# Patient Record
Sex: MALE | Race: WHITE | NOT HISPANIC OR LATINO | Employment: FULL TIME | URBAN - METROPOLITAN AREA
[De-identification: names, ages, dates, MRNs, and addresses within clinical notes are randomized per-mention and may not be internally consistent; named-entity substitution may affect disease eponyms.]

---

## 2017-06-29 ENCOUNTER — OFFICE VISIT (OUTPATIENT)
Dept: FAMILY MEDICINE CLINIC | Facility: CLINIC | Age: 32
End: 2017-06-29

## 2017-06-29 VITALS
RESPIRATION RATE: 16 BRPM | OXYGEN SATURATION: 98 % | HEART RATE: 66 BPM | DIASTOLIC BLOOD PRESSURE: 94 MMHG | WEIGHT: 157.8 LBS | SYSTOLIC BLOOD PRESSURE: 146 MMHG | HEIGHT: 74 IN | BODY MASS INDEX: 20.25 KG/M2 | TEMPERATURE: 98.6 F

## 2017-06-29 DIAGNOSIS — R03.0 PRE-HYPERTENSION: Primary | ICD-10-CM

## 2017-06-29 DIAGNOSIS — F98.8 ADD (ATTENTION DEFICIT DISORDER): ICD-10-CM

## 2017-06-29 LAB
ALBUMIN SERPL-MCNC: 5 G/DL (ref 3.5–5.2)
ALBUMIN/GLOB SERPL: 2 G/DL
ALP SERPL-CCNC: 72 U/L (ref 39–117)
ALT SERPL-CCNC: 20 U/L (ref 1–41)
AST SERPL-CCNC: 22 U/L (ref 1–40)
BASOPHILS # BLD AUTO: 0.04 10*3/MM3 (ref 0–0.2)
BASOPHILS NFR BLD AUTO: 0.5 % (ref 0–1.5)
BILIRUB SERPL-MCNC: 0.4 MG/DL (ref 0.1–1.2)
BUN SERPL-MCNC: 12 MG/DL (ref 6–20)
BUN/CREAT SERPL: 13.3 (ref 7–25)
CALCIUM SERPL-MCNC: 9.7 MG/DL (ref 8.6–10.5)
CHLORIDE SERPL-SCNC: 99 MMOL/L (ref 98–107)
CO2 SERPL-SCNC: 28.6 MMOL/L (ref 22–29)
CREAT SERPL-MCNC: 0.9 MG/DL (ref 0.76–1.27)
EOSINOPHIL # BLD AUTO: 0.13 10*3/MM3 (ref 0–0.7)
EOSINOPHIL NFR BLD AUTO: 1.5 % (ref 0.3–6.2)
ERYTHROCYTE [DISTWIDTH] IN BLOOD BY AUTOMATED COUNT: 13.2 % (ref 11.5–14.5)
GLOBULIN SER CALC-MCNC: 2.5 GM/DL
GLUCOSE SERPL-MCNC: 104 MG/DL (ref 65–99)
HCT VFR BLD AUTO: 47.4 % (ref 40.4–52.2)
HGB BLD-MCNC: 15.8 G/DL (ref 13.7–17.6)
IMM GRANULOCYTES # BLD: 0 10*3/MM3 (ref 0–0.03)
IMM GRANULOCYTES NFR BLD: 0 % (ref 0–0.5)
LYMPHOCYTES # BLD AUTO: 1.87 10*3/MM3 (ref 0.9–4.8)
LYMPHOCYTES NFR BLD AUTO: 21.1 % (ref 19.6–45.3)
MCH RBC QN AUTO: 32.2 PG (ref 27–32.7)
MCHC RBC AUTO-ENTMCNC: 33.3 G/DL (ref 32.6–36.4)
MCV RBC AUTO: 96.7 FL (ref 79.8–96.2)
MONOCYTES # BLD AUTO: 0.55 10*3/MM3 (ref 0.2–1.2)
MONOCYTES NFR BLD AUTO: 6.2 % (ref 5–12)
NEUTROPHILS # BLD AUTO: 6.29 10*3/MM3 (ref 1.9–8.1)
NEUTROPHILS NFR BLD AUTO: 70.7 % (ref 42.7–76)
PLATELET # BLD AUTO: 255 10*3/MM3 (ref 140–500)
POTASSIUM SERPL-SCNC: 4.9 MMOL/L (ref 3.5–5.2)
PROT SERPL-MCNC: 7.5 G/DL (ref 6–8.5)
RBC # BLD AUTO: 4.9 10*6/MM3 (ref 4.6–6)
SODIUM SERPL-SCNC: 140 MMOL/L (ref 136–145)
TSH SERPL DL<=0.005 MIU/L-ACNC: 0.81 MIU/ML (ref 0.27–4.2)
WBC # BLD AUTO: 8.88 10*3/MM3 (ref 4.5–10.7)

## 2017-06-29 PROCEDURE — 99203 OFFICE O/P NEW LOW 30 MIN: CPT | Performed by: INTERNAL MEDICINE

## 2017-06-29 NOTE — PROGRESS NOTES
Subjective   Alvaro Morris is a 31 y.o. male. Patient is here today for establishing care as a new patient and wanting to get on Vyvanse for his ADD.  He had been on that for about 10 years ago.  He's been having increasing problems with concentration, staying on task.  He is generally been healthy.  His only operation was for hemorrhoids about a year ago and he denies any other problems.  He's not on any medications.  He is a smoker of tobacco and works as an .  He is feeling well and is had no chest pain, shortness of breath, edema or significant myalgias.  He says he just took an energy drink    Chief Complaint   Patient presents with   • Establish Care     NP   • ADD     PT WOULD LIKE TO DISCUSS POSSIBILITY OF ADD.           Vitals:    06/29/17 0959   BP: 146/94   Pulse: 66   Resp: 16   Temp: 98.6 °F (37 °C)   SpO2: 98%     The following portions of the patient's history were reviewed and updated as appropriate: allergies, current medications, past family history, past medical history, past social history, past surgical history and problem list.    Past Medical History:   Diagnosis Date   • Bursitis of elbow     right elbow   • Hemorrhoids       No Known Allergies   Social History     Social History   • Marital status:      Spouse name: N/A   • Number of children: N/A   • Years of education: N/A     Occupational History   • Not on file.     Social History Main Topics   • Smoking status: Current Every Day Smoker     Packs/day: 1.00     Years: 10.00   • Smokeless tobacco: Current User   • Alcohol use Yes      Comment: ON OCC   • Drug use: No   • Sexual activity: Not on file     Other Topics Concern   • Not on file     Social History Narrative      No current outpatient prescriptions on file.     Objective     History of Present Illness     Review of Systems   Constitutional: Negative.    HENT: Negative.    Eyes: Negative.    Respiratory: Negative.    Cardiovascular: Negative.     Gastrointestinal: Negative.    Genitourinary: Negative.    Musculoskeletal: Negative.    Skin: Negative.    Neurological: Negative.    Psychiatric/Behavioral: Positive for decreased concentration.       Physical Exam   Constitutional: He appears well-developed and well-nourished.   Pleasant, cooperative and in no distress but with a blood pressure 140/90 currently.  The patient says he just had a Monster energy drink   HENT:   Head: Normocephalic and atraumatic.   Eyes: Conjunctivae are normal.   Neck: Normal range of motion. Neck supple. No thyromegaly present.   Cardiovascular: Normal rate, regular rhythm and normal heart sounds.    Pulmonary/Chest: Effort normal and breath sounds normal. No respiratory distress. He has no wheezes. He has no rales.   Musculoskeletal: Normal range of motion. He exhibits no edema.   Neurological: He is alert.   Skin: Skin is warm and dry.   Psychiatric: He has a normal mood and affect. His behavior is normal.   Nursing note and vitals reviewed.      ASSESSMENT  patient appears stable and does give a good history of ADD with poor concentration and focus.  He has a prior history of being on the Vyvanse 50 mg.  Old records are not available.  His blood pressure is currently borderline high but he just had an energy drink, probably loaded with caffeine.     Problem List Items Addressed This Visit        Cardiovascular and Mediastinum    Pre-hypertension - Primary    Relevant Orders    CBC & Differential    Comprehensive Metabolic Panel    TSH       Other    ADD (attention deficit disorder)    Relevant Orders    TSH          PLAN  I'm going to have the patient try and watch energy drinks and monitor his blood pressure at home.  I will draw a CBC, CMP and TSH today and a plan on rechecking him in one week.  If labs and his blood pressure are okay, we can start trying the Vyvanse    There are no Patient Instructions on file for this visit.  Return in about 1 week (around 7/6/2017).

## 2017-07-06 ENCOUNTER — OFFICE VISIT (OUTPATIENT)
Dept: FAMILY MEDICINE CLINIC | Facility: CLINIC | Age: 32
End: 2017-07-06

## 2017-07-06 VITALS
HEIGHT: 74 IN | BODY MASS INDEX: 20.74 KG/M2 | OXYGEN SATURATION: 99 % | HEART RATE: 67 BPM | DIASTOLIC BLOOD PRESSURE: 76 MMHG | WEIGHT: 161.6 LBS | SYSTOLIC BLOOD PRESSURE: 152 MMHG | TEMPERATURE: 98.1 F

## 2017-07-06 DIAGNOSIS — I10 ESSENTIAL HYPERTENSION: ICD-10-CM

## 2017-07-06 DIAGNOSIS — F98.8 ADD (ATTENTION DEFICIT DISORDER): Primary | ICD-10-CM

## 2017-07-06 PROCEDURE — 99213 OFFICE O/P EST LOW 20 MIN: CPT | Performed by: INTERNAL MEDICINE

## 2017-07-06 RX ORDER — LISINOPRIL 5 MG/1
5 TABLET ORAL DAILY
Qty: 30 TABLET | Refills: 5 | Status: SHIPPED | OUTPATIENT
Start: 2017-07-06 | End: 2017-08-03 | Stop reason: SDUPTHER

## 2017-07-06 NOTE — PROGRESS NOTES
Cristhian Morris is a 31 y.o. male. Patient is here today for follow-up on laboratory studies and a recheck on his blood pressure.  He's been feeling fine.  He does have symptoms and a history of ADD and would like to get back on Vyvanse.  He formerly was on 50 mg.    Chief Complaint   Patient presents with   • ADD     PRE-HYPERTENSION FOLLOW UP          Vitals:    07/06/17 1401   BP: 152/76   Pulse: 67   Temp: 98.1 °F (36.7 °C)   SpO2: 99%     The following portions of the patient's history were reviewed and updated as appropriate: allergies, current medications, past family history, past medical history, past social history, past surgical history and problem list.    Past Medical History:   Diagnosis Date   • Bursitis of elbow     right elbow   • Hemorrhoids       No Known Allergies   Social History     Social History   • Marital status:      Spouse name: N/A   • Number of children: N/A   • Years of education: N/A     Occupational History   • Not on file.     Social History Main Topics   • Smoking status: Current Every Day Smoker     Packs/day: 1.00     Years: 10.00   • Smokeless tobacco: Current User   • Alcohol use Yes      Comment: ON OCC   • Drug use: No   • Sexual activity: Not on file     Other Topics Concern   • Not on file     Social History Narrative        Current Outpatient Prescriptions:   •  lisdexamfetamine (VYVANSE) 30 MG capsule, Take 1 capsule by mouth Every Morning., Disp: 30 capsule, Rfl: 0  •  lisinopril (PRINIVIL,ZESTRIL) 5 MG tablet, Take 1 tablet by mouth Daily., Disp: 30 tablet, Rfl: 5     Objective     History of Present Illness     Review of Systems   Constitutional: Negative.    HENT: Negative.    Eyes: Negative.    Respiratory: Negative.    Cardiovascular: Negative.    Gastrointestinal: Negative.    Genitourinary: Negative.    Musculoskeletal: Negative.    Skin: Negative.    Neurological: Negative.    Psychiatric/Behavioral: Negative.        Physical Exam    Constitutional: He appears well-developed and well-nourished.   Pleasant, cooperative and in no distress with a blood pressure of 140/70   HENT:   Head: Normocephalic and atraumatic.   Eyes: Conjunctivae are normal.   Neck: Normal range of motion. Neck supple.   Cardiovascular: Normal rate, regular rhythm and normal heart sounds.    Pulmonary/Chest: Effort normal and breath sounds normal. No respiratory distress. He has no wheezes. He has no rales.   Musculoskeletal: Normal range of motion.   Neurological: He is alert.   Skin: Skin is warm and dry.   Psychiatric: He has a normal mood and affect. His behavior is normal.   Nursing note and vitals reviewed.      ASSESSMENT  the patient CBC, CMP and TSH were normal.  His blood pressure continues high at 140/80.     Problem List Items Addressed This Visit        Cardiovascular and Mediastinum    Essential hypertension    Relevant Medications    lisinopril (PRINIVIL,ZESTRIL) 5 MG tablet       Other    ADD (attention deficit disorder) - Primary    Relevant Medications    lisdexamfetamine (VYVANSE) 30 MG capsule          PLAN  I'm going to start the patient on lisinopril 5 mg daily.  I'm also starting him on Vyvanse 30 mg daily.  I would like to recheck him in 3 weeks to see how his blood pressures doing to see how the dose of Vyvanse is doing.    There are no Patient Instructions on file for this visit.  Return in about 3 weeks (around 7/27/2017).

## 2017-07-18 ENCOUNTER — TELEPHONE (OUTPATIENT)
Dept: FAMILY MEDICINE CLINIC | Facility: CLINIC | Age: 32
End: 2017-07-18

## 2017-07-18 NOTE — TELEPHONE ENCOUNTER
FAXED BACK -[phone] AND CALLED AND ADVISED PT THAT I FAXED OVER.     ----- Message from Solange Conklin sent at 7/17/2017 10:32 AM EDT -----  PT CALLED THIS MORNING WANTING TO SEND A PAPER OVER FOR DR KEENE TO LOOK AT AND SIGN. AS OF NOW NOTHING FROM HIM IN THE FAX.  IF AND WHEN WE GET IT HE WOULD LIKE US TO FAX IT BACK AFTER IT GETS LOOKED FOR US TO FAX IT BACK [phone]        THANK YOU

## 2017-08-03 ENCOUNTER — OFFICE VISIT (OUTPATIENT)
Dept: FAMILY MEDICINE CLINIC | Facility: CLINIC | Age: 32
End: 2017-08-03

## 2017-08-03 VITALS
OXYGEN SATURATION: 99 % | SYSTOLIC BLOOD PRESSURE: 140 MMHG | WEIGHT: 158.8 LBS | HEART RATE: 65 BPM | TEMPERATURE: 98.1 F | HEIGHT: 74 IN | BODY MASS INDEX: 20.38 KG/M2 | DIASTOLIC BLOOD PRESSURE: 90 MMHG

## 2017-08-03 DIAGNOSIS — F98.8 ADD (ATTENTION DEFICIT DISORDER): ICD-10-CM

## 2017-08-03 DIAGNOSIS — I10 ESSENTIAL HYPERTENSION: Primary | ICD-10-CM

## 2017-08-03 PROCEDURE — 99213 OFFICE O/P EST LOW 20 MIN: CPT | Performed by: INTERNAL MEDICINE

## 2017-08-03 RX ORDER — LISINOPRIL 10 MG/1
10 TABLET ORAL DAILY
Qty: 30 TABLET | Refills: 5 | Status: SHIPPED | OUTPATIENT
Start: 2017-08-03 | End: 2017-09-12 | Stop reason: SDUPTHER

## 2017-08-03 NOTE — PROGRESS NOTES
Subjective   Alvaro Morris is a 31 y.o. male. Patient is here today for follow-up on his hypertension and ADD.  At the last visit he was started on Vyvanse 30 mg daily and has been continuing lisinopril 5 mg.  He has not checked his blood pressure at home but has been feeling better on the lisinopril.  He is not felt a whole lot from the Vyvanse thus far.  He's had no chest pain, shortness of breath, edema or significant myalgias    Chief Complaint   Patient presents with   • ADD     VYVANSE FOLLOW UP    • Hypertension     FOLLOW UP           Vitals:    08/03/17 0841   BP: 140/90   Pulse: 65   Temp: 98.1 °F (36.7 °C)   SpO2: 99%     The following portions of the patient's history were reviewed and updated as appropriate: allergies, current medications, past family history, past medical history, past social history, past surgical history and problem list.    Past Medical History:   Diagnosis Date   • Bursitis of elbow     right elbow   • Hemorrhoids       No Known Allergies   Social History     Social History   • Marital status:      Spouse name: N/A   • Number of children: N/A   • Years of education: N/A     Occupational History   • Not on file.     Social History Main Topics   • Smoking status: Current Every Day Smoker     Packs/day: 1.00     Years: 10.00   • Smokeless tobacco: Current User   • Alcohol use Yes      Comment: ON OCC   • Drug use: No   • Sexual activity: Not on file     Other Topics Concern   • Not on file     Social History Narrative        Current Outpatient Prescriptions:   •  lisdexamfetamine (VYVANSE) 40 MG capsule, Take 1 capsule by mouth Every Morning., Disp: 30 capsule, Rfl: 0  •  lisinopril (PRINIVIL,ZESTRIL) 10 MG tablet, Take 1 tablet by mouth Daily., Disp: 30 tablet, Rfl: 5     Objective     History of Present Illness     Review of Systems   Constitutional: Negative.    HENT: Negative.    Eyes: Negative.    Respiratory: Negative.    Cardiovascular: Negative.    Gastrointestinal:  Negative.    Genitourinary: Negative.    Musculoskeletal: Negative.    Skin: Negative.    Neurological: Negative.    Psychiatric/Behavioral: Negative.        Physical Exam   Constitutional: He appears well-developed and well-nourished.   Pleasant, cooperative and in no distress with a blood pressure of 135/80 and 130/80   HENT:   Head: Normocephalic and atraumatic.   Eyes: Conjunctivae are normal.   Neck: Normal range of motion. Neck supple. No thyromegaly present.   Cardiovascular: Normal rate, regular rhythm and normal heart sounds.    Pulmonary/Chest: Effort normal and breath sounds normal. No respiratory distress. He has no wheezes. He has no rales.   Musculoskeletal: Normal range of motion. He exhibits no edema.   Neurological: He is alert.   Skin: Skin is warm and dry.   Psychiatric: He has a normal mood and affect. His behavior is normal.   Nursing note and vitals reviewed.      ASSESSMENT  overall the patient seems stable.  His blood pressures improved but not optimal.  His ADD has not worsened but not appreciably improved with the Vyvanse.  He is noted no side effects however.  CBC was essentially normal drawn at the last visit.  CMP had a minimally increased sugar 104 but was nonfasting specimen.  TSH was normal.  His Nilson was reviewed and is appropriate.     Problem List Items Addressed This Visit        Cardiovascular and Mediastinum    Essential hypertension - Primary    Relevant Medications    lisinopril (PRINIVIL,ZESTRIL) 10 MG tablet       Other    ADD (attention deficit disorder)    Relevant Medications    lisdexamfetamine (VYVANSE) 40 MG capsule          PLAN  I'm increasing the patient's lisinopril to 10 mg daily for better blood pressure control and am increasing his Vyvanse to 40 mg daily.  I like to recheck him in one month with no laboratory studies      There are no Patient Instructions on file for this visit.  Return in about 1 month (around 9/3/2017).

## 2017-09-12 ENCOUNTER — OFFICE VISIT (OUTPATIENT)
Dept: FAMILY MEDICINE CLINIC | Facility: CLINIC | Age: 32
End: 2017-09-12

## 2017-09-12 VITALS
HEART RATE: 65 BPM | TEMPERATURE: 98.6 F | OXYGEN SATURATION: 99 % | BODY MASS INDEX: 19.69 KG/M2 | DIASTOLIC BLOOD PRESSURE: 74 MMHG | HEIGHT: 74 IN | WEIGHT: 153.4 LBS | SYSTOLIC BLOOD PRESSURE: 120 MMHG

## 2017-09-12 DIAGNOSIS — M25.512 ACUTE PAIN OF BOTH SHOULDERS: ICD-10-CM

## 2017-09-12 DIAGNOSIS — I10 ESSENTIAL HYPERTENSION: Primary | ICD-10-CM

## 2017-09-12 DIAGNOSIS — M25.511 ACUTE PAIN OF BOTH SHOULDERS: ICD-10-CM

## 2017-09-12 DIAGNOSIS — F98.8 ADD (ATTENTION DEFICIT DISORDER): ICD-10-CM

## 2017-09-12 PROCEDURE — 99213 OFFICE O/P EST LOW 20 MIN: CPT | Performed by: INTERNAL MEDICINE

## 2017-09-12 RX ORDER — LISINOPRIL 10 MG/1
10 TABLET ORAL DAILY
Qty: 30 TABLET | Refills: 5 | Status: ON HOLD | OUTPATIENT
Start: 2017-09-12 | End: 2017-12-04

## 2017-09-12 NOTE — PROGRESS NOTES
Subjective   Alvaro Morris is a 31 y.o. male. Patient is here today for follow-up on his hypertension and ADD.  At the last visit I increased his lisinopril and Vyvanse.  He is tolerating those medications fine.  His new complaints today are bilateral shoulder pains that he thinks are related to his job which is very physical.  He says he can only abduct his arms to about 90° without discomfort.  He's had no acute injuries to them and has not seen an orthopedist in the past.     Chief Complaint   Patient presents with   • Hypertension   • ADD     MED CHECK FOR VYVANSE          Vitals:    09/12/17 1521   BP: 120/74   Pulse: 65   Temp: 98.6 °F (37 °C)   SpO2: 99%     The following portions of the patient's history were reviewed and updated as appropriate: allergies, current medications, past family history, past medical history, past social history, past surgical history and problem list.    Past Medical History:   Diagnosis Date   • Bursitis of elbow     right elbow   • Hemorrhoids       No Known Allergies   Social History     Social History   • Marital status:      Spouse name: N/A   • Number of children: N/A   • Years of education: N/A     Occupational History   • Not on file.     Social History Main Topics   • Smoking status: Current Every Day Smoker     Packs/day: 1.00     Years: 10.00   • Smokeless tobacco: Current User   • Alcohol use Yes      Comment: ON OCC   • Drug use: No   • Sexual activity: Not on file     Other Topics Concern   • Not on file     Social History Narrative        Current Outpatient Prescriptions:   •  lisdexamfetamine (VYVANSE) 40 MG capsule, Take 1 capsule by mouth Every Morning., Disp: 30 capsule, Rfl: 0  •  lisinopril (PRINIVIL,ZESTRIL) 10 MG tablet, Take 1 tablet by mouth Daily., Disp: 30 tablet, Rfl: 5     Objective     History of Present Illness     Review of Systems   Constitutional: Negative.    HENT: Negative.    Eyes: Negative.    Respiratory: Negative.     Cardiovascular: Negative.    Gastrointestinal: Negative.    Genitourinary: Negative.    Musculoskeletal: Positive for arthralgias.   Skin: Negative.    Neurological: Negative.    Psychiatric/Behavioral: Negative.        Physical Exam   Constitutional: He is oriented to person, place, and time. He appears well-developed and well-nourished.   Pleasant, cooperative and in no distress with a blood pressure of 120/70   HENT:   Head: Normocephalic and atraumatic.   Eyes: Conjunctivae are normal. Pupils are equal, round, and reactive to light.   Neck: Normal range of motion. Neck supple. No thyromegaly present.   Cardiovascular: Normal rate, regular rhythm and normal heart sounds.    Pulmonary/Chest: Effort normal and breath sounds normal. No respiratory distress. He has no wheezes. He has no rales.   Musculoskeletal: He exhibits tenderness.   Patient has tenderness in the deltoid area of both shoulders and can only abduct to about 90° without significant discomfort starting   Neurological: He is alert and oriented to person, place, and time.   Skin: Skin is warm and dry.   Psychiatric: He has a normal mood and affect. His behavior is normal.   Nursing note and vitals reviewed.      ASSESSMENT  the patient's having bilateral shoulder pain that probably is an overuse syndrome.  However it is impacting his job performance.  I will refer him to the orthopedists.  He feels that the increased dose on the Vyvanse to 40 mg has been helpful and he satisfied with how it's helping.  His blood pressure seems under excellent control on the lisinopril 10 mg.  His Nilson was reviewed and is appropriate.     Problem List Items Addressed This Visit        Cardiovascular and Mediastinum    Essential hypertension - Primary    Relevant Medications    lisinopril (PRINIVIL,ZESTRIL) 10 MG tablet       Other    ADD (attention deficit disorder)    Relevant Medications    lisdexamfetamine (VYVANSE) 40 MG capsule    Acute pain of both shoulders     Relevant Orders    Ambulatory Referral to Orthopedic Surgery          PLAN  I'm referring the patient to the orthopedists for his bilateral shoulder pain.  I refilled his Vyvanse.  I would like to recheck him in 3 months without any laboratory studies that    There are no Patient Instructions on file for this visit.  Return in about 3 months (around 12/12/2017).

## 2017-10-12 ENCOUNTER — OFFICE VISIT (OUTPATIENT)
Dept: ORTHOPEDIC SURGERY | Facility: CLINIC | Age: 32
End: 2017-10-12

## 2017-10-12 VITALS — WEIGHT: 154.4 LBS | BODY MASS INDEX: 19.81 KG/M2 | HEIGHT: 74 IN | TEMPERATURE: 97.8 F

## 2017-10-12 DIAGNOSIS — M25.512 BILATERAL SHOULDER PAIN, UNSPECIFIED CHRONICITY: Primary | ICD-10-CM

## 2017-10-12 DIAGNOSIS — IMO0002 BURSITIS/TENDONITIS, SHOULDER: ICD-10-CM

## 2017-10-12 DIAGNOSIS — M25.511 BILATERAL SHOULDER PAIN, UNSPECIFIED CHRONICITY: Primary | ICD-10-CM

## 2017-10-12 PROCEDURE — 73030 X-RAY EXAM OF SHOULDER: CPT | Performed by: ORTHOPAEDIC SURGERY

## 2017-10-12 PROCEDURE — 20610 DRAIN/INJ JOINT/BURSA W/O US: CPT | Performed by: ORTHOPAEDIC SURGERY

## 2017-10-12 PROCEDURE — 99244 OFF/OP CNSLTJ NEW/EST MOD 40: CPT | Performed by: ORTHOPAEDIC SURGERY

## 2017-10-12 RX ORDER — METHYLPREDNISOLONE ACETATE 80 MG/ML
80 INJECTION, SUSPENSION INTRA-ARTICULAR; INTRALESIONAL; INTRAMUSCULAR; SOFT TISSUE
Status: COMPLETED | OUTPATIENT
Start: 2017-10-12 | End: 2017-10-12

## 2017-10-12 RX ORDER — MELOXICAM 15 MG/1
TABLET ORAL
Qty: 30 TABLET | Refills: 3 | Status: SHIPPED | OUTPATIENT
Start: 2017-10-12 | End: 2017-11-27 | Stop reason: HOSPADM

## 2017-10-12 RX ORDER — BUPIVACAINE HYDROCHLORIDE 5 MG/ML
4 INJECTION, SOLUTION EPIDURAL; INTRACAUDAL
Status: COMPLETED | OUTPATIENT
Start: 2017-10-12 | End: 2017-10-12

## 2017-10-12 RX ADMIN — BUPIVACAINE HYDROCHLORIDE 4 ML: 5 INJECTION, SOLUTION EPIDURAL; INTRACAUDAL at 09:15

## 2017-10-12 RX ADMIN — METHYLPREDNISOLONE ACETATE 80 MG: 80 INJECTION, SUSPENSION INTRA-ARTICULAR; INTRALESIONAL; INTRAMUSCULAR; SOFT TISSUE at 09:15

## 2017-10-12 NOTE — PROGRESS NOTES
New Bilateral Shoulder      Patient: Alvaro Morris        YOB: 1985    Medical Record Number: [id number]        Chief Complaints: Bilateral Shoulder pain  Chief Complaint   Patient presents with   • Left Shoulder - Establish Care, Pain   • Right Shoulder - Establish Care, Pain           History of Present Illness: This is a  31 y.o. male who presents with Complaints of bilateral shoulder pain he is right-hand-dominant right is greater than left.  His been ongoing for 6-8 months.  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  Significant night pain states he can't sleep he is tried ibuprofen and Aleve all with no relief.  His symptoms are severe constant stabbing aching clicking popping snapping smokes a pack a day he was referred here by Dr. Banks is a  past medical history is unremarkable          Allergies: No Known Allergies    Medications:   Home Medications:  Current Outpatient Prescriptions on File Prior to Visit   Medication Sig   • lisdexamfetamine (VYVANSE) 40 MG capsule Take 1 capsule by mouth Every Morning.   • lisinopril (PRINIVIL,ZESTRIL) 10 MG tablet Take 1 tablet by mouth Daily.     No current facility-administered medications on file prior to visit.      Current Medications:  Scheduled Meds:  Continuous Infusions:  No current facility-administered medications for this visit.   PRN Meds:.    Past Medical History:   Diagnosis Date   • Bursitis of elbow     right elbow   • Hemorrhoids         Past Surgical History:   Procedure Laterality Date   • HEMORRHOIDECTOMY N/A 6/23/2016    Procedure: HEMORRHOIDECTOMY;  Surgeon: Atilio Smith MD;  Location: [facility] OR;  Service:         Social History     Occupational History   • Not on file.     Social History Main Topics   • Smoking status: Current Every Day Smoker     Packs/day: 1.00     Years: 10.00  "  • Smokeless tobacco: Current User   • Alcohol use Yes      Comment: ON OCC   • Drug use: No   • Sexual activity: Not on file    Social History     Social History Narrative      History reviewed. No pertinent family history.          Review of Systems: 14 point review of systems are remarkable for the pertinent positives listed in the chart by the patient the remainder are negative    Review of Systems      Physical Exam: 31 y.o. male  General Appearance:    Alert, cooperative, in no acute distress                 Vitals:    10/12/17 0843   Temp: 97.8 °F (36.6 °C)   TempSrc: Temporal Artery    Weight: 154 lb 6.4 oz (70 kg)   Height: 74\" (188 cm)      Patient is alert and read ×3 no acute distress appears her above-listed at height weight and age.  Affect is normal respiratory rate is normal unlabored. Heart rate regular rate rhythm, sclera, dentition and hearing are normal for the purpose of this exam.    Ortho Exam  Physical exam of the right shoulder reveals no overlying skin changes no lymphedema no lymphadenopathy.  Patient has active flexion 180 with mild symptoms abduction is similar external rotation is to 50 and internal rotation to the upper lumbar spine with mild symptoms.  Patient has good rotator cuff strength 4+ over 5 with isometric strength testing with pain.  Patient has a positive impingement and a positive Che sign.  Patient has good cervical range of motion which is full and asymptomatic no radicular symptoms.  Patient has a normal elbow exam.  Good distal pulses are present  Patient has pain with overhead activity and a positive Neer sign and a positive empty can sign  Physical exam of the left shoulder reveals no overlying skin changes no lymphedema no lymphadenopathy.  Patient has active flexion 180 with mild symptoms abduction is similar external rotation is to 50 and internal rotation to the upper lumbar spine with mild symptoms.  Patient has good rotator cuff strength 4+ over 5 with " isometric strength testing with pain.  Patient has a positive impingement and a positive Che sign.  Patient has good cervical range of motion which is full and asymptomatic no radicular symptoms.  Patient has a normal elbow exam.  Good distal pulses are presentPatient has pain with overhead activity and a positive Neer sign and a positive empty can sign      Large Joint Arthrocentesis  Date/Time: 10/12/2017 9:15 AM  Consent given by: patient  Site marked: site marked  Timeout: Immediately prior to procedure a time out was called to verify the correct patient, procedure, equipment, support staff and site/side marked as required   Supporting Documentation  Indications: pain   Procedure Details  Location: shoulder - L subacromial bursa  Preparation: Patient was prepped and draped in the usual sterile fashion  Needle size: 25 G  Approach: posterior  Medications administered: 80 mg methylPREDNISolone acetate 80 MG/ML; 4 mL bupivacaine (PF) 0.5 %  Patient tolerance: patient tolerated the procedure well with no immediate complications    Large Joint Arthrocentesis  Date/Time: 10/12/2017 9:15 AM  Consent given by: patient  Site marked: site marked  Timeout: Immediately prior to procedure a time out was called to verify the correct patient, procedure, equipment, support staff and site/side marked as required   Supporting Documentation  Indications: pain   Procedure Details  Location: shoulder - R subacromial bursa  Preparation: Patient was prepped and draped in the usual sterile fashion  Needle size: 25 G  Approach: posterior  Medications administered: 80 mg methylPREDNISolone acetate 80 MG/ML; 4 mL bupivacaine (PF) 0.5 %  Patient tolerance: patient tolerated the procedure well with no immediate complications                Radiology:   AP, Scapular Y and Axillary Lateral of the right and left shoulder were ordered/reviewed to evauate shoulder pain.  I've no comparative films.  These are normal I see no evidence of any  acute bony pathology    Assessment/Plan:      Bilateral shoulder pain I think this is impingement I think he is just in about pain cycle and continues to work and can get out of it.  I would like to approach this from several different ways.  I will start him on Mobic with strict precautions I will inject both subacromially today which she is agreeable to do I will then start him into physical therapy.  I will see him back in a month if he fails to improve we will pursue other means of testing I will send a copy of the office notes to Dr. Banks

## 2017-10-17 ENCOUNTER — TELEPHONE (OUTPATIENT)
Dept: FAMILY MEDICINE CLINIC | Facility: CLINIC | Age: 32
End: 2017-10-17

## 2017-10-17 NOTE — TELEPHONE ENCOUNTER
RX IS UP FRONT AND READY TO BE PICKED UP. PT IS AWARE.     ----- Message from Amie Paez sent at 10/16/2017 11:00 AM EDT -----  NEEDS A RX FOR VYVANSE # 30     PLEASE CALL PT WHEN READY TO      [phone]

## 2017-10-24 ENCOUNTER — OFFICE VISIT (OUTPATIENT)
Dept: ORTHOPEDIC SURGERY | Facility: CLINIC | Age: 32
End: 2017-10-24

## 2017-10-24 VITALS — HEIGHT: 74 IN | TEMPERATURE: 98.6 F

## 2017-10-24 DIAGNOSIS — M75.101 TEAR OF RIGHT ROTATOR CUFF, UNSPECIFIED TEAR EXTENT: Primary | ICD-10-CM

## 2017-10-24 DIAGNOSIS — S43.431D TEAR OF RIGHT GLENOID LABRUM, SUBSEQUENT ENCOUNTER: ICD-10-CM

## 2017-10-24 PROCEDURE — 99213 OFFICE O/P EST LOW 20 MIN: CPT | Performed by: ORTHOPAEDIC SURGERY

## 2017-10-24 NOTE — PROGRESS NOTES
"Shoulder Follow Up      Patient: Alvaro Morris        YOB: 1985            Chief Complaints: Bilateral Shoulder pain  Chief Complaint   Patient presents with   • Left Shoulder - Follow-up, Pain   • Right Shoulder - Follow-up, Pain           History of Present Illness:Patient is here follow-up bilateral shoulder pain.  The left one is doing well and is responded well to conservative care the right one is still really bothering him it is activity limiting pain he really would like her see to the next step      Physical Exam: 31 y.o. male  General Appearance:    Alert, cooperative, in no acute distress                   Vitals:    10/24/17 1328   Temp: 98.6 °F (37 °C)   TempSrc: Temporal Artery    Height: 74\" (188 cm)        Patient is alert and read ×3 no acute distress appears her above-listed at height weight and age.  Affect is normal respiratory rate is normal unlabored. Heart rate regular rate rhythm, sclera, dentition and hearing are normal for the purpose of this exam.      Ortho Exam    Physical exam of the left shoulder reveals no overlying skin changes no lymphedema no lymphadenopathy.  The patient can actively flex to 180, abduction is similar external rotation is to 50, internal rotation to the upper lumbar spine.  Rotator cuff strength is 4+ to 5 over 5 with isometric strength testing without symptoms.  The patient has good cervical range of motion full and asymptomatic no radicular symptoms and a normal elbow exam.  Patient has good distal pulses  Physical exam of the right shoulder reveals no overlying skin changes no lymphedema no lymphadenopathy.  Patient has active flexion 180 with mild symptoms abduction is similar external rotation is to 50 and internal rotation to the upper lumbar spine with mild symptoms.  Patient has good rotator cuff strength 4+ over 5 with isometric strength testing with pain.  Patient has a positive impingement and a positive Che sign.  Patient has good " cervical range of motion which is full and asymptomatic no radicular symptoms.  Patient has a normal elbow exam.  Good distal pulses are present  Patient has pain with overhead activity and a positive Neer sign and a positive empty can sign        Assessment/Plan:      Bilateral shoulder pain the left has resolved the right is actually a little worse she would like to proceed to the next step.  We will proceed with an MRI.  I would do this with an arthrogram as labral pathology is in my differential

## 2017-10-31 ENCOUNTER — HOSPITAL ENCOUNTER (OUTPATIENT)
Dept: MRI IMAGING | Facility: HOSPITAL | Age: 32
Discharge: HOME | End: 2017-10-31
Attending: ORTHOPAEDIC SURGERY

## 2017-10-31 ENCOUNTER — HOSPITAL ENCOUNTER (OUTPATIENT)
Dept: GENERAL RADIOLOGY | Facility: HOSPITAL | Age: 32
Discharge: HOME | End: 2017-10-31
Attending: ORTHOPAEDIC SURGERY | Admitting: ORTHOPAEDIC SURGERY

## 2017-10-31 DIAGNOSIS — M75.101 TEAR OF RIGHT ROTATOR CUFF, UNSPECIFIED TEAR EXTENT: ICD-10-CM

## 2017-10-31 PROCEDURE — 77002 NEEDLE LOCALIZATION BY XRAY: CPT

## 2017-10-31 PROCEDURE — 73222 MRI JOINT UPR EXTREM W/DYE: CPT

## 2017-10-31 PROCEDURE — A9577 INJ MULTIHANCE: HCPCS | Performed by: RADIOLOGY

## 2017-10-31 PROCEDURE — 0 GADOBENATE DIMEGLUMINE 529 MG/ML SOLUTION: Performed by: RADIOLOGY

## 2017-10-31 PROCEDURE — 0 IOPAMIDOL 61 % SOLUTION: Performed by: RADIOLOGY

## 2017-10-31 RX ORDER — LIDOCAINE HYDROCHLORIDE 10 MG/ML
10 INJECTION, SOLUTION INFILTRATION; PERINEURAL ONCE
Status: COMPLETED | OUTPATIENT
Start: 2017-10-31 | End: 2017-10-31

## 2017-10-31 RX ADMIN — IOPAMIDOL 5 ML: 612 INJECTION, SOLUTION INTRAVENOUS at 08:54

## 2017-10-31 RX ADMIN — LIDOCAINE HYDROCHLORIDE 2 ML: 10 INJECTION, SOLUTION INFILTRATION; PERINEURAL at 08:54

## 2017-10-31 RX ADMIN — GADOBENATE DIMEGLUMINE 0.05 ML: 529 INJECTION, SOLUTION INTRAVENOUS at 08:54

## 2017-11-07 ENCOUNTER — OFFICE VISIT (OUTPATIENT)
Dept: ORTHOPEDIC SURGERY | Facility: CLINIC | Age: 32
End: 2017-11-07

## 2017-11-07 VITALS — TEMPERATURE: 98.9 F | HEIGHT: 73 IN | WEIGHT: 160 LBS | BODY MASS INDEX: 21.2 KG/M2

## 2017-11-07 DIAGNOSIS — M75.110 INCOMPLETE TEAR OF ROTATOR CUFF, UNSPECIFIED LATERALITY: Primary | ICD-10-CM

## 2017-11-07 PROCEDURE — 99213 OFFICE O/P EST LOW 20 MIN: CPT | Performed by: ORTHOPAEDIC SURGERY

## 2017-11-07 RX ORDER — TRAMADOL HYDROCHLORIDE 50 MG/1
TABLET ORAL
Qty: 45 TABLET | Refills: 0 | Status: SHIPPED | OUTPATIENT
Start: 2017-11-07 | End: 2017-12-04 | Stop reason: HOSPADM

## 2017-11-07 NOTE — PROGRESS NOTES
"RTShoulder MRI Follow Up      Patient: Alvaro Morris        YOB: 1985            Chief Complaints:RT Shoulder pain      History of Present Illness: The patient is here follow-up of an MRI of the shoulder MRI shows a near full thickness of the rotator cuff approximately 1 cm patient states his \"shoulder hurts so F'in  bad.\"  He states we have to get this fixed      Physical Exam: 31 y.o. male  General Appearance:    Alert, cooperative, in no acute distress                   Vitals:    11/07/17 1325   Temp: 98.9 °F (37.2 °C)   Weight: 160 lb (72.6 kg)   Height: 73\" (185.4 cm)        Patient is alert and read ×3 no acute distress appears her above-listed at height weight and age.  Affect is normal respiratory rate is normal unlabored. Heart rate regular rate rhythm, sclera, dentition and hearing are normal for the purpose of this exam.      Ortho Exam    Physical exam the right shoulder he only actively flex to about 1:30 with severe pain passively has near normal range of motion rotator cuff strength 44+ over 5 with severe pain  MRI Results: MRI results are as above near full-thickness tear plan is to proceed with repair I have reviewed the findings and the films and agree with him  Procedures      Assessment/Plan:    Right shoulder pain with a near full-thickness rotator cuff tear.  He is quite young to have this but I think it is real plan is to proceed with shoulder arthroscopy rotator cuff repair.  I have discussed with him risks benefits and alternatives The patient voiced understanding of the risks, benefits, and alternative forms of treatment that were discussed and the patient consents to proceed with the above listed surgery.  All risks, benefits and alternatives were discussed.  Risks including to but not exclusive to anesthetic complications, including death, MI, CVA, infection, bleeding DVT, fracture, residual pain and need for future surgery.  He understands these and agrees to proceed. "  Understands that these do not do as well smokers and will attempt to quit

## 2017-11-08 ENCOUNTER — PREP FOR SURGERY (OUTPATIENT)
Dept: OTHER | Facility: HOSPITAL | Age: 32
End: 2017-11-08

## 2017-11-08 DIAGNOSIS — M75.110 INCOMPLETE TEAR OF ROTATOR CUFF, UNSPECIFIED LATERALITY: Primary | ICD-10-CM

## 2017-11-09 ENCOUNTER — TELEPHONE (OUTPATIENT)
Dept: ORTHOPEDIC SURGERY | Facility: CLINIC | Age: 32
End: 2017-11-09

## 2017-11-14 ENCOUNTER — TELEPHONE (OUTPATIENT)
Dept: FAMILY MEDICINE CLINIC | Facility: CLINIC | Age: 32
End: 2017-11-14

## 2017-11-14 NOTE — TELEPHONE ENCOUNTER
RX IS UP FRONT AND READY TO BE PICKED UP. PT IS AWARE.     ----- Message from Love Morales MA sent at 11/13/2017 11:38 AM EST -----  Contact: PT  PT NEEDS RX REFILL FOR VYVANSE 40 MG QTY 30      PT CAN BE REACHED [phone]

## 2017-11-27 ENCOUNTER — APPOINTMENT (OUTPATIENT)
Dept: PREADMISSION TESTING | Facility: HOSPITAL | Age: 32
End: 2017-11-27

## 2017-11-27 VITALS
TEMPERATURE: 97.5 F | OXYGEN SATURATION: 98 % | HEIGHT: 74 IN | RESPIRATION RATE: 18 BRPM | BODY MASS INDEX: 20.28 KG/M2 | HEART RATE: 77 BPM | DIASTOLIC BLOOD PRESSURE: 61 MMHG | SYSTOLIC BLOOD PRESSURE: 119 MMHG | WEIGHT: 158 LBS

## 2017-11-27 LAB
ANION GAP SERPL CALCULATED.3IONS-SCNC: 13.7 MMOL/L
BUN BLD-MCNC: 11 MG/DL (ref 6–20)
BUN/CREAT SERPL: 14.7 (ref 7–25)
CALCIUM SPEC-SCNC: 9.2 MG/DL (ref 8.6–10.5)
CHLORIDE SERPL-SCNC: 103 MMOL/L (ref 98–107)
CO2 SERPL-SCNC: 27.3 MMOL/L (ref 22–29)
CREAT BLD-MCNC: 0.75 MG/DL (ref 0.76–1.27)
DEPRECATED RDW RBC AUTO: 46.3 FL (ref 37–54)
ERYTHROCYTE [DISTWIDTH] IN BLOOD BY AUTOMATED COUNT: 13.4 % (ref 11.5–14.5)
GFR SERPL CREATININE-BSD FRML MDRD: 121 ML/MIN/1.73
GLUCOSE BLD-MCNC: 74 MG/DL (ref 65–99)
HCT VFR BLD AUTO: 42.1 % (ref 40.4–52.2)
HGB BLD-MCNC: 14.4 G/DL (ref 13.7–17.6)
MCH RBC QN AUTO: 32.4 PG (ref 27–32.7)
MCHC RBC AUTO-ENTMCNC: 34.2 G/DL (ref 32.6–36.4)
MCV RBC AUTO: 94.6 FL (ref 79.8–96.2)
PLATELET # BLD AUTO: 211 10*3/MM3 (ref 140–500)
PMV BLD AUTO: 9.5 FL (ref 6–12)
POTASSIUM BLD-SCNC: 3.7 MMOL/L (ref 3.5–5.2)
RBC # BLD AUTO: 4.45 10*6/MM3 (ref 4.6–6)
SODIUM BLD-SCNC: 144 MMOL/L (ref 136–145)
WBC NRBC COR # BLD: 5.01 10*3/MM3 (ref 4.5–10.7)

## 2017-11-27 PROCEDURE — 85027 COMPLETE CBC AUTOMATED: CPT | Performed by: ORTHOPAEDIC SURGERY

## 2017-11-27 PROCEDURE — 36415 COLL VENOUS BLD VENIPUNCTURE: CPT

## 2017-11-27 PROCEDURE — 80048 BASIC METABOLIC PNL TOTAL CA: CPT | Performed by: ORTHOPAEDIC SURGERY

## 2017-11-27 NOTE — DISCHARGE INSTRUCTIONS
Take the following medications the morning of surgery with a small sip of water:        General Instructions:  • Do not eat solid food after midnight the night before surgery.  • You may drink clear liquids day of surgery but must stop at least one hour before your hospital arrival time.  • It is beneficial for you to have a clear drink that contains carbohydrates the day of surgery.  We suggest a 12 to 20 ounce bottle of Gatorade or Powerade for non-diabetic patients or a 12 to 20 ounce bottle of G2 or Powerade Zero for diabetic patients. (Pediatric patients, are not advised to drink a 12 to 20 ounce carbohydrate drink)    Clear liquids are liquids you can see through.  Nothing red in color.     Plain water                               Sports drinks  Sodas                                   Gelatin (Jell-O)  Fruit juices without pulp such as white grape juice and apple juice  Popsicles that contain no fruit or yogurt  Tea or coffee (no cream or milk added)  Gatorade / Powerade  G2 / Powerade Zero    • Infants may have breast milk up to four hours before surgery.  • Infants drinking formula may drink formula up to six hours before surgery.   • Patients who avoid smoking, chewing tobacco and alcohol for 4 weeks prior to surgery have a reduced risk of post-operative complications.  Quit smoking as many days before surgery as you can.  • Do not smoke, use chewing tobacco or drink alcohol the day of surgery.   • If applicable bring your C-PAP/ BI-PAP machine.  • Bring any papers given to you in the doctor’s office.  • Wear clean comfortable clothes and socks.  • Do not wear contact lenses or make-up.  Bring a case for your glasses.   • Bring crutches or walker if applicable.  • Remove all piercings.  Leave jewelry and any other valuables at home.  • The Pre-Admission Testing nurse will instruct you to bring medications if unable to obtain an accurate list in Pre-Admission Testing.        If you were given a blood bank  ID arm band remember to bring it with you the day of surgery.    Preventing a Surgical Site Infection:  • For 2 to 3 days before surgery, avoid shaving with a razor because the razor can irritate skin and make it easier to develop an infection.  • The night prior to surgery sleep in a clean bed with clean clothing.  Do not allow pets to sleep with you.  • Shower on the morning of surgery using a fresh bar of anti-bacterial soap (such as Dial) and clean washcloth.  Dry with a clean towel and dress in clean clothing.  • Ask your surgeon if you will be receiving antibiotics prior to surgery.  • Make sure you, your family, and all healthcare providers clean their hands with soap and water or an alcohol based hand  before caring for you or your wound.    Day of surgery:  Upon arrival, a Pre-op nurse and Anesthesiologist will review your health history, obtain vital signs, and answer questions you may have.  The only belongings needed at this time will be your home medications and if applicable your C-PAP/BI-PAP machine.  If you are staying overnight your family can leave the rest of your belongings in the car and bring them to your room later.  A Pre-op nurse will start an IV and you may receive medication in preparation for surgery, including something to help you relax.  Your family will be able to see you in the Pre-op area.  While you are in surgery your family should notify the waiting room  if they leave the waiting room area and provide a contact phone number.    Please be aware that surgery does come with discomfort.  We want to make every effort to control your discomfort so please discuss any uncontrolled symptoms with your nurse.   Your doctor will most likely have prescribed pain medications.      If you are going home after surgery you will receive individualized written care instructions before being discharged.  A responsible adult must drive you to and from the hospital on the day of  your surgery and stay with you for 24 hours.    If you are staying overnight following surgery, you will be transported to your hospital room following the recovery period.  [facility] has all private rooms.    If you have any questions please call Pre-Admission Testing at [phone].  Deductibles and co-payments are collected on the day of service. Please be prepared to pay the required co-pay, deductible or deposit on the day of service as defined by your plan.

## 2017-11-29 ENCOUNTER — OFFICE VISIT (OUTPATIENT)
Dept: FAMILY MEDICINE CLINIC | Facility: CLINIC | Age: 32
End: 2017-11-29

## 2017-11-29 VITALS
BODY MASS INDEX: 19.87 KG/M2 | OXYGEN SATURATION: 99 % | HEIGHT: 74 IN | HEART RATE: 80 BPM | TEMPERATURE: 97.8 F | DIASTOLIC BLOOD PRESSURE: 80 MMHG | SYSTOLIC BLOOD PRESSURE: 120 MMHG | WEIGHT: 154.8 LBS

## 2017-11-29 DIAGNOSIS — F98.8 ATTENTION DEFICIT DISORDER, UNSPECIFIED HYPERACTIVITY PRESENCE: ICD-10-CM

## 2017-11-29 DIAGNOSIS — I10 ESSENTIAL HYPERTENSION: Primary | ICD-10-CM

## 2017-11-29 DIAGNOSIS — M75.111 INCOMPLETE TEAR OF RIGHT ROTATOR CUFF: ICD-10-CM

## 2017-11-29 PROCEDURE — 99213 OFFICE O/P EST LOW 20 MIN: CPT | Performed by: INTERNAL MEDICINE

## 2017-11-29 NOTE — PROGRESS NOTES
Subjective   Alvaro Morris is a 32 y.o. male. Patient is here today for follow-up on his hypertension and his ADD.  He has been on Vyvanse 40 mg.  He feels a little bit with that but seems to wear off fairly quickly and lungs not feeling it's doing as well as he like.  His blood pressures been doing fine when he checks it and he tells me his quit smoking for the past 3 weeks.  He also is seen the orthopedist and has rotator cuff tear and will be having surgery on his right shoulder by Dr. Cordero next week.    Chief Complaint   Patient presents with   • Hypertension     ADD- PT HERE FOR MED CHECK           Vitals:    11/29/17 0753   BP: 120/80   Pulse: 80   Temp: 97.8 °F (36.6 °C)   SpO2: 99%     The following portions of the patient's history were reviewed and updated as appropriate: allergies, current medications, past family history, past medical history, past social history, past surgical history and problem list.    Past Medical History:   Diagnosis Date   • ADHD    • Bursitis of elbow     right elbow   • Hemorrhoids    • Hypertension     B/P OK SINCE STOPPED SMOKING-NO MEDS CURRENTLY   • Rotator cuff tear       No Known Allergies   Social History     Social History   • Marital status:      Spouse name: N/A   • Number of children: N/A   • Years of education: N/A     Occupational History   • Not on file.     Social History Main Topics   • Smoking status: Former Smoker     Packs/day: 1.00     Years: 10.00     Types: Cigarettes     Quit date: 11/6/2017   • Smokeless tobacco: Current User   • Alcohol use Yes      Comment: ON OCC   • Drug use: No   • Sexual activity: Defer     Other Topics Concern   • Not on file     Social History Narrative        Current Outpatient Prescriptions:   •  traMADol (ULTRAM) 50 MG tablet, 1-2 Oral Q4H PRN severe pain (Patient taking differently: Take  by mouth Every 6 (Six) Hours As Needed. 1-2 Oral Q4H PRN severe pain), Disp: 45 tablet, Rfl: 0  •  lisdexamfetamine (VYVANSE) 50  MG capsule, Take 1 capsule by mouth Every Morning., Disp: 30 capsule, Rfl: 0  •  lisinopril (PRINIVIL,ZESTRIL) 10 MG tablet, Take 1 tablet by mouth Daily. (Patient taking differently: Take 10 mg by mouth Daily. PT NOT TAKING SINCE STOPPED SMOKING 11/6/17), Disp: 30 tablet, Rfl: 5     Objective     History of Present Illness     Review of Systems   Constitutional: Negative.    HENT: Negative.    Eyes: Negative.    Respiratory: Negative.    Cardiovascular: Negative.    Gastrointestinal: Negative.    Endocrine: Negative.    Genitourinary: Negative.    Musculoskeletal:        Right shoulder pain   Skin: Negative.    Neurological: Negative.    Psychiatric/Behavioral: Negative.        Physical Exam   Constitutional: He is oriented to person, place, and time. He appears well-developed and well-nourished.   Pleasant, cooperative no distress with a blood pressure of 110 over 80   HENT:   Head: Normocephalic and atraumatic.   Eyes: Conjunctivae are normal. Pupils are equal, round, and reactive to light.   Neck: Normal range of motion.   Cardiovascular: Normal rate, regular rhythm and normal heart sounds.    Pulmonary/Chest: Effort normal and breath sounds normal. No respiratory distress. He has no wheezes. He has no rales.   Musculoskeletal: Normal range of motion. He exhibits tenderness. He exhibits no edema.   Neurological: He is alert and oriented to person, place, and time.   Skin: Skin is warm and dry.   Psychiatric: He has a normal mood and affect. His behavior is normal.   Nursing note and vitals reviewed.      ASSESSMENT  #1-hypertension, well controlled  #2-ADD, not optimally controlled and we'll try a higher dose of the Vyvanse  #3-rotator cuff tear in the right shoulder with planned surgery by Dr. Cordero  The patient's Nilson was reviewed and is appropriate       Problem List Items Addressed This Visit        Cardiovascular and Mediastinum    Essential hypertension - Primary       Musculoskeletal and Integument     Incomplete tear of rotator cuff       Other    ADD (attention deficit disorder)          PLAN  I'm going to increase the Vyvanse to 50 mg daily and the patient will continue other medicines.  I would like to recheck him in about one month to see how the Vyvanse is doing.    There are no Patient Instructions on file for this visit.  Return in about 1 month (around 12/29/2017).

## 2017-12-04 ENCOUNTER — HOSPITAL ENCOUNTER (OUTPATIENT)
Facility: HOSPITAL | Age: 32
Setting detail: HOSPITAL OUTPATIENT SURGERY
Discharge: HOME | End: 2017-12-04
Attending: ORTHOPAEDIC SURGERY | Admitting: ORTHOPAEDIC SURGERY

## 2017-12-04 ENCOUNTER — ANESTHESIA EVENT (OUTPATIENT)
Dept: PERIOP | Facility: HOSPITAL | Age: 32
End: 2017-12-04

## 2017-12-04 ENCOUNTER — ANESTHESIA (OUTPATIENT)
Dept: PERIOP | Facility: HOSPITAL | Age: 32
End: 2017-12-04

## 2017-12-04 VITALS
DIASTOLIC BLOOD PRESSURE: 88 MMHG | HEART RATE: 84 BPM | SYSTOLIC BLOOD PRESSURE: 137 MMHG | TEMPERATURE: 98 F | RESPIRATION RATE: 16 BRPM | OXYGEN SATURATION: 95 %

## 2017-12-04 PROCEDURE — 25010000002 ONDANSETRON PER 1 MG: Performed by: NURSE ANESTHETIST, CERTIFIED REGISTERED

## 2017-12-04 PROCEDURE — 23412 REPAIR ROTATOR CUFF CHRONIC: CPT | Performed by: ORTHOPAEDIC SURGERY

## 2017-12-04 PROCEDURE — C1713 ANCHOR/SCREW BN/BN,TIS/BN: HCPCS | Performed by: ORTHOPAEDIC SURGERY

## 2017-12-04 PROCEDURE — 25010000002 ROPIVACAINE PER 1 MG: Performed by: ANESTHESIOLOGY

## 2017-12-04 PROCEDURE — 25010000002 PROPOFOL 10 MG/ML EMULSION: Performed by: NURSE ANESTHETIST, CERTIFIED REGISTERED

## 2017-12-04 PROCEDURE — 25010000002 MIDAZOLAM PER 1 MG: Performed by: ANESTHESIOLOGY

## 2017-12-04 PROCEDURE — 29826 SHO ARTHRS SRG DECOMPRESSION: CPT | Performed by: ORTHOPAEDIC SURGERY

## 2017-12-04 PROCEDURE — 25010000002 KETOROLAC TROMETHAMINE PER 15 MG: Performed by: NURSE ANESTHETIST, CERTIFIED REGISTERED

## 2017-12-04 PROCEDURE — 25010000002 DEXAMETHASONE PER 1 MG: Performed by: ANESTHESIOLOGY

## 2017-12-04 PROCEDURE — 25010000002 FENTANYL CITRATE (PF) 100 MCG/2ML SOLUTION: Performed by: ANESTHESIOLOGY

## 2017-12-04 PROCEDURE — 25010000003 CEFAZOLIN IN DEXTROSE 2-4 GM/100ML-% SOLUTION: Performed by: ORTHOPAEDIC SURGERY

## 2017-12-04 PROCEDURE — 29822 SHO ARTHRS SRG LMTD DBRDMT: CPT | Performed by: ORTHOPAEDIC SURGERY

## 2017-12-04 PROCEDURE — 25010000002 EPINEPHRINE PER 0.1 MG: Performed by: ORTHOPAEDIC SURGERY

## 2017-12-04 DEVICE — SUT/ANCH BIOCOMP SWIVELOCK/C BLU FIBR TP LP: Type: IMPLANTABLE DEVICE | Site: SHOULDER | Status: FUNCTIONAL

## 2017-12-04 DEVICE — SUT/ANCH BIOCOMP SWIVELOCK/C 4.75X19.1MM: Type: IMPLANTABLE DEVICE | Site: SHOULDER | Status: FUNCTIONAL

## 2017-12-04 RX ORDER — OXYCODONE HYDROCHLORIDE AND ACETAMINOPHEN 5; 325 MG/1; MG/1
1 TABLET ORAL EVERY 4 HOURS PRN
Status: DISCONTINUED | OUTPATIENT
Start: 2017-12-04 | End: 2017-12-04 | Stop reason: HOSPADM

## 2017-12-04 RX ORDER — DEXAMETHASONE SODIUM PHOSPHATE 4 MG/ML
INJECTION, SOLUTION INTRA-ARTICULAR; INTRALESIONAL; INTRAMUSCULAR; INTRAVENOUS; SOFT TISSUE AS NEEDED
Status: DISCONTINUED | OUTPATIENT
Start: 2017-12-04 | End: 2017-12-04 | Stop reason: SURG

## 2017-12-04 RX ORDER — OXYCODONE AND ACETAMINOPHEN 7.5; 325 MG/1; MG/1
TABLET ORAL
Qty: 60 TABLET | Refills: 0 | Status: SHIPPED | OUTPATIENT
Start: 2017-12-04 | End: 2017-12-08 | Stop reason: SDUPTHER

## 2017-12-04 RX ORDER — LIDOCAINE HYDROCHLORIDE 10 MG/ML
0.5 INJECTION, SOLUTION EPIDURAL; INFILTRATION; INTRACAUDAL; PERINEURAL ONCE AS NEEDED
Status: COMPLETED | OUTPATIENT
Start: 2017-12-04 | End: 2017-12-04

## 2017-12-04 RX ORDER — ONDANSETRON 2 MG/ML
4 INJECTION INTRAMUSCULAR; INTRAVENOUS ONCE AS NEEDED
Status: DISCONTINUED | OUTPATIENT
Start: 2017-12-04 | End: 2017-12-04 | Stop reason: HOSPADM

## 2017-12-04 RX ORDER — CEFAZOLIN SODIUM 2 G/100ML
2 INJECTION, SOLUTION INTRAVENOUS ONCE
Status: COMPLETED | OUTPATIENT
Start: 2017-12-04 | End: 2017-12-04

## 2017-12-04 RX ORDER — FLUMAZENIL 0.1 MG/ML
0.2 INJECTION INTRAVENOUS AS NEEDED
Status: DISCONTINUED | OUTPATIENT
Start: 2017-12-04 | End: 2017-12-04 | Stop reason: HOSPADM

## 2017-12-04 RX ORDER — OXYCODONE HYDROCHLORIDE AND ACETAMINOPHEN 5; 325 MG/1; MG/1
2 TABLET ORAL ONCE AS NEEDED
Status: DISCONTINUED | OUTPATIENT
Start: 2017-12-04 | End: 2017-12-04 | Stop reason: HOSPADM

## 2017-12-04 RX ORDER — HYDROCODONE BITARTRATE AND ACETAMINOPHEN 7.5; 325 MG/1; MG/1
1 TABLET ORAL ONCE AS NEEDED
Status: DISCONTINUED | OUTPATIENT
Start: 2017-12-04 | End: 2017-12-04 | Stop reason: HOSPADM

## 2017-12-04 RX ORDER — FENTANYL CITRATE 50 UG/ML
100 INJECTION, SOLUTION INTRAMUSCULAR; INTRAVENOUS
Status: DISCONTINUED | OUTPATIENT
Start: 2017-12-04 | End: 2017-12-04 | Stop reason: HOSPADM

## 2017-12-04 RX ORDER — LIDOCAINE HYDROCHLORIDE 20 MG/ML
INJECTION, SOLUTION INFILTRATION; PERINEURAL AS NEEDED
Status: DISCONTINUED | OUTPATIENT
Start: 2017-12-04 | End: 2017-12-04 | Stop reason: SURG

## 2017-12-04 RX ORDER — EPHEDRINE SULFATE 50 MG/ML
5 INJECTION, SOLUTION INTRAVENOUS ONCE AS NEEDED
Status: DISCONTINUED | OUTPATIENT
Start: 2017-12-04 | End: 2017-12-04 | Stop reason: HOSPADM

## 2017-12-04 RX ORDER — LABETALOL HYDROCHLORIDE 5 MG/ML
5 INJECTION, SOLUTION INTRAVENOUS
Status: DISCONTINUED | OUTPATIENT
Start: 2017-12-04 | End: 2017-12-04 | Stop reason: HOSPADM

## 2017-12-04 RX ORDER — PROPOFOL 10 MG/ML
VIAL (ML) INTRAVENOUS AS NEEDED
Status: DISCONTINUED | OUTPATIENT
Start: 2017-12-04 | End: 2017-12-04 | Stop reason: SURG

## 2017-12-04 RX ORDER — SODIUM CHLORIDE 0.9 % (FLUSH) 0.9 %
1-10 SYRINGE (ML) INJECTION AS NEEDED
Status: DISCONTINUED | OUTPATIENT
Start: 2017-12-04 | End: 2017-12-04 | Stop reason: HOSPADM

## 2017-12-04 RX ORDER — FENTANYL CITRATE 50 UG/ML
50 INJECTION, SOLUTION INTRAMUSCULAR; INTRAVENOUS
Status: DISCONTINUED | OUTPATIENT
Start: 2017-12-04 | End: 2017-12-04 | Stop reason: HOSPADM

## 2017-12-04 RX ORDER — ONDANSETRON 4 MG/1
4 TABLET, FILM COATED ORAL EVERY 8 HOURS PRN
Qty: 20 TABLET | Refills: 0 | Status: SHIPPED | OUTPATIENT
Start: 2017-12-04 | End: 2018-01-17

## 2017-12-04 RX ORDER — ROPIVACAINE HYDROCHLORIDE 5 MG/ML
INJECTION, SOLUTION EPIDURAL; INFILTRATION; PERINEURAL AS NEEDED
Status: DISCONTINUED | OUTPATIENT
Start: 2017-12-04 | End: 2017-12-04 | Stop reason: SURG

## 2017-12-04 RX ORDER — WOUND DRESSING ADHESIVE - LIQUID
LIQUID MISCELLANEOUS AS NEEDED
Status: DISCONTINUED | OUTPATIENT
Start: 2017-12-04 | End: 2017-12-04 | Stop reason: HOSPADM

## 2017-12-04 RX ORDER — DIPHENHYDRAMINE HYDROCHLORIDE 50 MG/ML
6.25 INJECTION INTRAMUSCULAR; INTRAVENOUS
Status: DISCONTINUED | OUTPATIENT
Start: 2017-12-04 | End: 2017-12-04 | Stop reason: HOSPADM

## 2017-12-04 RX ORDER — MIDAZOLAM HYDROCHLORIDE 1 MG/ML
2 INJECTION INTRAMUSCULAR; INTRAVENOUS
Status: DISCONTINUED | OUTPATIENT
Start: 2017-12-04 | End: 2017-12-04 | Stop reason: HOSPADM

## 2017-12-04 RX ORDER — KETOROLAC TROMETHAMINE 15 MG/ML
15 INJECTION, SOLUTION INTRAMUSCULAR; INTRAVENOUS EVERY 8 HOURS
Status: DISCONTINUED | OUTPATIENT
Start: 2017-12-04 | End: 2017-12-04 | Stop reason: HOSPADM

## 2017-12-04 RX ORDER — BUPIVACAINE HYDROCHLORIDE 5 MG/ML
INJECTION, SOLUTION PERINEURAL AS NEEDED
Status: DISCONTINUED | OUTPATIENT
Start: 2017-12-04 | End: 2017-12-04 | Stop reason: HOSPADM

## 2017-12-04 RX ORDER — ONDANSETRON 2 MG/ML
INJECTION INTRAMUSCULAR; INTRAVENOUS AS NEEDED
Status: DISCONTINUED | OUTPATIENT
Start: 2017-12-04 | End: 2017-12-04 | Stop reason: SURG

## 2017-12-04 RX ORDER — MIDAZOLAM HYDROCHLORIDE 1 MG/ML
1 INJECTION INTRAMUSCULAR; INTRAVENOUS
Status: DISCONTINUED | OUTPATIENT
Start: 2017-12-04 | End: 2017-12-04 | Stop reason: HOSPADM

## 2017-12-04 RX ORDER — SODIUM CHLORIDE, SODIUM LACTATE, POTASSIUM CHLORIDE, CALCIUM CHLORIDE 600; 310; 30; 20 MG/100ML; MG/100ML; MG/100ML; MG/100ML
9 INJECTION, SOLUTION INTRAVENOUS CONTINUOUS
Status: DISCONTINUED | OUTPATIENT
Start: 2017-12-04 | End: 2017-12-04 | Stop reason: HOSPADM

## 2017-12-04 RX ORDER — FAMOTIDINE 10 MG/ML
20 INJECTION, SOLUTION INTRAVENOUS ONCE
Status: DISCONTINUED | OUTPATIENT
Start: 2017-12-04 | End: 2017-12-04 | Stop reason: HOSPADM

## 2017-12-04 RX ORDER — KETOROLAC TROMETHAMINE 30 MG/ML
INJECTION, SOLUTION INTRAMUSCULAR; INTRAVENOUS AS NEEDED
Status: DISCONTINUED | OUTPATIENT
Start: 2017-12-04 | End: 2017-12-04 | Stop reason: SURG

## 2017-12-04 RX ORDER — HYDRALAZINE HYDROCHLORIDE 20 MG/ML
5 INJECTION INTRAMUSCULAR; INTRAVENOUS
Status: DISCONTINUED | OUTPATIENT
Start: 2017-12-04 | End: 2017-12-04 | Stop reason: HOSPADM

## 2017-12-04 RX ADMIN — ONDANSETRON 4 MG: 2 INJECTION INTRAMUSCULAR; INTRAVENOUS at 08:47

## 2017-12-04 RX ADMIN — CEFAZOLIN SODIUM 2 G: 2 INJECTION, SOLUTION INTRAVENOUS at 08:14

## 2017-12-04 RX ADMIN — FENTANYL CITRATE 100 MCG: 50 INJECTION INTRAMUSCULAR; INTRAVENOUS at 07:30

## 2017-12-04 RX ADMIN — SODIUM CHLORIDE, POTASSIUM CHLORIDE, SODIUM LACTATE AND CALCIUM CHLORIDE 9 ML/HR: 600; 310; 30; 20 INJECTION, SOLUTION INTRAVENOUS at 07:30

## 2017-12-04 RX ADMIN — DEXAMETHASONE SODIUM PHOSPHATE 4 MG: 4 INJECTION, SOLUTION INTRAMUSCULAR; INTRAVENOUS at 07:44

## 2017-12-04 RX ADMIN — DEXAMETHASONE SODIUM PHOSPHATE 8 MG: 4 INJECTION, SOLUTION INTRAMUSCULAR; INTRAVENOUS at 08:25

## 2017-12-04 RX ADMIN — Medication 2 MG: at 07:30

## 2017-12-04 RX ADMIN — Medication 2 MG: at 07:35

## 2017-12-04 RX ADMIN — SODIUM CHLORIDE, POTASSIUM CHLORIDE, SODIUM LACTATE AND CALCIUM CHLORIDE 9 ML/HR: 600; 310; 30; 20 INJECTION, SOLUTION INTRAVENOUS at 10:05

## 2017-12-04 RX ADMIN — LIDOCAINE HYDROCHLORIDE 100 MG: 20 INJECTION, SOLUTION INFILTRATION; PERINEURAL at 08:17

## 2017-12-04 RX ADMIN — PROPOFOL 200 MG: 10 INJECTION, EMULSION INTRAVENOUS at 08:17

## 2017-12-04 RX ADMIN — ROPIVACAINE HYDROCHLORIDE 20 ML: 5 INJECTION, SOLUTION EPIDURAL; INFILTRATION; PERINEURAL at 07:44

## 2017-12-04 RX ADMIN — KETOROLAC TROMETHAMINE 30 MG: 30 INJECTION, SOLUTION INTRAMUSCULAR; INTRAVENOUS at 09:15

## 2017-12-04 RX ADMIN — LIDOCAINE HYDROCHLORIDE 0.5 ML: 10 INJECTION, SOLUTION EPIDURAL; INFILTRATION; INTRACAUDAL; PERINEURAL at 07:30

## 2017-12-04 NOTE — ANESTHESIA PROCEDURE NOTES
Peripheral Block    Patient location during procedure: holding area  Start time: 12/4/2017 7:35 AM  Stop time: 12/4/2017 7:45 AM  Reason for block: at surgeon's request and post-op pain management  Performed by  Anesthesiologist: MOISÉS SANON  Preanesthetic Checklist  Completed: patient identified, site marked, surgical consent, pre-op evaluation, timeout performed, IV checked, risks and benefits discussed and monitors and equipment checked  Prep:  Pt Position: supine  Sterile barriers:cap, gloves, mask and sterile barriers  Prep: ChloraPrep  Patient monitoring: blood pressure monitoring, continuous pulse oximetry and EKG  Procedure  Sedation:yes  Performed under: local infiltration  Guidance:ultrasound guided  ULTRASOUND INTERPRETATION.  Using ultrasound guidance a 22 G gauge needle was placed in close proximity to the brachial plexus nerve, at which point, under ultrasound guidance anesthetic was injected in the area of the nerve and spread of the anesthesia was seen on ultrasound in close proximity thereto.  There were no abnormalities seen on ultrasound; a digital image was taken; and the patient tolerated the procedure with no complications. Images:still images obtained    Laterality:right  Block Type:interscalene  Injection Technique:single-shot  Needle Type:echogenic  Needle Gauge:22 G  Resistance on Injection: less than 15 psi  Medications  Local Injected:ropivacaine 0.5% without epinephrine Local Amount Injected:20mL  Post Assessment  Injection Assessment: negative aspiration for heme, no paresthesia on injection and incremental injection  Patient Tolerance:comfortable throughout block  Complications:no  Additional Notes  rop 20cc / dex 4mg

## 2017-12-04 NOTE — PLAN OF CARE
Problem: Patient Care Overview (Adult)  Goal: Plan of Care Review  Outcome: Ongoing (interventions implemented as appropriate)    12/04/17 0705   Coping/Psychosocial Response Interventions   Plan Of Care Reviewed With patient   Patient Care Overview   Progress improving       Goal: Adult Individualization and Mutuality  Outcome: Ongoing (interventions implemented as appropriate)  Goal: Discharge Needs Assessment  Outcome: Ongoing (interventions implemented as appropriate)    12/04/17 0705   Discharge Needs Assessment   Concerns To Be Addressed no discharge needs identified

## 2017-12-04 NOTE — PLAN OF CARE
Problem: Perioperative Period (Adult)  Goal: Signs and Symptoms of Listed Potential Problems Will be Absent or Manageable (Perioperative Period)  Outcome: Ongoing (interventions implemented as appropriate)    12/04/17 0743   Perioperative Period   Problems Assessed (Perioperative Period) all   Problems Present (Perioperative Period) none

## 2017-12-04 NOTE — ANESTHESIA POSTPROCEDURE EVALUATION
Patient: Alvaro Morris    Procedure Summary     Date Anesthesia Start Anesthesia Stop Room / Location    12/04/17 0812 0942  [facility]  /  [facility]       Procedure Diagnosis Surgeon Provider    SHOULDER ARTHROSCOPY WITH MINI OPEN ROTATOR CUFF REPAIR  DEBRIDEMENT OF LABRUM DEBRIDEMENT OF SUBSCAPULARIS DECOMPRESSION (Right Shoulder) Incomplete tear of rotator cuff, unspecified laterality  (Incomplete tear of rotator cuff, unspecified laterality [M75.110]) MD Kenneth Landers MD          Anesthesia Type: general  Last vitals  BP   137/88 (12/04/17 1054)   Temp   36.7 °C (98 °F) (12/04/17 0941)   Pulse   84 (12/04/17 1054)   Resp   16 (12/04/17 1030)     SpO2   95 % (12/04/17 1054)     Post Anesthesia Care and Evaluation    Patient location during evaluation: bedside  Patient participation: complete - patient participated  Level of consciousness: awake and alert  Pain management: adequate  Airway patency: patent  Anesthetic complications: No anesthetic complications    Cardiovascular status: acceptable  Respiratory status: acceptable  Hydration status: acceptable    Comments: /88 (BP Location: Left arm, Patient Position: Lying)  Pulse 84  Temp 36.7 °C (98 °F) (Temporal Artery )   Resp 16  SpO2 95%

## 2017-12-04 NOTE — H&P
History & Physical       Patient: Alvaro Morris    Date of Admission: No admission date for patient encounter.    YOB: 1985    Medical Record Number: [id number]    Attending Physician: Pily Cordero MD        Chief Complaints: Incomplete tear of rotator cuff, unspecified laterality [M75.110]  Right shoulder pain    History of Present Illness:  This is a 32-year-old gentleman with several month history of right shoulder pain he is a near full-thickness tear of the rotator cuff and presents for repair   Allergies: No Known Allergies    Medications:   Home Medications:  No current facility-administered medications on file prior to encounter.      Current Outpatient Prescriptions on File Prior to Encounter   Medication Sig   • lisinopril (PRINIVIL,ZESTRIL) 10 MG tablet Take 1 tablet by mouth Daily. (Patient taking differently: Take 10 mg by mouth Daily. PT NOT TAKING SINCE STOPPED SMOKING 11/6/17)   • traMADol (ULTRAM) 50 MG tablet 1-2 Oral Q4H PRN severe pain (Patient taking differently: Take  by mouth Every 6 (Six) Hours As Needed. 1-2 Oral Q4H PRN severe pain)     Current Medications:  Scheduled Meds:  Continuous Infusions:  No current facility-administered medications for this encounter.   PRN Meds:.    Past Medical History:   Diagnosis Date   • ADHD    • Bursitis of elbow     right elbow   • Hemorrhoids    • Hypertension     B/P OK SINCE STOPPED SMOKING-NO MEDS CURRENTLY   • Rotator cuff tear         Past Surgical History:   Procedure Laterality Date   • HEMORRHOIDECTOMY N/A 6/23/2016    Procedure: HEMORRHOIDECTOMY;  Surgeon: Atilio Smith MD;  Location: [facility] OR;  Service:         Social History     Occupational History   • Not on file.     Social History Main Topics   • Smoking status: Former Smoker     Packs/day: 1.00     Years: 10.00     Types: Cigarettes     Quit date: 11/6/2017   • Smokeless tobacco: Current User   • Alcohol use Yes      Comment: ON OCC   • Drug use: No   •  Sexual activity: Defer    Social History     Social History Narrative      No family history on file.    Review of Systems      Physical Exam: 32 y.o. male  General Appearance:    Alert, cooperative, in no acute distress                    There were no vitals filed for this visit.     Head:    Normocephalic, without obvious abnormality, atraumatic   Eyes:            conjunctivae and sclerae normal, no pallor, corneas clear,    Ears:    Ears appear intact with no abnormalities noted   Throat:   No oral lesions, no thrush, oral mucosa moist   Neck:   No adenopathy, supple, trachea midline, no thyromegaly,    Back:     No kyphosis present, no scoliosis present, no skin lesions,      erythema or scars, no tenderness to percussion or                   palpation,   range of motion normal   Lungs:     Clear to auscultation,respirations regular, even and                  unlabored    Heart:    Regular rhythm and normal rate               Chest Wall:    No abnormalities observed   Abdomen:     Normal bowel sounds, no masses, no organomegaly, soft        non-tender, non-distended, no guarding, no rebound                tenderness   Rectal:     Deferred   Extremities:    Moves all extremities well, no edema,   no cyanosis, no redness   Pulses:   Pulses palpable and equal bilaterally   Skin:   No bleeding, bruising or rash   Lymph nodes:   No palpable adenopathy   Neurologic:   Appears neurologic intact             Assessment:  Patient Active Problem List   Diagnosis   • ADD (attention deficit disorder)   • Essential hypertension   • Acute pain of both shoulders   • Incomplete tear of rotator cuff           Plan: All risks, benefits and alternatives were discussed.  Risks including to but not exclusive to anesthetic complications, including death, MI, CVA, infection, bleeding DVT, PE,  fracture, residual pain and need for future surgery. He also understands potential failure for repair and the importance of following strict  postoperative protocol Patient understood all and agrees to proceed.

## 2017-12-04 NOTE — ANESTHESIA PROCEDURE NOTES
Airway  Urgency: elective    Airway not difficult    General Information and Staff    Patient location during procedure: OR  Anesthesiologist: MOISÉS SANON  CRNA: AMBER CHIANG    Indications and Patient Condition  Indications for airway management: airway protection    Preoxygenated: yes  MILS not maintained throughout  Mask difficulty assessment: 1 - vent by mask    Final Airway Details  Final airway type: supraglottic airway      Successful airway: classic  Size 5    Number of attempts at approach: 1    Additional Comments  Inflated to seal.

## 2017-12-04 NOTE — ANESTHESIA PREPROCEDURE EVALUATION
Anesthesia Evaluation     Patient summary reviewed and Nursing notes reviewed   NPO Solid Status: > 8 hours       Airway   Mallampati: II  TM distance: >3 FB  Neck ROM: full  no difficulty expected  Dental - normal exam     Pulmonary - negative pulmonary ROS and normal exam   Cardiovascular - normal exam    (+) hypertension,       Neuro/Psych- negative ROS  GI/Hepatic/Renal/Endo - negative ROS     Musculoskeletal (-) negative ROS    Abdominal  - normal exam   Substance History - negative use     OB/GYN negative ob/gyn ROS         Other                                        Anesthesia Plan    ASA 2     general   (Isb popc  )  intravenous induction   Anesthetic plan and risks discussed with patient.    Plan discussed with CRNA.

## 2017-12-04 NOTE — ANESTHESIA PROCEDURE NOTES
Peripheral Block    Patient location during procedure: holding area  Start time: 12/4/2017 10:27 AM  Stop time: 12/4/2017 10:37 AM  Reason for block: at surgeon's request and post-op pain management  Performed by  Anesthesiologist: MOISÉS SANON  Preanesthetic Checklist  Completed: patient identified, site marked, surgical consent, pre-op evaluation, timeout performed, IV checked, risks and benefits discussed and monitors and equipment checked  Prep:  Pt Position: supine  Sterile barriers:cap, gloves, mask and sterile barriers  Prep: ChloraPrep  Patient monitoring: blood pressure monitoring, continuous pulse oximetry and EKG  Procedure  Sedation:no  Performed under: local infiltration  Guidance:ultrasound guided  ULTRASOUND INTERPRETATION.  Using ultrasound guidance a 22 G gauge needle was placed in close proximity to the brachial plexus nerve, at which point, under ultrasound guidance anesthetic was injected in the area of the nerve and spread of the anesthesia was seen on ultrasound in close proximity thereto.  There were no abnormalities seen on ultrasound; a digital image was taken; and the patient tolerated the procedure with no complications. Images:still images obtained    Laterality:right  Block Type:interscalene  Injection Technique:catheter  Needle Type:echogenic  Needle Gauge:22 G  Resistance on Injection: less than 15 psi  Post Assessment  Injection Assessment: negative aspiration for heme, no paresthesia on injection and incremental injection  Patient Tolerance:comfortable throughout block  Complications:no  Additional Notes  Cath placed for popc psr

## 2017-12-04 NOTE — OP NOTE
Rotator Cuff RepairOperative Note      Facility: [facility]  Patient Name: Alvaro Morris  YOB: 1985  Date: 12/4/2017  Medical Record Number: [id number]      Pre-op Diagnosis:   Incomplete tear of rotator cuff, unspecified laterality [M75.110] right    Post-Op Diagnosis Codes:     * Incomplete tear of rotator cuff, unspecified laterality [M75.110]  Small full-thickness tear of right rotator cuff, degenerative labral tear partial tear of subscapularis and impingement  Procedure(s):  SHOULDER ARTHROSCOPY WITH MINI OPEN ROTATOR CUFF REPAIR  DEBRIDEMENT OF LABRUM DEBRIDEMENT OF SUBSCAPULARIS DECOMPRESSION    Surgeon(s):  Pily Cordero MD    Anesthesia: General with Block  Anesthesiologist: Kenneth Zepeda MD  CRNA: Katy Barreto CRNA    Staff:   Circulator: Harika Villar RN  Scrub Person: Clarence Muse RN  Vendor Representative: Deon Ocampo; Sherman Oliva    Assistants :   None    Estimated Blood Loss: 50 cc    Specimens:   None    Drains: None    Findings: See Dictation    Complications: None    Indication for procedure:   This patient has had a several month history of right shoulder pain that is been unresponsive to conservative management.  They have an exam and an MRI which are consistent with rotator cuff pathology and they present for arthroscopy and possible repair.  They understand all risks benefits and alternatives.  Risk including but not exclusive to anesthetic complications including death,  MI.  CVA as well as infection bleeding DVT PE stiffness,  failure to relieve their symptoms and need for future surgery.  They understand this and agree to proceed.    Description of procedure:  Patient was taken to the operating room.  They were placed supine on the  operating room table.  After induction of adequate LMA anesthesia and scalene nerve block and IV antibiotics.  They underwent exam under anesthesia was symmetric full range of motion which was  symmetric side to side.  They were then placed in the modified beachchair position all prominent areas well padded and head well stabilized.  The arm was prepped and draped  in usual sterile fashion, bony landmarks were demarcated and the joint was infiltrated with 30 cc of fluid.  A standard posterior portal was made inferior and medial to the posterior lateral edge of the acromion with an 11 blade.  Blunt trocar penetrated into the joint scope following her evaluation began.  An anterior portal was established in the interval between the subscapularis and the biceps tendon with spinal needle localization and direct visualization. he was found have a degenerative tear of the superior aspect of the labrum that was debrided with the Apollo device and the motorized shaver.  He was also found to have a partial tear of the subscapularis was that was debrided with a motorized shaver and the Apollo device.  The rotator cuff appeared pathologic.    I then exited the space, entered subacromial space. I made accessory lateral portal off the anterior lateral edge of the acromion,placed the shaver and removed thickened bursa. I delineated the anterior lateral edge of the acromion with the Opus device and removed a large spur with a bone cutter. There was plenty of room under this joint after this for the rotator cuff. **The rotator cuff was evaluated and was found have a small full-thickness tear.. I did place a retention stitch with the scorpion device. I then exited the space, placed the Kobel retractors and readily identified the rotator cuff tear. The tuberosity was prepared with a rongeur.  2 Arthrex swivel lock anchors preloaded with fiber tape was placed in standard fashion with good bony pullout. A fiber tape was preloaded scorpion device was used to place each of the fiber wires one anterior one posterior. I then placed a cinch stitch anterior and posterior and incorporated this cinch stitch and 2 fiber wires in each  of 2 swivel lock anchors on the lateral row.  The decompression was adequate. Everything was thoroughly irrigated. The deltoid is reapproximated with 0 Vicryl, subcutaneous tissue with 2-0 Vicryl. The skin was closed with a running 4-0 subcuticular stitch. Sterile dressings Steri-Strips were applied. The portals were closed with 2-0 Vicryl. Sling was applied. The patient was taken to recovery room in good condition all sponge and needle count were correct.  I did inject the posterior portal with half percent Marcaine plain          Date: 12/4/2017  Time: 10:00 AM      brittany

## 2017-12-08 ENCOUNTER — TELEPHONE (OUTPATIENT)
Dept: ORTHOPEDIC SURGERY | Facility: CLINIC | Age: 32
End: 2017-12-08

## 2017-12-08 RX ORDER — OXYCODONE AND ACETAMINOPHEN 7.5; 325 MG/1; MG/1
TABLET ORAL
Qty: 60 TABLET | Refills: 0 | Status: SHIPPED | OUTPATIENT
Start: 2017-12-08 | End: 2017-12-14 | Stop reason: SDUPTHER

## 2017-12-08 NOTE — TELEPHONE ENCOUNTER
Spoke with patient re: prescription pick-up at [facility].pt verbalized understanding and states that he will pick-up script today (12/08/2017). AW

## 2017-12-13 ENCOUNTER — TELEPHONE (OUTPATIENT)
Dept: ORTHOPEDIC SURGERY | Facility: CLINIC | Age: 32
End: 2017-12-13

## 2017-12-13 NOTE — TELEPHONE ENCOUNTER
Lennon can not create a new encounter for this to print at EP tomorrow for NEHA for me please thanks

## 2017-12-14 RX ORDER — OXYCODONE AND ACETAMINOPHEN 7.5; 325 MG/1; MG/1
TABLET ORAL
Qty: 50 TABLET | Refills: 0 | Status: SHIPPED | OUTPATIENT
Start: 2017-12-14 | End: 2017-12-18 | Stop reason: ALTCHOICE

## 2017-12-18 ENCOUNTER — OFFICE VISIT (OUTPATIENT)
Dept: ORTHOPEDIC SURGERY | Facility: CLINIC | Age: 32
End: 2017-12-18

## 2017-12-18 VITALS — WEIGHT: 155 LBS | HEIGHT: 74 IN | BODY MASS INDEX: 19.89 KG/M2 | TEMPERATURE: 98.2 F

## 2017-12-18 DIAGNOSIS — M75.121 COMPLETE TEAR OF RIGHT ROTATOR CUFF: Primary | ICD-10-CM

## 2017-12-18 DIAGNOSIS — Z98.890 S/P ROTATOR CUFF REPAIR: ICD-10-CM

## 2017-12-18 PROCEDURE — 99024 POSTOP FOLLOW-UP VISIT: CPT | Performed by: ORTHOPAEDIC SURGERY

## 2017-12-18 RX ORDER — MELOXICAM 15 MG/1
TABLET ORAL
COMMUNITY
Start: 2017-12-08 | End: 2017-12-18 | Stop reason: ALTCHOICE

## 2017-12-18 RX ORDER — OXYCODONE HYDROCHLORIDE AND ACETAMINOPHEN 5; 325 MG/1; MG/1
TABLET ORAL
Qty: 50 TABLET | Refills: 0 | Status: SHIPPED | OUTPATIENT
Start: 2017-12-18 | End: 2017-12-21 | Stop reason: SDUPTHER

## 2017-12-18 RX ORDER — IBUPROFEN 800 MG/1
800 TABLET ORAL 3 TIMES DAILY
Qty: 90 TABLET | Refills: 3 | Status: SHIPPED | OUTPATIENT
Start: 2017-12-18 | End: 2018-02-08 | Stop reason: HOSPADM

## 2017-12-18 NOTE — PROGRESS NOTES
"Shoulder Scope RCR follow Up 1st Visit      Patient: Alvaro Morris        YOB: 1985      Chief Complaints: shoulder pain right      History of Present Illness: Pt is here f/u shoulder arthroscopy rotator cuff repair he states she's doing good he is still taking pain medicine 2 every 4-5 hours        Allergies: No Known Allergies    Medications:   Home Medications:  Current Outpatient Prescriptions on File Prior to Visit   Medication Sig   • lisdexamfetamine (VYVANSE) 50 MG capsule Take 1 capsule by mouth Every Morning.   • ondansetron (ZOFRAN) 4 MG tablet Take 1 tablet by mouth Every 8 (Eight) Hours As Needed for Nausea or Vomiting.   • [DISCONTINUED] oxyCODONE-acetaminophen (PERCOCET) 7.5-325 MG per tablet  1-2 Oral Q4H PRN severe pain     No current facility-administered medications on file prior to visit.      Current Medications:  Scheduled Meds:  Continuous Infusions:  No current facility-administered medications for this visit.   PRN Meds:.          Physical Exam: 32 y.o. male  General Appearance:    Alert, cooperative, in no acute distress                 Vitals:    12/18/17 1222   Temp: 98.2 °F (36.8 °C)   Weight: 70.3 kg (155 lb)   Height: 188 cm (74\")      Patient is alert and oriented ×3 no acute distress normal mood physical exam.  Physical exam of the shoulder, incisions looked good there is no erythema,no signs or sx of infection.        Assessment  S/P shoulder scope.  I did review intraoperative findings and arthroscopic pictures with the patient.He is doing fine we will remove his sutures today and Steri-Strips I will refill his pain medicine but I will drop him down to Percocet 5 over like to start spreading these out as well I will see him back in 1 month.  I will have him see physical therapy he wants to go up stairs      "

## 2017-12-21 ENCOUNTER — TELEPHONE (OUTPATIENT)
Dept: ORTHOPEDIC SURGERY | Facility: CLINIC | Age: 32
End: 2017-12-21

## 2017-12-21 RX ORDER — OXYCODONE HYDROCHLORIDE AND ACETAMINOPHEN 5; 325 MG/1; MG/1
TABLET ORAL
Qty: 50 TABLET | Refills: 0 | Status: SHIPPED | OUTPATIENT
Start: 2017-12-21 | End: 2017-12-26 | Stop reason: SDUPTHER

## 2017-12-22 ENCOUNTER — HOSPITAL ENCOUNTER (OUTPATIENT)
Dept: PHYSICAL THERAPY | Facility: HOSPITAL | Age: 32
Setting detail: THERAPIES SERIES
Discharge: HOME | End: 2017-12-22
Attending: ORTHOPAEDIC SURGERY

## 2017-12-22 DIAGNOSIS — M75.121 COMPLETE TEAR OF RIGHT ROTATOR CUFF: ICD-10-CM

## 2017-12-22 DIAGNOSIS — M25.511 ACUTE PAIN OF RIGHT SHOULDER: Primary | ICD-10-CM

## 2017-12-22 PROCEDURE — 97110 THERAPEUTIC EXERCISES: CPT | Performed by: PHYSICAL THERAPIST

## 2017-12-22 PROCEDURE — 97161 PT EVAL LOW COMPLEX 20 MIN: CPT | Performed by: PHYSICAL THERAPIST

## 2017-12-22 NOTE — THERAPY EVALUATION
Outpatient Physical Therapy Ortho Initial Evaluation  [facility]     Patient Name: Alvaro Morris  : 1985  MRN: [id number]  Today's Date: 2017      Visit Date: 2017    Patient Active Problem List   Diagnosis   • ADD (attention deficit disorder)   • Essential hypertension   • Acute pain of both shoulders   • Incomplete tear of rotator cuff        Past Medical History:   Diagnosis Date   • ADHD    • Bursitis of elbow     right elbow   • Hemorrhoids    • Hypertension     B/P OK SINCE STOPPED SMOKING-NO MEDS CURRENTLY   • Rotator cuff tear         Past Surgical History:   Procedure Laterality Date   • HEMORRHOIDECTOMY N/A 2016    Procedure: HEMORRHOIDECTOMY;  Surgeon: Atilio Smith MD;  Location: [facility];  Service:    • SHOULDER ARTHROSCOPY W/ ROTATOR CUFF REPAIR Right 2017    Procedure: SHOULDER ARTHROSCOPY WITH MINI OPEN ROTATOR CUFF REPAIR  DEBRIDEMENT OF LABRUM DEBRIDEMENT OF SUBSCAPULARIS DECOMPRESSION;  Surgeon: Pily Cordero MD;  Location: [facility];  Service:        Visit Dx:     ICD-10-CM ICD-9-CM   1. Acute pain of right shoulder M25.511 719.41   2. Complete tear of right rotator cuff M75.121 727.61             Patient History       17 1000          History    Chief Complaint Pain;Muscle weakness  -      Type of Pain Shoulder pain  -      Date Current Problem(s) Began 17  -      Brief Description of Current Complaint Patient is a 33 y/o male s/p R shoulder arthroscopy with mini open RTC repair, debridement of labrum, and  subscapularis decompression on 17. 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 Patient is now ~2.5 weeks post op and is still wearing the sling ~23 hours/day.   -KH      Previous treatment for THIS PROBLEM Surgery  -      Onset Date- PT 17  -      Surgery Date: 17  -      Patient/Caregiver Goals  Relieve pain;Improve mobility;Improve strength  -      Patient's Rating of General Health Very good  -KH      Hand Dominance right-handed  -      Occupation/sports/leisure activities works as a   -      How has patient tried to help current problem? tried injections first followed by surgery  -      What clinical tests have you had for this problem? MRI  -      Results of Clinical Tests full thickness RTC tear- now has been repaired through surgery  -      Pain     Pain Location Shoulder  -      Pain at Present 4  -KH      Pain at Best 3  -KH      Pain at Worst 8  -KH      Pain Frequency Constant/continuous  -      Pain Description Sharp  -KH      What Performance Factors Make the Current Problem(s) WORSE? night pain (if he rolls on it by accident)  -      What Performance Factors Make the Current Problem(s) BETTER? ice  -      Difficulties at work? yes- unable to work right now  -      Difficulties with ADL's? awkward to get dressed, shower. Patient doing most of his grooming with his left arm  -      Difficulties with recreational activities? has two young kids- enjoys playing with them and currently is limited in his ability to do that  -      Fall Risk Assessment    Any falls in the past year: No  -KH      Daily Activities    Primary Language English  -      Patient is concerned about/has problems with Bed Mobility;Difficulty with self care (i.e. bathing, dressing, toileting:;Performing home management (household chores, shopping, care of dependents);Performing job responsibilities/community activities (work, school,;Repetitive movements of the hand, arm, shoulder;Reaching over head;Writing/grasping items with hand(s)  -      Pt Participated in POC and Goals Yes  -      Safety    Are you being hurt, hit, or frightened by anyone at home or in your life? No  -KH      Are you being neglected by a caregiver No  -        User Key  (r) = Recorded By, (t) = Taken By, (c) =  Cosigned By    Initials Name Provider Type    GARTH Keyes PT Physical Therapist                PT Ortho       12/22/17 1000    Subjective Pain    Able to rate subjective pain? yes  -GARTH    Pre-Treatment Pain Level 4  -KH    Post-Treatment Pain Level 4  -KH    Shoulder Girdle Palpation    Deltoid Right:;Tender  -KH    Teres Minor Bilateral:;Tender  -KH    Greater Tubercule Right:;Tender  -KH    Shoulder Girdle Accessory Motions    Posterior glide of humerus Right:;Hypomobile;Right pain  -KH    Anterior glide of humerus Right:;Hypomobile;Right pain  -KH    Inferior glide of humerus Right:;Hypomobile;Right pain  -KH    Left Shoulder    Flexion ROM Details 180 degrees  -KH    ABduction ROM Details 180 degrees  -KH    External Rotation ROM Details 100 degrees (shoulder at 90 degrees abduction)  -KH    Internal Rotation ROM Details 100 degrees (shoulder at 90 degrees abduction)  -KH    Right Shoulder    Flexion ROM Details 50 degrees PROM   painful  -KH    Extension ROM Details 40 degrees  -KH    ABduction ROM Details NT  -KH    External Rotation ROM Details NT  -KH    Internal Rotation ROM Details at neutral: able to bring arm accross body to belly button  -KH    Left Shoulder    Flexion Gross Movement (5/5) normal  -KH    Extension Gross Movement (5/5) normal  -KH    ABduction Gross Movement (5/5) normal  -KH    Int Rotation Gross Movement (5/5) normal  -KH    Ext Rotation Gross Movement (5/5) normal  -KH    Right Shoulder    Flexion Gross Movement --   NT due to pain/protocol restrictions  -KH    Extension Gross Movement --   NT due to pain/protocol restrictions  -KH    ABduction Gross Movement --   NT due to pain/protocol restrictions  -KH    Int Rotation Gross Movement --   NT due to pain/protocol restrictions  -KH    Ext Rotation Gross Movement --   NT due to pain/protocol restrictions  -KH      User Key  (r) = Recorded By, (t) = Taken By, (c) = Cosigned By    Initials Name Provider Type    GARTH Keyes PT  Physical Therapist            Therapy Education  Given: HEP, Symptoms/condition management, Pain management, Posture/body mechanics  Program: New  How Provided: Verbal, Demonstration  Provided to: Patient  Level of Understanding: Teach back education performed           PT OP Goals       12/22/17 1000       PT Short Term Goals    STG 1 Patient will demonstrate compliance and independence with initial HEP  -     STG 2 Patient will report a 50% reduction in night pain to allow for better quality of sleep  -     STG 3 Patient will demonstrate R shoulder PROM of 90 degrees in order to increase ease with overhead tasks  -     Long Term Goals    LTG 1 Patient will report the ability to complete grooming tasks using his R UE to increase ease with morning ADLs  -     LTG 2 Patient will demonstrate R shoulder AROM of 100 degrees in order to increase ease with overhead tasks  -     LTG 3 Patient will demonstrate R shoulder flexion strength of 3+/5 with MMT to demonstrate improved shoulder stability for ADLs  -     LTG 4 Patient will decrease level of perceived disability as measured by the DASH from 37.5% disability to </=20% disability in order to improve quality of life  -       User Key  (r) = Recorded By, (t) = Taken By, (c) = Cosigned By    Initials Name Provider Type    GARTH Keyes, PT Physical Therapist                PT Assessment/Plan       12/22/17 1058       PT Assessment    Functional Limitations Limitations in community activities;Limitations in functional capacity and performance;Performance in work activities;Limitation in home management;Performance in sport activities;Performance in self-care ADL;Performance in leisure activities;Decreased safety during functional activities  -     Impairments Joint integrity;Joint mobility;Range of motion;Muscle strength;Pain  -     Assessment Comments Patient is a 33 y/o male s/p R shoulder arthroscopy with mini open RTC repair, debridement of labrum,  and  subscapularis decompression on 12/4/17. 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 Patient is now ~2.5 weeks post op and is still wearing the sling ~23 hours/day. His pain is the most severe at night if he accidentally rolls over onto his right side. He is able to complete ADLs but mostly compensates by using his left arm. He also reports limitations in his ability to play with his young children. Patient presents with slight tenderness in the musculature surrounding his right shoulder, hypomobility with GH joint mobs, and decreased right shoulder strength/ROM. Patient would benefit from skilled PT to address these deficits and increase ease with ADLs.   -     Please refer to paper survey for additional self-reported information Yes  -KH     Rehab Potential Good  -KH     Patient/caregiver participated in establishment of treatment plan and goals Yes  -KH     Patient would benefit from skilled therapy intervention Yes  -KH     PT Plan    PT Frequency 2x/week  -     Predicted Duration of Therapy Intervention (days/wks) 8 weeks  -     Planned CPT's? PT EVAL LOW COMPLEXITY: 91391;PT ULTRASOUND EA 15 MIN: 76291;PT HOT OR COLD PACK TREAT MCARE;PT MANUAL THERAPY EA 15 MIN: 22003;PT SELF CARE/HOME MGMT/TRAIN EA 15: 45674;PT RE-EVAL: 45014;PT THER PROC EA 15 MIN: 33141;PT ELECTRICAL STIM ATTD EA 15 MIN: 60493;PT IONTOPHORESIS EA 15 MIN: 18366;PT ELECTRICAL STIM UNATTEND: ;PT GAIT TRAINING EA 15 MIN: 92184;PT NEUROMUSC RE-EDUCATION EA 15 MIN: 10228;PT TRACTION CERVICAL: 70191  -     Physical Therapy Interventions (Optional Details) ROM (Range of Motion);strengthening;stretching;taping;dry needling;gross motor skills;home exercise program;joint mobilization;postural re-education;patient/family education;modalities  -     PT Plan Comments RTC rehabilitation per protocol  -       User Key  (r) = Recorded By,  (t) = Taken By, (c) = Cosigned By    Initials Name Provider Type    GARTH Keyes PT Physical Therapist                  Exercises       12/22/17 1000          Subjective Comments    Subjective Comments I feel like my arm is very stiff  -GARTH      Subjective Pain    Able to rate subjective pain? yes  -KH      Pre-Treatment Pain Level 4  -KH      Post-Treatment Pain Level 4  -KH      Exercise 1    Exercise Name 1 pendulums (circles, forward/backward, sideways)  -KH      Reps 1 2  -KH      Time (Minutes) 1 1  -KH      Exercise 2    Exercise Name 2 pulleys  -KH      Time (Minutes) 2 5  -KH        User Key  (r) = Recorded By, (t) = Taken By, (c) = Cosigned By    Initials Name Provider Type    GARTH Keyes PT Physical Therapist              Outcome Measure Options: Disabilities of the Arm, Shoulder, and Hand (DASH) (37.5% dsiability)         Time Calculation:   Start Time: 1015  Stop Time: 1059  Time Calculation (min): 44 min     Therapy Charges for Today     Code Description Service Date Service Provider Modifiers Qty    84840619428  PT THER PROC EA 15 MIN 12/22/2017 Morelia Keyes PT GP 1    [id number]  PT EVAL LOW COMPLEXITY 2 12/22/2017 Morelia Keyes, PT GP 1          PT G-Codes  Outcome Measure Options: Disabilities of the Arm, Shoulder, and Hand (DASH) (37.5% dsiability)         Morelia Keyes PT  12/22/2017

## 2017-12-26 DIAGNOSIS — Z98.890 S/P ARTHROSCOPY OF SHOULDER: Primary | ICD-10-CM

## 2017-12-26 RX ORDER — OXYCODONE HYDROCHLORIDE AND ACETAMINOPHEN 5; 325 MG/1; MG/1
TABLET ORAL
Qty: 50 TABLET | Refills: 0 | Status: SHIPPED | OUTPATIENT
Start: 2017-12-26 | End: 2017-12-29 | Stop reason: SDUPTHER

## 2017-12-27 ENCOUNTER — HOSPITAL ENCOUNTER (OUTPATIENT)
Dept: PHYSICAL THERAPY | Facility: HOSPITAL | Age: 32
Setting detail: THERAPIES SERIES
End: 2017-12-27
Attending: ORTHOPAEDIC SURGERY

## 2017-12-27 NOTE — TELEPHONE ENCOUNTER
Patient informed rx percocet 5/325mg ready for  at the [facility] office 1008 12/27/2017

## 2017-12-28 ENCOUNTER — HOSPITAL ENCOUNTER (OUTPATIENT)
Dept: PHYSICAL THERAPY | Facility: HOSPITAL | Age: 32
Setting detail: THERAPIES SERIES
Discharge: HOME | End: 2017-12-28
Attending: ORTHOPAEDIC SURGERY

## 2017-12-28 DIAGNOSIS — M25.511 ACUTE PAIN OF RIGHT SHOULDER: Primary | ICD-10-CM

## 2017-12-28 DIAGNOSIS — M75.121 COMPLETE TEAR OF RIGHT ROTATOR CUFF: ICD-10-CM

## 2017-12-28 PROCEDURE — 97110 THERAPEUTIC EXERCISES: CPT

## 2017-12-28 PROCEDURE — 97140 MANUAL THERAPY 1/> REGIONS: CPT

## 2017-12-28 NOTE — THERAPY TREATMENT NOTE
Outpatient Physical Therapy Ortho Treatment Note  [facility]     Patient Name: Alvaro Morris  : 1985  MRN: [id number]  Today's Date: 2017      Visit Date: 2017    Visit Dx:    ICD-10-CM ICD-9-CM   1. Acute pain of right shoulder M25.511 719.41   2. Complete tear of right rotator cuff M75.121 727.61       Patient Active Problem List   Diagnosis   • ADD (attention deficit disorder)   • Essential hypertension   • Acute pain of both shoulders   • Incomplete tear of rotator cuff        Past Medical History:   Diagnosis Date   • ADHD    • Bursitis of elbow     right elbow   • Hemorrhoids    • Hypertension     B/P OK SINCE STOPPED SMOKING-NO MEDS CURRENTLY   • Rotator cuff tear         Past Surgical History:   Procedure Laterality Date   • HEMORRHOIDECTOMY N/A 2016    Procedure: HEMORRHOIDECTOMY;  Surgeon: Atilio Smith MD;  Location: [facility];  Service:    • SHOULDER ARTHROSCOPY W/ ROTATOR CUFF REPAIR Right 2017    Procedure: SHOULDER ARTHROSCOPY WITH MINI OPEN ROTATOR CUFF REPAIR  DEBRIDEMENT OF LABRUM DEBRIDEMENT OF SUBSCAPULARIS DECOMPRESSION;  Surgeon: Pily Cordero MD;  Location: [facility] [place], operated by [facility];  Service:              PT Ortho       17 1300    Subjective Comments    Subjective Comments C/O pain and stiffness.  TAking pain meds 3-4 times a day  -SI    Subjective Pain    Able to rate subjective pain? yes  -SI    Right Shoulder    Flexion ROM Details P-80  -SI    External Rotation ROM Details P-30  -SI      User Key  (r) = Recorded By, (t) = Taken By, (c) = Cosigned By    Initials Name Provider Type    MARKIE Espinosa PTA Physical Therapy Assistant                            PT Assessment/Plan       17 1326       PT Assessment    Assessment Comments Pt appears to be well motivated and receptive to instruction for RCR protocol.  Pain and MM guarding with PROM.  -SI       User Key  (r) = Recorded By, (t) = Taken By, (c) = Cosigned By    Initials Name  Provider Type    MARKIE Espinosa PTA Physical Therapy Assistant                Modalities       12/28/17 1300          Ice    Ice Applied Yes  -SI      Location Right shoulder with arm on pillow sitting  -SI      Rx Minutes 10 mins  -SI      Ice S/P Rx Yes  -SI        User Key  (r) = Recorded By, (t) = Taken By, (c) = Cosigned By    Initials Name Provider Type    MARKIE Espinosa PTA Physical Therapy Assistant                Exercises       12/28/17 1300          Subjective Comments    Subjective Comments C/O pain and stiffness.  TAking pain meds 3-4 times a day  -SI      Subjective Pain    Able to rate subjective pain? yes  -SI      Exercise 1    Exercise Name 1 pendulums (circles, forward/backward, sideways)  -SI      Reps 1 2  -SI      Time (Minutes) 1 1  -SI      Exercise 2    Exercise Name 2 Sh rolls and retraction  -SI      Exercise 3    Exercise Name 3 Active biceps standing  -SI      Reps 3 20  -SI      Exercise 4    Exercise Name 4 Table slides for flexion  -SI      Sets 4 2  -SI      Reps 4 10  -SI      Time (Seconds) 4 20  -SI      Exercise 5    Exercise Name 5 supine ER with cane  -SI      Sets 5 1  -SI      Reps 5 10  -SI      Time (Seconds) 5 10  -SI        User Key  (r) = Recorded By, (t) = Taken By, (c) = Cosigned By    Initials Name Provider Type    MARKIE Espinosa PTA Physical Therapy Assistant                        Manual Rx (last 36 hours)      Manual Treatments       12/28/17 1300          Manual Rx 1    Manual Rx 1 Location PROM as per RCR protocal  -SI      Manual Rx 1 Duration 2 x 15 min  -SI        User Key  (r) = Recorded By, (t) = Taken By, (c) = Cosigned By    Initials Name Provider Type    MARKIE Espinosa PTA Physical Therapy Assistant              Therapy Education  Given: HEP, Symptoms/condition management, Pain management (Discussed do' and don'ts at 3 1/2 weeks post-op and goals of PROM next couple weeks.)  Program: Progressed  How Provided: Verbal, Demonstration,  Written  Provided to: Patient  Level of Understanding: Teach back education performed              Time Calculation:   Start Time: 1100  Stop Time: 1150  Time Calculation (min): 50 min    Therapy Charges for Today     Code Description Service Date Service Provider Modifiers Qty    30430248464 HC PT THER PROC EA 15 MIN 12/28/2017 Sia Espinosa PTA GP 1    02926745035 HC PT MANUAL THERAPY EA 15 MIN 12/28/2017 Sia Espinosa PTA GP 2                    Sia Espinosa PTA  12/28/2017

## 2017-12-29 DIAGNOSIS — Z98.890 S/P ARTHROSCOPY OF SHOULDER: ICD-10-CM

## 2017-12-29 RX ORDER — OXYCODONE HYDROCHLORIDE AND ACETAMINOPHEN 5; 325 MG/1; MG/1
TABLET ORAL
Qty: 50 TABLET | Refills: 0 | Status: SHIPPED | OUTPATIENT
Start: 2017-12-29 | End: 2018-01-02 | Stop reason: SDUPTHER

## 2018-01-02 DIAGNOSIS — Z98.890 S/P ARTHROSCOPY OF SHOULDER: ICD-10-CM

## 2018-01-02 RX ORDER — OXYCODONE HYDROCHLORIDE AND ACETAMINOPHEN 5; 325 MG/1; MG/1
TABLET ORAL
Qty: 50 TABLET | Refills: 0 | Status: SHIPPED | OUTPATIENT
Start: 2018-01-02 | End: 2018-01-08 | Stop reason: SDUPTHER

## 2018-01-05 ENCOUNTER — HOSPITAL ENCOUNTER (OUTPATIENT)
Dept: PHYSICAL THERAPY | Facility: HOSPITAL | Age: 33
Setting detail: THERAPIES SERIES
Discharge: HOME | End: 2018-01-05
Attending: ORTHOPAEDIC SURGERY

## 2018-01-05 DIAGNOSIS — M75.121 COMPLETE TEAR OF RIGHT ROTATOR CUFF: ICD-10-CM

## 2018-01-05 DIAGNOSIS — M25.511 ACUTE PAIN OF RIGHT SHOULDER: Primary | ICD-10-CM

## 2018-01-05 PROCEDURE — 97140 MANUAL THERAPY 1/> REGIONS: CPT

## 2018-01-05 PROCEDURE — 97110 THERAPEUTIC EXERCISES: CPT

## 2018-01-05 NOTE — THERAPY TREATMENT NOTE
Outpatient Physical Therapy Ortho Treatment Note  [facility]     Patient Name: Alvaro Morris  : 1985  MRN: [id number]  Today's Date: 2018      Visit Date: 2018    Visit Dx:    ICD-10-CM ICD-9-CM   1. Acute pain of right shoulder M25.511 719.41   2. Complete tear of right rotator cuff M75.121 727.61       Patient Active Problem List   Diagnosis   • ADD (attention deficit disorder)   • Essential hypertension   • Acute pain of both shoulders   • Incomplete tear of rotator cuff        Past Medical History:   Diagnosis Date   • ADHD    • Bursitis of elbow     right elbow   • Hemorrhoids    • Hypertension     B/P OK SINCE STOPPED SMOKING-NO MEDS CURRENTLY   • Rotator cuff tear         Past Surgical History:   Procedure Laterality Date   • HEMORRHOIDECTOMY N/A 2016    Procedure: HEMORRHOIDECTOMY;  Surgeon: Atilio Smith MD;  Location: [facility];  Service:    • SHOULDER ARTHROSCOPY W/ ROTATOR CUFF REPAIR Right 2017    Procedure: SHOULDER ARTHROSCOPY WITH MINI OPEN ROTATOR CUFF REPAIR  DEBRIDEMENT OF LABRUM DEBRIDEMENT OF SUBSCAPULARIS DECOMPRESSION;  Surgeon: Pily Cordero MD;  Location: [facility];  Service:              PT Ortho       18 0800    Subjective Comments    Subjective Comments Pt appologizes for missed appts and rescheduled future appts to days and times he feels will work better for him.  -SI    Subjective Pain    Able to rate subjective pain? yes  -SI    Pre-Treatment Pain Level 2  -SI    Post-Treatment Pain Level 2  -SI    Subjective Pain Comment Minimal report of pain at rest in sling.  Pain pain at night.  Pain end ranges with PROM.  -SI    Right Shoulder    Flexion ROM Details P-108  -SI    ABduction ROM Details abd P-80  -SI    External Rotation ROM Details P-42  -SI    Internal Rotation ROM Details P-70  -SI      User Key  (r) = Recorded By, (t) = Taken By, (c) = Cosigned By    Initials Name Provider Type    MARKIE Espinosa PTA Physical  "Therapy Assistant                            PT Assessment/Plan       01/05/18 0853       PT Assessment    Assessment Comments Pt has attended 3 sessions of PT since 12-22-17, and cx or no show 3 times.  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  Discussed with him need to get P-AAROM before start strength and needs to show up for PT and do HEP.  Pt  hopefully with be more \"onboard\" with PT and goals.  -SI       User Key  (r) = Recorded By, (t) = Taken By, (c) = Cosigned By    Initials Name Provider Type    MARKIE Espinosa PTA Physical Therapy Assistant                    Exercises       01/05/18 0800          Subjective Comments    Subjective Comments Pt appologizes for missed appts and rescheduled future appts to days and times he feels will work better for him.  -SI      Subjective Pain    Able to rate subjective pain? yes  -SI      Pre-Treatment Pain Level 2  -SI      Post-Treatment Pain Level 2  -SI      Subjective Pain Comment Minimal report of pain at rest in sling.  Pain pain at night.  Pain end ranges with PROM.  -SI      Exercise 1    Exercise Name 1 pendulums (circles, forward/backward, sideways)  -SI      Reps 1 2  -SI      Time (Minutes) 1 1  -SI      Exercise 2    Exercise Name 2 Sh rolls and retraction  -SI      Time (Minutes) 2 5  -SI      Exercise 3    Exercise Name 3 Active biceps standing  -SI      Reps 3 20  -SI      Exercise 4    Exercise Name 4 Table slides for flexion  -SI      Sets 4 2  -SI      Reps 4 10  -SI      Time (Seconds) 4 20  -SI      Exercise 5    Exercise Name 5 supine ER with cane  -SI      Sets 5 1  -SI      Reps 5 10  -SI      Time (Seconds) 5 10  -SI      Exercise 6    Exercise Name 6 supine, self assist sh flexion  -SI      Sets 6 1  -SI      Reps 6 10  -SI      Time (Seconds) 6 10  -SI      Exercise 7    Exercise Name 7 sub-maximal deltoid idometrics  -SI      Sets 7 1  -SI      Reps 7 10  -SI      Time (Seconds) 7 5  -SI        User Key  " (r) = Recorded By, (t) = Taken By, (c) = Cosigned By    Initials Name Provider Type    MARKIE Espinosa PTA Physical Therapy Assistant                        Manual Rx (last 36 hours)      Manual Treatments       01/05/18 0700          Manual Rx 1    Manual Rx 1 Location PROM as per RCR protocal  -SI      Manual Rx 1 Duration 2 x 15  -SI        User Key  (r) = Recorded By, (t) = Taken By, (c) = Cosigned By    Initials Name Provider Type    MARKIE Espinosa PTA Physical Therapy Assistant              Therapy Education  Given: HEP, Symptoms/condition management (jRe-explained rehab following RCR )  Program: Progressed  How Provided: Verbal, Demonstration, Written  Provided to: Patient  Level of Understanding: Teach back education performed              Time Calculation:   Start Time: 0715  Stop Time: 0800  Time Calculation (min): 45 min    Therapy Charges for Today     Code Description Service Date Service Provider Modifiers Qty    92246042331 HC PT THER PROC EA 15 MIN 1/5/2018 Sia Espinosa PTA GP 1    41233104619 HC PT MANUAL THERAPY EA 15 MIN 1/5/2018 Sia Espinosa PTA GP 2                    Sia Espinosa PTA  1/5/2018

## 2018-01-08 DIAGNOSIS — Z98.890 S/P ARTHROSCOPY OF SHOULDER: ICD-10-CM

## 2018-01-08 RX ORDER — OXYCODONE HYDROCHLORIDE AND ACETAMINOPHEN 5; 325 MG/1; MG/1
TABLET ORAL
Qty: 50 TABLET | Refills: 0 | Status: SHIPPED | OUTPATIENT
Start: 2018-01-08 | End: 2018-01-12 | Stop reason: SDUPTHER

## 2018-01-10 ENCOUNTER — HOSPITAL ENCOUNTER (OUTPATIENT)
Dept: PHYSICAL THERAPY | Facility: HOSPITAL | Age: 33
Setting detail: THERAPIES SERIES
Discharge: HOME | End: 2018-01-10
Attending: ORTHOPAEDIC SURGERY

## 2018-01-10 ENCOUNTER — TELEPHONE (OUTPATIENT)
Dept: FAMILY MEDICINE CLINIC | Facility: CLINIC | Age: 33
End: 2018-01-10

## 2018-01-10 DIAGNOSIS — M25.511 ACUTE PAIN OF RIGHT SHOULDER: Primary | ICD-10-CM

## 2018-01-10 DIAGNOSIS — M75.121 COMPLETE TEAR OF RIGHT ROTATOR CUFF: ICD-10-CM

## 2018-01-10 PROCEDURE — 97140 MANUAL THERAPY 1/> REGIONS: CPT

## 2018-01-10 PROCEDURE — 97110 THERAPEUTIC EXERCISES: CPT

## 2018-01-10 NOTE — THERAPY TREATMENT NOTE
Outpatient Physical Therapy Ortho Treatment Note  [facility]     Patient Name: Alvaro Morris  : 1985  MRN: [id number]  Today's Date: 1/10/2018      Visit Date: 01/10/2018    Visit Dx:    ICD-10-CM ICD-9-CM   1. Acute pain of right shoulder M25.511 719.41   2. Complete tear of right rotator cuff M75.121 727.61       Patient Active Problem List   Diagnosis   • ADD (attention deficit disorder)   • Essential hypertension   • Acute pain of both shoulders   • Incomplete tear of rotator cuff        Past Medical History:   Diagnosis Date   • ADHD    • Bursitis of elbow     right elbow   • Hemorrhoids    • Hypertension     B/P OK SINCE STOPPED SMOKING-NO MEDS CURRENTLY   • Rotator cuff tear         Past Surgical History:   Procedure Laterality Date   • HEMORRHOIDECTOMY N/A 2016    Procedure: HEMORRHOIDECTOMY;  Surgeon: Atilio Smith MD;  Location: [facility];  Service:    • SHOULDER ARTHROSCOPY W/ ROTATOR CUFF REPAIR Right 2017    Procedure: SHOULDER ARTHROSCOPY WITH MINI OPEN ROTATOR CUFF REPAIR  DEBRIDEMENT OF LABRUM DEBRIDEMENT OF SUBSCAPULARIS DECOMPRESSION;  Surgeon: Pily Cordero MD;  Location: [facility];  Service:              PT Ortho       01/10/18 1300    Subjective Comments    Subjective Comments Pt late 10 min..from appt rescheduled from yesterday.  Reports compliance with HEP  -SI    Subjective Pain    Able to rate subjective pain? yes  -SI    Pre-Treatment Pain Level 1  -SI    Post-Treatment Pain Level 1  -SI    Subjective Pain Comment C/O pain end ranges with PROM states takes 4 pain pills per day  -SI    Right Shoulder    Flexion ROM Details P-140  -SI    ABduction ROM Details P-90  -SI    External Rotation ROM Details P-52  -SI    Internal Rotation ROM Details P-75  -SI      User Key  (r) = Recorded By, (t) = Taken By, (c) = Cosigned By    Initials Name Provider Type    MARKIE Espinosa PTA Physical Therapy Assistant                            PT Assessment/Plan        01/10/18 1327       PT Assessment    Assessment Comments Pt late or reschedules most appointments...He does know his ROM ex and showing gains in PROM.  Taking pain meds regularly still.  Pt is 5 1/2 weeks post-op.  -SI       User Key  (r) = Recorded By, (t) = Taken By, (c) = Cosigned By    Initials Name Provider Type    MARKIE Espinosa PTA Physical Therapy Assistant                    Exercises       01/10/18 1300          Subjective Comments    Subjective Comments Pt late 10 min..from appt rescheduled from yesterday.  Reports compliance with HEP  -SI      Subjective Pain    Able to rate subjective pain? yes  -SI      Pre-Treatment Pain Level 1  -SI      Post-Treatment Pain Level 1  -SI      Subjective Pain Comment C/O pain end ranges with PROM states takes 4 pain pills per day  -SI      Exercise 1    Exercise Name 1 pendulums (circles, forward/backward, sideways)  -SI      Reps 1 2  -SI      Time (Minutes) 1 1  -SI      Exercise 2    Exercise Name 2 Sh rolls and retraction  -SI      Time (Minutes) 2 5  -SI      Exercise 3    Exercise Name 3 Active biceps standing  -SI      Reps 3 20  -SI      Exercise 4    Exercise Name 4 Table slides for flexion  -SI      Sets 4 2  -SI      Reps 4 10  -SI      Time (Seconds) 4 20  -SI      Exercise 5    Exercise Name 5 supine ER with cane  -SI      Sets 5 1  -SI      Reps 5 10  -SI      Time (Seconds) 5 10  -SI      Exercise 6    Exercise Name 6 supine, self assist sh flexion  -SI      Sets 6 1  -SI      Reps 6 10  -SI      Time (Seconds) 6 10  -SI      Exercise 7    Exercise Name 7 sub-maximal deltoid idometrics  -SI      Sets 7 1  -SI      Reps 7 10  -SI      Time (Seconds) 7 5  -SI      Exercise 8    Exercise Name 8 UBE  -SI      Time (Minutes) 8 4  -SI        User Key  (r) = Recorded By, (t) = Taken By, (c) = Cosigned By    Initials Name Provider Type    MARKIE Espinosa PTA Physical Therapy Assistant                        Manual Rx (last 36 hours)      Manual  Treatments       01/10/18 1300          Manual Rx 1    Manual Rx 1 Location PROM as per RCR protocal  -SI      Manual Rx 1 Duration 2 x 15  -SI        User Key  (r) = Recorded By, (t) = Taken By, (c) = Cosigned By    Initials Name Provider Type    MARKIE Espinosa PTA Physical Therapy Assistant                             Time Calculation:   Start Time: 0855  Stop Time: 0940  Time Calculation (min): 45 min    Therapy Charges for Today     Code Description Service Date Service Provider Modifiers Qty    05241741442 HC PT MANUAL THERAPY EA 15 MIN 1/10/2018 Sia Espinosa PTA GP 2    04073200831 HC PT THER PROC EA 15 MIN 1/10/2018 Sia Espinosa PTA GP 1                    Sia Espinosa PTA  1/10/2018

## 2018-01-12 ENCOUNTER — APPOINTMENT (OUTPATIENT)
Dept: PHYSICAL THERAPY | Facility: HOSPITAL | Age: 33
End: 2018-01-12
Attending: ORTHOPAEDIC SURGERY

## 2018-01-12 DIAGNOSIS — Z98.890 S/P ARTHROSCOPY OF SHOULDER: ICD-10-CM

## 2018-01-12 RX ORDER — OXYCODONE HYDROCHLORIDE AND ACETAMINOPHEN 5; 325 MG/1; MG/1
TABLET ORAL
Qty: 50 TABLET | Refills: 0 | Status: SHIPPED | OUTPATIENT
Start: 2018-01-12 | End: 2018-01-18 | Stop reason: ALTCHOICE

## 2018-01-16 ENCOUNTER — OFFICE VISIT (OUTPATIENT)
Dept: ORTHOPEDIC SURGERY | Facility: CLINIC | Age: 33
End: 2018-01-16

## 2018-01-16 VITALS — WEIGHT: 160 LBS | BODY MASS INDEX: 20.53 KG/M2 | TEMPERATURE: 98.9 F | HEIGHT: 74 IN

## 2018-01-16 DIAGNOSIS — Z98.890 STATUS POST ROTATOR CUFF REPAIR: Primary | ICD-10-CM

## 2018-01-16 DIAGNOSIS — Z98.890 S/P ROTATOR CUFF REPAIR: ICD-10-CM

## 2018-01-16 PROCEDURE — 99024 POSTOP FOLLOW-UP VISIT: CPT | Performed by: ORTHOPAEDIC SURGERY

## 2018-01-16 NOTE — PROGRESS NOTES
"Shoulder Scope RCR follow Up       Patient: Alvaro Morris        YOB: 1985      Chief Complaints:  right shoulder pain      History of Present Illness: Pt is here f/u shoulder arthroscopy, RCR he is doing okay still requesting a lot of pain medicine I told him we need to decrease not only the frequency with a strength of this which we are doing.  He understands the risk of becoming dependent on these.  In looking at his physical therapy he has missed several visits I do think his range of motion has suffered.  He states he has 2 young kids and it is hard and I do understand that however I think his shoulder results will stop her        Allergies: No Known Allergies    Medications:   Home Medications:  Current Outpatient Prescriptions on File Prior to Visit   Medication Sig   • ibuprofen (ADVIL,MOTRIN) 800 MG tablet Take 1 tablet by mouth 3 (Three) Times a Day.   • lisdexamfetamine (VYVANSE) 50 MG capsule Take 1 capsule by mouth Every Morning Earliest Fill Date: 1/10/18   • oxyCODONE-acetaminophen (PERCOCET) 5-325 MG per tablet 1-2 Oral Every 8 hr PRN severe pain   • ondansetron (ZOFRAN) 4 MG tablet Take 1 tablet by mouth Every 8 (Eight) Hours As Needed for Nausea or Vomiting.     No current facility-administered medications on file prior to visit.      Current Medications:  Scheduled Meds:  Continuous Infusions:  No current facility-administered medications for this visit.   PRN Meds:.          Physical Exam: 32 y.o. male  General Appearance:    Alert, cooperative, in no acute distress                 Vitals:    01/16/18 1332   Temp: 98.9 °F (37.2 °C)   TempSrc: Temporal Artery    Weight: 72.6 kg (160 lb)   Height: 188 cm (74\")      Patient is alert and oriented ×3 no acute distress normal mood physical exam.  Physical exam of the shoulder, incisions looked good there is no erythema,no signs or sx of infection.  I can only flex him up to about 90 states when therapy worked with him having get up " to 1 80 cc this reflected in the notes his rotator cuff strength is quite good    Assessment  S/P shoulder scope, rotator cuff repair, he is behind on his motion which I discussed with him in detail.  Also discussed the need to decrease the frequency and strength of this pain medicine I will see him back in 2-3 weeks to review his motion he may well have to have a manipulation

## 2018-01-17 ENCOUNTER — HOSPITAL ENCOUNTER (OUTPATIENT)
Dept: PHYSICAL THERAPY | Facility: HOSPITAL | Age: 33
Setting detail: THERAPIES SERIES
Discharge: HOME | End: 2018-01-17
Attending: ORTHOPAEDIC SURGERY

## 2018-01-17 ENCOUNTER — OFFICE VISIT (OUTPATIENT)
Dept: FAMILY MEDICINE CLINIC | Facility: CLINIC | Age: 33
End: 2018-01-17

## 2018-01-17 VITALS
TEMPERATURE: 98.3 F | OXYGEN SATURATION: 98 % | DIASTOLIC BLOOD PRESSURE: 82 MMHG | HEIGHT: 74 IN | SYSTOLIC BLOOD PRESSURE: 130 MMHG | WEIGHT: 157 LBS | BODY MASS INDEX: 20.15 KG/M2 | HEART RATE: 82 BPM

## 2018-01-17 DIAGNOSIS — Z98.890 HISTORY OF ROTATOR CUFF SURGERY: ICD-10-CM

## 2018-01-17 DIAGNOSIS — F98.8 ATTENTION DEFICIT DISORDER, UNSPECIFIED HYPERACTIVITY PRESENCE: ICD-10-CM

## 2018-01-17 DIAGNOSIS — I10 ESSENTIAL HYPERTENSION: Primary | ICD-10-CM

## 2018-01-17 DIAGNOSIS — M75.121 COMPLETE TEAR OF RIGHT ROTATOR CUFF: ICD-10-CM

## 2018-01-17 DIAGNOSIS — M25.511 ACUTE PAIN OF RIGHT SHOULDER: Primary | ICD-10-CM

## 2018-01-17 PROCEDURE — 99213 OFFICE O/P EST LOW 20 MIN: CPT | Performed by: INTERNAL MEDICINE

## 2018-01-17 PROCEDURE — 97110 THERAPEUTIC EXERCISES: CPT

## 2018-01-17 PROCEDURE — 97140 MANUAL THERAPY 1/> REGIONS: CPT

## 2018-01-17 NOTE — PROGRESS NOTES
Cristhian Morris is a 32 y.o. male. Patient is here today for follow-up on his borderline hypertension and ADD.  He is doing better on the increased dose of the Vyvanse and is happy with the results and just recently had it refilled.  He had right rotator cuff surgery done in December 2017 by Dr. Cordero and seems to be recovering okay from that.  He is getting physical therapy for it.  Otherwise he is felt well and is had no new acute problems.  Chief Complaint   Patient presents with   • ADD     MED CHECK FOR VYVANSE          Vitals:    01/17/18 1000   BP: 130/82   Pulse: 82   Temp: 98.3 °F (36.8 °C)   SpO2: 98%     The following portions of the patient's history were reviewed and updated as appropriate: allergies, current medications, past family history, past medical history, past social history, past surgical history and problem list.    Past Medical History:   Diagnosis Date   • ADHD    • Bursitis of elbow     right elbow   • Hemorrhoids    • Hypertension     B/P OK SINCE STOPPED SMOKING-NO MEDS CURRENTLY   • Rotator cuff tear       No Known Allergies   Social History     Social History   • Marital status:      Spouse name: N/A   • Number of children: N/A   • Years of education: N/A     Occupational History   • Not on file.     Social History Main Topics   • Smoking status: Former Smoker     Packs/day: 1.00     Years: 10.00     Types: Cigarettes     Quit date: 11/6/2017   • Smokeless tobacco: Current User   • Alcohol use Yes      Comment: ON OCC   • Drug use: No   • Sexual activity: Defer     Other Topics Concern   • Not on file     Social History Narrative        Current Outpatient Prescriptions:   •  ibuprofen (ADVIL,MOTRIN) 800 MG tablet, Take 1 tablet by mouth 3 (Three) Times a Day., Disp: 90 tablet, Rfl: 3  •  lisdexamfetamine (VYVANSE) 50 MG capsule, Take 1 capsule by mouth Every Morning Earliest Fill Date: 1/10/18, Disp: 30 capsule, Rfl: 0  •  oxyCODONE-acetaminophen (PERCOCET) 5-325  MG per tablet, 1-2 Oral Every 8 hr PRN severe pain, Disp: 50 tablet, Rfl: 0     Objective     History of Present Illness     Review of Systems   Constitutional: Negative.    HENT: Negative.    Eyes: Negative.    Respiratory: Negative.    Cardiovascular: Negative.    Gastrointestinal: Negative.    Endocrine: Negative.    Genitourinary: Negative.    Musculoskeletal:        Recent right rotator cuff surgery   Skin: Negative.    Neurological: Negative.    Psychiatric/Behavioral: Negative.        Physical Exam   Constitutional: He is oriented to person, place, and time. He appears well-developed and well-nourished.   Pleasant, cooperative no distress with a blood pressure 140/80   HENT:   Head: Normocephalic and atraumatic.   Eyes: Conjunctivae are normal. Pupils are equal, round, and reactive to light. No scleral icterus.   Neck: Normal range of motion. Neck supple.   Cardiovascular: Normal rate, regular rhythm and normal heart sounds.    Pulmonary/Chest: Effort normal and breath sounds normal. No respiratory distress. He has no wheezes. He has no rales.   Neurological: He is alert and oriented to person, place, and time.   Skin: Skin is warm and dry.   Psychiatric: He has a normal mood and affect. His behavior is normal.   Nursing note and vitals reviewed.      ASSESSMENT  overall stable and recovering fine from his right rotator cuff surgery.  The patient's ADD seems well controlled on the Vyvanse.  Blood pressure is reasonable.     Problem List Items Addressed This Visit        Cardiovascular and Mediastinum    Essential hypertension - Primary       Other    ADD (attention deficit disorder)    History of rotator cuff surgery          PLAN  The patient will continue Vyvanse 50 mg daily.  Recheck him in 3 months with a CBC, CMP, lipid panel    There are no Patient Instructions on file for this visit.  Return in about 3 months (around 4/17/2018) for with labs.

## 2018-01-17 NOTE — THERAPY TREATMENT NOTE
"    Outpatient Physical Therapy Ortho Treatment Note  [facility]     Patient Name: Alvaro Morris  : 1985  MRN: [id number]  Today's Date: 2018      Visit Date: 2018    Visit Dx:    ICD-10-CM ICD-9-CM   1. Acute pain of right shoulder M25.511 719.41   2. Complete tear of right rotator cuff M75.121 727.61       Patient Active Problem List   Diagnosis   • ADD (attention deficit disorder)   • Essential hypertension   • Acute pain of both shoulders   • Incomplete tear of rotator cuff        Past Medical History:   Diagnosis Date   • ADHD    • Bursitis of elbow     right elbow   • Hemorrhoids    • Hypertension     B/P OK SINCE STOPPED SMOKING-NO MEDS CURRENTLY   • Rotator cuff tear         Past Surgical History:   Procedure Laterality Date   • HEMORRHOIDECTOMY N/A 2016    Procedure: HEMORRHOIDECTOMY;  Surgeon: Atilio Smith MD;  Location: [facility];  Service:    • SHOULDER ARTHROSCOPY W/ ROTATOR CUFF REPAIR Right 2017    Procedure: SHOULDER ARTHROSCOPY WITH MINI OPEN ROTATOR CUFF REPAIR  DEBRIDEMENT OF LABRUM DEBRIDEMENT OF SUBSCAPULARIS DECOMPRESSION;  Surgeon: Pily Cordero MD;  Location: [facility];  Service:              PT Ortho       18 0800    Subjective Comments    Subjective Comments MD yesterday and said need to get moving and mentionined a manipulation.  \"Scared me and really worked on ex yesterday\"  -SI    Subjective Pain    Able to rate subjective pain? yes  -SI    Pre-Treatment Pain Level 1  -SI    Post-Treatment Pain Level 1  -SI    Subjective Pain Comment Pain end  ranges with PROM.  -SI    Right Shoulder    Flexion ROM Details P-148  -SI    ABduction ROM Details P-P-130  -SI    External Rotation ROM Details P-67  -SI    Internal Rotation ROM Details P-75  -SI    Right Shoulder    Flexion Gross Movement (3/5) fair  -SI    ABduction Gross Movement (3-/5) fair minus  -SI    Int Rotation Gross Movement (3/5) fair  -SI    Ext Rotation Gross Movement (3/5) " "fair  -SI      User Key  (r) = Recorded By, (t) = Taken By, (c) = Cosigned By    Initials Name Provider Type    MARKIE Espinosa PTA Physical Therapy Assistant                            PT Assessment/Plan       01/17/18 0844       PT Assessment    Assessment Comments Sounds like MD gave him a nice talk about compliance and verbally he states he is \"on board\".  6 1/2 weeks post-op  -SI       User Key  (r) = Recorded By, (t) = Taken By, (c) = Cosigned By    Initials Name Provider Type    MARKIE AVELAR FABIAN Espinosa Physical Therapy Assistant                    Exercises       01/17/18 0800          Subjective Comments    Subjective Comments MD yesterday and said need to get moving and mentionined a manipulation.  \"Scared me and really worked on ex yesterday\"  -SI      Subjective Pain    Able to rate subjective pain? yes  -SI      Pre-Treatment Pain Level 1  -SI      Post-Treatment Pain Level 1  -SI      Subjective Pain Comment Pain end  ranges with PROM.  -SI      Exercise 1    Exercise Name 1 pendulums (circles, forward/backward, sideways)  -SI      Reps 1 2  -SI      Time (Minutes) 1 1  -SI      Exercise 2    Exercise Name 2 Sh rolls and retraction  -SI      Time (Minutes) 2 5  -SI      Exercise 3    Exercise Name 3 Active biceps standing  -SI      Reps 3 20  -SI      Exercise 4    Exercise Name 4 Table slides for flexion  -SI      Sets 4 2  -SI      Reps 4 10  -SI      Time (Seconds) 4 20  -SI      Exercise 5    Exercise Name 5 supine ER with cane  -SI      Sets 5 1  -SI      Reps 5 10  -SI      Time (Seconds) 5 10  -SI      Exercise 6    Exercise Name 6 supine, self assist sh flexion  -SI      Sets 6 1  -SI      Reps 6 10  -SI      Time (Seconds) 6 10  -SI      Exercise 7    Exercise Name 7 sub-maximal deltoid idometrics  -SI      Sets 7 1  -SI      Reps 7 10  -SI      Time (Seconds) 7 5  -SI      Exercise 8    Exercise Name 8 UBE  -SI      Time (Minutes) 8 4  -SI      Exercise 9    Exercise Name 9 Sh press up " supine  -SI      Sets 9 2  -SI      Reps 9 10  -SI      Exercise 10    Exercise Name 10 supine, sh protraction, trace letters of alphabet  -SI      Sets 10 1  -SI      Exercise 11    Exercise Name 11 ER side  -SI      Sets 11 2  -SI      Reps 11 10  -SI        User Key  (r) = Recorded By, (t) = Taken By, (c) = Cosigned By    Initials Name Provider Type    MARKIE Espinosa PTA Physical Therapy Assistant                        Manual Rx (last 36 hours)      Manual Treatments       01/17/18 0700          Manual Rx 1    Manual Rx 1 Location PROM as per RCR protocal  -SI      Manual Rx 1 Duration 2 x 15  -SI        User Key  (r) = Recorded By, (t) = Taken By, (c) = Cosigned By    Initials Name Provider Type    MARKIE Espinosa PTA Physical Therapy Assistant                PT OP Goals       01/17/18 0800       PT Short Term Goals    STG 1 Patient will demonstrate compliance and independence with initial HEP  -SI     STG 1 Progress Ongoing  -SI     STG 2 Patient will report a 50% reduction in night pain to allow for better quality of sleep  -SI     STG 2 Progress Ongoing  -SI     STG 3 Patient will demonstrate R shoulder PROM of 90 degrees in order to increase ease with overhead tasks  -SI     STG 3 Progress Met  -SI       User Key  (r) = Recorded By, (t) = Taken By, (c) = Cosigned By    Initials Name Provider Type    MARKIE Espinosa PTA Physical Therapy Assistant          Therapy Education  Given: HEP, Symptoms/condition management, Pain management (discussed protocol at 6 to 8 weeks.  Avoid overhead, and no quick jerk of arm not that out of sling)              Time Calculation:   Start Time: 0805  Stop Time: 0850  Time Calculation (min): 45 min    Therapy Charges for Today     Code Description Service Date Service Provider Modifiers Qty    56939304662 HC PT THER PROC EA 15 MIN 1/17/2018 Sia Espinosa PTA GP 1    04250471784 HC PT MANUAL THERAPY EA 15 MIN 1/17/2018 Sai Espinosa PTA GP 2                     Sia Espinosa, PTA  1/17/2018

## 2018-01-18 ENCOUNTER — HOSPITAL ENCOUNTER (OUTPATIENT)
Dept: PHYSICAL THERAPY | Facility: HOSPITAL | Age: 33
Setting detail: THERAPIES SERIES
Discharge: HOME | End: 2018-01-18
Attending: ORTHOPAEDIC SURGERY

## 2018-01-18 DIAGNOSIS — M75.121 COMPLETE TEAR OF RIGHT ROTATOR CUFF: ICD-10-CM

## 2018-01-18 DIAGNOSIS — Z98.890 S/P ARTHROSCOPY OF SHOULDER: ICD-10-CM

## 2018-01-18 DIAGNOSIS — M25.511 ACUTE PAIN OF RIGHT SHOULDER: Primary | ICD-10-CM

## 2018-01-18 DIAGNOSIS — Z98.890 STATUS POST ROTATOR CUFF REPAIR: Primary | ICD-10-CM

## 2018-01-18 PROCEDURE — 97110 THERAPEUTIC EXERCISES: CPT

## 2018-01-18 PROCEDURE — 97140 MANUAL THERAPY 1/> REGIONS: CPT

## 2018-01-18 RX ORDER — HYDROCODONE BITARTRATE AND ACETAMINOPHEN 5; 325 MG/1; MG/1
1 TABLET ORAL EVERY 8 HOURS PRN
Qty: 45 TABLET | Refills: 0 | Status: SHIPPED | OUTPATIENT
Start: 2018-01-18 | End: 2018-01-23 | Stop reason: SDUPTHER

## 2018-01-18 NOTE — THERAPY TREATMENT NOTE
"    Outpatient Physical Therapy Ortho Treatment Note  [facility]     Patient Name: Alvaro Morris  : 1985  MRN: [id number]  Today's Date: 2018      Visit Date: 2018    Visit Dx:    ICD-10-CM ICD-9-CM   1. Acute pain of right shoulder M25.511 719.41   2. Complete tear of right rotator cuff M75.121 727.61       Patient Active Problem List   Diagnosis   • ADD (attention deficit disorder)   • Essential hypertension   • Acute pain of both shoulders   • Incomplete tear of rotator cuff   • History of rotator cuff surgery        Past Medical History:   Diagnosis Date   • ADHD    • Bursitis of elbow     right elbow   • Hemorrhoids    • Hypertension     B/P OK SINCE STOPPED SMOKING-NO MEDS CURRENTLY   • Rotator cuff tear         Past Surgical History:   Procedure Laterality Date   • HEMORRHOIDECTOMY N/A 2016    Procedure: HEMORRHOIDECTOMY;  Surgeon: Atilio Smith MD;  Location: [facility];  Service:    • SHOULDER ARTHROSCOPY W/ ROTATOR CUFF REPAIR Right 2017    Procedure: SHOULDER ARTHROSCOPY WITH MINI OPEN ROTATOR CUFF REPAIR  DEBRIDEMENT OF LABRUM DEBRIDEMENT OF SUBSCAPULARIS DECOMPRESSION;  Surgeon: Pily Cordero MD;  Location: [facility];  Service:              PT Ortho       18 0800    Subjective Comments    Subjective Comments Sore for awhile after PT.  Pain reported today while doing the UBE  -SI    Subjective Pain    Able to rate subjective pain? yes  -SI    Pre-Treatment Pain Level 0  -SI    Post-Treatment Pain Level 1  -SI    Subjective Pain Comment No pain if just sitting , pain at night, and some pain with ex.  Mostly \"stiff\".  -SI    Right Shoulder    Flexion ROM Details A-80  -SI    ABduction ROM Details A-50  -SI    External Rotation ROM Details Neck  -SI    Internal Rotation ROM Details A-T12  -SI      18 0800    Subjective Comments    Subjective Comments MD yesterday and said need to get moving and mentionined a manipulation.  \"Scared me and really " "worked on ex yesterday\"  -SI    Subjective Pain    Able to rate subjective pain? yes  -SI    Pre-Treatment Pain Level 1  -SI    Post-Treatment Pain Level 1  -SI    Subjective Pain Comment Pain end  ranges with PROM.  -SI    Right Shoulder    Flexion ROM Details P-148  -SI    ABduction ROM Details P-P-130  -SI    External Rotation ROM Details P-67  -SI    Internal Rotation ROM Details P-75  -SI    Right Shoulder    Flexion Gross Movement (3/5) fair  -SI    ABduction Gross Movement (3-/5) fair minus  -SI    Int Rotation Gross Movement (3/5) fair  -SI    Ext Rotation Gross Movement (3/5) fair  -SI      User Key  (r) = Recorded By, (t) = Taken By, (c) = Cosigned By    Initials Name Provider Type    MARKIE Espinosa PTA Physical Therapy Assistant                            PT Assessment/Plan       01/18/18 0908       PT Assessment    Assessment Comments More pain and MM guarding today.  Hopefully not doing too much now that out of sling.  Reminded pt of do's and don'ts at 6 1/2 weeks post-op.  -SI       User Key  (r) = Recorded By, (t) = Taken By, (c) = Cosigned By    Initials Name Provider Type    MARKIE Espinosa PTA Physical Therapy Assistant                    Exercises       01/18/18 0800          Subjective Comments    Subjective Comments Sore for awhile after PT.  Pain reported today while doing the UBE  -SI      Subjective Pain    Able to rate subjective pain? yes  -SI      Pre-Treatment Pain Level 0  -SI      Post-Treatment Pain Level 1  -SI      Subjective Pain Comment No pain if just sitting , pain at night, and some pain with ex.  Mostly \"stiff\".  -SI        User Key  (r) = Recorded By, (t) = Taken By, (c) = Cosigned By    Initials Name Provider Type    MARKIE Espinosa PTA Physical Therapy Assistant                        Manual Rx (last 36 hours)      Manual Treatments       01/17/18 0700          Manual Rx 1    Manual Rx 1 Location PROM as per RCR protocal  -SI      Manual Rx 1 Duration 2 x 15  -SI   "      User Key  (r) = Recorded By, (t) = Taken By, (c) = Cosigned By    Initials Name Provider Type    MARKIE Espinosa PTA Physical Therapy Assistant                PT OP Goals       01/18/18 0900       Long Term Goals    LTG 2 Patient will demonstrate R shoulder AROM of 100 degrees in order to increase ease with overhead tasks  -SI     LTG 2 Progress Progressing  -SI       User Key  (r) = Recorded By, (t) = Taken By, (c) = Cosigned By    Initials Name Provider Type    MARKIE Espinosa PTA Physical Therapy Assistant                         Time Calculation:   Start Time: 0847  Stop Time: 0935  Time Calculation (min): 48 min    Therapy Charges for Today     Code Description Service Date Service Provider Modifiers Qty    15160914502 HC PT MANUAL THERAPY EA 15 MIN 1/18/2018 Sia Espinosa PTA GP 2    91173233105 HC PT THER PROC EA 15 MIN 1/18/2018 Sia Espinosa PTA GP 1                    Sia Espinosa PTA  1/18/2018

## 2018-01-18 NOTE — TELEPHONE ENCOUNTER
Karan Darnell   [facility] Clinical Pool 2 hours ago (9:43 AM)                 PT NEEDS REFILL ON SCRIPT FOR HYDROCODONE FOR [facility] (Routing comment)

## 2018-01-19 RX ORDER — OXYCODONE HYDROCHLORIDE AND ACETAMINOPHEN 5; 325 MG/1; MG/1
TABLET ORAL
Qty: 50 TABLET | Refills: 0 | Status: CANCELLED | OUTPATIENT
Start: 2018-01-19

## 2018-01-23 RX ORDER — HYDROCODONE BITARTRATE AND ACETAMINOPHEN 5; 325 MG/1; MG/1
1 TABLET ORAL EVERY 6 HOURS PRN
Qty: 60 TABLET | Refills: 0 | Status: SHIPPED | OUTPATIENT
Start: 2018-01-23 | End: 2018-02-08

## 2018-01-23 NOTE — TELEPHONE ENCOUNTER
Have spoken with [place] and Dr. Banks's office regarding this patient and his request for pain medicine.  Patient called them this morning to get a refill on his hydrocodone however she did notice that he had also called our office and they denied his prescription.  I've discussed with her that Dr. Cordero is recommending that the patient see someone in pain management.  According to his chart he does have an appointment with Dr. Orosco with [facility] pain management on February 8.  I did discuss with both Dr. Banks's office as well as Dr. Cordero and she has agreed to fill one more prescription hydrocodone 5/325mg, #60, directions her to take 1 tablet every 6 hours as needed for pain.  Also advised the patient that this is the last prescription that he will be getting from our office and that this prescription must last until his appointment with Dr. Orosco on February 8.  I also advised him that Dr. Banks's office also will not be giving him any narcotics.  Prescription has been left at the  for the patient to pickup

## 2018-01-24 ENCOUNTER — HOSPITAL ENCOUNTER (OUTPATIENT)
Dept: PHYSICAL THERAPY | Facility: HOSPITAL | Age: 33
Setting detail: THERAPIES SERIES
Discharge: HOME | End: 2018-01-24
Attending: ORTHOPAEDIC SURGERY

## 2018-01-24 DIAGNOSIS — M75.121 COMPLETE TEAR OF RIGHT ROTATOR CUFF: ICD-10-CM

## 2018-01-24 DIAGNOSIS — M25.511 ACUTE PAIN OF RIGHT SHOULDER: Primary | ICD-10-CM

## 2018-01-24 PROCEDURE — 97110 THERAPEUTIC EXERCISES: CPT

## 2018-01-24 PROCEDURE — 97140 MANUAL THERAPY 1/> REGIONS: CPT

## 2018-01-24 NOTE — THERAPY TREATMENT NOTE
Outpatient Physical Therapy Ortho Treatment Note  [facility]     Patient Name: Alvaro Morris  : 1985  MRN: [id number]  Today's Date: 2018      Visit Date: 2018    Visit Dx:    ICD-10-CM ICD-9-CM   1. Acute pain of right shoulder M25.511 719.41   2. Complete tear of right rotator cuff M75.121 727.61       Patient Active Problem List   Diagnosis   • ADD (attention deficit disorder)   • Essential hypertension   • Acute pain of both shoulders   • Incomplete tear of rotator cuff   • History of rotator cuff surgery        Past Medical History:   Diagnosis Date   • ADHD    • Bursitis of elbow     right elbow   • Hemorrhoids    • Hypertension     B/P OK SINCE STOPPED SMOKING-NO MEDS CURRENTLY   • Rotator cuff tear         Past Surgical History:   Procedure Laterality Date   • HEMORRHOIDECTOMY N/A 2016    Procedure: HEMORRHOIDECTOMY;  Surgeon: Atilio Smith MD;  Location: [facility];  Service:    • SHOULDER ARTHROSCOPY W/ ROTATOR CUFF REPAIR Right 2017    Procedure: SHOULDER ARTHROSCOPY WITH MINI OPEN ROTATOR CUFF REPAIR  DEBRIDEMENT OF LABRUM DEBRIDEMENT OF SUBSCAPULARIS DECOMPRESSION;  Surgeon: Pily Cordero MD;  Location: [facility];  Service:              PT Ortho       18 1500    Subjective Comments    Subjective Comments Pt states some days lot of shoulder pain and others not....  -SI    Subjective Pain    Able to rate subjective pain? yes  -SI    Pre-Treatment Pain Level 0  -SI    Post-Treatment Pain Level 1  -SI    Subjective Pain Comment No pain at rest, pain intermittent with ADL's and ex  -SI    Right Shoulder    Flexion ROM Details A/P 130/160  -SI    ABduction ROM Details 90/130  -SI    External Rotation ROM Details neck/73  -SI    Internal Rotation ROM Details T12/73  -SI    Right Shoulder    Flexion Gross Movement (3/5) fair  -SI    ABduction Gross Movement (3-/5) fair minus  -SI    Int Rotation Gross Movement (3/5) fair  -SI    Ext Rotation Gross  Movement (3/5) fair  -SI      User Key  (r) = Recorded By, (t) = Taken By, (c) = Cosigned By    Initials Name Provider Type    MARKIE Espinosa PTA Physical Therapy Assistant                            PT Assessment/Plan       01/24/18 1536       PT Assessment    Assessment Comments Pt must be doing his HEP because his ROM and strength are improving.  He is often late for PT appts or no show as this AM, but after call he came in this PM........Pt is 7 1/2 weeks post-op.  -SI       User Key  (r) = Recorded By, (t) = Taken By, (c) = Cosigned By    Initials Name Provider Type    MARKIE Espinosa PTA Physical Therapy Assistant                    Exercises       01/24/18 1500          Subjective Comments    Subjective Comments Pt states some days lot of shoulder pain and others not....  -SI      Subjective Pain    Able to rate subjective pain? yes  -SI      Pre-Treatment Pain Level 0  -SI      Post-Treatment Pain Level 1  -SI      Subjective Pain Comment No pain at rest, pain intermittent with ADL's and ex  -SI      Exercise 1    Exercise Name 1 pendulums (circles, forward/backward, sideways)  -SI      Reps 1 2  -SI      Time (Minutes) 1 1  -SI      Exercise 2    Exercise Name 2 Sh rolls and retraction  -SI      Time (Minutes) 2 5  -SI      Exercise 3    Exercise Name 3 Active biceps standing  -SI      Reps 3 20  -SI      Exercise 4    Exercise Name 4 Table slides for flexion  -SI      Sets 4 2  -SI      Reps 4 10  -SI      Time (Seconds) 4 20  -SI      Exercise 5    Exercise Name 5 supine ER with cane  -SI      Sets 5 1  -SI      Reps 5 10  -SI      Time (Seconds) 5 10  -SI      Exercise 6    Exercise Name 6 supine, self assist sh flexion  -SI      Sets 6 1  -SI      Reps 6 10  -SI      Time (Seconds) 6 10  -SI      Exercise 7    Exercise Name 7 Wall slide with eccentric lower  -SI      Reps 7 5  -SI      Exercise 8    Exercise Name 8 UBE  -SI      Time (Minutes) 8 4  -SI      Exercise 9    Exercise Name 9 Sh press  up supine  -SI      Sets 9 2  -SI      Reps 9 10  -SI      Additional Comments 2lbs  -SI      Exercise 10    Exercise Name 10 supine, sh protraction, trace letters of alphabet  -SI      Sets 10 1  -SI      Additional Comments 2lbs  -SI      Exercise 11    Exercise Name 11 ER side  -SI      Sets 11 2  -SI      Reps 11 10  -SI      Additional Comments 2lbs  -SI        User Key  (r) = Recorded By, (t) = Taken By, (c) = Cosigned By    Initials Name Provider Type    MARKIE Espinosa PTA Physical Therapy Assistant                        Manual Rx (last 36 hours)      Manual Treatments       01/24/18 1500          Manual Rx 1    Manual Rx 1 Location PROM as per RCR protocal  -SI      Manual Rx 1 Duration 25  -SI        User Key  (r) = Recorded By, (t) = Taken By, (c) = Cosigned By    Initials Name Provider Type    MARKIE Espinosa PTA Physical Therapy Assistant                PT OP Goals       01/24/18 1500       PT Short Term Goals    STG 1 Patient will demonstrate compliance and independence with initial HEP  -SI     STG 1 Progress Met  -SI     STG 2 Patient will report a 50% reduction in night pain to allow for better quality of sleep  -SI     STG 2 Progress Progressing  -SI     Long Term Goals    LTG 1 Patient will report the ability to complete grooming tasks using his R UE to increase ease with morning ADLs  -SI     LTG 1 Progress Progressing  -SI     LTG 2 Patient will demonstrate R shoulder AROM of 100 degrees in order to increase ease with overhead tasks  -SI     LTG 2 Progress Met  -SI     LTG 3 Patient will demonstrate R shoulder flexion strength of 3+/5 with MMT to demonstrate improved shoulder stability for ADLs  -SI     LTG 3 Progress Progressing  -SI       User Key  (r) = Recorded By, (t) = Taken By, (c) = Cosigned By    Initials Name Provider Type    MARKIE Espinosa PTA Physical Therapy Assistant          Therapy Education  Given: HEP, Symptoms/condition management  Program: Progressed  How Provided:  Verbal, Demonstration, Written  Provided to: Patient  Level of Understanding: Teach back education performed              Time Calculation:   Start Time: 1330  Stop Time: 1415  Time Calculation (min): 45 min    Therapy Charges for Today     Code Description Service Date Service Provider Modifiers Qty    77707983502 HC PT THER PROC EA 15 MIN 1/24/2018 Sia Espinosa PTA GP 1    02588724769 HC PT MANUAL THERAPY EA 15 MIN 1/24/2018 Sia Espinosa PTA GP 2                    Sia Espinosa PTA  1/24/2018

## 2018-01-26 ENCOUNTER — HOSPITAL ENCOUNTER (OUTPATIENT)
Dept: PHYSICAL THERAPY | Facility: HOSPITAL | Age: 33
Setting detail: THERAPIES SERIES
Discharge: HOME | End: 2018-01-26
Attending: ORTHOPAEDIC SURGERY

## 2018-01-26 DIAGNOSIS — M75.121 COMPLETE TEAR OF RIGHT ROTATOR CUFF: ICD-10-CM

## 2018-01-26 DIAGNOSIS — M25.511 ACUTE PAIN OF RIGHT SHOULDER: Primary | ICD-10-CM

## 2018-01-26 PROCEDURE — 97140 MANUAL THERAPY 1/> REGIONS: CPT | Performed by: PHYSICAL THERAPIST

## 2018-01-26 PROCEDURE — 97110 THERAPEUTIC EXERCISES: CPT | Performed by: PHYSICAL THERAPIST

## 2018-01-26 NOTE — THERAPY TREATMENT NOTE
Outpatient Physical Therapy Ortho Treatment Note  [facility]     Patient Name: Alvaro Morris  : 1985  MRN: [id number]  Today's Date: 2018      Visit Date: 2018    Visit Dx:    ICD-10-CM ICD-9-CM   1. Acute pain of right shoulder M25.511 719.41   2. Complete tear of right rotator cuff M75.121 727.61       Patient Active Problem List   Diagnosis   • ADD (attention deficit disorder)   • Essential hypertension   • Acute pain of both shoulders   • Incomplete tear of rotator cuff   • History of rotator cuff surgery        Past Medical History:   Diagnosis Date   • ADHD    • Bursitis of elbow     right elbow   • Hemorrhoids    • Hypertension     B/P OK SINCE STOPPED SMOKING-NO MEDS CURRENTLY   • Rotator cuff tear         Past Surgical History:   Procedure Laterality Date   • HEMORRHOIDECTOMY N/A 2016    Procedure: HEMORRHOIDECTOMY;  Surgeon: Atilio Smith MD;  Location: [facility];  Service:    • SHOULDER ARTHROSCOPY W/ ROTATOR CUFF REPAIR Right 2017    Procedure: SHOULDER ARTHROSCOPY WITH MINI OPEN ROTATOR CUFF REPAIR  DEBRIDEMENT OF LABRUM DEBRIDEMENT OF SUBSCAPULARIS DECOMPRESSION;  Surgeon: Pily Cordero MD;  Location: [facility] [place];  Service:              PT Ortho       18 0900    Subjective Comments    Subjective Comments Pt reports improved movement of his shoulder over the past few days. He has some increased soreness just from increased activity.  -LC    Subjective Pain    Able to rate subjective pain? yes  -LC    Pre-Treatment Pain Level 2  -LC    Right Shoulder    Flexion ROM Details A/P 150/160  -LC    ABduction ROM Details A/P 120/145  -LC    External Rotation ROM Details P 90  -LC    Internal Rotation ROM Details P 80  -LC    Right Shoulder    Flexion Gross Movement (3/5) fair  -LC    ABduction Gross Movement (3-/5) fair minus  -LC    Int Rotation Gross Movement (3/5) fair  -LC    Ext Rotation Gross Movement (3/5) fair  -LC      18 1500     Subjective Comments    Subjective Comments Pt states some days lot of shoulder pain and others not....  -SI    Subjective Pain    Able to rate subjective pain? yes  -SI    Pre-Treatment Pain Level 0  -SI    Post-Treatment Pain Level 1  -SI    Subjective Pain Comment No pain at rest, pain intermittent with ADL's and ex  -SI    Right Shoulder    Flexion ROM Details A/P 130/160  -SI    ABduction ROM Details 90/130  -SI    External Rotation ROM Details neck/73  -SI    Internal Rotation ROM Details T12/73  -SI    Right Shoulder    Flexion Gross Movement (3/5) fair  -SI    ABduction Gross Movement (3-/5) fair minus  -SI    Int Rotation Gross Movement (3/5) fair  -SI    Ext Rotation Gross Movement (3/5) fair  -SI      User Key  (r) = Recorded By, (t) = Taken By, (c) = Cosigned By    Initials Name Provider Type    MARKIE Espinosa, PTA Physical Therapy Assistant    CHAD Rosado, PT DPT Physical Therapist                            PT Assessment/Plan       01/26/18 0933       PT Assessment    Functional Limitations Limitations in community activities;Limitations in functional capacity and performance;Performance in work activities;Limitation in home management;Performance in sport activities;Performance in self-care ADL;Performance in leisure activities;Decreased safety during functional activities  -     Impairments Joint integrity;Joint mobility;Range of motion;Muscle strength;Pain  -     Assessment Comments Pt demonstrates increased AROM of R shoulder. He reports soreness and pain, but can tell that functionally he is improving and is very pleased. He demonstrated AROM 150 flex, 145 ABD in supine. He tolerated PROM 90 ER and 80 IR. He tolerated active therex well and reports no sharp pain only muscle fatigue and anterior capsule soreness. Pt was early for appointment and reported complete compliance with HEP.  -LC     PT Plan    PT Plan Comments Pt requires continued skilled therapy to improve R shoulder  functional strenght and mobility to allow full return to work and leisure activities with no limitations.  -LC       User Key  (r) = Recorded By, (t) = Taken By, (c) = Cosigned By    Initials Name Provider Type    CHAD Rosado, PT DPT Physical Therapist                    Exercises       01/26/18 0900          Subjective Comments    Subjective Comments Pt reports improved movement of his shoulder over the past few days. He has some increased soreness just from increased activity.  -LC      Subjective Pain    Able to rate subjective pain? yes  -LC      Pre-Treatment Pain Level 2  -LC      Exercise 1    Exercise Name 1 pendulums (circles, forward/backward, sideways)  -LC      Reps 1 2  -LC      Time (Minutes) 1 1  -LC      Exercise 2    Exercise Name 2 Sh rolls and retraction  -LC      Time (Minutes) 2 5  -LC      Exercise 3    Exercise Name 3 Active biceps standing  -LC      Reps 3 20  -LC      Additional Comments 2#s  -LC      Exercise 4    Exercise Name 4 Table slides for flexion  -LC      Sets 4 2  -LC      Reps 4 10  -LC      Time (Seconds) 4 20  -LC      Exercise 5    Exercise Name 5 supine ER with cane  -LC      Sets 5 1  -LC      Reps 5 10  -LC      Time (Seconds) 5 10  -LC      Exercise 6    Exercise Name 6 supine, self assist sh flexion  -LC      Sets 6 1  -LC      Reps 6 10  -LC      Time (Seconds) 6 10  -LC      Exercise 7    Exercise Name 7 Wall slide with eccentric lower  -LC      Sets 7 1  -LC      Reps 7 5  -LC      Time (Seconds) 7 5  -LC      Exercise 8    Exercise Name 8 UBE  -LC      Time (Minutes) 8 4  -LC      Exercise 9    Exercise Name 9 Sh press up supine  -LC      Sets 9 2  -LC      Reps 9 10  -LC      Additional Comments 2#s  -LC      Exercise 10    Exercise Name 10 supine, sh protraction, trace letters of alphabet  -LC      Sets 10 1  -LC      Additional Comments 2#s  -LC      Exercise 11    Exercise Name 11 ER side  -LC      Sets 11 2  -LC      Reps 11 10  -LC      Additional Comments  2#s  -LC        User Key  (r) = Recorded By, (t) = Taken By, (c) = Cosigned By    Initials Name Provider Type    CHAD Rosado, PT DPT Physical Therapist                        Manual Rx (last 36 hours)      Manual Treatments       01/26/18 0900          Manual Rx 1    Manual Rx 1 Location PROM as per RCR protocal  -LC      Manual Rx 1 Duration 25  -LC        User Key  (r) = Recorded By, (t) = Taken By, (c) = Cosigned By    Initials Name Provider Type    CHDA Rosado, PT DPT Physical Therapist              Therapy Education  Given: HEP, Symptoms/condition management  Program: Reinforced  How Provided: Verbal, Demonstration  Provided to: Patient  Level of Understanding: Teach back education performed, Verbalized, Demonstrated              Time Calculation:   Start Time: 0900  Stop Time: 0941  Time Calculation (min): 41 min  Total Timed Code Minutes- PT: 41 minute(s)    Therapy Charges for Today     Code Description Service Date Service Provider Modifiers Qty    56931853046  PT THER PROC EA 15 MIN 1/26/2018 Caleb Rosado PT DPT GP 1    [id number]  PT MANUAL THERAPY EA 15 MIN 1/26/2018 Caleb Rosado PT DPT GP 2                    Caleb Rosado PT DPT  1/26/2018

## 2018-01-31 ENCOUNTER — HOSPITAL ENCOUNTER (OUTPATIENT)
Dept: PHYSICAL THERAPY | Facility: HOSPITAL | Age: 33
Setting detail: THERAPIES SERIES
Discharge: HOME | End: 2018-01-31
Attending: ORTHOPAEDIC SURGERY

## 2018-01-31 DIAGNOSIS — M75.121 COMPLETE TEAR OF RIGHT ROTATOR CUFF: ICD-10-CM

## 2018-01-31 DIAGNOSIS — M25.511 ACUTE PAIN OF RIGHT SHOULDER: Primary | ICD-10-CM

## 2018-01-31 PROCEDURE — 97110 THERAPEUTIC EXERCISES: CPT

## 2018-01-31 PROCEDURE — 97140 MANUAL THERAPY 1/> REGIONS: CPT

## 2018-02-01 NOTE — THERAPY PROGRESS REPORT/RE-CERT
"    Outpatient Physical Therapy Ortho Re-Assessment  [facility]     Patient Name: Alvaro Morris  : 1985  MRN: [id number]  Today's Date: 2018      Visit Date: 2018    Patient Active Problem List   Diagnosis   • ADD (attention deficit disorder)   • Essential hypertension   • Acute pain of both shoulders   • Incomplete tear of rotator cuff   • History of rotator cuff surgery        Past Medical History:   Diagnosis Date   • ADHD    • Bursitis of elbow     right elbow   • Hemorrhoids    • Hypertension     B/P OK SINCE STOPPED SMOKING-NO MEDS CURRENTLY   • Rotator cuff tear         Past Surgical History:   Procedure Laterality Date   • HEMORRHOIDECTOMY N/A 2016    Procedure: HEMORRHOIDECTOMY;  Surgeon: Atilio Smith MD;  Location: [facility];  Service:    • SHOULDER ARTHROSCOPY W/ ROTATOR CUFF REPAIR Right 2017    Procedure: SHOULDER ARTHROSCOPY WITH MINI OPEN ROTATOR CUFF REPAIR  DEBRIDEMENT OF LABRUM DEBRIDEMENT OF SUBSCAPULARIS DECOMPRESSION;  Surgeon: Pily Cordero MD;  Location: [facility];  Service:        Visit Dx:     ICD-10-CM ICD-9-CM   1. Acute pain of right shoulder M25.511 719.41   2. Complete tear of right rotator cuff M75.121 727.61                 PT Ortho       18 1000    Subjective Comments    Subjective Comments \"never sleep well but better than before surgery now\"  -SI    Subjective Pain    Able to rate subjective pain? yes  -SI    Pre-Treatment Pain Level 0  -SI    Post-Treatment Pain Level 1  -SI    Right Shoulder    Flexion ROM Details A-150/160  -SI    ABduction ROM Details 140/150  -SI    Right Shoulder    Flexion Gross Movement (3+/5) fair plus  -SI    ABduction Gross Movement (3/5) fair  -SI    Int Rotation Gross Movement (3+/5) fair plus  -SI    Ext Rotation Gross Movement (3+/5) fair plus  -SI      User Key  (r) = Recorded By, (t) = Taken By, (c) = Cosigned By    Initials Name Provider Type    MARKIE Espinosa PTA Physical Therapy " Assistant                      Therapy Education  Given: HEP, Symptoms/condition management  Program: Progressed  How Provided: Verbal, Demonstration, Written  Provided to: Patient  Level of Understanding: Teach back education performed               PT Assessment/Plan       02/01/18 1529       PT Assessment    Functional Limitations Limitations in community activities;Limitations in functional capacity and performance;Performance in work activities;Limitation in home management;Performance in sport activities;Performance in self-care ADL;Performance in leisure activities;Decreased safety during functional activities  -     Impairments Joint integrity;Joint mobility;Range of motion;Muscle strength;Pain  -     Assessment Comments Pt has AROM of R shoulder 150 flex, 140 ABD. He continues to increase with strength and is progressing well through therex. He will continue to be progressed to improve R shoulder functional strength and mobility. He demonstrates R shoulder MMT grossly 3/5 at this time.  -     PT Plan    PT Plan Comments Pt requires continued skilled therapy to include therex, manual, strengthening, stretching, and ROM.  -       User Key  (r) = Recorded By, (t) = Taken By, (c) = Cosigned By    Initials Name Provider Type    CHAD Rosado, PT DPT Physical Therapist                 Manual Rx (last 36 hours)      Manual Treatments       01/31/18 0900          Manual Rx 1    Manual Rx 1 Location PROM as per RCR protocal  -SI      Manual Rx 1 Duration 25  -SI        User Key  (r) = Recorded By, (t) = Taken By, (c) = Cosigned By    Initials Name Provider Type    MARKIE Espinosa, PTA Physical Therapy Assistant                      Outcome Measure Options: Disabilities of the Arm, Shoulder, and Hand (DASH) (58.3)         Time Calculation:   Start Time: 0930  Stop Time: 1013  Time Calculation (min): 43 min         PT G-Codes  Outcome Measure Options: Disabilities of the Arm, Shoulder, and Hand (DASH)  (58.3)         Caleb Rosado, PT DPT  2/1/2018

## 2018-02-02 ENCOUNTER — HOSPITAL ENCOUNTER (OUTPATIENT)
Dept: PHYSICAL THERAPY | Facility: HOSPITAL | Age: 33
Setting detail: THERAPIES SERIES
Discharge: HOME | End: 2018-02-02
Attending: ORTHOPAEDIC SURGERY

## 2018-02-02 DIAGNOSIS — M25.511 ACUTE PAIN OF RIGHT SHOULDER: Primary | ICD-10-CM

## 2018-02-02 DIAGNOSIS — M75.121 COMPLETE TEAR OF RIGHT ROTATOR CUFF: ICD-10-CM

## 2018-02-02 PROCEDURE — 97110 THERAPEUTIC EXERCISES: CPT

## 2018-02-02 PROCEDURE — 97140 MANUAL THERAPY 1/> REGIONS: CPT

## 2018-02-02 NOTE — THERAPY TREATMENT NOTE
"    Outpatient Physical Therapy Ortho Treatment Note  [facility]     Patient Name: Alvaro Morris  : 1985  MRN: [id number]  Today's Date: 2018      Visit Date: 2018    Visit Dx:    ICD-10-CM ICD-9-CM   1. Acute pain of right shoulder M25.511 719.41   2. Complete tear of right rotator cuff M75.121 727.61       Patient Active Problem List   Diagnosis   • ADD (attention deficit disorder)   • Essential hypertension   • Acute pain of both shoulders   • Incomplete tear of rotator cuff   • History of rotator cuff surgery        Past Medical History:   Diagnosis Date   • ADHD    • Bursitis of elbow     right elbow   • Hemorrhoids    • Hypertension     B/P OK SINCE STOPPED SMOKING-NO MEDS CURRENTLY   • Rotator cuff tear         Past Surgical History:   Procedure Laterality Date   • HEMORRHOIDECTOMY N/A 2016    Procedure: HEMORRHOIDECTOMY;  Surgeon: Atilio Smith MD;  Location: [facility];  Service:    • SHOULDER ARTHROSCOPY W/ ROTATOR CUFF REPAIR Right 2017    Procedure: SHOULDER ARTHROSCOPY WITH MINI OPEN ROTATOR CUFF REPAIR  DEBRIDEMENT OF LABRUM DEBRIDEMENT OF SUBSCAPULARIS DECOMPRESSION;  Surgeon: Pily Cordero MD;  Location: [facility];  Service:              PT Ortho       18 1100    Subjective Comments    Subjective Comments Elizabeth MAXWELL is pleased with my progress when go next week.  \"She scared me when said may have a manip if not improved.  -SI    Subjective Pain    Able to rate subjective pain? yes  -SI    Pre-Treatment Pain Level 0  -SI    Post-Treatment Pain Level 0  -SI    Subjective Pain Comment sub-acute pain with stretching  -SI    Right Shoulder    Flexion ROM Details A/P 150/161  -SI    ABduction ROM Details 140/148  -SI    External Rotation ROM Details T1/83  -SI    Internal Rotation ROM Details T12/  -SI    Right Shoulder    Flexion Gross Movement (3+/5) fair plus  -SI    ABduction Gross Movement (3/5) fair  -SI    Int Rotation Gross Movement (3+/5) fair " "plus  -SI    Ext Rotation Gross Movement (3+/5) fair plus  -SI      01/31/18 1000    Subjective Comments    Subjective Comments \"never sleep well but better than before surgery now\"  -SI    Subjective Pain    Able to rate subjective pain? yes  -SI    Pre-Treatment Pain Level 0  -SI    Post-Treatment Pain Level 1  -SI    Right Shoulder    Flexion ROM Details A-150/160  -SI    ABduction ROM Details 140/150  -SI    Right Shoulder    Flexion Gross Movement (3+/5) fair plus  -SI    ABduction Gross Movement (3/5) fair  -SI    Int Rotation Gross Movement (3+/5) fair plus  -SI    Ext Rotation Gross Movement (3+/5) fair plus  -SI      User Key  (r) = Recorded By, (t) = Taken By, (c) = Cosigned By    Initials Name Provider Type    MARKIE Espinosa PTA Physical Therapy Assistant                            PT Assessment/Plan       02/02/18 1111 02/01/18 1529    PT Assessment    Functional Limitations  Limitations in community activities;Limitations in functional capacity and performance;Performance in work activities;Limitation in home management;Performance in sport activities;Performance in self-care ADL;Performance in leisure activities;Decreased safety during functional activities  -    Impairments  Joint integrity;Joint mobility;Range of motion;Muscle strength;Pain  -    Assessment Comments Pt is now progressing as expected at 8 1/2 weeks post-op.  Strengthening and ROM continue to improve.  Pt works as an  and off work.  - Pt has AROM of R shoulder 150 flex, 140 ABD. He continues to increase with strength and is progressing well through therex. He will continue to be progressed to improve R shoulder functional strength and mobility. He demonstrates R shoulder MMT grossly 3/5 at this time.  -    PT Plan    PT Plan Comments  Pt requires continued skilled therapy to include therex, manual, strengthening, stretching, and ROM.  -      User Key  (r) = Recorded By, (t) = Taken By, (c) = Cosigned By    " "Initials Name Provider Type    SI Sia AVELAR Francisca, PTA Physical Therapy Assistant    CHAD Rosado, PT DPT Physical Therapist                    Exercises       02/02/18 1100          Subjective Comments    Subjective Comments Elizabeth MAXWELL is pleased with my progress when go next week.  \"She scared me when said may have a manip if not improved.  -SI      Subjective Pain    Able to rate subjective pain? yes  -SI      Pre-Treatment Pain Level 0  -SI      Post-Treatment Pain Level 0  -SI      Subjective Pain Comment sub-acute pain with stretching  -SI      Exercise 1    Exercise Name 1 pendulums (circles, forward/backward, sideways)  -SI      Reps 1 2  -SI      Time (Minutes) 1 1  -SI      Exercise 2    Exercise Name 2 Sh rolls and retraction  -SI      Time (Minutes) 2 5  -SI      Exercise 3    Exercise Name 3 Active biceps standing  -SI      Reps 3 20  -SI      Exercise 4    Exercise Name 4 Table slides for flexion  -SI      Sets 4 2  -SI      Reps 4 10  -SI      Time (Seconds) 4 20  -SI      Exercise 5    Exercise Name 5 supine ER with cane  -SI      Sets 5 1  -SI      Reps 5 10  -SI      Time (Seconds) 5 10  -SI      Exercise 6    Exercise Name 6 supine, self assist sh flexion  -SI      Sets 6 1  -SI      Reps 6 10  -SI      Time (Seconds) 6 10  -SI      Exercise 7    Exercise Name 7 Wall slide with eccentric lower  -SI      Sets 7 1  -SI      Reps 7 5  -SI      Time (Seconds) 7 5  -SI      Exercise 8    Exercise Name 8 UBE  -SI      Time (Minutes) 8 4  -SI      Exercise 9    Exercise Name 9 Sh press up supine  -SI      Sets 9 2  -SI      Reps 9 10  -SI      Additional Comments 2lbs  -SI      Exercise 10    Exercise Name 10 supine, sh protraction, trace letters of alphabet  -SI      Sets 10 1  -SI      Additional Comments 2lbs  -SI      Exercise 11    Exercise Name 11 ER side  -SI      Sets 11 2  -SI      Reps 11 10  -SI      Additional Comments 2lbs  -SI      Exercise 12    Exercise Name 12 Scap retraction and Sh ext "   -SI      Sets 12 2  -SI      Reps 12 10  -SI      Additional Comments green  -SI      Exercise 13    Exercise Name 13 IR and ER  -SI      Sets 13 2  -SI      Reps 13 10  -SI      Additional Comments red  -SI        User Key  (r) = Recorded By, (t) = Taken By, (c) = Cosigned By    Initials Name Provider Type    MARKIE Espinosa PTA Physical Therapy Assistant                        Manual Rx (last 36 hours)      Manual Treatments       02/02/18 1100          Manual Rx 1    Manual Rx 1 Location PROM as per RCR protocal  -SI      Manual Rx 1 Duration 20  -SI        User Key  (r) = Recorded By, (t) = Taken By, (c) = Cosigned By    Initials Name Provider Type    MARKIE Espinosa PTA Physical Therapy Assistant              Therapy Education  Given: HEP, Symptoms/condition management  Program: Reinforced  How Provided: Verbal, Demonstration, Written  Provided to: Patient  Level of Understanding: Teach back education performed              Time Calculation:   Start Time: 0930  Stop Time: 1015  Time Calculation (min): 45 min    Therapy Charges for Today     Code Description Service Date Service Provider Modifiers Qty    29280109555 HC PT THER PROC EA 15 MIN 2/2/2018 Sia Espinosa PTA GP 2    06117437306 HC PT MANUAL THERAPY EA 15 MIN 2/2/2018 Sia Espinosa PTA GP 1                    Sia Espinosa PTA  2/2/2018

## 2018-02-07 ENCOUNTER — HOSPITAL ENCOUNTER (OUTPATIENT)
Dept: PHYSICAL THERAPY | Facility: HOSPITAL | Age: 33
Setting detail: THERAPIES SERIES
Discharge: HOME | End: 2018-02-07
Attending: ORTHOPAEDIC SURGERY

## 2018-02-07 DIAGNOSIS — M25.511 ACUTE PAIN OF RIGHT SHOULDER: Primary | ICD-10-CM

## 2018-02-07 DIAGNOSIS — M75.121 COMPLETE TEAR OF RIGHT ROTATOR CUFF: ICD-10-CM

## 2018-02-07 PROCEDURE — 97140 MANUAL THERAPY 1/> REGIONS: CPT

## 2018-02-07 PROCEDURE — 97110 THERAPEUTIC EXERCISES: CPT

## 2018-02-07 NOTE — THERAPY TREATMENT NOTE
"    Outpatient Physical Therapy Ortho Treatment Note  [facility]     Patient Name: Alvaro Morris  : 1985  MRN: [id number]  Today's Date: 2018      Visit Date: 2018    Visit Dx:    ICD-10-CM ICD-9-CM   1. Acute pain of right shoulder M25.511 719.41   2. Complete tear of right rotator cuff M75.121 727.61       Patient Active Problem List   Diagnosis   • ADD (attention deficit disorder)   • Essential hypertension   • Acute pain of both shoulders   • Incomplete tear of rotator cuff   • History of rotator cuff surgery        Past Medical History:   Diagnosis Date   • ADHD    • Bursitis of elbow     right elbow   • Hemorrhoids    • Hypertension     B/P OK SINCE STOPPED SMOKING-NO MEDS CURRENTLY   • Rotator cuff tear         Past Surgical History:   Procedure Laterality Date   • HEMORRHOIDECTOMY N/A 2016    Procedure: HEMORRHOIDECTOMY;  Surgeon: Atilio Smith MD;  Location: [facility];  Service:    • SHOULDER ARTHROSCOPY W/ ROTATOR CUFF REPAIR Right 2017    Procedure: SHOULDER ARTHROSCOPY WITH MINI OPEN ROTATOR CUFF REPAIR  DEBRIDEMENT OF LABRUM DEBRIDEMENT OF SUBSCAPULARIS DECOMPRESSION;  Surgeon: Pily Cordero MD;  Location: [facility];  Service:              PT Ortho       18 1000    Subjective Comments    Subjective Comments Had to change appt with MD \"no  yesterday\"...Sleeping much better and less shoulder pain  -SI    Subjective Pain    Able to rate subjective pain? yes  -SI    Pre-Treatment Pain Level 0  -SI    Post-Treatment Pain Level 0  -SI      User Key  (r) = Recorded By, (t) = Taken By, (c) = Cosigned By    Initials Name Provider Type    MARKIE Espinosa PTA Physical Therapy Assistant                            PT Assessment/Plan       18 1018       PT Assessment    Assessment Comments pt \"on board\" with the PT and making excellent gains.  RTMD  now as changed appt.  -SI       User Key  (r) = Recorded By, (t) = Taken By, (c) = " "Cosigned By    Initials Name Provider Type    MARKIE Sia AVEALR FABIAN Espinosa Physical Therapy Assistant                    Exercises       02/07/18 1000          Subjective Comments    Subjective Comments Had to change appt with MD \"no  yesterday\"...Sleeping much better and less shoulder pain  -SI      Subjective Pain    Able to rate subjective pain? yes  -SI      Pre-Treatment Pain Level 0  -SI      Post-Treatment Pain Level 0  -SI      Exercise 1    Exercise Name 1 pendulums (circles, forward/backward, sideways)  -SI      Reps 1 2  -SI      Time (Minutes) 1 1  -SI      Exercise 2    Exercise Name 2 Sh rolls and retraction  -SI      Time (Minutes) 2 5  -SI      Exercise 3    Exercise Name 3 Active biceps standing  -SI      Reps 3 20  -SI      Additional Comments green  -SI      Exercise 4    Exercise Name 4 Table slides for flexion  -SI      Sets 4 2  -SI      Reps 4 10  -SI      Time (Seconds) 4 20  -SI      Exercise 5    Exercise Name 5 supine ER with cane  -SI      Sets 5 1  -SI      Reps 5 10  -SI      Time (Seconds) 5 10  -SI      Exercise 6    Exercise Name 6 supine, self assist sh flexion  -SI      Sets 6 1  -SI      Reps 6 10  -SI      Time (Seconds) 6 10  -SI      Exercise 7    Exercise Name 7 Wall slide with eccentric lower  -SI      Sets 7 1  -SI      Reps 7 5  -SI      Time (Seconds) 7 5  -SI      Exercise 8    Exercise Name 8 UBE  -SI      Time (Minutes) 8 4  -SI      Exercise 9    Exercise Name 9 D2 flexion and horiz abd supine  -SI      Sets 9 2  -SI      Reps 9 10  -SI      Additional Comments red  -SI      Exercise 10    Exercise Name 10 biceps and triceps  -SI      Sets 10 2  -SI      Reps 10 10  -SI      Additional Comments green  -SI      Exercise 11    Exercise Name 11 ER side  -SI      Sets 11 2  -SI      Reps 11 10  -SI      Additional Comments 2lbs  -SI      Exercise 12    Exercise Name 12 Scap retraction and Sh ext   -SI      Sets 12 2  -SI      Reps 12 10  -SI      Additional Comments " blue  -SI      Exercise 13    Exercise Name 13 IR and ER  -SI      Sets 13 2  -SI      Reps 13 10  -SI      Additional Comments green  -SI        User Key  (r) = Recorded By, (t) = Taken By, (c) = Cosigned By    Initials Name Provider Type    MARKIE Espinosa PTA Physical Therapy Assistant                        Manual Rx (last 36 hours)      Manual Treatments       02/07/18 0900          Manual Rx 1    Manual Rx 1 Location PROM as per RCR protocal  -SI      Manual Rx 1 Duration 20  -SI        User Key  (r) = Recorded By, (t) = Taken By, (c) = Cosigned By    Initials Name Provider Type    MARKIE Espinosa PTA Physical Therapy Assistant              Therapy Education  Given: HEP, Symptoms/condition management  Program: Progressed  How Provided: Verbal, Demonstration, Written  Provided to: Patient  Level of Understanding: Teach back education performed              Time Calculation:   Start Time: 0930  Stop Time: 1015  Time Calculation (min): 45 min    Therapy Charges for Today     Code Description Service Date Service Provider Modifiers Qty    75934954180 HC PT THER PROC EA 15 MIN 2/7/2018 Sia Espinosa PTA GP 2    11855513179 HC PT MANUAL THERAPY EA 15 MIN 2/7/2018 Sia Espinosa PTA GP 1                    Sia Espinosa PTA  2/7/2018

## 2018-02-08 ENCOUNTER — OFFICE VISIT (OUTPATIENT)
Dept: PAIN MEDICINE | Facility: CLINIC | Age: 33
End: 2018-02-08

## 2018-02-08 VITALS
BODY MASS INDEX: 20.02 KG/M2 | HEART RATE: 99 BPM | SYSTOLIC BLOOD PRESSURE: 150 MMHG | RESPIRATION RATE: 18 BRPM | HEIGHT: 74 IN | TEMPERATURE: 98.3 F | OXYGEN SATURATION: 99 % | DIASTOLIC BLOOD PRESSURE: 95 MMHG | WEIGHT: 156 LBS

## 2018-02-08 DIAGNOSIS — G89.29 CHRONIC RIGHT SHOULDER PAIN: Primary | ICD-10-CM

## 2018-02-08 DIAGNOSIS — M25.511 CHRONIC RIGHT SHOULDER PAIN: Primary | ICD-10-CM

## 2018-02-08 LAB
POC AMPHETAMINES: NEGATIVE
POC BARBITURATES: NEGATIVE
POC BENZODIAZEPHINES: NEGATIVE
POC COCAINE: NEGATIVE
POC METHADONE: NEGATIVE
POC METHAMPHETAMINE SCREEN URINE: NEGATIVE
POC OPIATES: NEGATIVE
POC OXYCODONE: POSITIVE
POC PHENCYCLIDINE: NEGATIVE
POC PROPOXYPHENE: NEGATIVE
POC THC: NEGATIVE
POC TRICYCLIC ANTIDEPRESSANTS: NEGATIVE

## 2018-02-08 PROCEDURE — 80305 DRUG TEST PRSMV DIR OPT OBS: CPT | Performed by: PAIN MEDICINE

## 2018-02-08 PROCEDURE — 99204 OFFICE O/P NEW MOD 45 MIN: CPT | Performed by: PAIN MEDICINE

## 2018-02-08 RX ORDER — IBUPROFEN 800 MG/1
800 TABLET ORAL EVERY 6 HOURS PRN
COMMUNITY
End: 2018-05-03

## 2018-02-08 RX ORDER — CYCLOBENZAPRINE HCL 10 MG
10 TABLET ORAL NIGHTLY PRN
Qty: 30 TABLET | Refills: 0 | Status: SHIPPED | OUTPATIENT
Start: 2018-02-08 | End: 2018-04-26

## 2018-02-08 RX ORDER — LIDOCAINE 50 MG/G
1 PATCH TOPICAL EVERY 24 HOURS
Qty: 30 PATCH | Refills: 0 | Status: SHIPPED | OUTPATIENT
Start: 2018-02-08 | End: 2018-06-08

## 2018-02-08 RX ORDER — HYDROCODONE BITARTRATE AND ACETAMINOPHEN 7.5; 325 MG/1; MG/1
1 TABLET ORAL 3 TIMES DAILY PRN
Qty: 90 TABLET | Refills: 0 | Status: SHIPPED | OUTPATIENT
Start: 2018-02-08 | End: 2018-03-08 | Stop reason: SDUPTHER

## 2018-02-09 ENCOUNTER — HOSPITAL ENCOUNTER (OUTPATIENT)
Dept: PHYSICAL THERAPY | Facility: HOSPITAL | Age: 33
Setting detail: THERAPIES SERIES
Discharge: HOME | End: 2018-02-09
Attending: ORTHOPAEDIC SURGERY

## 2018-02-09 DIAGNOSIS — M25.511 ACUTE PAIN OF RIGHT SHOULDER: Primary | ICD-10-CM

## 2018-02-09 DIAGNOSIS — M75.121 COMPLETE TEAR OF RIGHT ROTATOR CUFF: ICD-10-CM

## 2018-02-09 PROCEDURE — 97110 THERAPEUTIC EXERCISES: CPT

## 2018-02-09 PROCEDURE — 97140 MANUAL THERAPY 1/> REGIONS: CPT

## 2018-02-09 NOTE — THERAPY TREATMENT NOTE
"    Outpatient Physical Therapy Ortho Treatment Note  [facility]     Patient Name: Alvaro Morris  : 1985  MRN: [id number]  Today's Date: 2018      Visit Date: 2018    Visit Dx:    ICD-10-CM ICD-9-CM   1. Acute pain of right shoulder M25.511 719.41   2. Complete tear of right rotator cuff M75.121 727.61       Patient Active Problem List   Diagnosis   • ADD (attention deficit disorder)   • Essential hypertension   • Chronic right shoulder pain   • Incomplete tear of rotator cuff   • History of rotator cuff surgery        Past Medical History:   Diagnosis Date   • ADHD    • Bursitis of elbow     right elbow   • Hemorrhoids    • Hypertension     B/P OK SINCE STOPPED SMOKING-NO MEDS CURRENTLY   • Rotator cuff tear         Past Surgical History:   Procedure Laterality Date   • HEMORRHOIDECTOMY N/A 2016    Procedure: HEMORRHOIDECTOMY;  Surgeon: Atilio Smith MD;  Location: [facility];  Service:    • SHOULDER ARTHROSCOPY W/ ROTATOR CUFF REPAIR Right 2017    Procedure: SHOULDER ARTHROSCOPY WITH MINI OPEN ROTATOR CUFF REPAIR  DEBRIDEMENT OF LABRUM DEBRIDEMENT OF SUBSCAPULARIS DECOMPRESSION;  Surgeon: Pily Cordero MD;  Location: [facility];  Service:              PT Ortho       18 0900    Subjective Comments    Subjective Comments Pt states went to \"Pain Management\" because was told to.....Reports difficulty sleeping at night.  -SI    Subjective Pain    Able to rate subjective pain? yes  -SI    Pre-Treatment Pain Level 0  -SI    Post-Treatment Pain Level 0  -SI    Subjective Pain Comment No pain at rest during the day.  sub-acute pain with ADL's.  Intermitent sharp pain rates 6-7   -SI    Right Shoulder    Flexion ROM Details A/P 150/161  -SI    ABduction ROM Details 140/150  -SI    External Rotation ROM Details T1/83  -SI    Internal Rotation ROM Details T12/73  -SI    Right Shoulder    Flexion Gross Movement (4-/5) good minus  -SI    ABduction Gross Movement (3+/5) fair " "plus  -SI    Int Rotation Gross Movement (4-/5) good minus  -SI    Ext Rotation Gross Movement (4-/5) good minus  -SI      02/07/18 1000    Subjective Comments    Subjective Comments Had to change appt with MD \"no  yesterday\"...Sleeping much better and less shoulder pain  -SI    Subjective Pain    Able to rate subjective pain? yes  -SI    Pre-Treatment Pain Level 0  -SI    Post-Treatment Pain Level 0  -SI      User Key  (r) = Recorded By, (t) = Taken By, (c) = Cosigned By    Initials Name Provider Type    MARKIE Espinosa PTA Physical Therapy Assistant                            PT Assessment/Plan       02/09/18 0955       PT Assessment    Assessment Comments No unusual pain observed with pt while in PT, and tolerating given ex well.  Did caution him regarding doing to much with RUE at home.  Pt is 9 1/2 weeks post-op and progressing as expected with ROM and strength.  -SI       User Key  (r) = Recorded By, (t) = Taken By, (c) = Cosigned By    Initials Name Provider Type    MARKIE Espinosa PTA Physical Therapy Assistant                Modalities       02/09/18 0900          Ice    Patient reports will apply ice at home to involved area Yes  -SI        User Key  (r) = Recorded By, (t) = Taken By, (c) = Cosigned By    Initials Name Provider Type    MARKIE Espinosa PTA Physical Therapy Assistant                Exercises       02/09/18 0900          Subjective Comments    Subjective Comments Pt states went to \"Pain Management\" because was told to.....Reports difficulty sleeping at night.  -SI      Subjective Pain    Able to rate subjective pain? yes  -SI      Pre-Treatment Pain Level 0  -SI      Post-Treatment Pain Level 0  -SI      Subjective Pain Comment No pain at rest during the day.  sub-acute pain with ADL's.  Intermitent sharp pain rates 6-7   -SI      Exercise 1    Exercise Name 1 pendulums (circles, forward/backward, sideways)  -SI      Reps 1 2  -SI      Time (Minutes) 1 1  -SI      Exercise " 2    Exercise Name 2 Sh rolls and retraction  -SI      Time (Minutes) 2 5  -SI      Exercise 3    Exercise Name 3 Active biceps standing  -SI      Reps 3 20  -SI      Additional Comments green  -SI      Exercise 4    Exercise Name 4 Table slides for flexion  -SI      Sets 4 2  -SI      Reps 4 10  -SI      Time (Seconds) 4 20  -SI      Exercise 5    Exercise Name 5 supine ER with cane  -SI      Sets 5 1  -SI      Reps 5 10  -SI      Time (Seconds) 5 10  -SI      Exercise 6    Exercise Name 6 supine, self assist sh flexion  -SI      Sets 6 1  -SI      Reps 6 10  -SI      Time (Seconds) 6 10  -SI      Exercise 7    Exercise Name 7 Wall slide with eccentric lower  -SI      Sets 7 1  -SI      Reps 7 5  -SI      Time (Seconds) 7 5  -SI      Exercise 8    Exercise Name 8 UBE  -SI      Time (Minutes) 8 4  -SI      Exercise 9    Exercise Name 9 D2 flexion and horiz abd supine  -SI      Sets 9 2  -SI      Reps 9 10  -SI      Additional Comments red  -SI      Exercise 11    Exercise Name 11 ER side  -SI      Sets 11 2  -SI      Reps 11 10  -SI      Additional Comments 2lbs  -SI      Exercise 12    Exercise Name 12 Scap retraction and Sh ext   -SI      Sets 12 2  -SI      Reps 12 10  -SI      Additional Comments blue  -SI      Exercise 13    Exercise Name 13 IR and ER  -SI      Sets 13 2  -SI      Reps 13 10  -SI      Additional Comments green  -SI        User Key  (r) = Recorded By, (t) = Taken By, (c) = Cosigned By    Initials Name Provider Type    MARKIE Espinosa PTA Physical Therapy Assistant                        Manual Rx (last 36 hours)      Manual Treatments       02/09/18 0900          Manual Rx 1    Manual Rx 1 Location PROM as per RCR protocal  -SI      Manual Rx 1 Duration 20  -SI        User Key  (r) = Recorded By, (t) = Taken By, (c) = Cosigned By    Initials Name Provider Type    MARKIE Espinosa PTA Physical Therapy Assistant              Therapy Education  Given: HEP, Symptoms/condition management (pt  cautioned no overhead lifting or extreme reaching.)  Program: Reinforced  How Provided: Verbal, Demonstration, Written  Provided to: Patient  Level of Understanding: Teach back education performed              Time Calculation:   Start Time: 0930  Stop Time: 1015  Time Calculation (min): 45 min    Therapy Charges for Today     Code Description Service Date Service Provider Modifiers Qty    67322205069 HC PT THER PROC EA 15 MIN 2/9/2018 Sia Espinosa, FABIAN GP 2    64975197730 HC PT MANUAL THERAPY EA 15 MIN 2/9/2018 Sia Espinosa PTA GP 1                    Sia Espinosa PTA  2/9/2018

## 2018-02-12 ENCOUNTER — OFFICE VISIT (OUTPATIENT)
Dept: ORTHOPEDIC SURGERY | Facility: CLINIC | Age: 33
End: 2018-02-12

## 2018-02-12 VITALS — BODY MASS INDEX: 20.02 KG/M2 | TEMPERATURE: 97.8 F | WEIGHT: 156 LBS | HEIGHT: 74 IN

## 2018-02-12 DIAGNOSIS — Z98.890 S/P ROTATOR CUFF REPAIR: Primary | ICD-10-CM

## 2018-02-12 PROCEDURE — 99024 POSTOP FOLLOW-UP VISIT: CPT | Performed by: ORTHOPAEDIC SURGERY

## 2018-02-12 NOTE — PROGRESS NOTES
"Shoulder Scope RCR follow Up       Patient: Alvaro Morris        YOB: 1985      Chief Complaints: shoulder pain      History of Present Illness: Pt is here f/u shoulder arthroscopy, RCR he is better motion is better decreasing his pain medicine.  I did have him see pain management to manage overall doing much better        Allergies: No Known Allergies    Medications:   Home Medications:  Current Outpatient Prescriptions on File Prior to Visit   Medication Sig   • cyclobenzaprine (FLEXERIL) 10 MG tablet Take 1 tablet by mouth At Night As Needed for Muscle Spasms.   • HYDROcodone-acetaminophen (NORCO) 7.5-325 MG per tablet Take 1 tablet by mouth 3 (Three) Times a Day As Needed for Moderate Pain .   • ibuprofen (ADVIL,MOTRIN) 800 MG tablet Take 800 mg by mouth Every 6 (Six) Hours As Needed for Mild Pain .   • lidocaine (LIDODERM) 5 % Place 1 patch on the skin Daily. Remove & Discard patch within 12 hours or as directed by MD   • lisdexamfetamine (VYVANSE) 50 MG capsule Take 1 capsule by mouth Every Morning Earliest Fill Date: 1/10/18     No current facility-administered medications on file prior to visit.      Current Medications:  Scheduled Meds:  Continuous Infusions:  No current facility-administered medications for this visit.   PRN Meds:.          Physical Exam: 32 y.o. male  General Appearance:    Alert, cooperative, in no acute distress                 Vitals:    02/12/18 1607   Temp: 97.8 °F (36.6 °C)   TempSrc: Temporal Artery    Weight: 70.8 kg (156 lb)   Height: 188 cm (74\")      Patient is alert and oriented ×3 no acute distress normal mood physical exam.  Physical exam of the shoulder, incisions looked good there is no erythema,no signs or sx of infection.  Range of motion is much improved passively get up to 180 actively still struggling with that a little bit but I think that's perfectly normal.  Rotator cuff strength is 4+ over 5    Assessment  S/P shoulder scope, rotator cuff " repair, much better he continued progress with physical therapy we'll have him continue with pain management see him back in 4-6 weeks

## 2018-02-13 ENCOUNTER — RESULTS ENCOUNTER (OUTPATIENT)
Dept: PAIN MEDICINE | Facility: CLINIC | Age: 33
End: 2018-02-13

## 2018-02-13 ENCOUNTER — TELEPHONE (OUTPATIENT)
Dept: FAMILY MEDICINE CLINIC | Facility: CLINIC | Age: 33
End: 2018-02-13

## 2018-02-13 DIAGNOSIS — M25.511 CHRONIC RIGHT SHOULDER PAIN: ICD-10-CM

## 2018-02-13 DIAGNOSIS — G89.29 CHRONIC RIGHT SHOULDER PAIN: ICD-10-CM

## 2018-02-13 NOTE — TELEPHONE ENCOUNTER
RX IS UP FRONT AND READY TO BE PICKED UP. PT IS AWARE.     ----- Message from Solange Conklin sent at 2/13/2018 12:15 PM EST -----  Refill on vyvance 50mg qty:30 1qd    Please call pt when ready [phone]

## 2018-02-16 ENCOUNTER — HOSPITAL ENCOUNTER (OUTPATIENT)
Dept: PHYSICAL THERAPY | Facility: HOSPITAL | Age: 33
Setting detail: THERAPIES SERIES
Discharge: HOME | End: 2018-02-16
Attending: ORTHOPAEDIC SURGERY

## 2018-02-16 DIAGNOSIS — M25.511 ACUTE PAIN OF RIGHT SHOULDER: Primary | ICD-10-CM

## 2018-02-16 DIAGNOSIS — M75.121 COMPLETE TEAR OF RIGHT ROTATOR CUFF: ICD-10-CM

## 2018-02-16 PROCEDURE — 97140 MANUAL THERAPY 1/> REGIONS: CPT

## 2018-02-16 PROCEDURE — 97110 THERAPEUTIC EXERCISES: CPT

## 2018-02-16 NOTE — THERAPY TREATMENT NOTE
Outpatient Physical Therapy Ortho Treatment Note  [facility]     Patient Name: Alvaro Morris  : 1985  MRN: [id number]  Today's Date: 2018      Visit Date: 2018    Visit Dx:    ICD-10-CM ICD-9-CM   1. Acute pain of right shoulder M25.511 719.41   2. Complete tear of right rotator cuff M75.121 727.61       Patient Active Problem List   Diagnosis   • ADD (attention deficit disorder)   • Essential hypertension   • Chronic right shoulder pain   • Incomplete tear of rotator cuff   • History of rotator cuff surgery        Past Medical History:   Diagnosis Date   • ADHD    • Bursitis of elbow     right elbow   • Hemorrhoids    • Hypertension     B/P OK SINCE STOPPED SMOKING-NO MEDS CURRENTLY   • Rotator cuff tear         Past Surgical History:   Procedure Laterality Date   • HEMORRHOIDECTOMY N/A 2016    Procedure: HEMORRHOIDECTOMY;  Surgeon: Atilio Smith MD;  Location: [facility];  Service:    • SHOULDER ARTHROSCOPY W/ ROTATOR CUFF REPAIR Right 2017    Procedure: SHOULDER ARTHROSCOPY WITH MINI OPEN ROTATOR CUFF REPAIR  DEBRIDEMENT OF LABRUM DEBRIDEMENT OF SUBSCAPULARIS DECOMPRESSION;  Surgeon: Pily Cordero MD;  Location: [facility];  Service:              PT Ortho       18 1000    Subjective Comments    Subjective Comments Pt N/S last ts and 1/2 hour late today... issues... Went to MD and said strengthening  x 4 weeks before RTW on regular duty.  -SI    Subjective Pain    Able to rate subjective pain? yes  -SI    Pre-Treatment Pain Level 0  -SI    Post-Treatment Pain Level 0  -SI    Subjective Pain Comment Intermittent 6-7 with ADL's.  -SI      User Key  (r) = Recorded By, (t) = Taken By, (c) = Cosigned By    Initials Name Provider Type    MARKIE Espinosa PTA Physical Therapy Assistant                            PT Assessment/Plan       18 1033       PT Assessment    Assessment Comments Pt reports compliance with HEP.  Has trouble with attendance  in PT and getting here on time.  Half hour late today but able to see him for full session.  PROM good now, strengthening in progress.  Pain not observed to de high in PT.  Sub acute with PROM end ranges.  -SI       User Key  (r) = Recorded By, (t) = Taken By, (c) = Cosigned By    Initials Name Provider Type    MARKIE Siacristal Espinosa PTA Physical Therapy Assistant                    Exercises       02/16/18 1000          Subjective Comments    Subjective Comments Pt N/S last ts and 1/2 hour late today... issues... Went to MD and said strengthening  x 4 weeks before RTW on regular duty.  -SI      Subjective Pain    Able to rate subjective pain? yes  -SI      Pre-Treatment Pain Level 0  -SI      Post-Treatment Pain Level 0  -SI      Subjective Pain Comment Intermittent 6-7 with ADL's.  -SI      Exercise 1    Exercise Name 1 pendulums (circles, forward/backward, sideways)  -SI      Reps 1 2  -SI      Time (Minutes) 1 1  -SI      Exercise 2    Exercise Name 2 Sh rolls and retraction  -SI      Time (Minutes) 2 5  -SI      Exercise 3    Exercise Name 3 Active biceps standing  -SI      Reps 3 20  -SI      Additional Comments green  -SI      Exercise 5    Exercise Name 5 supine ER with cane  -SI      Sets 5 1  -SI      Reps 5 10  -SI      Time (Seconds) 5 10  -SI      Exercise 7    Exercise Name 7 Wall slide with eccentric lower  -SI      Sets 7 1  -SI      Reps 7 5  -SI      Time (Seconds) 7 5  -SI      Exercise 8    Exercise Name 8 UBE  -SI      Time (Minutes) 8 4  -SI      Exercise 9    Exercise Name 9 D2 flexion and horiz abd supine  -SI      Sets 9 2  -SI      Reps 9 10  -SI      Additional Comments green  -SI      Exercise 10    Exercise Name 10 biceps and triceps  -SI      Sets 10 2  -SI      Reps 10 10  -SI      Additional Comments green  -SI      Exercise 11    Exercise Name 11 ER side  -SI      Sets 11 2  -SI      Reps 11 10  -SI      Additional Comments 2lbs  -SI      Exercise 12    Exercise Name 12 Scap  retraction and Sh ext   -SI      Sets 12 2  -SI      Reps 12 10  -SI      Additional Comments blue  -SI      Exercise 13    Exercise Name 13 IR and ER  -SI      Sets 13 2  -SI      Reps 13 10  -SI      Additional Comments blue  -SI      Exercise 14    Exercise Name 14 Lat pull down with black TB  -SI      Sets 14 2  -SI      Reps 14 10  -SI      Exercise 15    Exercise Name 15 ER (80 degrees abd position)   -SI      Sets 15 2  -SI      Reps 15 10  -SI      Additional Comments In PT only  -SI        User Key  (r) = Recorded By, (t) = Taken By, (c) = Cosigned By    Initials Name Provider Type    MARKIE Espinosa PTA Physical Therapy Assistant                        Manual Rx (last 36 hours)      Manual Treatments       02/16/18 0900          Manual Rx 1    Manual Rx 1 Location PROM as per RCR protocal  -SI      Manual Rx 1 Duration 20  -SI        User Key  (r) = Recorded By, (t) = Taken By, (c) = Cosigned By    Initials Name Provider Type    MARKIE Espinosa PTA Physical Therapy Assistant              Therapy Education  Given: HEP, Symptoms/condition management  Program: Progressed  How Provided: Verbal, Demonstration, Written  Provided to: Patient  Level of Understanding: Teach back education performed              Time Calculation:   Start Time: 1000  Stop Time: 1045  Time Calculation (min): 45 min    Therapy Charges for Today     Code Description Service Date Service Provider Modifiers Qty    12552112112 HC PT THER PROC EA 15 MIN 2/16/2018 Sia Espinosa PTA GP 2    21669915292 HC PT MANUAL THERAPY EA 15 MIN 2/16/2018 Sia Espinosa PTA GP 1                    Sia Espinosa PTA  2/16/2018

## 2018-02-23 ENCOUNTER — HOSPITAL ENCOUNTER (OUTPATIENT)
Dept: PHYSICAL THERAPY | Facility: HOSPITAL | Age: 33
Setting detail: THERAPIES SERIES
Discharge: HOME | End: 2018-02-23
Attending: ORTHOPAEDIC SURGERY

## 2018-02-23 DIAGNOSIS — M75.121 COMPLETE TEAR OF RIGHT ROTATOR CUFF: ICD-10-CM

## 2018-02-23 DIAGNOSIS — M25.511 ACUTE PAIN OF RIGHT SHOULDER: Primary | ICD-10-CM

## 2018-02-23 PROCEDURE — 97110 THERAPEUTIC EXERCISES: CPT

## 2018-02-23 PROCEDURE — 97010 HOT OR COLD PACKS THERAPY: CPT

## 2018-02-23 PROCEDURE — 97140 MANUAL THERAPY 1/> REGIONS: CPT

## 2018-02-23 NOTE — THERAPY TREATMENT NOTE
"    Outpatient Physical Therapy Ortho Treatment Note  [facility]     Patient Name: Alvaro Morris  : 1985  MRN: [id number]  Today's Date: 2018      Visit Date: 2018    Visit Dx:    ICD-10-CM ICD-9-CM   1. Acute pain of right shoulder M25.511 719.41   2. Complete tear of right rotator cuff M75.121 727.61       Patient Active Problem List   Diagnosis   • ADD (attention deficit disorder)   • Essential hypertension   • Chronic right shoulder pain   • Incomplete tear of rotator cuff   • History of rotator cuff surgery        Past Medical History:   Diagnosis Date   • ADHD    • Bursitis of elbow     right elbow   • Hemorrhoids    • Hypertension     B/P OK SINCE STOPPED SMOKING-NO MEDS CURRENTLY   • Rotator cuff tear         Past Surgical History:   Procedure Laterality Date   • HEMORRHOIDECTOMY N/A 2016    Procedure: HEMORRHOIDECTOMY;  Surgeon: Atilio Smith MD;  Location: [facility];  Service:    • SHOULDER ARTHROSCOPY W/ ROTATOR CUFF REPAIR Right 2017    Procedure: SHOULDER ARTHROSCOPY WITH MINI OPEN ROTATOR CUFF REPAIR  DEBRIDEMENT OF LABRUM DEBRIDEMENT OF SUBSCAPULARIS DECOMPRESSION;  Surgeon: Pily Cordero MD;  Location: [facility];  Service:              PT Ortho       18 0900    Subjective Comments    Subjective Comments Arm hurt all day yesterday , \"didn't do anything but take care of kids.\"  -SI    Subjective Pain    Able to rate subjective pain? yes  -SI    Pre-Treatment Pain Level 1  -SI    Post-Treatment Pain Level 1  -SI    Subjective Pain Comment pain with abd  -SI    Right Shoulder    ABduction ROM Details A-80 today  -SI      User Key  (r) = Recorded By, (t) = Taken By, (c) = Cosigned By    Initials Name Provider Type    MARKIE Espinosa PTA Physical Therapy Assistant                            PT Assessment/Plan       18 0953       PT Assessment    Assessment Comments Strengthening in progress.  Pt admittingly can't sit still and cautioned him " "against doing too much with RUE with regards to overhead or extreme reaching.  Pain in PT with abd active or passive tiday...  -SI       User Key  (r) = Recorded By, (t) = Taken By, (c) = Cosigned By    Initials Name Provider Type    MARKIE AVELAR FABIAN Espinosa Physical Therapy Assistant                Modalities       02/23/18 0900          Ice    Ice Applied Yes  -SI      Location Right shoulder with arm on pillow sitting  -SI      Rx Minutes 10 mins  -SI      Ice S/P Rx Yes  -SI        User Key  (r) = Recorded By, (t) = Taken By, (c) = Cosigned By    Initials Name Provider Type    MARKIE AVELAR FABIAN Espinosa Physical Therapy Assistant                Exercises       02/23/18 0900          Subjective Comments    Subjective Comments Arm hurt all day yesterday , \"didn't do anything but take care of kids.\"  -SI      Subjective Pain    Able to rate subjective pain? yes  -SI      Pre-Treatment Pain Level 1  -SI      Post-Treatment Pain Level 1  -SI      Subjective Pain Comment pain with abd  -SI      Exercise 1    Exercise Name 1 pendulums (circles, forward/backward, sideways)  -SI      Reps 1 2  -SI      Time (Minutes) 1 1  -SI      Exercise 2    Exercise Name 2 Sh rolls and retraction  -SI      Time (Minutes) 2 5  -SI      Exercise 5    Exercise Name 5 supine ER with cane  -SI      Sets 5 1  -SI      Reps 5 10  -SI      Time (Seconds) 5 10  -SI      Exercise 7    Exercise Name 7 Wall slide with eccentric lower  -SI      Sets 7 1  -SI      Reps 7 5  -SI      Time (Seconds) 7 10  -SI      Exercise 8    Exercise Name 8 UBE  -SI      Time (Minutes) 8 4  -SI      Exercise 9    Exercise Name 9 D2 flexion and horiz abd supine  -SI      Sets 9 2  -SI      Reps 9 10  -SI      Additional Comments green  -SI      Exercise 10    Exercise Name 10 biceps and triceps  -SI      Sets 10 2  -SI      Reps 10 10  -SI      Additional Comments green  -SI      Exercise 11    Exercise Name 11 ER side  -SI      Sets 11 2  -SI      Reps 11 10  -SI      " Additional Comments 2lbs  -SI      Exercise 12    Exercise Name 12 Scap retraction and Sh ext   -SI      Sets 12 2  -SI      Reps 12 10  -SI      Additional Comments blue  -SI      Exercise 13    Exercise Name 13 IR and ER  -SI      Sets 13 2  -SI      Reps 13 10  -SI      Additional Comments blue  -SI      Exercise 14    Exercise Name 14 Lat pull down with black TB  -SI      Sets 14 2  -SI      Reps 14 10  -SI      Exercise 15    Exercise Name 15 ER (80 degrees abd position)   -SI      Sets 15 2  -SI      Reps 15 10  -SI      Additional Comments active only, In PT only  -SI        User Key  (r) = Recorded By, (t) = Taken By, (c) = Cosigned By    Initials Name Provider Type    MARKIE Espinosa PTA Physical Therapy Assistant                        Manual Rx (last 36 hours)      Manual Treatments       02/23/18 0900          Manual Rx 1    Manual Rx 1 Location PROM as per RCR protocal  -SI      Manual Rx 1 Duration 20  -SI        User Key  (r) = Recorded By, (t) = Taken By, (c) = Cosigned By    Initials Name Provider Type    MARKIE Espinosa PTA Physical Therapy Assistant              Therapy Education  Given: HEP, Symptoms/condition management  Program: Reinforced  How Provided: Verbal, Demonstration, Written  Provided to: Patient  Level of Understanding: Teach back education performed              Time Calculation:   Start Time: 0935  Stop Time: 1025  Time Calculation (min): 50 min    Therapy Charges for Today     Code Description Service Date Service Provider Modifiers Qty    71606031971 HC PT THER PROC EA 15 MIN 2/23/2018 Sia Espinosa PTA GP 2    67642126974 HC PT MANUAL THERAPY EA 15 MIN 2/23/2018 Sia Espinosa PTA GP 1    12511527556 HC PT HOT/COLD PACK WC NONMCARE 2/23/2018 Sia Espinosa PTA GP 1                    Sia Espinosa PTA  2/23/2018

## 2018-02-28 ENCOUNTER — HOSPITAL ENCOUNTER (OUTPATIENT)
Dept: PHYSICAL THERAPY | Facility: HOSPITAL | Age: 33
Setting detail: THERAPIES SERIES
Discharge: HOME | End: 2018-02-28
Attending: ORTHOPAEDIC SURGERY

## 2018-02-28 DIAGNOSIS — M25.511 ACUTE PAIN OF RIGHT SHOULDER: Primary | ICD-10-CM

## 2018-02-28 DIAGNOSIS — M75.121 COMPLETE TEAR OF RIGHT ROTATOR CUFF: ICD-10-CM

## 2018-02-28 PROCEDURE — 97140 MANUAL THERAPY 1/> REGIONS: CPT

## 2018-02-28 PROCEDURE — 97110 THERAPEUTIC EXERCISES: CPT

## 2018-03-02 ENCOUNTER — HOSPITAL ENCOUNTER (OUTPATIENT)
Dept: PHYSICAL THERAPY | Facility: HOSPITAL | Age: 33
Setting detail: THERAPIES SERIES
Discharge: HOME | End: 2018-03-02
Attending: ORTHOPAEDIC SURGERY

## 2018-03-02 DIAGNOSIS — M75.121 COMPLETE TEAR OF RIGHT ROTATOR CUFF: ICD-10-CM

## 2018-03-02 DIAGNOSIS — M25.511 ACUTE PAIN OF RIGHT SHOULDER: Primary | ICD-10-CM

## 2018-03-02 PROCEDURE — 97010 HOT OR COLD PACKS THERAPY: CPT

## 2018-03-02 PROCEDURE — G0283 ELEC STIM OTHER THAN WOUND: HCPCS

## 2018-03-02 PROCEDURE — 97140 MANUAL THERAPY 1/> REGIONS: CPT

## 2018-03-02 NOTE — THERAPY TREATMENT NOTE
"    Outpatient Physical Therapy Ortho Treatment Note  [facility]     Patient Name: Alvaro Morris  : 1985  MRN: [id number]  Today's Date: 3/2/2018      Visit Date: 2018    Visit Dx:    ICD-10-CM ICD-9-CM   1. Acute pain of right shoulder M25.511 719.41   2. Complete tear of right rotator cuff M75.121 727.61       Patient Active Problem List   Diagnosis   • ADD (attention deficit disorder)   • Essential hypertension   • Chronic right shoulder pain   • Incomplete tear of rotator cuff   • History of rotator cuff surgery        Past Medical History:   Diagnosis Date   • ADHD    • Bursitis of elbow     right elbow   • Hemorrhoids    • Hypertension     B/P OK SINCE STOPPED SMOKING-NO MEDS CURRENTLY   • Rotator cuff tear         Past Surgical History:   Procedure Laterality Date   • HEMORRHOIDECTOMY N/A 2016    Procedure: HEMORRHOIDECTOMY;  Surgeon: Atilio Smith MD;  Location: [facility];  Service:    • SHOULDER ARTHROSCOPY W/ ROTATOR CUFF REPAIR Right 2017    Procedure: SHOULDER ARTHROSCOPY WITH MINI OPEN ROTATOR CUFF REPAIR  DEBRIDEMENT OF LABRUM DEBRIDEMENT OF SUBSCAPULARIS DECOMPRESSION;  Surgeon: Pily Cordero MD;  Location: [facility];  Service:              PT Ortho       18 1000    Subjective Comments    Subjective Comments pt enters PT today with report of increase \"soreness\" right shoulder past two days.  -SI    Subjective Pain    Able to rate subjective pain? yes  -SI    Pre-Treatment Pain Level 0  -SI    Post-Treatment Pain Level 0  -SI    Subjective Pain Comment No pain at rest 6-7 with ADL's.  Did say felt better after tx today  -SI    Right Shoulder    Flexion ROM Details A-90  -SI    Extension ROM Details A-90  -SI      18 0900    Subjective Comments    Subjective Comments Shoulder not hurting as much.  Trying to be careful with what I do..  -SI    Subjective Pain    Able to rate subjective pain? yes  -SI    Pre-Treatment Pain Level 1  -SI    " "Post-Treatment Pain Level 1  -SI    Subjective Pain Comment Pain 4-5 at worst.    -SI    Right Shoulder    Flexion ROM Details 160/162  -SI    ABduction ROM Details 160/160   -SI    External Rotation ROM Details T3/80  -SI    Internal Rotation ROM Details T10/75  -SI    Right Shoulder    Flexion Gross Movement (4/5) good  -SI    ABduction Gross Movement (3+/5) fair plus   Mild sh hike at 90 degrees  -SI    Int Rotation Gross Movement (4/5) good  -SI    Ext Rotation Gross Movement (4-/5) good minus  -SI      User Key  (r) = Recorded By, (t) = Taken By, (c) = Cosigned By    Initials Name Provider Type    MARKIE Espinosa PTA Physical Therapy Assistant                            PT Assessment/Plan       03/02/18 1041       PT Assessment    Assessment Comments pt with increase pain and decrease AROM right shoulder today.  Felt better after tx with manual therapy , E-stim and ice today.  to rest it until nex session and only do isometrics and gentle ROM  -SI       User Key  (r) = Recorded By, (t) = Taken By, (c) = Cosigned By    Initials Name Provider Type    MARKIE Espinosa PTA Physical Therapy Assistant                Modalities       03/02/18 1000          Ice    Ice Applied Yes  -SI      Location Right shoulder with arm on pillow sitting  -SI      Rx Minutes Other:   20 minutes with E-stim  -SI      Ice S/P Rx Yes  -SI      ELECTRICAL STIMULATION    Attended/Unattended Unattended  -SI      Stimulation Type IFC  -SI      Location/Electrode Placement/Other ant, post, sup, and inferior shoulder joint right  -SI      Rx Minutes 20 mins  -SI        User Key  (r) = Recorded By, (t) = Taken By, (c) = Cosigned By    Initials Name Provider Type    MARKIE Espinosa PTA Physical Therapy Assistant                Exercises       03/02/18 1000          Subjective Comments    Subjective Comments pt enters PT today with report of increase \"soreness\" right shoulder past two days.  -SI      Subjective Pain    Able to rate " subjective pain? yes  -SI      Pre-Treatment Pain Level 0  -SI      Post-Treatment Pain Level 0  -SI      Subjective Pain Comment No pain at rest 6-7 with ADL's.  Did say felt better after tx today  -SI        User Key  (r) = Recorded By, (t) = Taken By, (c) = Cosigned By    Initials Name Provider Type    MARKIE Espinosa PTA Physical Therapy Assistant                        Manual Rx (last 36 hours)      Manual Treatments       03/02/18 0900          Manual Rx 1    Manual Rx 1 Location PROM as tolerated  -SI      Manual Rx 1 Type multiangle isometrics and scapular isometrics  -SI      Manual Rx 1 Duration 20  -SI        User Key  (r) = Recorded By, (t) = Taken By, (c) = Cosigned By    Initials Name Provider Type    MARKIE Espinosa PTA Physical Therapy Assistant              Therapy Education  Given: HEP, Symptoms/condition management, Pain management (pt to do ROM and isometrics only until next session)  Program: Modified  How Provided: Verbal, Demonstration, Written  Provided to: Patient  Level of Understanding: Teach back education performed              Time Calculation:   Start Time: 0930  Stop Time: 1015  Time Calculation (min): 45 min    Therapy Charges for Today     Code Description Service Date Service Provider Modifiers Qty    70820990920 HC PT MANUAL THERAPY EA 15 MIN 3/2/2018 Sia Espinosa PTA GP 1    39366811077 HC PT ELECTRICAL STIM UNATTENDED 3/2/2018 Sia Espinosa PTA  1    93701015381 HC PT HOT/COLD PACK WC NONMCARE 3/2/2018 Sia Espinosa PTA GP 1                    Sia Espinosa PTA  3/2/2018

## 2018-03-07 ENCOUNTER — APPOINTMENT (OUTPATIENT)
Dept: PHYSICAL THERAPY | Facility: HOSPITAL | Age: 33
End: 2018-03-07
Attending: ORTHOPAEDIC SURGERY

## 2018-03-08 ENCOUNTER — OFFICE VISIT (OUTPATIENT)
Dept: PAIN MEDICINE | Facility: CLINIC | Age: 33
End: 2018-03-08

## 2018-03-08 VITALS
TEMPERATURE: 98.4 F | RESPIRATION RATE: 18 BRPM | SYSTOLIC BLOOD PRESSURE: 168 MMHG | HEART RATE: 107 BPM | BODY MASS INDEX: 20.43 KG/M2 | HEIGHT: 74 IN | OXYGEN SATURATION: 99 % | DIASTOLIC BLOOD PRESSURE: 73 MMHG | WEIGHT: 159.2 LBS

## 2018-03-08 DIAGNOSIS — G89.29 CHRONIC RIGHT SHOULDER PAIN: ICD-10-CM

## 2018-03-08 DIAGNOSIS — M25.511 CHRONIC RIGHT SHOULDER PAIN: ICD-10-CM

## 2018-03-08 LAB
POC AMPHETAMINES: NEGATIVE
POC BARBITURATES: NEGATIVE
POC BENZODIAZEPHINES: NEGATIVE
POC COCAINE: NEGATIVE
POC METHADONE: NEGATIVE
POC METHAMPHETAMINE SCREEN URINE: NEGATIVE
POC OPIATES: POSITIVE
POC OXYCODONE: NEGATIVE
POC PHENCYCLIDINE: NEGATIVE
POC PROPOXYPHENE: NEGATIVE
POC THC: NEGATIVE
POC TRICYCLIC ANTIDEPRESSANTS: POSITIVE

## 2018-03-08 PROCEDURE — 80305 DRUG TEST PRSMV DIR OPT OBS: CPT | Performed by: PAIN MEDICINE

## 2018-03-08 PROCEDURE — 99213 OFFICE O/P EST LOW 20 MIN: CPT | Performed by: PAIN MEDICINE

## 2018-03-08 RX ORDER — HYDROCODONE BITARTRATE AND ACETAMINOPHEN 7.5; 325 MG/1; MG/1
1 TABLET ORAL 3 TIMES DAILY PRN
Qty: 80 TABLET | Refills: 0 | Status: SHIPPED | OUTPATIENT
Start: 2018-03-08 | End: 2018-04-05 | Stop reason: SDUPTHER

## 2018-03-08 NOTE — PROGRESS NOTES
CHIEF COMPLAINT: Shoulder Pain    HPI  Alvaro Morris is a 32 y.o. male.  He is here to follow up for Shoulder Pain    Alvaro Morris is a 32 y.o. male  who presents to the office for follow-up. Since last visit their pain has improved. Mr. Morris states that he started back to work as a  this week. Slowly healing, slowly improving. Medication helping. Only taking norco. Muscle relaxer helping his sleep at night. Insurance wouldn't cover patches. Needs to call compound cream. Taking a break from PT, going to restart this week. Had f/up with ortho, healing well, improved ROM.     The patient states their pain is a 4 on a scale of 1-10.  The patient describes this pain as episodic dull and ache.  The pain is located in right shoulder and does not radiate. This painful problem is aggravated by physical activity, lifting and work and is alleviated by pain medication, relaxation and physical therapy.    Past pain medications: Hydrocodone  - moderate relief                                                                         Ibuprofen - minimal relief                                                                         Oxycodone - works better than Hydrocodone                                                                         Meloxicam - no relief                                           Lidocaine patches - some help     Current pain medications: Hydrocodone 7.5/325 tid - some help                                                                               Ibuprofen                                                                               Vyvance     Past therapies:  Physical Therapy: yes - currently going - helping  Chiropractor: no  Massage Therapy: no  TENS: no  Neck or back surgery: no  Past pain management: no     Previous Injections: Right shoulder injections performed prior to surgery with minimal help    PEG Assessment   What number best describes your pain on average in the past  week? 6  What number best describes how, during the past week, pain has interfered with your enjoyment of life? 5  What number best describes how, during the past week, pain has interfered with your general activity? 5      Current Outpatient Prescriptions:   •  cyclobenzaprine (FLEXERIL) 10 MG tablet, Take 1 tablet by mouth At Night As Needed for Muscle Spasms., Disp: 30 tablet, Rfl: 0  •  HYDROcodone-acetaminophen (NORCO) 7.5-325 MG per tablet, Take 1 tablet by mouth 3 (Three) Times a Day As Needed for Moderate Pain ., Disp: 80 tablet, Rfl: 0  •  ibuprofen (ADVIL,MOTRIN) 800 MG tablet, Take 800 mg by mouth Every 6 (Six) Hours As Needed for Mild Pain ., Disp: , Rfl:   •  lisdexamfetamine (VYVANSE) 50 MG capsule, Take 1 capsule by mouth Every Morning Earliest Fill Date: 2/13/18, Disp: 30 capsule, Rfl: 0  •  lidocaine (LIDODERM) 5 %, Place 1 patch on the skin Daily. Remove & Discard patch within 12 hours or as directed by MD, Disp: 30 patch, Rfl: 0    IMAGING  RIGHT SHOULDER ARTHROGRAM MRI      HISTORY: Right shoulder pain with decreased range of motion for about 6  months.      TECHNIQUE: Arthrogram MRI of the right shoulder was performed using  standard sequences. Details of the injection procedure are reported  separately.      FINDINGS: There is contrast in the glenohumeral joint. A small effusion  is observed in the subacromial subdeltoid bursa. This is more voluminous  than would be expected simply due to fluid from the analgesic injection  during the procedure and is believed to represent some bursal fluid.      The sagittal and coronal T2 images demonstrate a focus of fluid signal  extending from the bursal surface deep into the substance of the distal  supraspinatus tendon, about 9 mm AP width. There is a 2 or 3 mm bundle  of fibers remaining intact along the articular surface. The adjacent  tendon is mildly edematous. There is also some bone marrow edema in the  greater tuberosity deep to the small  "supraspinatus tendon defect. The  infraspinatus, teres minor, and subscapularis are intact. Musculature  throughout the shoulder appears normal.      There is mild acromioclavicular arthropathy. The coracoclavicular  ligament is intact.      The long head biceps tendon is intact. There is a small sublabral  foramen anterosuperiorly. The labrum appears intact. No glenohumeral  chondral defect or intra-articular body is present. There is no lesion  in the spinoglenoid notch, quadrilateral space, nor in the visualized  axilla.      IMPRESSION:  Right subacromial subdeltoid bursitis with supraspinatus  tendinitis and a less than 1 cm wide, near full-thickness supraspinatus  tendon tear.    Imaging last reviewed: 03/09/18     PFSH:  The following portions of the patient's history were reviewed and updated as appropriate: problem list, past medical history, past surgery history, social history, family history, medications, and allergies    Review of Systems   Constitutional: Negative for fatigue.   HENT: Negative for congestion.    Eyes: Negative for visual disturbance.   Respiratory: Negative for cough, shortness of breath and wheezing.    Cardiovascular: Negative.    Gastrointestinal: Negative for constipation and diarrhea.   Genitourinary: Negative for difficulty urinating.   Musculoskeletal: Positive for arthralgias (right shoulder).   Neurological: Positive for weakness (right shoulder and arm). Negative for numbness.   Psychiatric/Behavioral: Positive for sleep disturbance. Negative for suicidal ideas. The patient is not nervous/anxious.        Vitals:    03/08/18 1355   BP: 168/73   Pulse: 107   Resp: 18   Temp: 98.4 °F (36.9 °C)   SpO2: 99%   Weight: 72.2 kg (159 lb 3.2 oz)   Height: 188 cm (74\")   PainSc:   4   PainLoc: Shoulder       Physical Exam   Constitutional: He appears well-developed and well-nourished. No distress.   HENT:   Head: Normocephalic and atraumatic.   Nose: Nose normal.   Mouth/Throat: " Oropharynx is clear and moist.   Eyes: Conjunctivae and EOM are normal.   Neck: Normal range of motion. Neck supple.   Pulmonary/Chest: Effort normal. No stridor. No respiratory distress.   Musculoskeletal:        Right shoulder: He exhibits decreased range of motion, tenderness and pain. He exhibits no deformity.        Left shoulder: He exhibits normal range of motion, no tenderness, no deformity and no pain.        Right elbow: He exhibits normal range of motion and no swelling. No tenderness found.        Cervical back: He exhibits normal range of motion, no tenderness and no pain.   Neurological: He is alert. He has normal strength. No cranial nerve deficit or sensory deficit.   Skin: Skin is warm and dry. No rash noted. He is not diaphoretic.   Psychiatric: He has a normal mood and affect. His speech is normal and behavior is normal.   Nursing note and vitals reviewed.    Ortho Exam  Neurologic Exam     Mental Status   Speech: speech is normal     Cranial Nerves     CN III, IV, VI   Extraocular motions are normal.     Motor Exam     Strength   Strength 5/5 throughout.       Lab Results   Component Value Date    POCMETH Negative 03/08/2018    POCAMPHET Negative 03/08/2018    POCBARBITUR Negative 03/08/2018    POCBENZO Negative 03/08/2018    POCCOCAINE Negative 03/08/2018    POCMETHADO Negative 03/08/2018    POCOPIATES Positive 03/08/2018    POCOXYCODO Negative 03/08/2018    POCPHENCYC Negative 03/08/2018    POCPROPOXY Negative 03/08/2018    POCTHC Negative 03/08/2018    POCTRICYC Positive 03/08/2018     Last UDS results reviewed: 03/09/18   Last UDS: 2/2018  Comments: inconsistent      Date of last LUCAS reviewed : 03/09/18   Comments: Consistent     Assessment/Plan   Alvaro was seen today for shoulder pain.    Diagnoses and all orders for this visit:    Chronic right shoulder pain  -     HYDROcodone-acetaminophen (NORCO) 7.5-325 MG per tablet; Take 1 tablet by mouth 3 (Three) Times a Day As Needed for  Moderate Pain .  -     Urine Drug Screen Confirmation - Urine, Urine, Clean Catch; Future  -     POC Urine Drug Screen, Triage      Requested Prescriptions     Signed Prescriptions Disp Refills   • HYDROcodone-acetaminophen (NORCO) 7.5-325 MG per tablet 80 tablet 0     Sig: Take 1 tablet by mouth 3 (Three) Times a Day As Needed for Moderate Pain .        - Patient did have large surgery performed 3 months ago and has reason for pain.  Saying that, he is 3 months post op and his pain should continue to improve as he heals.  I agree with his surgeon that these medications are very dangerous, can lead to dependence, and he does not need to be on them long-term.  Long discussion about these medications and they're dangers with the patient.  I will prescribe the patient short term narcotic therapy to help him get over the postoperative period, especially while in physical therapy.  Once out of the postoperative window he will be weaned and stopped on his narcotics as I will not provide them long-term.  Also discussion about being compliant with physical therapy in order to continue a narcotic therapy.  - Compound cream ordered today with Ketamine 5%. Ordered today.  Alternatives ok if his insurance does not cover.  re ordered again today.   - Continue Flexeril 10 mg daily at bedtime as needed for pain.    - Importance of multimodal pain regimen discussed with the patient so he can cut back on his narcotic requirements.  - Nilson reviewed today and patient obtained 480 tablets of pain medication the first 30 days after surgery.  This averages to 16 pills per day.  Discussion with patient how this is a very high dose and is not sustainable.  Will give hydrocodone 7.5/325 mg. #90 given last month, cut back to #80 this month. We need to continue to wean him off medication.   - Last UDS performed was reviewed and consistent.  Random urine drug screen per office policy today, to be checked at next visit.         Follow-up in 1  month.    Carey Orosco MD  Pain Management    LUCAS REPORT  As part of the patient's treatment plan, I am prescribing controlled substances. The patient has been made aware of appropriate use of such medications, including potential risk of somnolence, limited ability to drive and/or work safely, and the potential for dependence or overdose. It has also bee made clear that these medications are for use by this patient only, without concomitant use of alcohol or other substances unless prescribed.   Patient has completed prescribing agreement detailing terms of continued prescribing of controlled substances, including monitoring LUCAS reports, urine drug screening, and pill counts if necessary. The patient is aware that inappropriate use will results in cessation of prescribing such medications.  LUCAS report has been reviewed and scanned into the patient's chart.  History and physical exam exhibit continued safe and appropriate use of controlled substances.

## 2018-03-13 ENCOUNTER — RESULTS ENCOUNTER (OUTPATIENT)
Dept: PAIN MEDICINE | Facility: CLINIC | Age: 33
End: 2018-03-13

## 2018-03-13 DIAGNOSIS — M25.511 CHRONIC RIGHT SHOULDER PAIN: ICD-10-CM

## 2018-03-13 DIAGNOSIS — G89.29 CHRONIC RIGHT SHOULDER PAIN: ICD-10-CM

## 2018-03-15 ENCOUNTER — TELEPHONE (OUTPATIENT)
Dept: FAMILY MEDICINE CLINIC | Facility: CLINIC | Age: 33
End: 2018-03-15

## 2018-03-15 NOTE — TELEPHONE ENCOUNTER
"RX IS UP FRONT AND READY TO BE PICKED UP. CALLED AND LEFT MESSAGE FOR PT THAT HE CAN COME .     ----- Message from Solange Conklin sent at 3/15/2018 10:49 AM EDT -----  REFILL ON VYVANCE 50MG QTY\" 30    PLEASE CALL WHEN READY  [phone]    "

## 2018-04-04 ENCOUNTER — DOCUMENTATION (OUTPATIENT)
Dept: PHYSICAL THERAPY | Facility: HOSPITAL | Age: 33
End: 2018-04-04

## 2018-04-04 DIAGNOSIS — M75.121 COMPLETE TEAR OF RIGHT ROTATOR CUFF: ICD-10-CM

## 2018-04-04 DIAGNOSIS — M25.511 ACUTE PAIN OF RIGHT SHOULDER: Primary | ICD-10-CM

## 2018-04-04 NOTE — THERAPY DISCHARGE NOTE
Outpatient Physical Therapy Discharge Summary         Patient Name: Alvaro Morris  : 1985  MRN: [id number]    Today's Date: 2018    Visit Dx:    ICD-10-CM ICD-9-CM   1. Acute pain of right shoulder M25.511 719.41   2. Complete tear of right rotator cuff M75.121 727.61             PT OP Goals     Row Name 18 1600          PT Short Term Goals    STG 1 Patient will demonstrate compliance and independence with initial HEP  -SI     STG 1 Progress Met  -SI     STG 2 Patient will report a 50% reduction in night pain to allow for better quality of sleep  -SI     STG 2 Progress Met  -SI     STG 3 Patient will demonstrate R shoulder PROM of 90 degrees in order to increase ease with overhead tasks  -SI     STG 3 Progress Met  -SI        Long Term Goals    LTG 1 Patient will report the ability to complete grooming tasks using his R UE to increase ease with morning ADLs  -SI     LTG 1 Progress Met  -SI     LTG 2 Patient will demonstrate R shoulder AROM of 100 degrees in order to increase ease with overhead tasks  -SI     LTG 2 Progress Met  -SI     LTG 3 Patient will demonstrate R shoulder flexion strength of 3+/5 with MMT to demonstrate improved shoulder stability for ADLs  -SI     LTG 3 Progress Met  -SI     LTG 4 Patient will decrease level of perceived disability as measured by the DASH from 37.5% disability to </=20% disability in order to improve quality of life  -SI     LTG 4 Progress Partially Met   pt no show for final appts so not repeated  -SI       User Key  (r) = Recorded By, (t) = Taken By, (c) = Cosigned By    Initials Name Provider Type    MARKIE Espinosa PTA Physical Therapy Assistant          OP PT Discharge Summary  Date of Discharge: 18  Reason for Discharge: Independent  Outcomes Achieved: Patient able to partially acheive established goals  Discharge Destination: Home with home program  Discharge Instructions/Additional Comments: Pt had numerous No Shows for PT and  "eventually removed from schedule.  It is noted in Epic documentation that he also did not show for follow up with dr. Cordero 3-19-18, but he does seem to make regular appts at the \"pain clinic\" for meds....      Time Calculation:                    Sia Espinosa, PTA  4/4/2018       "

## 2018-04-05 ENCOUNTER — OFFICE VISIT (OUTPATIENT)
Dept: PAIN MEDICINE | Facility: CLINIC | Age: 33
End: 2018-04-05

## 2018-04-05 VITALS
RESPIRATION RATE: 15 BRPM | OXYGEN SATURATION: 98 % | SYSTOLIC BLOOD PRESSURE: 133 MMHG | TEMPERATURE: 97.8 F | HEIGHT: 74 IN | WEIGHT: 156.4 LBS | HEART RATE: 93 BPM | BODY MASS INDEX: 20.07 KG/M2 | DIASTOLIC BLOOD PRESSURE: 84 MMHG

## 2018-04-05 DIAGNOSIS — M25.511 CHRONIC RIGHT SHOULDER PAIN: ICD-10-CM

## 2018-04-05 DIAGNOSIS — G89.29 CHRONIC RIGHT SHOULDER PAIN: ICD-10-CM

## 2018-04-05 PROCEDURE — 99213 OFFICE O/P EST LOW 20 MIN: CPT | Performed by: PAIN MEDICINE

## 2018-04-05 RX ORDER — HYDROCODONE BITARTRATE AND ACETAMINOPHEN 7.5; 325 MG/1; MG/1
TABLET ORAL
Qty: 70 TABLET | Refills: 0 | Status: SHIPPED | OUTPATIENT
Start: 2018-04-05 | End: 2018-05-03 | Stop reason: SDUPTHER

## 2018-04-05 NOTE — PROGRESS NOTES
CHIEF COMPLAINT: Shoulder Pain    HPI  Alvaro Morris is a 32 y.o. male.  He is here to follow up for Shoulder Pain  .   Since last visit their pain has worsened . Pt states he has returned to work after four months off so his pain has been increased due to work the last few days. Pain is worse in the morning and at night. Better after he gets going. Still has to  arm when lifting. 4 months out from surgery. Missed follow up with surgeon last month- did not reschedule.   The patient states their pain is a 5 on a scale of 1-10.  The patient describes this pain as constant in am, intermittent during the day dull and ache.  The pain is located in right shoulder and does not radiate. This painful problem is aggravated by physical activity, work and lifting RUE and is alleviated by pain medication, relaxation and heat/heating pad.    Past pain medications:   Hydrocodone  - moderate relief  Ibuprofen - minimal relief  Oxycodone - works better than Hydrocodone   Meloxicam - no relief   Lidocaine patches - some help     Current pain medications:   Hydrocodone 7.5/325 tid - some help  Ibuprofen   Vyvance     Past therapies:  Physical Therapy: yes - currently going - helping  Chiropractor: no  Massage Therapy: no  TENS: no  Neck or back surgery: no  Past pain management: no     Previous Injections: Right shoulder injections performed prior to surgery with minimal help    PEG Assessment   What number best describes your pain on average in the past week? 5-6  What number best describes how, during the past week, pain has interfered with your enjoyment of life? 7  What number best describes how, during the past week, pain has interfered with your general activity? 6      Current Outpatient Prescriptions:   •  cyclobenzaprine (FLEXERIL) 10 MG tablet, Take 1 tablet by mouth At Night As Needed for Muscle Spasms., Disp: 30 tablet, Rfl: 0  •  HYDROcodone-acetaminophen (NORCO) 7.5-325 MG per tablet, Take 1 tablet po 2-3  "tablets a day prn pain, Disp: 70 tablet, Rfl: 0  •  ibuprofen (ADVIL,MOTRIN) 800 MG tablet, Take 800 mg by mouth Every 6 (Six) Hours As Needed for Mild Pain ., Disp: , Rfl:   •  lidocaine (LIDODERM) 5 %, Place 1 patch on the skin Daily. Remove & Discard patch within 12 hours or as directed by MD, Disp: 30 patch, Rfl: 0  •  lisdexamfetamine (VYVANSE) 50 MG capsule, Take 1 capsule by mouth Every Morning Earliest Fill Date: 3/15/18, Disp: 30 capsule, Rfl: 0    IMAGING  RIGHT SHOULDER ARTHROGRAM MRI - 10/2017  IMPRESSION:  Right subacromial subdeltoid bursitis with supraspinatus  tendinitis and a less than 1 cm wide, near full-thickness supraspinatus  tendon tear.     Imaging last reviewed: 04/05/18     PFSH:  The following portions of the patient's history were reviewed and updated as appropriate: problem list, past medical history, past surgery history, social history, family history, medications, and allergies    Review of Systems   Constitutional: Positive for activity change (increased). Negative for fatigue.   HENT: Negative for congestion.    Eyes: Negative for visual disturbance.   Respiratory: Negative for cough, shortness of breath and wheezing.    Cardiovascular: Negative.    Gastrointestinal: Negative for constipation and diarrhea.   Genitourinary: Negative for difficulty urinating.   Musculoskeletal: Positive for arthralgias (right shoulder).   Neurological: Positive for weakness (right shoulder and arm). Negative for dizziness, light-headedness, numbness and headaches.   Psychiatric/Behavioral: Positive for sleep disturbance. Negative for agitation, confusion and suicidal ideas. The patient is not nervous/anxious.    All other systems reviewed and are negative.      Vitals:    04/05/18 0756   BP: 133/84   Pulse: 93   Resp: 15   Temp: 97.8 °F (36.6 °C)   SpO2: 98%   Weight: 70.9 kg (156 lb 6.4 oz)   Height: 188 cm (74.02\")   PainSc:   5   PainLoc: Shoulder       Physical Exam   Constitutional: He appears " well-developed and well-nourished. No distress.   HENT:   Head: Normocephalic and atraumatic.   Nose: Nose normal.   Mouth/Throat: Oropharynx is clear and moist.   Eyes: Conjunctivae and EOM are normal.   Neck: Normal range of motion. Neck supple.   Pulmonary/Chest: Effort normal. No stridor. No respiratory distress.   Musculoskeletal:        Right shoulder: He exhibits decreased range of motion, tenderness and pain. He exhibits no deformity.        Left shoulder: He exhibits normal range of motion, no tenderness, no deformity and no pain.        Right elbow: He exhibits normal range of motion and no swelling. No tenderness found.        Cervical back: He exhibits normal range of motion, no tenderness and no pain.   Neurological: He is alert. He has normal strength. No cranial nerve deficit or sensory deficit.   Skin: Skin is warm and dry. No rash noted. He is not diaphoretic.   Psychiatric: He has a normal mood and affect. His speech is normal and behavior is normal.   Nursing note and vitals reviewed.    Ortho Exam  Neurologic Exam     Mental Status   Speech: speech is normal     Cranial Nerves     CN III, IV, VI   Extraocular motions are normal.     Motor Exam     Strength   Strength 5/5 throughout.       Lab Results   Component Value Date    POCMETH Negative 03/08/2018    POCAMPHET Negative 03/08/2018    POCBARBITUR Negative 03/08/2018    POCBENZO Negative 03/08/2018    POCCOCAINE Negative 03/08/2018    POCMETHADO Negative 03/08/2018    POCOPIATES Positive 03/08/2018    POCOXYCODO Negative 03/08/2018    POCPHENCYC Negative 03/08/2018    POCPROPOXY Negative 03/08/2018    POCTHC Negative 03/08/2018    POCTRICYC Positive 03/08/2018     Last UDS results reviewed: 04/05/18   Last UDS: 3/2018  Comments: Consistent     Date of last LUCAS reviewed : 04/05/18   Comments: Jasmyne John was seen today for shoulder pain.    Diagnoses and all orders for this visit:    Chronic right shoulder pain  -      HYDROcodone-acetaminophen (NORCO) 7.5-325 MG per tablet; Take 1 tablet po 2-3 tablets a day prn pain      Requested Prescriptions     Signed Prescriptions Disp Refills   • HYDROcodone-acetaminophen (NORCO) 7.5-325 MG per tablet 70 tablet 0     Sig: Take 1 tablet po 2-3 tablets a day prn pain     - 4 months out from surgery. His pain should continue to improve as he heals.  I agree with his surgeon that these medications are very dangerous, can lead to dependence, and he does not need to be on them long-term.  Long discussion about these medications and they're dangers with the patient.  I will prescribe the patient short term narcotic therapy to help him get over the postoperative period, especially while in physical therapy.    - Will continue to wean his medication every month.  I will not provide them long-term.  Also discussion about being compliant with physical therapy in order to continue a narcotic therapy. Cancelled follow up with surgeon without rescheduling but he is still having pain 4 months out which is unusual.   - He must follow up with surgeon before next visit. He must be compliant with surgical request to continue with pain management.   - Last UDS performed was reviewed and consistent.  Random urine drug screen per office policy AT NEXT VISIT.          Follow-up in 1 month.    Carey Orosco MD  Pain Management     [facility] REPORT  As part of the patient's treatment plan, I am prescribing controlled substances. The patient has been made aware of appropriate use of such medications, including potential risk of somnolence, limited ability to drive and/or work safely, and the potential for dependence or overdose. It has also bee made clear that these medications are for use by this patient only, without concomitant use of alcohol or other substances unless prescribed.   Patient has completed prescribing agreement detailing terms of continued prescribing of controlled substances, including  monitoring LUCAS reports, urine drug screening, and pill counts if necessary. The patient is aware that inappropriate use will results in cessation of prescribing such medications.  LUCAS report has been reviewed and scanned into the patient's chart.  History and physical exam exhibit continued safe and appropriate use of controlled substances.

## 2018-04-26 ENCOUNTER — OFFICE VISIT (OUTPATIENT)
Dept: ORTHOPEDIC SURGERY | Facility: CLINIC | Age: 33
End: 2018-04-26

## 2018-04-26 VITALS — BODY MASS INDEX: 19.76 KG/M2 | HEIGHT: 74 IN | WEIGHT: 154 LBS | TEMPERATURE: 98.2 F

## 2018-04-26 DIAGNOSIS — Z98.890 S/P ROTATOR CUFF REPAIR: Primary | ICD-10-CM

## 2018-04-26 PROCEDURE — 99212 OFFICE O/P EST SF 10 MIN: CPT | Performed by: ORTHOPAEDIC SURGERY

## 2018-04-26 NOTE — PROGRESS NOTES
"Right Shoulder Follow Up      Patient: Alvaro Morris        YOB: 1985            Chief Complaints: right Shoulder pain  Chief Complaint   Patient presents with   • Right Shoulder - Pain, Follow-up         History of Present Illness:Patient is here follow-up of right rotator cuff repair he's about 6 months out his doing his therapy on his own states he still limiting himself and his activities at work      Physical Exam: 32 y.o. male  General Appearance:    Alert, cooperative, in no acute distress                   Vitals:    04/26/18 0806   Temp: 98.2 °F (36.8 °C)   Weight: 69.9 kg (154 lb)   Height: 188 cm (74\")        Patient is alert and read ×3 no acute distress appears her above-listed at height weight and age.  Affect is normal respiratory rate is normal unlabored. Heart rate regular rate rhythm, sclera, dentition and hearing are normal for the purpose of this exam.      Ortho Exam      Physical exam of the right shoulder reveals no overlying skin changes no lymphedema no lymphadenopathy.  Patient has active flexion 180 with mild symptoms abduction is similar he does still limps on has poor scapular stabilization external rotation is to 50 and internal rotation to the upper lumbar spine with mild symptoms.  Patient has good rotator cuff strength 4+ over 5 with isometric strength testing with pain.  Patient has a positive impingement and a positive Che sign.  Patient has good cervical range of motion which is full and asymptomatic no radicular symptoms.  Patient has a normal elbow exam.  Good distal pulses are present  Patient has pain with overhead activity and a positive Neer sign and a positive empty can sign , a positive drop arm and a definitive painful arc        Assessment/Plan:    Status post right rotator cuff repair do think is a little behind where would normally like to see patients however I think we started slow he states about 10 or 11:00 the morning he feels much better I " want him to get back over and see [facility] physical therapy have her update his therapy regimen and I will see him back in 1 month

## 2018-05-03 ENCOUNTER — OFFICE VISIT (OUTPATIENT)
Dept: PAIN MEDICINE | Facility: CLINIC | Age: 33
End: 2018-05-03

## 2018-05-03 VITALS
HEART RATE: 83 BPM | DIASTOLIC BLOOD PRESSURE: 84 MMHG | BODY MASS INDEX: 19.69 KG/M2 | RESPIRATION RATE: 16 BRPM | OXYGEN SATURATION: 99 % | WEIGHT: 153.4 LBS | SYSTOLIC BLOOD PRESSURE: 142 MMHG | TEMPERATURE: 98.1 F | HEIGHT: 74 IN

## 2018-05-03 DIAGNOSIS — Z98.890 HISTORY OF ROTATOR CUFF SURGERY: ICD-10-CM

## 2018-05-03 DIAGNOSIS — G89.29 CHRONIC RIGHT SHOULDER PAIN: ICD-10-CM

## 2018-05-03 DIAGNOSIS — Z51.81 ENCOUNTER FOR MONITORING OPIOID MAINTENANCE THERAPY: ICD-10-CM

## 2018-05-03 DIAGNOSIS — Z79.891 ENCOUNTER FOR MONITORING OPIOID MAINTENANCE THERAPY: ICD-10-CM

## 2018-05-03 DIAGNOSIS — M25.511 CHRONIC RIGHT SHOULDER PAIN: ICD-10-CM

## 2018-05-03 DIAGNOSIS — G89.29 OTHER CHRONIC PAIN: Primary | ICD-10-CM

## 2018-05-03 LAB
POC AMPHETAMINES: NEGATIVE
POC BARBITURATES: NEGATIVE
POC BENZODIAZEPHINES: NEGATIVE
POC COCAINE: NEGATIVE
POC METHADONE: NEGATIVE
POC METHAMPHETAMINE SCREEN URINE: NEGATIVE
POC OPIATES: POSITIVE
POC OXYCODONE: NEGATIVE
POC PHENCYCLIDINE: NEGATIVE
POC PROPOXYPHENE: NEGATIVE
POC THC: NEGATIVE
POC TRICYCLIC ANTIDEPRESSANTS: NEGATIVE

## 2018-05-03 PROCEDURE — 99214 OFFICE O/P EST MOD 30 MIN: CPT | Performed by: NURSE PRACTITIONER

## 2018-05-03 PROCEDURE — 80305 DRUG TEST PRSMV DIR OPT OBS: CPT | Performed by: NURSE PRACTITIONER

## 2018-05-03 RX ORDER — HYDROCODONE BITARTRATE AND ACETAMINOPHEN 7.5; 325 MG/1; MG/1
1 TABLET ORAL 2 TIMES DAILY
Qty: 60 TABLET | Refills: 0 | Status: SHIPPED | OUTPATIENT
Start: 2018-05-03 | End: 2018-05-31 | Stop reason: DRUGHIGH

## 2018-05-03 NOTE — PROGRESS NOTES
CHIEF COMPLAINT  F/U right shoulder pain. Pt states it has worsened since last visit and he has seen Dr. Pily Cordero who re-referred him to PT.     Cristhian Morris is a 32 y.o. male  who presents to the office for follow-up.He has a history of right shoulder pain.  He is 5 months out from right shoulder rotator cuff repair with Dr. Cordero.  He has been sent back to PT by the surgeon. He reports that his pain is worse today.  He has not actually re-started PT.  He reports that he been back at work for a month now, he is a , pain has been worse since returning to work.      He continues with Hydrocodone 7.5/325 2-3/day.  He also takes Ibuprofen 800 mg Q6 hours. He reports mild-moderate reduction in pain. Denies adverse reactions.      Has never received compounded pain cream.      Shoulder Injury    The right shoulder is affected. Incident onset: surgery 12/2017. The quality of the pain is described as aching. The pain does not radiate. The pain is at a severity of 6/10. Pertinent negatives include no numbness. The symptoms are aggravated by movement and overhead lifting (worse first thing in the morning). He has tried ice, NSAIDs, acetaminophen and rest (PT, hydrocodone) for the symptoms. The treatment provided mild relief.      PEG Assessment   What number best describes your pain on average in the past week?7  What number best describes how, during the past week, pain has interfered with your enjoyment of life?7  What number best describes how, during the past week, pain has interfered with your general activity?  7    The following portions of the patient's history were reviewed and updated as appropriate: allergies, current medications, past family history, past medical history, past social history, past surgical history and problem list.    Review of Systems   Constitutional: Positive for activity change (increased). Negative for fatigue.   HENT: Negative for congestion.    Eyes: Negative  "for visual disturbance.   Respiratory: Negative for cough, shortness of breath and wheezing.    Cardiovascular: Negative.    Gastrointestinal: Negative for constipation and diarrhea.   Genitourinary: Negative for difficulty urinating.   Musculoskeletal: Positive for arthralgias (right shoulder).   Neurological: Positive for weakness (right shoulder and arm). Negative for dizziness, light-headedness, numbness and headaches.   Psychiatric/Behavioral: Positive for sleep disturbance. Negative for agitation, confusion and suicidal ideas. The patient is not nervous/anxious.        Vitals:    05/03/18 0758   BP: 142/84   Pulse: 83   Resp: 16   Temp: 98.1 °F (36.7 °C)   SpO2: 99%   Weight: 69.6 kg (153 lb 6.4 oz)   Height: 188 cm (74.02\")   PainSc:   6   PainLoc: Shoulder  Comment: right     Objective   Physical Exam   Constitutional: He appears well-developed and well-nourished. No distress.   HENT:   Head: Normocephalic and atraumatic.   Nose: Nose normal.   Mouth/Throat: Oropharynx is clear and moist.   Eyes: Conjunctivae and EOM are normal.   Neck: Normal range of motion. Neck supple.   Cardiovascular: Normal rate.    Pulmonary/Chest: Effort normal. No stridor. No respiratory distress.   Musculoskeletal:        Right shoulder: He exhibits decreased range of motion, tenderness, crepitus and pain. He exhibits no deformity.   Neurological: He is alert. He has normal strength. No cranial nerve deficit or sensory deficit.   Skin: Skin is warm and dry. No rash noted. He is not diaphoretic.   Psychiatric: He has a normal mood and affect. His speech is normal and behavior is normal.   Nursing note and vitals reviewed.    Assessment/Plan   Alvaro was seen today for shoulder pain.    Diagnoses and all orders for this visit:    Other chronic pain    Chronic right shoulder pain  -     HYDROcodone-acetaminophen (NORCO) 7.5-325 MG per tablet; Take 1 tablet by mouth 2 (Two) Times a Day.    History of rotator cuff surgery    Encounter " for monitoring opioid maintenance therapy    Other orders  -     diclofenac (VOLTAREN) 50 MG EC tablet; Take 1 tablet by mouth 3 (Three) Times a Day As Needed (pain).      --- Refill Hydrocodone, will reduce dose to #60, max 2/day as per previous plan of care. We will continue to wean as previously discussed with Dr. Oorsco. There is no plan for chronic opioids.    --- The urine drug screen confirmation from 3/8/18 has been reviewed and the result is appropriate based on patient history and LUCAS report  --- Routine UDS in office today as part of monitoring requirements for controlled substances.  The specimen was viewed and the immunoassay result reviewed and is +OPI.  This specimen will be sent to [facility] laboratory for confirmation.     --- Trial a different NSAID. Discussed medication with the patient.  Included in this discussion was the potential for side effects and adverse events.  Patient verbalized understanding and wished to proceed.  Prescription will be sent to pharmacy (Diclofenac)  --- Check on status of compounded pain cream  --- Follow-up 1 month          LUCAS REPORT  As part of the patient's treatment plan, I am prescribing controlled substances. The patient has been made aware of appropriate use of such medications, including potential risk of somnolence, limited ability to drive and/or work safely, and the potential for dependence or overdose. It has also bee made clear that these medications are for use by this patient only, without concomitant use of alcohol or other substances unless prescribed.     Patient has completed prescribing agreement detailing terms of continued prescribing of controlled substances, including monitoring LUCAS reports, urine drug screening, and pill counts if necessary. The patient is aware that inappropriate use will results in cessation of prescribing such medications.    LUCAS report has been reviewed and scanned into the patient's chart.    Date of last  LUCAS : 5/2/2018    History and physical exam exhibit continued safe and appropriate use of controlled substances.    EMR Dragon/Transcription disclaimer:   Much of this encounter note is an electronic transcription/translation of spoken language to printed text. The electronic translation of spoken language may permit erroneous, or at times, nonsensical words or phrases to be inadvertently transcribed; Although I have reviewed the note for such errors, some may still exist.      Initial visit with TODD Timmons

## 2018-05-08 ENCOUNTER — RESULTS ENCOUNTER (OUTPATIENT)
Dept: PAIN MEDICINE | Facility: CLINIC | Age: 33
End: 2018-05-08

## 2018-05-08 DIAGNOSIS — G89.29 CHRONIC RIGHT SHOULDER PAIN: ICD-10-CM

## 2018-05-08 DIAGNOSIS — Z98.890 HISTORY OF ROTATOR CUFF SURGERY: ICD-10-CM

## 2018-05-08 DIAGNOSIS — Z79.891 ENCOUNTER FOR MONITORING OPIOID MAINTENANCE THERAPY: ICD-10-CM

## 2018-05-08 DIAGNOSIS — M25.511 CHRONIC RIGHT SHOULDER PAIN: ICD-10-CM

## 2018-05-08 DIAGNOSIS — Z51.81 ENCOUNTER FOR MONITORING OPIOID MAINTENANCE THERAPY: ICD-10-CM

## 2018-05-08 DIAGNOSIS — G89.29 OTHER CHRONIC PAIN: ICD-10-CM

## 2018-05-25 DIAGNOSIS — M25.511 CHRONIC RIGHT SHOULDER PAIN: ICD-10-CM

## 2018-05-25 DIAGNOSIS — G89.29 CHRONIC RIGHT SHOULDER PAIN: ICD-10-CM

## 2018-05-25 RX ORDER — HYDROCODONE BITARTRATE AND ACETAMINOPHEN 7.5; 325 MG/1; MG/1
1 TABLET ORAL 2 TIMES DAILY
Qty: 60 TABLET | Refills: 0 | OUTPATIENT
Start: 2018-05-25

## 2018-05-25 NOTE — TELEPHONE ENCOUNTER
Medication Refill Request    Date of phone call: 5-24-18    Medication being requested: Hydrocodone-apap 7.5-325 mg sig: Take 1 tablet by mouth 2 times a day  Qty: 60 tablets    Date of last visit: 5-03-18    Date of last refill: 5-03-18    LUCAS up to date?: 5-02-18    Next Follow up?: 5-31-18    Any new pertinent information? (i.e, new medication allergies, new use of medications, change in patient's health or condition, non-compliance or inconsistency with prescribing agreement?): n/a

## 2018-05-31 ENCOUNTER — OFFICE VISIT (OUTPATIENT)
Dept: PAIN MEDICINE | Facility: CLINIC | Age: 33
End: 2018-05-31

## 2018-05-31 VITALS
HEART RATE: 67 BPM | BODY MASS INDEX: 19.38 KG/M2 | DIASTOLIC BLOOD PRESSURE: 83 MMHG | WEIGHT: 151 LBS | OXYGEN SATURATION: 100 % | RESPIRATION RATE: 16 BRPM | HEIGHT: 74 IN | SYSTOLIC BLOOD PRESSURE: 133 MMHG | TEMPERATURE: 97.8 F

## 2018-05-31 DIAGNOSIS — Z98.890 HISTORY OF ROTATOR CUFF SURGERY: ICD-10-CM

## 2018-05-31 DIAGNOSIS — Z51.81 ENCOUNTER FOR MONITORING OPIOID MAINTENANCE THERAPY: ICD-10-CM

## 2018-05-31 DIAGNOSIS — Z79.891 ENCOUNTER FOR MONITORING OPIOID MAINTENANCE THERAPY: ICD-10-CM

## 2018-05-31 DIAGNOSIS — G89.29 CHRONIC RIGHT SHOULDER PAIN: ICD-10-CM

## 2018-05-31 DIAGNOSIS — G89.29 OTHER CHRONIC PAIN: Primary | ICD-10-CM

## 2018-05-31 DIAGNOSIS — M25.511 CHRONIC RIGHT SHOULDER PAIN: ICD-10-CM

## 2018-05-31 PROCEDURE — 99214 OFFICE O/P EST MOD 30 MIN: CPT | Performed by: NURSE PRACTITIONER

## 2018-05-31 RX ORDER — HYDROCODONE BITARTRATE AND ACETAMINOPHEN 5; 325 MG/1; MG/1
TABLET ORAL
Qty: 21 TABLET | Refills: 0 | Status: SHIPPED | OUTPATIENT
Start: 2018-05-31 | End: 2018-06-08

## 2018-05-31 NOTE — PROGRESS NOTES
CHIEF COMPLAINT  Pt states R shoulder pain is tolerable with current medicine regimen,allowing Pt to work 6 days weekly as a .    Cristhian Morris is a 32 y.o. male  who presents to the office for follow-up.He has a history of right shoulder pain.    12/4/2018 - right rotator cuff repair.  He is nearly 6 months post operative at this time.  We have discussed at previous office visits the need to wean from opioids by this time.      He is currently taking hydrocodone 7.5/325 BID.  Reports minimal relief.  Also taking Ibuprofen 800 mg PRN.  Stiffness in the morning, gets better during the day, and worse again at night.      Doing rubber band strengthening exercises recommended by PT.      Shoulder Injury    The right shoulder is affected. Incident onset: surgery 12/2017. The quality of the pain is described as aching. The pain does not radiate. The pain is at a severity of 6/10. Pertinent negatives include no numbness. The symptoms are aggravated by movement and overhead lifting (worse first thing in the morning). He has tried ice, NSAIDs, acetaminophen and rest (PT, hydrocodone) for the symptoms. The treatment provided mild relief.      PEG Assessment   What number best describes your pain on average in the past week?7  What number best describes how, during the past week, pain has interfered with your enjoyment of life?7  What number best describes how, during the past week, pain has interfered with your general activity?  7    The following portions of the patient's history were reviewed and updated as appropriate: allergies, current medications, past family history, past medical history, past social history, past surgical history and problem list.    Review of Systems   Constitutional: Positive for activity change (increased). Negative for fatigue.   HENT: Negative for congestion.    Eyes: Negative for visual disturbance.   Respiratory: Negative for cough, shortness of breath and wheezing.   "  Cardiovascular: Negative.    Gastrointestinal: Negative for constipation, diarrhea and nausea.   Genitourinary: Negative for difficulty urinating.   Musculoskeletal: Positive for arthralgias (right shoulder).   Neurological: Positive for weakness (right shoulder and arm). Negative for dizziness, light-headedness, numbness and headaches.   Psychiatric/Behavioral: Positive for sleep disturbance. Negative for agitation, confusion and suicidal ideas. The patient is not nervous/anxious.        Vitals:    05/31/18 0837   BP: 133/83   Pulse: 67   Resp: 16   Temp: 97.8 °F (36.6 °C)   SpO2: 100%   Weight: 68.5 kg (151 lb)   Height: 188 cm (74.02\")   PainSc: 6  Comment: R shoulder pain ranges from 4-8/10   PainLoc: Shoulder     Objective   Physical Exam   Constitutional: He appears well-developed and well-nourished. No distress.   HENT:   Head: Normocephalic and atraumatic.   Nose: Nose normal.   Eyes: Conjunctivae and EOM are normal.   Cardiovascular: Normal rate.    Pulmonary/Chest: Effort normal. No respiratory distress.   Musculoskeletal:        Right shoulder: He exhibits decreased range of motion, tenderness, crepitus and pain. He exhibits no deformity.   Neurological: He is alert. He has normal strength. Gait normal.   Skin: Skin is warm and dry. He is not diaphoretic.   Psychiatric: He has a normal mood and affect. His speech is normal and behavior is normal.   Nursing note and vitals reviewed.      Assessment/Plan   Alvaro was seen today for shoulder pain.    Diagnoses and all orders for this visit:    Other chronic pain    Chronic right shoulder pain    History of rotator cuff surgery    Encounter for monitoring opioid maintenance therapy    Other orders  -     HYDROcodone-acetaminophen (NORCO) 5-325 MG per tablet; Take one PO BID X 1 week, then take one PO daily X 1 week, then discontinue      --- Weaning Dose of hydrocodone provided today  --- No more opioids from this practice.  He is 6 months out from surgery " and now has multiple abnormal urine drug screens.  Plan from the beginning has been to wean after acute post operative period.    --- The urine drug screen confirmation from 5/3/18 and 3/8/18 show very high levels of alcohol, also initial UDS on 2/8/18 was abnormal - positive for non-prescribed Percocet         LUCAS REPORT  LUCAS report has been reviewed and scanned into the patient's chart.    As the clinician, I personally reviewed the LUCAS from 5/30/2018 while the patient was in the office today.    EMR Dragon/Transcription disclaimer:   Much of this encounter note is an electronic transcription/translation of spoken language to printed text. The electronic translation of spoken language may permit erroneous, or at times, nonsensical words or phrases to be inadvertently transcribed; Although I have reviewed the note for such errors, some may still exist.

## 2018-06-05 ENCOUNTER — TELEPHONE (OUTPATIENT)
Dept: FAMILY MEDICINE CLINIC | Facility: CLINIC | Age: 33
End: 2018-06-05

## 2018-06-05 NOTE — TELEPHONE ENCOUNTER
CALLED AND LEFT MESSAGE FOR PT THAT HE HAS TO HAVE AN APPOINTMENT TO HAVE THIS FILLED. HE WAS LAST SEEN IN January AND NO SHOWED FOR HIS APPOINTMENT HE WAS SUPPOSED TO HAVE IN April. DR. DIAZ DOES HAVE OPENINGS THIS WEEK, SO I CALLED PATIENT AND LEFT MESSAGE FOR PT TO MAKE AN APPOINTMENT.     ----- Message from Solange Villalobos sent at 6/4/2018  2:19 PM EDT -----  REFILL ON:    VYVANCE 40MG 1QD QTY: 30    PLEASE CALL PT WHEN READY: [phone]

## 2018-06-08 ENCOUNTER — OFFICE VISIT (OUTPATIENT)
Dept: FAMILY MEDICINE CLINIC | Facility: CLINIC | Age: 33
End: 2018-06-08

## 2018-06-08 VITALS
TEMPERATURE: 97.8 F | WEIGHT: 154.6 LBS | OXYGEN SATURATION: 99 % | BODY MASS INDEX: 19.84 KG/M2 | HEART RATE: 77 BPM | SYSTOLIC BLOOD PRESSURE: 146 MMHG | HEIGHT: 74 IN | DIASTOLIC BLOOD PRESSURE: 80 MMHG

## 2018-06-08 DIAGNOSIS — Z98.890 HISTORY OF ROTATOR CUFF SURGERY: ICD-10-CM

## 2018-06-08 DIAGNOSIS — I10 ESSENTIAL HYPERTENSION: Primary | ICD-10-CM

## 2018-06-08 DIAGNOSIS — F98.8 ATTENTION DEFICIT DISORDER, UNSPECIFIED HYPERACTIVITY PRESENCE: ICD-10-CM

## 2018-06-08 PROCEDURE — 99213 OFFICE O/P EST LOW 20 MIN: CPT | Performed by: INTERNAL MEDICINE

## 2018-06-08 NOTE — PROGRESS NOTES
Cristhian Morris is a 32 y.o. male. Patient is here today for follow-up on borderline high blood pressure and ADD.  He has had his narcotics discontinued and may be having some slight withdrawal reaction, it is feeling a little bad and some diarrhea.  He is back at work and needs his Vyvanse renewed which seems to be helpful for his ADD.  Chief Complaint   Patient presents with   • ADD     MED CHECK FOR VYVANSE          Vitals:    06/08/18 1412   BP: 146/80   Pulse: 77   Temp: 97.8 °F (36.6 °C)   SpO2: 99%     The following portions of the patient's history were reviewed and updated as appropriate: allergies, current medications, past family history, past medical history, past social history, past surgical history and problem list.    Past Medical History:   Diagnosis Date   • ADHD    • Bursitis of elbow     right elbow   • Hemorrhoids    • Hypertension     B/P OK SINCE STOPPED SMOKING-NO MEDS CURRENTLY   • Rotator cuff tear       No Known Allergies   Social History     Social History   • Marital status:      Spouse name: N/A   • Number of children: N/A   • Years of education: N/A     Occupational History   • Not on file.     Social History Main Topics   • Smoking status: Former Smoker     Packs/day: 1.00     Years: 10.00     Types: Cigarettes     Quit date: 11/6/2017   • Smokeless tobacco: Current User   • Alcohol use Yes      Comment: ON OCC   • Drug use: No   • Sexual activity: Defer     Other Topics Concern   • Not on file     Social History Narrative   • No narrative on file        Current Outpatient Prescriptions:   •  lisdexamfetamine (VYVANSE) 50 MG capsule, Take 1 capsule by mouth Every Morning, Disp: 30 capsule, Rfl: 0     Objective     History of Present Illness     Review of Systems   Constitutional: Positive for fatigue.   HENT: Negative.    Eyes: Negative.    Respiratory: Negative.    Cardiovascular: Negative.    Gastrointestinal: Positive for diarrhea.   Endocrine: Negative.     Genitourinary: Negative.    Musculoskeletal: Positive for arthralgias.   Skin: Negative.    Neurological: Negative.    Psychiatric/Behavioral: Positive for decreased concentration.       Physical Exam   Constitutional: He is oriented to person, place, and time. He appears well-developed and well-nourished.   Pleasant, cooperative and in no severe distress with a blood pressure 140/80   HENT:   Head: Normocephalic and atraumatic.   Eyes: Conjunctivae are normal. Pupils are equal, round, and reactive to light.   Neck: Normal range of motion. Neck supple.   Cardiovascular: Normal rate, regular rhythm and normal heart sounds.    Pulmonary/Chest: Effort normal and breath sounds normal. No respiratory distress. He has no wheezes. He has no rales.   Musculoskeletal: Normal range of motion. He exhibits no edema.   Neurological: He is alert and oriented to person, place, and time.   Skin: Skin is warm and dry.   Psychiatric: He has a normal mood and affect. His behavior is normal.   Nursing note and vitals reviewed.      ASSESSMENT  #1-hypertension, little bit high today  #2-ADD, controlled on medication  #3-possible mild withdrawal symptoms from opiates  Nilson was reviewed and is appropriate.     Problem List Items Addressed This Visit        Cardiovascular and Mediastinum    Essential hypertension - Primary       Other    ADD (attention deficit disorder)          PLAN  I refilled the patient's Vyvanse for his ADD.  I want to recheck the patient in 3 months with a CBC, CMP, lipid panel and TSH and in office urine drug screen    There are no Patient Instructions on file for this visit.  No Follow-up on file.

## 2018-07-06 ENCOUNTER — TELEPHONE (OUTPATIENT)
Dept: FAMILY MEDICINE CLINIC | Facility: CLINIC | Age: 33
End: 2018-07-06

## 2018-07-06 NOTE — TELEPHONE ENCOUNTER
RX IS UP FRONT AND READY TO BE PICKED UP. CALLED PATIENT AND LEFT MESSAGE THAT THEY CAN COME .       ----- Message from Danita Wisdom sent at 7/5/2018 10:55 AM EDT -----  PT NEEDS SCRIPT REFILL FOR lisdexamfetamine (VYVANSE) 50 MG Take 1 capsule by mouth Every Morning #30    PLEASE CONTACT PT WHEN READY FOR  [phone]

## 2018-08-08 ENCOUNTER — TELEPHONE (OUTPATIENT)
Dept: FAMILY MEDICINE CLINIC | Facility: CLINIC | Age: 33
End: 2018-08-08

## 2018-08-08 NOTE — TELEPHONE ENCOUNTER
RX IS UP FRONT AND READY TO BE PICKED UP. CALLED PATIENT AND LEFT MESSAGE THAT THEY CAN COME .       ----- Message from Solange Villaloobs sent at 8/7/2018 12:38 PM EDT -----  REFILL ON VYVANCE 50MG QTY: 30    PLEASE CALL WHEN READY  [phone]

## 2018-09-11 ENCOUNTER — TELEPHONE (OUTPATIENT)
Dept: FAMILY MEDICINE CLINIC | Facility: CLINIC | Age: 33
End: 2018-09-11

## 2018-09-11 NOTE — TELEPHONE ENCOUNTER
PRINTED AND PT CALLED        ----- Message from Solange Villalobos sent at 9/11/2018 10:33 AM EDT -----  REFILL ON:    VYVANSE) 50 MG capsule QTY: 30 1AM    PLEASE CALL PT WHEN READY: [phone]

## 2018-09-11 NOTE — TELEPHONE ENCOUNTER
----- Message from Danita Wisdom sent at 9/10/2018  1:11 PM EDT -----  PT NEEDS SCRIPT REFILL FOR lisdexamfetamine (VYVANSE) 50 MG Take 1 capsule by mouth Every Morning #30    PLEASE CONTACT PT WHEN READY FOR  [phone]

## 2018-11-09 ENCOUNTER — TELEPHONE (OUTPATIENT)
Dept: FAMILY MEDICINE CLINIC | Facility: CLINIC | Age: 33
End: 2018-11-09

## 2018-11-09 NOTE — TELEPHONE ENCOUNTER
----MESSAGE LEFT ON VOICEMAIL NEEDS AN APPT FIRST    - Message from Jose Banks MD sent at 11/9/2018 11:54 AM EST -----  I'll need to see him first      ----- Message -----  From: Alta Reese MA  Sent: 11/9/2018  10:10 AM  To: Jose Banks MD    Please review and advise  ----- Message -----  From: Yoli Wiley  Sent: 11/8/2018   4:03 PM  To: Alta Reese MA    PT TAKES VYVANSE 50MG #30 AND COST HIM OUT OF POCKET AROUND $300. TRIED TO GET AN APPT FOR A MED CHECK AND NOTHING AVAILABLE UNTIL THE END OF NOV. CAN HE BE PRESCRIBED ADDERALL XR?     [phone] PLEASE CALL AND ADVISE WHAT HE SHOULD DO.

## 2018-11-14 ENCOUNTER — OFFICE VISIT (OUTPATIENT)
Dept: FAMILY MEDICINE CLINIC | Facility: CLINIC | Age: 33
End: 2018-11-14

## 2018-11-14 VITALS
SYSTOLIC BLOOD PRESSURE: 130 MMHG | HEIGHT: 74 IN | HEART RATE: 84 BPM | TEMPERATURE: 97.2 F | WEIGHT: 155 LBS | DIASTOLIC BLOOD PRESSURE: 80 MMHG | RESPIRATION RATE: 16 BRPM | BODY MASS INDEX: 19.89 KG/M2 | OXYGEN SATURATION: 98 %

## 2018-11-14 DIAGNOSIS — I10 ESSENTIAL HYPERTENSION: ICD-10-CM

## 2018-11-14 DIAGNOSIS — F98.8 ATTENTION DEFICIT DISORDER, UNSPECIFIED HYPERACTIVITY PRESENCE: Primary | ICD-10-CM

## 2018-11-14 PROBLEM — Z98.890 H/O SHOULDER SURGERY: Status: ACTIVE | Noted: 2018-08-25

## 2018-11-14 PROCEDURE — 99213 OFFICE O/P EST LOW 20 MIN: CPT | Performed by: INTERNAL MEDICINE

## 2018-11-14 RX ORDER — DEXTROAMPHETAMINE SACCHARATE, AMPHETAMINE ASPARTATE MONOHYDRATE, DEXTROAMPHETAMINE SULFATE AND AMPHETAMINE SULFATE 5; 5; 5; 5 MG/1; MG/1; MG/1; MG/1
20 CAPSULE, EXTENDED RELEASE ORAL EVERY MORNING
Qty: 30 CAPSULE | Refills: 0 | Status: SHIPPED | OUTPATIENT
Start: 2018-11-14 | End: 2018-12-05 | Stop reason: SDUPTHER

## 2018-11-14 NOTE — PROGRESS NOTES
Cristhian Morris is a 32 y.o. male. Patient is here today for follow-up on his ADD.  He has been taking Vyvanse but it's no longer covered by his insurance and will cost him $300 a month.  He would like to try Adderall.  He had been doing well on the Vyvanse.  Chief Complaint   Patient presents with   • ADD          Vitals:    18 0805   BP: 130/80   Pulse: 84   Resp: 16   Temp: 97.2 °F (36.2 °C)   SpO2: 98%     The following portions of the patient's history were reviewed and updated as appropriate: allergies, current medications, past family history, past medical history, past social history, past surgical history and problem list.    Past Medical History:   Diagnosis Date   • ADHD    • Bursitis of elbow     right elbow   • Hemorrhoids    • Hypertension     B/P OK SINCE STOPPED SMOKING-NO MEDS CURRENTLY   • Rotator cuff tear       No Known Allergies   Social History     Socioeconomic History   • Marital status:      Spouse name: Not on file   • Number of children: Not on file   • Years of education: Not on file   • Highest education level: Not on file   Social Needs   • Financial resource strain: Not on file   • Food insecurity - worry: Not on file   • Food insecurity - inability: Not on file   • Transportation needs - medical: Not on file   • Transportation needs - non-medical: Not on file   Occupational History   • Not on file   Tobacco Use   • Smoking status: Former Smoker     Packs/day: 1.00     Years: 10.00     Pack years: 10.00     Types: Cigarettes     Last attempt to quit: 2017     Years since quittin.0   • Smokeless tobacco: Current User   Substance and Sexual Activity   • Alcohol use: Yes     Comment: ON OCC   • Drug use: No   • Sexual activity: Defer   Other Topics Concern   • Not on file   Social History Narrative   • Not on file        Current Outpatient Medications:   •  amphetamine-dextroamphetamine XR (ADDERALL XR) 20 MG 24 hr capsule, Take 1 capsule by mouth Every  Morning, Disp: 30 capsule, Rfl: 0     Objective     History of Present Illness     Review of Systems   Constitutional: Negative.    HENT: Negative.    Eyes: Negative.    Respiratory: Negative.    Cardiovascular: Negative.    Gastrointestinal: Negative.    Genitourinary: Negative.    Musculoskeletal: Negative.    Skin: Negative.    Neurological: Negative.    Psychiatric/Behavioral: Negative.        Physical Exam   Constitutional: He is oriented to person, place, and time. He appears well-developed and well-nourished.   Pleasant, cooperative no distress, blood pressure 120/70   HENT:   Head: Normocephalic and atraumatic.   Eyes: Conjunctivae are normal. Pupils are equal, round, and reactive to light. No scleral icterus.   Neck: Normal range of motion. Neck supple.   Cardiovascular: Normal rate, regular rhythm and normal heart sounds.   Pulmonary/Chest: Effort normal and breath sounds normal. No respiratory distress. He has no wheezes. He has no rales.   Musculoskeletal: Normal range of motion.   Neurological: He is alert and oriented to person, place, and time.   Skin: Skin is warm and dry.   Psychiatric: He has a normal mood and affect. His behavior is normal.   Nursing note and vitals reviewed.      ASSESSMENT  overall the patient seems stable and having no side effects with medications.  His ADD was reasonably controlled on the Vyvanse but because of cost we'll have to switch.     Problem List Items Addressed This Visit        Cardiovascular and Mediastinum    Essential hypertension       Other    ADD (attention deficit disorder) - Primary    Relevant Medications    amphetamine-dextroamphetamine XR (ADDERALL XR) 20 MG 24 hr capsule          PLAN  I'm starting him on Adderall XR generic, 20 mg daily and want to recheck him in one month with a CBC, CMP, lipid panel, TSH and in office urine drug screen    There are no Patient Instructions on file for this visit.  Return in about 1 month (around 12/14/2018) for with  labs.

## 2018-12-05 ENCOUNTER — OFFICE VISIT (OUTPATIENT)
Dept: FAMILY MEDICINE CLINIC | Facility: CLINIC | Age: 33
End: 2018-12-05

## 2018-12-05 VITALS
SYSTOLIC BLOOD PRESSURE: 140 MMHG | WEIGHT: 156 LBS | HEIGHT: 74 IN | DIASTOLIC BLOOD PRESSURE: 80 MMHG | BODY MASS INDEX: 20.02 KG/M2 | TEMPERATURE: 98.2 F | RESPIRATION RATE: 18 BRPM | HEART RATE: 68 BPM

## 2018-12-05 DIAGNOSIS — I10 ESSENTIAL HYPERTENSION: Primary | ICD-10-CM

## 2018-12-05 DIAGNOSIS — F98.8 ATTENTION DEFICIT DISORDER, UNSPECIFIED HYPERACTIVITY PRESENCE: ICD-10-CM

## 2018-12-05 PROCEDURE — 99213 OFFICE O/P EST LOW 20 MIN: CPT | Performed by: INTERNAL MEDICINE

## 2018-12-05 RX ORDER — DEXTROAMPHETAMINE SACCHARATE, AMPHETAMINE ASPARTATE MONOHYDRATE, DEXTROAMPHETAMINE SULFATE AND AMPHETAMINE SULFATE 7.5; 7.5; 7.5; 7.5 MG/1; MG/1; MG/1; MG/1
30 CAPSULE, EXTENDED RELEASE ORAL EVERY MORNING
Qty: 30 CAPSULE | Refills: 0 | Status: SHIPPED | OUTPATIENT
Start: 2018-12-05 | End: 2019-01-08 | Stop reason: SDUPTHER

## 2018-12-05 NOTE — PROGRESS NOTES
Cristhian Morris is a 33 y.o. male. Patient is here today for follow-up on his hypertension and ADD.  At the last visit we had to switch from Vyvanse to Adderall.  He's been doing okay on it and actually likes it a bit better but says it decreases and effectiveness earlier in the day.  He's had no other side effects or problems.  Chief Complaint   Patient presents with   • ADD          Vitals:    18 0802   BP: 140/80   Pulse: 68   Resp: 18   Temp: 98.2 °F (36.8 °C)     The following portions of the patient's history were reviewed and updated as appropriate: allergies, current medications, past family history, past medical history, past social history, past surgical history and problem list.    Past Medical History:   Diagnosis Date   • ADHD    • Bursitis of elbow     right elbow   • Hemorrhoids    • Hypertension     B/P OK SINCE STOPPED SMOKING-NO MEDS CURRENTLY   • Rotator cuff tear       No Known Allergies   Social History     Socioeconomic History   • Marital status:      Spouse name: Not on file   • Number of children: Not on file   • Years of education: Not on file   • Highest education level: Not on file   Social Needs   • Financial resource strain: Not on file   • Food insecurity - worry: Not on file   • Food insecurity - inability: Not on file   • Transportation needs - medical: Not on file   • Transportation needs - non-medical: Not on file   Occupational History   • Not on file   Tobacco Use   • Smoking status: Former Smoker     Packs/day: 1.00     Years: 10.00     Pack years: 10.00     Types: Cigarettes     Last attempt to quit: 2017     Years since quittin.0   • Smokeless tobacco: Current User   Substance and Sexual Activity   • Alcohol use: Yes     Comment: ON OCC   • Drug use: No   • Sexual activity: Defer   Other Topics Concern   • Not on file   Social History Narrative   • Not on file        Current Outpatient Medications:   •  amphetamine-dextroamphetamine XR  (ADDERALL XR) 30 MG 24 hr capsule, Take 1 capsule by mouth Every Morning, Disp: 30 capsule, Rfl: 0     Objective     History of Present Illness     Review of Systems   Constitutional: Negative.    HENT: Negative.    Eyes: Negative.    Respiratory: Negative.    Cardiovascular: Negative.    Gastrointestinal: Negative.    Genitourinary: Negative.    Musculoskeletal: Negative.    Skin: Negative.    Neurological: Negative.    Psychiatric/Behavioral: Negative.        Physical Exam   Constitutional: He is oriented to person, place, and time. He appears well-developed and well-nourished.   Pleasant, cooperative no distress blood pressure 120/80   HENT:   Head: Normocephalic and atraumatic.   Eyes: Conjunctivae are normal. Pupils are equal, round, and reactive to light. No scleral icterus.   Neck: Normal range of motion. Neck supple.   Cardiovascular: Normal rate, regular rhythm and normal heart sounds.   Pulmonary/Chest: Effort normal and breath sounds normal. No stridor. No respiratory distress. He has no wheezes. He has no rales. He exhibits no tenderness.   Musculoskeletal: Normal range of motion. He exhibits no edema.   Neurological: He is alert and oriented to person, place, and time.   Skin: Skin is warm and dry.   Psychiatric: He has a normal mood and affect. His behavior is normal.   Nursing note and vitals reviewed.      ASSESSMENT  patient's blood pressures well controlled.  He is tolerating the Adderall XR better but it wears off a bit too quickly.  He is noted no side effects.     Problem List Items Addressed This Visit        Cardiovascular and Mediastinum    Essential hypertension - Primary       Other    ADD (attention deficit disorder)    Relevant Medications    amphetamine-dextroamphetamine XR (ADDERALL XR) 30 MG 24 hr capsule          PLAN  I'm going to increase his Adderall XR to 30 mg daily.  He will be rechecked in 3 months with a CBC, CMP, TSH and office urine drug screen    There are no Patient  Instructions on file for this visit.  Return in about 3 months (around 3/5/2019) for with labs.

## 2019-01-09 RX ORDER — DEXTROAMPHETAMINE SACCHARATE, AMPHETAMINE ASPARTATE MONOHYDRATE, DEXTROAMPHETAMINE SULFATE AND AMPHETAMINE SULFATE 7.5; 7.5; 7.5; 7.5 MG/1; MG/1; MG/1; MG/1
30 CAPSULE, EXTENDED RELEASE ORAL EVERY MORNING
Qty: 30 CAPSULE | Refills: 0 | Status: SHIPPED | OUTPATIENT
Start: 2019-01-09 | End: 2019-02-05 | Stop reason: SDUPTHER

## 2019-02-05 RX ORDER — DEXTROAMPHETAMINE SACCHARATE, AMPHETAMINE ASPARTATE MONOHYDRATE, DEXTROAMPHETAMINE SULFATE AND AMPHETAMINE SULFATE 7.5; 7.5; 7.5; 7.5 MG/1; MG/1; MG/1; MG/1
30 CAPSULE, EXTENDED RELEASE ORAL EVERY MORNING
Qty: 30 CAPSULE | Refills: 0 | Status: SHIPPED | OUTPATIENT
Start: 2019-02-05 | End: 2019-02-28 | Stop reason: SDUPTHER

## 2019-02-15 ENCOUNTER — APPOINTMENT (OUTPATIENT)
Dept: GENERAL RADIOLOGY | Facility: HOSPITAL | Age: 34
End: 2019-02-15

## 2019-02-15 ENCOUNTER — HOSPITAL ENCOUNTER (EMERGENCY)
Facility: HOSPITAL | Age: 34
Discharge: HOME | End: 2019-02-15
Attending: EMERGENCY MEDICINE | Admitting: EMERGENCY MEDICINE

## 2019-02-15 VITALS
TEMPERATURE: 99 F | HEIGHT: 74 IN | DIASTOLIC BLOOD PRESSURE: 95 MMHG | BODY MASS INDEX: 23.1 KG/M2 | RESPIRATION RATE: 16 BRPM | HEART RATE: 83 BPM | SYSTOLIC BLOOD PRESSURE: 148 MMHG | OXYGEN SATURATION: 97 % | WEIGHT: 180 LBS

## 2019-02-15 DIAGNOSIS — S60.212A CONTUSION OF LEFT WRIST, INITIAL ENCOUNTER: Primary | ICD-10-CM

## 2019-02-15 PROCEDURE — 99283 EMERGENCY DEPT VISIT LOW MDM: CPT

## 2019-02-15 PROCEDURE — 73110 X-RAY EXAM OF WRIST: CPT

## 2019-02-15 RX ORDER — IBUPROFEN 200 MG
400 TABLET ORAL EVERY 6 HOURS PRN
COMMUNITY
End: 2020-03-17

## 2019-02-15 NOTE — ED PROVIDER NOTES
EMERGENCY DEPARTMENT ENCOUNTER    CHIEF COMPLAINT  Chief Complaint: wrist pain  History given by: patient  History limited by: nothing  Room Number: 01/01  PMD: Jose Banks MD      HPI:  Pt is a 33 y.o. male who presents complaining of left wrist pain that began after he struck his left wrist with a hammer several weeks ago. Pt states that his pain became acutely worse today but denies additional injury. Pt denies additional complaint.    Duration:  Several weeks  Onset: sudden  Timing: constant  Location: left wrist  Radiation: none  Quality: throbbing  Intensity/Severity: moderate  Progression: worsening  Associated Symptoms: none  Aggravating Factors: movement  Alleviating Factors: none  Previous Episodes: none  Treatment before arrival: none    PAST MEDICAL HISTORY  Active Ambulatory Problems     Diagnosis Date Noted   • ADD (attention deficit disorder) 06/29/2017   • Essential hypertension 06/29/2017   • Chronic right shoulder pain 09/12/2017   • Incomplete tear of rotator cuff 11/08/2017   • History of rotator cuff surgery 01/17/2018   • Encounter for monitoring opioid maintenance therapy 05/03/2018   • H/O shoulder surgery 08/25/2018     Resolved Ambulatory Problems     Diagnosis Date Noted   • No Resolved Ambulatory Problems     Past Medical History:   Diagnosis Date   • ADHD    • Bursitis of elbow    • Hemorrhoids    • Hypertension    • Rotator cuff tear        PAST SURGICAL HISTORY  Past Surgical History:   Procedure Laterality Date   • HEMORRHOIDECTOMY N/A 6/23/2016    Procedure: HEMORRHOIDECTOMY;  Surgeon: Atilio Smith MD;  Location: [facility];  Service:    • SHOULDER ARTHROSCOPY W/ ROTATOR CUFF REPAIR Right 12/4/2017    Procedure: SHOULDER ARTHROSCOPY WITH MINI OPEN ROTATOR CUFF REPAIR  DEBRIDEMENT OF LABRUM DEBRIDEMENT OF SUBSCAPULARIS DECOMPRESSION;  Surgeon: Pily Cordero MD;  Location: [facility];  Service:        FAMILY HISTORY  History reviewed. No pertinent family  history.    SOCIAL HISTORY  Social History     Socioeconomic History   • Marital status:      Spouse name: Not on file   • Number of children: Not on file   • Years of education: Not on file   • Highest education level: Not on file   Social Needs   • Financial resource strain: Not on file   • Food insecurity - worry: Not on file   • Food insecurity - inability: Not on file   • Transportation needs - medical: Not on file   • Transportation needs - non-medical: Not on file   Occupational History   • Not on file   Tobacco Use   • Smoking status: Former Smoker     Packs/day: 1.00     Years: 10.00     Pack years: 10.00     Types: Cigarettes     Last attempt to quit: 2017     Years since quittin.2   • Smokeless tobacco: Current User   Substance and Sexual Activity   • Alcohol use: Yes     Comment: ON OCC   • Drug use: No   • Sexual activity: Defer   Other Topics Concern   • Not on file   Social History Narrative   • Not on file       ALLERGIES  Patient has no known allergies.    REVIEW OF SYSTEMS  Review of Systems   Constitutional: Negative for activity change, appetite change and fever.   HENT: Negative for congestion and sore throat.    Respiratory: Negative for cough and shortness of breath.    Cardiovascular: Negative for chest pain and leg swelling.   Gastrointestinal: Negative for abdominal pain, diarrhea and vomiting.   Musculoskeletal: Positive for arthralgias (left wrist). Negative for neck pain.   Skin: Negative for rash and wound.   Neurological: Negative for weakness, numbness and headaches.   All other systems reviewed and are negative.      PHYSICAL EXAM  ED Triage Vitals [02/15/19 1429]   Temp Heart Rate Resp BP SpO2   99 °F (37.2 °C) 101 -- -- 96 %      Temp src Heart Rate Source Patient Position BP Location FiO2 (%)   Tympanic -- -- -- --       Physical Exam   Constitutional: He is oriented to person, place, and time and well-developed, well-nourished, and in no distress. No distress.    HENT:   Mouth/Throat: Mucous membranes are normal.   Eyes: EOM are normal.   Cardiovascular: Normal rate and regular rhythm. Exam reveals no gallop and no friction rub.   No murmur heard.  Pulses:       Radial pulses are 2+ on the left side.   Pulmonary/Chest: Effort normal. No respiratory distress. He has no wheezes. He has no rhonchi. He has no rales.   Breath sounds are symmetric.   Abdominal: Soft. He exhibits no distension. There is no tenderness. There is no guarding.   Musculoskeletal: Normal range of motion. He exhibits no deformity.        Left wrist: He exhibits tenderness.   Diffuse tenderness over the radial aspect of the left distal wrist and proximal left thumb. No worsening tenderness over the left scaphoid   Neurological: He is alert and oriented to person, place, and time.   moving all extremities, no focal deficits. Pt is NVI distally in left hand   Skin: Skin is warm and dry.   Psychiatric:   Calm, cooperative     RADIOLOGY  XR Wrist 3+ View Left   Final Result       No acute fracture is identified. Follow-up/further evaluation can be   obtained as indications persist.       This report was finalized on 2/15/2019 4:10 PM by Dr. Gil Flowers M.D.               I ordered the above noted radiological studies. Interpreted by radiologist. Reviewed by me in PACS.       PROCEDURES  Procedures      PROGRESS AND CONSULTS     1516- Ordered L wrist XR for further evaluation.    1517- Discussed the plan to order a L wrist XR for further evaluation of his symptoms. Pt understands and agrees with the plan, all questions answered.    1615- Rechecked pt. Pt is resting comfortably. Notified pt of his unremarkable L wrist XR. Notified pt that his symptoms are likely due to a contusion and will improve with time. Discussed the plan to discharge the pt home. I instructed the pt to use ice and to take anti-inflammatory medication as needed for pain. Pt understands and agrees with the plan, all questions  answered.      MEDICAL DECISION MAKING  Results were reviewed/discussed with the patient and they were also made aware of online access. Pt also made aware that follow up with PMD is necessary.     MDM  Number of Diagnoses or Management Options     Amount and/or Complexity of Data Reviewed  Tests in the radiology section of CPT®: ordered and reviewed (L wrist XR shows nothing acute)  Independent visualization of images, tracings, or specimens: yes    Patient Progress  Patient progress: stable         DIAGNOSIS  Final diagnoses:   Contusion of left wrist, initial encounter       DISPOSITION  DISCHARGE    Patient discharged in stable condition.    Reviewed implications of results, diagnosis, meds, responsibility to follow up, warning signs and symptoms of possible worsening, potential complications and reasons to return to ER.    Patient/Family voiced understanding of above instructions.    Discussed plan for discharge, as there is no emergent indication for admission. Patient referred to primary care provider for BP management due to today's BP. Pt/family is agreeable and understands need for follow up and repeat testing.  Pt is aware that discharge does not mean that nothing is wrong but it indicates no emergency is present that requires admission and they must continue care with follow-up as given below or physician of their choice.     FOLLOW-UP  Jose Banks MD  [address] [facility] [address] [phone]    Schedule an appointment as soon as possible for a visit   As needed    [facility] Emergency Department  [address] [facility] [address] [phone]    As needed, If symptoms worsen         Medication List      No changes were made to your prescriptions during this visit.           Latest Documented Vital Signs:  As of 4:16 PM  BP- 151/83 HR- 101 Temp- 99 °F (37.2 °C) (Tympanic) O2 sat- 96%    --  Documentation assistance provided by otilia Mohan for   Rory.  Information recorded by the scribe was done at my direction and has been verified and validated by me.     Lara Mohan  02/15/19 1642       Gibson Valadez MD  02/15/19 3167

## 2019-02-28 ENCOUNTER — OFFICE VISIT (OUTPATIENT)
Dept: FAMILY MEDICINE CLINIC | Facility: CLINIC | Age: 34
End: 2019-02-28

## 2019-02-28 VITALS
DIASTOLIC BLOOD PRESSURE: 80 MMHG | WEIGHT: 158 LBS | BODY MASS INDEX: 20.28 KG/M2 | SYSTOLIC BLOOD PRESSURE: 140 MMHG | OXYGEN SATURATION: 98 % | HEART RATE: 69 BPM | HEIGHT: 74 IN | RESPIRATION RATE: 16 BRPM | TEMPERATURE: 97.2 F

## 2019-02-28 DIAGNOSIS — F98.8 ATTENTION DEFICIT DISORDER, UNSPECIFIED HYPERACTIVITY PRESENCE: ICD-10-CM

## 2019-02-28 DIAGNOSIS — Z23 NEED FOR VACCINATION: ICD-10-CM

## 2019-02-28 DIAGNOSIS — M25.532 ACUTE WRIST PAIN, LEFT: ICD-10-CM

## 2019-02-28 DIAGNOSIS — I10 ESSENTIAL HYPERTENSION: Primary | ICD-10-CM

## 2019-02-28 PROCEDURE — 90715 TDAP VACCINE 7 YRS/> IM: CPT | Performed by: INTERNAL MEDICINE

## 2019-02-28 PROCEDURE — 99213 OFFICE O/P EST LOW 20 MIN: CPT | Performed by: INTERNAL MEDICINE

## 2019-02-28 PROCEDURE — 90471 IMMUNIZATION ADMIN: CPT | Performed by: INTERNAL MEDICINE

## 2019-02-28 RX ORDER — DEXTROAMPHETAMINE SACCHARATE, AMPHETAMINE ASPARTATE MONOHYDRATE, DEXTROAMPHETAMINE SULFATE AND AMPHETAMINE SULFATE 7.5; 7.5; 7.5; 7.5 MG/1; MG/1; MG/1; MG/1
30 CAPSULE, EXTENDED RELEASE ORAL EVERY MORNING
Qty: 30 CAPSULE | Refills: 0 | Status: SHIPPED | OUTPATIENT
Start: 2019-02-28 | End: 2019-04-09 | Stop reason: SDUPTHER

## 2019-02-28 NOTE — PROGRESS NOTES
Subjective   Alvaro Morris is a 33 y.o. male. Patient is here today for follow-up on his hypertension and his ADD.  He is generally been doing okay on his current dose of Adderall.  He hit his left wrist with a hammer about 4-5 weeks ago.  Went to an urgent care several weeks ago and had an x-ray with no sign of a fracture but he continues with some swelling and pain in the radial aspect of his left wrist.  He has been taking some Advil but that is not helping much.  Chief Complaint   Patient presents with   • ADD     medication refill   • Wrist Pain     left x 1 month had xray no fracture          Vitals:    19 0759   BP: 140/80   Pulse: 69   Resp: 16   Temp: 97.2 °F (36.2 °C)   SpO2: 98%     The following portions of the patient's history were reviewed and updated as appropriate: allergies, current medications, past family history, past medical history, past social history, past surgical history and problem list.    Past Medical History:   Diagnosis Date   • ADHD    • Bursitis of elbow     right elbow   • Hemorrhoids    • Hypertension     B/P OK SINCE STOPPED SMOKING-NO MEDS CURRENTLY   • Rotator cuff tear       No Known Allergies   Social History     Socioeconomic History   • Marital status:      Spouse name: Not on file   • Number of children: Not on file   • Years of education: Not on file   • Highest education level: Not on file   Social Needs   • Financial resource strain: Not on file   • Food insecurity - worry: Not on file   • Food insecurity - inability: Not on file   • Transportation needs - medical: Not on file   • Transportation needs - non-medical: Not on file   Occupational History   • Not on file   Tobacco Use   • Smoking status: Former Smoker     Packs/day: 1.00     Years: 10.00     Pack years: 10.00     Types: Cigarettes     Last attempt to quit: 2017     Years since quittin.3   • Smokeless tobacco: Current User   Substance and Sexual Activity   • Alcohol use: Yes      Comment: ON OCC   • Drug use: No   • Sexual activity: Defer   Other Topics Concern   • Not on file   Social History Narrative   • Not on file        Current Outpatient Medications:   •  amphetamine-dextroamphetamine XR (ADDERALL XR) 30 MG 24 hr capsule, Take 1 capsule by mouth Every Morning, Disp: 30 capsule, Rfl: 0  •  ibuprofen (ADVIL,MOTRIN) 200 MG tablet, Take 400 mg by mouth Every 6 (Six) Hours As Needed for Mild Pain ., Disp: , Rfl:      Objective     History of Present Illness     Review of Systems   Constitutional: Negative.    HENT: Negative.    Eyes: Negative.    Respiratory: Negative.    Cardiovascular: Negative.    Gastrointestinal: Negative.    Genitourinary: Negative.    Musculoskeletal:        Left wrist swelling and pain   Skin: Negative.    Neurological: Negative.    Psychiatric/Behavioral: Negative.        Physical Exam   Constitutional: He is oriented to person, place, and time. He appears well-developed and well-nourished.   Pleasant, cooperative no distress blood pressure 120/70   HENT:   Head: Normocephalic and atraumatic.   Eyes: Conjunctivae are normal. Pupils are equal, round, and reactive to light. No scleral icterus.   Neck: Normal range of motion. Neck supple.   Cardiovascular: Normal rate, regular rhythm and normal heart sounds.   Pulmonary/Chest: Effort normal and breath sounds normal. No respiratory distress. He has no wheezes. He has no rales.   Musculoskeletal: Normal range of motion. He exhibits tenderness.   There is some swelling in the area of the distal left radius with tenderness to palpation   Neurological: He is alert and oriented to person, place, and time.   Skin: Skin is warm and dry.   Psychiatric: He has a normal mood and affect. His behavior is normal.   Nursing note and vitals reviewed.      ASSESSMENT #1-hypertension, controlled  #2-ADD, reasonable control on his medications  #3-persistent left wrist pain following an injury  The patient's Nilson was reviewed and is  appropriate.     Problem List Items Addressed This Visit        Cardiovascular and Mediastinum    Essential hypertension - Primary       Other    ADD (attention deficit disorder)    Relevant Medications    amphetamine-dextroamphetamine XR (ADDERALL XR) 30 MG 24 hr capsule      Other Visit Diagnoses     Acute wrist pain, left        Relevant Orders    Ambulatory Referral to Hand Surgery          PLAN I refilled patient's Adderall.  He received a Tdap immunization today.  I am referring him to hand surgery, for their opinion on his continuing left wrist pain.  Plan on rechecking him in 3 months    There are no Patient Instructions on file for this visit.  Return in about 3 months (around 5/28/2019).

## 2019-04-10 RX ORDER — DEXTROAMPHETAMINE SACCHARATE, AMPHETAMINE ASPARTATE MONOHYDRATE, DEXTROAMPHETAMINE SULFATE AND AMPHETAMINE SULFATE 7.5; 7.5; 7.5; 7.5 MG/1; MG/1; MG/1; MG/1
30 CAPSULE, EXTENDED RELEASE ORAL EVERY MORNING
Qty: 30 CAPSULE | Refills: 0 | Status: SHIPPED | OUTPATIENT
Start: 2019-04-10 | End: 2019-05-07 | Stop reason: SDUPTHER

## 2019-05-07 RX ORDER — DEXTROAMPHETAMINE SACCHARATE, AMPHETAMINE ASPARTATE MONOHYDRATE, DEXTROAMPHETAMINE SULFATE AND AMPHETAMINE SULFATE 7.5; 7.5; 7.5; 7.5 MG/1; MG/1; MG/1; MG/1
30 CAPSULE, EXTENDED RELEASE ORAL EVERY MORNING
Qty: 30 CAPSULE | Refills: 0 | Status: SHIPPED | OUTPATIENT
Start: 2019-05-07 | End: 2019-06-11 | Stop reason: SDUPTHER

## 2019-06-11 ENCOUNTER — OFFICE VISIT (OUTPATIENT)
Dept: FAMILY MEDICINE CLINIC | Facility: CLINIC | Age: 34
End: 2019-06-11

## 2019-06-11 VITALS
HEIGHT: 74 IN | BODY MASS INDEX: 19.53 KG/M2 | DIASTOLIC BLOOD PRESSURE: 80 MMHG | TEMPERATURE: 98.2 F | SYSTOLIC BLOOD PRESSURE: 124 MMHG | HEART RATE: 112 BPM | RESPIRATION RATE: 18 BRPM | WEIGHT: 152.2 LBS | OXYGEN SATURATION: 96 %

## 2019-06-11 DIAGNOSIS — F98.8 ATTENTION DEFICIT DISORDER, UNSPECIFIED HYPERACTIVITY PRESENCE: ICD-10-CM

## 2019-06-11 DIAGNOSIS — I10 ESSENTIAL HYPERTENSION: Primary | ICD-10-CM

## 2019-06-11 PROCEDURE — 99213 OFFICE O/P EST LOW 20 MIN: CPT | Performed by: INTERNAL MEDICINE

## 2019-06-11 RX ORDER — DEXTROAMPHETAMINE SACCHARATE, AMPHETAMINE ASPARTATE MONOHYDRATE, DEXTROAMPHETAMINE SULFATE AND AMPHETAMINE SULFATE 7.5; 7.5; 7.5; 7.5 MG/1; MG/1; MG/1; MG/1
30 CAPSULE, EXTENDED RELEASE ORAL EVERY MORNING
Qty: 30 CAPSULE | Refills: 0 | Status: SHIPPED | OUTPATIENT
Start: 2019-06-11 | End: 2019-06-11 | Stop reason: SDUPTHER

## 2019-06-11 RX ORDER — DEXTROAMPHETAMINE SACCHARATE, AMPHETAMINE ASPARTATE MONOHYDRATE, DEXTROAMPHETAMINE SULFATE AND AMPHETAMINE SULFATE 7.5; 7.5; 7.5; 7.5 MG/1; MG/1; MG/1; MG/1
30 CAPSULE, EXTENDED RELEASE ORAL EVERY MORNING
Qty: 30 CAPSULE | Refills: 0 | Status: SHIPPED | OUTPATIENT
Start: 2019-06-11 | End: 2019-07-09 | Stop reason: SDUPTHER

## 2019-06-11 RX ORDER — DEXTROAMPHETAMINE SACCHARATE, AMPHETAMINE ASPARTATE, DEXTROAMPHETAMINE SULFATE AND AMPHETAMINE SULFATE 2.5; 2.5; 2.5; 2.5 MG/1; MG/1; MG/1; MG/1
TABLET ORAL
Qty: 30 TABLET | Refills: 0 | Status: SHIPPED | OUTPATIENT
Start: 2019-06-11 | End: 2019-07-09 | Stop reason: SDUPTHER

## 2019-06-11 NOTE — PROGRESS NOTES
Cristhian Morris is a 33 y.o. male. Patient is here today for follow-up on his ADD.  He is generally feeling well.  He does say that the Adderall effects wear off around 3 or 4:00 in the afternoon he like either an increase or to take a smaller supplementary dose at that point.  Otherwise he has had no acute complaints.  He has not had any laboratory studies done in well over a year.  Chief Complaint   Patient presents with   • ADHD     pt here to get refill on adderall           Vitals:    19 0925   BP: 124/80   Pulse: 112   Resp: 18   Temp: 98.2 °F (36.8 °C)   SpO2: 96%     The following portions of the patient's history were reviewed and updated as appropriate: allergies, current medications, past family history, past medical history, past social history, past surgical history and problem list.    Past Medical History:   Diagnosis Date   • ADHD    • Bursitis of elbow     right elbow   • Hemorrhoids    • Hypertension     B/P OK SINCE STOPPED SMOKING-NO MEDS CURRENTLY   • Rotator cuff tear       No Known Allergies   Social History     Socioeconomic History   • Marital status:      Spouse name: Not on file   • Number of children: Not on file   • Years of education: Not on file   • Highest education level: Not on file   Tobacco Use   • Smoking status: Former Smoker     Packs/day: 1.00     Years: 10.00     Pack years: 10.00     Types: Cigarettes     Last attempt to quit: 2017     Years since quittin.5   • Smokeless tobacco: Current User   Substance and Sexual Activity   • Alcohol use: Yes     Comment: ON OCC   • Drug use: No   • Sexual activity: Defer        Current Outpatient Medications:   •  amphetamine-dextroamphetamine XR (ADDERALL XR) 30 MG 24 hr capsule, Take 1 capsule by mouth Every Morning, Disp: 30 capsule, Rfl: 0  •  ibuprofen (ADVIL,MOTRIN) 200 MG tablet, Take 400 mg by mouth Every 6 (Six) Hours As Needed for Mild Pain ., Disp: , Rfl:   •  amphetamine-dextroamphetamine  (ADDERALL) 10 MG tablet, Take 1 po in afternoon, Disp: 30 tablet, Rfl: 0     Objective     History of Present Illness     Review of Systems   Constitutional: Negative.    HENT: Negative.    Respiratory: Negative.    Cardiovascular: Negative.    Gastrointestinal: Negative.    Genitourinary: Negative.    Musculoskeletal: Negative.    Skin: Negative.    Neurological: Negative.    Psychiatric/Behavioral: Negative.        Physical Exam   Constitutional: He appears well-developed and well-nourished.   Pleasant, cooperative no distress, blood pressure 125/80   HENT:   Head: Normocephalic and atraumatic.   Eyes: Conjunctivae are normal. Pupils are equal, round, and reactive to light. No scleral icterus.   Neck: Normal range of motion. Neck supple.   Cardiovascular: Normal rate, regular rhythm and normal heart sounds.   Pulmonary/Chest: Effort normal and breath sounds normal. No respiratory distress. He has no wheezes. He has no rales.   Musculoskeletal: Normal range of motion.   Neurological: He is alert.   Skin: Skin is warm and dry.   Psychiatric: He has a normal mood and affect. His behavior is normal.   Nursing note and vitals reviewed.      ASSESSMENT the patient ADD is reasonably controlled on Adderall but the effects tend to wear off in the late afternoon.     Problem List Items Addressed This Visit     None          PLAN I refilled the patient's Adderall and also got him a supplementary dose of 10 mg to take in the afternoon.  I plan on rechecking him in 3 months with a CBC, CMP, lipid panel and TSH and urine drug screen done here in the office    There are no Patient Instructions on file for this visit.  Return in about 3 months (around 9/11/2019).

## 2019-07-09 RX ORDER — DEXTROAMPHETAMINE SACCHARATE, AMPHETAMINE ASPARTATE MONOHYDRATE, DEXTROAMPHETAMINE SULFATE AND AMPHETAMINE SULFATE 7.5; 7.5; 7.5; 7.5 MG/1; MG/1; MG/1; MG/1
30 CAPSULE, EXTENDED RELEASE ORAL EVERY MORNING
Qty: 30 CAPSULE | Refills: 0 | Status: SHIPPED | OUTPATIENT
Start: 2019-07-09 | End: 2019-08-07 | Stop reason: SDUPTHER

## 2019-07-09 RX ORDER — DEXTROAMPHETAMINE SACCHARATE, AMPHETAMINE ASPARTATE, DEXTROAMPHETAMINE SULFATE AND AMPHETAMINE SULFATE 2.5; 2.5; 2.5; 2.5 MG/1; MG/1; MG/1; MG/1
TABLET ORAL
Qty: 30 TABLET | Refills: 0 | Status: SHIPPED | OUTPATIENT
Start: 2019-07-09 | End: 2019-07-30 | Stop reason: DRUGHIGH

## 2019-07-10 DIAGNOSIS — H93.19 TINNITUS, UNSPECIFIED LATERALITY: Primary | ICD-10-CM

## 2019-07-30 RX ORDER — DEXTROAMPHETAMINE SACCHARATE, AMPHETAMINE ASPARTATE, DEXTROAMPHETAMINE SULFATE AND AMPHETAMINE SULFATE 5; 5; 5; 5 MG/1; MG/1; MG/1; MG/1
10 TABLET ORAL DAILY
Qty: 30 TABLET | Refills: 0 | Status: SHIPPED | OUTPATIENT
Start: 2019-07-30 | End: 2019-10-01 | Stop reason: SDUPTHER

## 2019-07-31 ENCOUNTER — TELEPHONE (OUTPATIENT)
Dept: FAMILY MEDICINE CLINIC | Facility: CLINIC | Age: 34
End: 2019-07-31

## 2019-07-31 NOTE — TELEPHONE ENCOUNTER
RX IS UP FRONT AND READY TO BE PICKED UP. PT IS AWARE.       ----- Message from Jose Banks MD sent at 7/30/2019  4:10 PM EDT -----  So okay to do this        ----- Message -----  From: Kelly Romero MA  Sent: 7/30/2019  11:00 AM  To: MD DR. YECENIA Chapman,    PER THE PHARMACIST ISIDORO AT [facility]- ALL [facility]'S HAVE THE ADDERALL 10MG ON BACK ORDER AND ARE NOT SURE WHEN THEY WILL GET IT. CAN PATIENT HAVE AN RX FOR THE ADDERALL 20MG AND TAKE 1/2 PILL DAILY? PLEASE ADVISE IF THIS IS OKAY. THANK YOU.      ----- Message -----  From: Carlee Wong RegSched Rep  Sent: 7/30/2019  10:21 AM  To: DONYA Sewell WITH [facility] CALLED IN STATING THAT amphetamine-dextroamphetamine (ADDERALL) 10 MG IS ON BACK ORDER WITH AN UNKNOWN DATE OF WHEN IT WILL BE BACK IN STOCK.  PT WAS ABLE TO GET HIS 30 MG 24 HR BUT NOT HIS 10 MG.  ISIDORO SUGGESTED INCREASING THE DOSAGE TO 20 MG AND CUTTING IN HALF.  PLEASE CHECK WITH  AND ADVISE.  PLEASE CONTACT PT WHEN READY FOR  [phone]

## 2019-08-08 ENCOUNTER — TELEPHONE (OUTPATIENT)
Dept: FAMILY MEDICINE CLINIC | Facility: CLINIC | Age: 34
End: 2019-08-08

## 2019-08-08 RX ORDER — DEXTROAMPHETAMINE SACCHARATE, AMPHETAMINE ASPARTATE MONOHYDRATE, DEXTROAMPHETAMINE SULFATE AND AMPHETAMINE SULFATE 7.5; 7.5; 7.5; 7.5 MG/1; MG/1; MG/1; MG/1
30 CAPSULE, EXTENDED RELEASE ORAL EVERY MORNING
Qty: 30 CAPSULE | Refills: 0 | Status: SHIPPED | OUTPATIENT
Start: 2019-08-08 | End: 2019-10-01 | Stop reason: SDUPTHER

## 2019-08-08 NOTE — TELEPHONE ENCOUNTER
CALLED PT AND LEFT V/M THAT SCRIPT IS READY FOR .     ----- Message from Kelly oRmero MA sent at 8/8/2019  1:02 PM EDT -----      ----- Message -----  From: Yoli Wiley  Sent: 7/31/2019  11:37 AM  To: Kelly Romero MA    PT NEEDS A REFILL ON ADDERALL 30MG 1QD #30.   PT HAS PUT IN A REQUEST FOR ANOTHER MEDICATION ALSO    R [phone]

## 2019-09-11 DIAGNOSIS — Z51.81 ENCOUNTER FOR MONITORING OPIOID MAINTENANCE THERAPY: Primary | ICD-10-CM

## 2019-09-11 DIAGNOSIS — Z79.891 ENCOUNTER FOR MONITORING OPIOID MAINTENANCE THERAPY: Primary | ICD-10-CM

## 2019-09-11 DIAGNOSIS — I10 ESSENTIAL HYPERTENSION: ICD-10-CM

## 2019-10-01 RX ORDER — DEXTROAMPHETAMINE SACCHARATE, AMPHETAMINE ASPARTATE, DEXTROAMPHETAMINE SULFATE AND AMPHETAMINE SULFATE 5; 5; 5; 5 MG/1; MG/1; MG/1; MG/1
10 TABLET ORAL DAILY
Qty: 30 TABLET | Refills: 0 | Status: SHIPPED | OUTPATIENT
Start: 2019-10-01 | End: 2019-11-13 | Stop reason: SDUPTHER

## 2019-10-01 RX ORDER — DEXTROAMPHETAMINE SACCHARATE, AMPHETAMINE ASPARTATE MONOHYDRATE, DEXTROAMPHETAMINE SULFATE AND AMPHETAMINE SULFATE 7.5; 7.5; 7.5; 7.5 MG/1; MG/1; MG/1; MG/1
30 CAPSULE, EXTENDED RELEASE ORAL EVERY MORNING
Qty: 30 CAPSULE | Refills: 0 | Status: SHIPPED | OUTPATIENT
Start: 2019-10-01 | End: 2019-11-05 | Stop reason: SDUPTHER

## 2019-10-03 LAB
ALBUMIN SERPL-MCNC: 4.5 G/DL (ref 3.5–5.2)
ALBUMIN/GLOB SERPL: 1.9 G/DL
ALP SERPL-CCNC: 66 U/L (ref 39–117)
ALT SERPL-CCNC: 13 U/L (ref 1–41)
AST SERPL-CCNC: 21 U/L (ref 1–40)
BASOPHILS # BLD AUTO: 0.05 10*3/MM3 (ref 0–0.2)
BASOPHILS NFR BLD AUTO: 1 % (ref 0–1.5)
BILIRUB SERPL-MCNC: 0.4 MG/DL (ref 0.2–1.2)
BUN SERPL-MCNC: 9 MG/DL (ref 6–20)
BUN/CREAT SERPL: 11.7 (ref 7–25)
CALCIUM SERPL-MCNC: 9.7 MG/DL (ref 8.6–10.5)
CHLORIDE SERPL-SCNC: 101 MMOL/L (ref 98–107)
CHOLEST SERPL-MCNC: 180 MG/DL (ref 0–200)
CO2 SERPL-SCNC: 32 MMOL/L (ref 22–29)
CREAT SERPL-MCNC: 0.77 MG/DL (ref 0.76–1.27)
EOSINOPHIL # BLD AUTO: 0.33 10*3/MM3 (ref 0–0.4)
EOSINOPHIL NFR BLD AUTO: 6.4 % (ref 0.3–6.2)
ERYTHROCYTE [DISTWIDTH] IN BLOOD BY AUTOMATED COUNT: 12.8 % (ref 12.3–15.4)
GLOBULIN SER CALC-MCNC: 2.4 GM/DL
GLUCOSE SERPL-MCNC: 109 MG/DL (ref 65–99)
HCT VFR BLD AUTO: 42.9 % (ref 37.5–51)
HDLC SERPL-MCNC: 98 MG/DL (ref 40–60)
HGB BLD-MCNC: 14.4 G/DL (ref 13–17.7)
IMM GRANULOCYTES # BLD AUTO: 0.01 10*3/MM3 (ref 0–0.05)
IMM GRANULOCYTES NFR BLD AUTO: 0.2 % (ref 0–0.5)
LDLC SERPL CALC-MCNC: 64 MG/DL (ref 0–100)
LDLC/HDLC SERPL: 0.66 {RATIO}
LYMPHOCYTES # BLD AUTO: 1.94 10*3/MM3 (ref 0.7–3.1)
LYMPHOCYTES NFR BLD AUTO: 37.7 % (ref 19.6–45.3)
MCH RBC QN AUTO: 31.6 PG (ref 26.6–33)
MCHC RBC AUTO-ENTMCNC: 33.6 G/DL (ref 31.5–35.7)
MCV RBC AUTO: 94.1 FL (ref 79–97)
MONOCYTES # BLD AUTO: 0.56 10*3/MM3 (ref 0.1–0.9)
MONOCYTES NFR BLD AUTO: 10.9 % (ref 5–12)
NEUTROPHILS # BLD AUTO: 2.25 10*3/MM3 (ref 1.7–7)
NEUTROPHILS NFR BLD AUTO: 43.8 % (ref 42.7–76)
NRBC BLD AUTO-RTO: 0 /100 WBC (ref 0–0.2)
PLATELET # BLD AUTO: 200 10*3/MM3 (ref 140–450)
POTASSIUM SERPL-SCNC: 5.1 MMOL/L (ref 3.5–5.2)
PROT SERPL-MCNC: 6.9 G/DL (ref 6–8.5)
RBC # BLD AUTO: 4.56 10*6/MM3 (ref 4.14–5.8)
SODIUM SERPL-SCNC: 142 MMOL/L (ref 136–145)
TRIGL SERPL-MCNC: 89 MG/DL (ref 0–150)
TSH SERPL DL<=0.005 MIU/L-ACNC: 1.93 UIU/ML (ref 0.27–4.2)
VLDLC SERPL CALC-MCNC: 17.8 MG/DL
WBC # BLD AUTO: 5.14 10*3/MM3 (ref 3.4–10.8)

## 2019-11-04 NOTE — TELEPHONE ENCOUNTER
Pt is calling for a refill on his Adderall 30 mg Pharmacy [phone]   Pt can be reached at [phone]

## 2019-11-05 NOTE — TELEPHONE ENCOUNTER
DR. KEENE,    PATIENT IS REQUESTING REFILL ON HIS ADDERALL 30MG. HE HASN'T BEEN SEEN SINCE 06/11/2019, AND NO SHOWED FOR HIS 10/09/2019 VISIT. HE DOES HAVE AN APPT ON 11/13/2019, OKAY TO REFILL THE WHOLE RX OR JUST GIVE HIM ENOUGH TO LAST UNTIL HIS APPOINTMENT? PLEASE ADVISE. THANK YOU.

## 2019-11-06 RX ORDER — DEXTROAMPHETAMINE SACCHARATE, AMPHETAMINE ASPARTATE MONOHYDRATE, DEXTROAMPHETAMINE SULFATE AND AMPHETAMINE SULFATE 7.5; 7.5; 7.5; 7.5 MG/1; MG/1; MG/1; MG/1
30 CAPSULE, EXTENDED RELEASE ORAL EVERY MORNING
Qty: 10 CAPSULE | Refills: 0 | Status: SHIPPED | OUTPATIENT
Start: 2019-11-06 | End: 2019-11-13 | Stop reason: SDUPTHER

## 2019-11-13 ENCOUNTER — OFFICE VISIT (OUTPATIENT)
Dept: FAMILY MEDICINE CLINIC | Facility: CLINIC | Age: 34
End: 2019-11-13

## 2019-11-13 VITALS
OXYGEN SATURATION: 100 % | HEART RATE: 65 BPM | DIASTOLIC BLOOD PRESSURE: 64 MMHG | SYSTOLIC BLOOD PRESSURE: 140 MMHG | WEIGHT: 155 LBS | BODY MASS INDEX: 19.89 KG/M2

## 2019-11-13 DIAGNOSIS — F98.8 ATTENTION DEFICIT DISORDER, UNSPECIFIED HYPERACTIVITY PRESENCE: ICD-10-CM

## 2019-11-13 DIAGNOSIS — I10 ESSENTIAL HYPERTENSION: Primary | ICD-10-CM

## 2019-11-13 PROCEDURE — 99213 OFFICE O/P EST LOW 20 MIN: CPT | Performed by: INTERNAL MEDICINE

## 2019-11-13 RX ORDER — BUPRENORPHINE HYDROCHLORIDE AND NALOXONE HYDROCHLORIDE DIHYDRATE 8; 2 MG/1; MG/1
TABLET SUBLINGUAL
Refills: 0 | COMMUNITY
Start: 2019-10-15 | End: 2020-03-17

## 2019-11-13 RX ORDER — DEXTROAMPHETAMINE SACCHARATE, AMPHETAMINE ASPARTATE, DEXTROAMPHETAMINE SULFATE AND AMPHETAMINE SULFATE 5; 5; 5; 5 MG/1; MG/1; MG/1; MG/1
10 TABLET ORAL DAILY
Qty: 30 TABLET | Refills: 0 | Status: SHIPPED | OUTPATIENT
Start: 2019-11-13 | End: 2019-12-17

## 2019-11-13 RX ORDER — DEXTROAMPHETAMINE SACCHARATE, AMPHETAMINE ASPARTATE MONOHYDRATE, DEXTROAMPHETAMINE SULFATE AND AMPHETAMINE SULFATE 7.5; 7.5; 7.5; 7.5 MG/1; MG/1; MG/1; MG/1
30 CAPSULE, EXTENDED RELEASE ORAL EVERY MORNING
Qty: 30 CAPSULE | Refills: 0 | Status: SHIPPED | OUTPATIENT
Start: 2019-11-13 | End: 2019-12-17 | Stop reason: SDUPTHER

## 2019-11-13 NOTE — PROGRESS NOTES
Cristhian Morris is a 33 y.o. male. Patient is here today for follow-up on his hypertension and ADD.  He is stable on medications and just needs a refill.  He also has to take periodic testing and needs a bit more time than usual because of his concentration problems.  That seems appropriate to me.  He is tolerating current medicines as now and his ADHD is generally controlled.  Chief Complaint   Patient presents with   • Hypertension     Lab results          Vitals:    19 1258   BP: 140/64   Pulse: 65   SpO2: 100%     Body mass index is 19.89 kg/m².  The following portions of the patient's history were reviewed and updated as appropriate: allergies, current medications, past family history, past medical history, past social history, past surgical history and problem list.    Past Medical History:   Diagnosis Date   • ADHD    • Bursitis of elbow     right elbow   • Hemorrhoids    • Hypertension     B/P OK SINCE STOPPED SMOKING-NO MEDS CURRENTLY   • Rotator cuff tear       No Known Allergies   Social History     Socioeconomic History   • Marital status:      Spouse name: Not on file   • Number of children: Not on file   • Years of education: Not on file   • Highest education level: Not on file   Tobacco Use   • Smoking status: Current Every Day Smoker     Packs/day: 1.00     Years: 10.00     Pack years: 10.00     Types: Cigarettes     Last attempt to quit: 2017     Years since quittin.0   • Smokeless tobacco: Current User   Substance and Sexual Activity   • Alcohol use: Yes     Comment: ON OCC   • Drug use: No   • Sexual activity: Defer        Current Outpatient Medications:   •  amphetamine-dextroamphetamine (ADDERALL) 20 MG tablet, Take 0.5 tablets by mouth Daily., Disp: 30 tablet, Rfl: 0  •  amphetamine-dextroamphetamine XR (ADDERALL XR) 30 MG 24 hr capsule, Take 1 capsule by mouth Every Morning, Disp: 30 capsule, Rfl: 0  •  buprenorphine-naloxone (SUBOXONE) 8-2 MG per SL tablet,  PLACE 1/2 TABLET UNDER TOUNGE EVERY DAY, Disp: , Rfl: 0  •  ibuprofen (ADVIL,MOTRIN) 200 MG tablet, Take 400 mg by mouth Every 6 (Six) Hours As Needed for Mild Pain ., Disp: , Rfl:      Objective     History of Present Illness     Review of Systems   Constitutional: Negative.    HENT: Negative.    Respiratory: Negative.    Cardiovascular: Negative.    Gastrointestinal: Negative.    Genitourinary: Negative.    Musculoskeletal: Negative.    Skin: Negative.    Neurological: Negative.    Psychiatric/Behavioral: Negative.        Physical Exam   Constitutional: He is oriented to person, place, and time. He appears well-developed and well-nourished.   Pleasant, cooperative no acute distress, blood pressure 126/80   HENT:   Head: Normocephalic and atraumatic.   Eyes: Conjunctivae are normal. Pupils are equal, round, and reactive to light. No scleral icterus.   Neck: Normal range of motion. Neck supple.   Cardiovascular: Normal rate, regular rhythm and normal heart sounds.   Pulmonary/Chest: Effort normal and breath sounds normal. No respiratory distress. He has no wheezes. He has no rales.   Musculoskeletal: Normal range of motion. He exhibits no edema.   Neurological: He is alert and oriented to person, place, and time.   Skin: Skin is warm and dry.   Psychiatric: He has a normal mood and affect. His behavior is normal.   Nursing note and vitals reviewed.      ASSESSMENT blood pressure was fine.  CBC was completely normal.  CMP had a minimally elevated sugar of 109 and was otherwise normal and TSH was quite normal.  Lipid panel has total cholesterol of 180, and HDL of 98 and LDL 64  #1-hypertension with normal blood pressure today  #2-ADD, controlled on medication  Patient's Nilson was reviewed and is appropriate.     Problem List Items Addressed This Visit        Cardiovascular and Mediastinum    Essential hypertension - Primary       Other    ADD (attention deficit disorder)    Relevant Medications     amphetamine-dextroamphetamine XR (ADDERALL XR) 30 MG 24 hr capsule    amphetamine-dextroamphetamine (ADDERALL) 20 MG tablet          PLAN I refilled the patient's Adderall medications.  I will recheck him in 4 months but will not need any laboratory studies    There are no Patient Instructions on file for this visit.  Return in about 4 months (around 3/13/2020).

## 2019-12-12 NOTE — TELEPHONE ENCOUNTER
PT CALLED IN TO GET SCRIPT REFILL FOR     amphetamine-dextroamphetamine XR (ADDERALL XR) 30 MG 24 hr Take 1 capsule by mouth Every Morning #30    amphetamine-dextroamphetamine (ADDERALL) 20 MG Take 0.5 tablets by mouth Daily #30    PLEASE SEND TO [facility] ON [address].  IF YOU HAVE ANY QUESTIONS PLEASE CONTACT PT [phone]

## 2019-12-17 RX ORDER — DEXTROAMPHETAMINE SACCHARATE, AMPHETAMINE ASPARTATE, DEXTROAMPHETAMINE SULFATE AND AMPHETAMINE SULFATE 2.5; 2.5; 2.5; 2.5 MG/1; MG/1; MG/1; MG/1
10 TABLET ORAL
Qty: 30 TABLET | Refills: 0 | Status: SHIPPED | OUTPATIENT
Start: 2019-12-17 | End: 2020-01-16 | Stop reason: SDUPTHER

## 2019-12-17 RX ORDER — DEXTROAMPHETAMINE SACCHARATE, AMPHETAMINE ASPARTATE MONOHYDRATE, DEXTROAMPHETAMINE SULFATE AND AMPHETAMINE SULFATE 7.5; 7.5; 7.5; 7.5 MG/1; MG/1; MG/1; MG/1
30 CAPSULE, EXTENDED RELEASE ORAL EVERY MORNING
Qty: 30 CAPSULE | Refills: 0 | Status: SHIPPED | OUTPATIENT
Start: 2019-12-17 | End: 2020-01-16 | Stop reason: SDUPTHER

## 2020-01-15 NOTE — TELEPHONE ENCOUNTER
PT CALLED IN FOR SCRIPT REFILL ON     amphetamine-dextroamphetamine XR (ADDERALL XR) 30 MG 24 hr Take 1 capsule by mouth Every Morning #30    amphetamine-dextroamphetamine (ADDERALL) 10 MG Take 1 tablet by mouth Daily With Lunch #30    PLEASE SEND TO [facility] ON [address].  IF YOU HAVE ANY QUESTIONS PLEASE CONTACT PT -[phone]

## 2020-01-16 RX ORDER — DEXTROAMPHETAMINE SACCHARATE, AMPHETAMINE ASPARTATE MONOHYDRATE, DEXTROAMPHETAMINE SULFATE AND AMPHETAMINE SULFATE 7.5; 7.5; 7.5; 7.5 MG/1; MG/1; MG/1; MG/1
30 CAPSULE, EXTENDED RELEASE ORAL EVERY MORNING
Qty: 30 CAPSULE | Refills: 0 | Status: SHIPPED | OUTPATIENT
Start: 2020-01-16 | End: 2020-02-12 | Stop reason: SDUPTHER

## 2020-01-16 RX ORDER — DEXTROAMPHETAMINE SACCHARATE, AMPHETAMINE ASPARTATE, DEXTROAMPHETAMINE SULFATE AND AMPHETAMINE SULFATE 2.5; 2.5; 2.5; 2.5 MG/1; MG/1; MG/1; MG/1
10 TABLET ORAL
Qty: 30 TABLET | Refills: 0 | Status: SHIPPED | OUTPATIENT
Start: 2020-01-16 | End: 2020-02-12 | Stop reason: SDUPTHER

## 2020-02-11 NOTE — TELEPHONE ENCOUNTER
REFILLS    Amphetamine-dextroamphetamine 10 mg and    Amphetamine-dextroamphetamine xr 30 mg 24 hr    [facility] [address]    PT #[phone]

## 2020-02-12 RX ORDER — DEXTROAMPHETAMINE SACCHARATE, AMPHETAMINE ASPARTATE, DEXTROAMPHETAMINE SULFATE AND AMPHETAMINE SULFATE 2.5; 2.5; 2.5; 2.5 MG/1; MG/1; MG/1; MG/1
10 TABLET ORAL
Qty: 30 TABLET | Refills: 0 | Status: SHIPPED | OUTPATIENT
Start: 2020-02-12 | End: 2020-03-17 | Stop reason: SDUPTHER

## 2020-02-12 RX ORDER — DEXTROAMPHETAMINE SACCHARATE, AMPHETAMINE ASPARTATE MONOHYDRATE, DEXTROAMPHETAMINE SULFATE AND AMPHETAMINE SULFATE 7.5; 7.5; 7.5; 7.5 MG/1; MG/1; MG/1; MG/1
30 CAPSULE, EXTENDED RELEASE ORAL EVERY MORNING
Qty: 30 CAPSULE | Refills: 0 | Status: SHIPPED | OUTPATIENT
Start: 2020-02-12 | End: 2020-03-17 | Stop reason: SDUPTHER

## 2020-03-17 ENCOUNTER — OFFICE VISIT (OUTPATIENT)
Dept: FAMILY MEDICINE CLINIC | Facility: CLINIC | Age: 35
End: 2020-03-17

## 2020-03-17 VITALS
SYSTOLIC BLOOD PRESSURE: 140 MMHG | HEIGHT: 74 IN | RESPIRATION RATE: 16 BRPM | WEIGHT: 162 LBS | TEMPERATURE: 98.4 F | BODY MASS INDEX: 20.79 KG/M2 | DIASTOLIC BLOOD PRESSURE: 70 MMHG | OXYGEN SATURATION: 99 % | HEART RATE: 72 BPM

## 2020-03-17 DIAGNOSIS — F10.11 HISTORY OF ALCOHOL ABUSE: ICD-10-CM

## 2020-03-17 DIAGNOSIS — I10 ESSENTIAL HYPERTENSION: Primary | ICD-10-CM

## 2020-03-17 DIAGNOSIS — F98.8 ATTENTION DEFICIT DISORDER, UNSPECIFIED HYPERACTIVITY PRESENCE: ICD-10-CM

## 2020-03-17 PROCEDURE — 99214 OFFICE O/P EST MOD 30 MIN: CPT | Performed by: INTERNAL MEDICINE

## 2020-03-17 RX ORDER — DEXTROAMPHETAMINE SACCHARATE, AMPHETAMINE ASPARTATE, DEXTROAMPHETAMINE SULFATE AND AMPHETAMINE SULFATE 2.5; 2.5; 2.5; 2.5 MG/1; MG/1; MG/1; MG/1
10 TABLET ORAL
Qty: 30 TABLET | Refills: 0 | Status: SHIPPED | OUTPATIENT
Start: 2020-03-17 | End: 2020-04-29 | Stop reason: SDUPTHER

## 2020-03-17 RX ORDER — DEXTROAMPHETAMINE SACCHARATE, AMPHETAMINE ASPARTATE MONOHYDRATE, DEXTROAMPHETAMINE SULFATE AND AMPHETAMINE SULFATE 7.5; 7.5; 7.5; 7.5 MG/1; MG/1; MG/1; MG/1
30 CAPSULE, EXTENDED RELEASE ORAL EVERY MORNING
Qty: 30 CAPSULE | Refills: 0 | Status: SHIPPED | OUTPATIENT
Start: 2020-03-17 | End: 2020-04-13 | Stop reason: SDUPTHER

## 2020-03-17 NOTE — PROGRESS NOTES
Cristhian Morris is a 34 y.o. male. Patient is here today for follow-up from hospital admission  for alcohol withdrawal as well as refill of his ADD medication.  The patient has been doing well since his discharge and has not had any alcohol to drink according to the patient is going to AA meetings daily.  He is feeling much better, eating much better and has been doing quite well.  His ADD has been well controlled on current medications and he just needs a refill.  He has not had any home blood pressure readings.  Chief Complaint   Patient presents with   • Hypertension     ADD- PT HERE FOR MED CHECK       (Not on file)-  Risk for Readmission (LACE) No data recorded         Vitals:    20 1408   BP: 140/70   Pulse: 72   Resp: 16   Temp: 98.4 °F (36.9 °C)   SpO2: 99%     The following portions of the patient's history were reviewed and updated as appropriate: allergies, current medications, past family history, past medical history, past social history, past surgical history and problem list.    Past Medical History:   Diagnosis Date   • ADHD    • Bursitis of elbow     right elbow   • Hemorrhoids    • Hypertension     B/P OK SINCE STOPPED SMOKING-NO MEDS CURRENTLY   • Rotator cuff tear       No Known Allergies   Social History     Socioeconomic History   • Marital status:      Spouse name: Not on file   • Number of children: Not on file   • Years of education: Not on file   • Highest education level: Not on file   Tobacco Use   • Smoking status: Current Every Day Smoker     Packs/day: 1.00     Years: 10.00     Pack years: 10.00     Types: Cigarettes     Last attempt to quit: 2017     Years since quittin.3   • Smokeless tobacco: Current User   Substance and Sexual Activity   • Alcohol use: Yes     Comment: ON OCC   • Drug use: No   • Sexual activity: Defer        Current Outpatient Medications:   •  amphetamine-dextroamphetamine (Adderall) 10 MG tablet, Take 1 tablet by  mouth Daily With Lunch., Disp: 30 tablet, Rfl: 0  •  amphetamine-dextroamphetamine XR (ADDERALL XR) 30 MG 24 hr capsule, Take 1 capsule by mouth Every Morning, Disp: 30 capsule, Rfl: 0     Objective     History of Present Illness     Review of Systems   Constitutional: Negative.    HENT: Negative.    Respiratory: Negative.    Cardiovascular: Negative.    Gastrointestinal: Negative.    Genitourinary: Negative.    Musculoskeletal: Negative.    Skin: Negative.    Neurological: Negative.    Psychiatric/Behavioral: Negative.        Physical Exam   Constitutional: He is oriented to person, place, and time. He appears well-developed and well-nourished.   Pleasant, cooperative no acute distress, blood pressure 140/70   HENT:   Head: Normocephalic and atraumatic.   Eyes: Pupils are equal, round, and reactive to light. Conjunctivae are normal. No scleral icterus.   Neck: Normal range of motion. Neck supple.   Cardiovascular: Normal rate, regular rhythm and normal heart sounds.   Pulmonary/Chest: Effort normal and breath sounds normal. No respiratory distress. He has no wheezes. He has no rales.   Neurological: He is alert and oriented to person, place, and time.   Skin: Skin is warm and dry.   Psychiatric: He has a normal mood and affect. His behavior is normal.   Nursing note and vitals reviewed.      ASSESSMENT the patient currently seems to be doing quite well.  His blood pressure is a bit borderline at 140/70 and I want him to monitor that at home.  He has had no further alcohol use and is feeling much better and eating well.  His ADD seems adequately controlled on current medications.  His Nilson was reviewed and is appropriate.     Problem List Items Addressed This Visit        Cardiovascular and Mediastinum    Essential hypertension - Primary       Other    ADD (attention deficit disorder)    Relevant Medications    amphetamine-dextroamphetamine XR (ADDERALL XR) 30 MG 24 hr capsule    amphetamine-dextroamphetamine  (Adderall) 10 MG tablet    History of alcohol abuse          Current outpatient and discharge medications have been reconciled for the patient.  Reviewed by: Jose Banks MD      PLAN I refilled his Adderall as now.  The patient will monitor blood pressure at home and will call for return appointment if it remains at its current level or higher.  I plan on rechecking him in 6 months and would like to get a CBC, CMP, lipid panel    There are no Patient Instructions on file for this visit.  Return in about 6 months (around 9/17/2020) for with labs.

## 2020-04-13 DIAGNOSIS — F98.8 ATTENTION DEFICIT DISORDER, UNSPECIFIED HYPERACTIVITY PRESENCE: ICD-10-CM

## 2020-04-13 NOTE — TELEPHONE ENCOUNTER
Patient called in requesting re-fill for amphetamine-dextroamphetamine XR (ADDERALL XR) 30 MG 24 hr capsule    [facility] [address] [facility]    Patient call back [phone]

## 2020-04-14 RX ORDER — DEXTROAMPHETAMINE SACCHARATE, AMPHETAMINE ASPARTATE MONOHYDRATE, DEXTROAMPHETAMINE SULFATE AND AMPHETAMINE SULFATE 7.5; 7.5; 7.5; 7.5 MG/1; MG/1; MG/1; MG/1
30 CAPSULE, EXTENDED RELEASE ORAL EVERY MORNING
Qty: 30 CAPSULE | Refills: 0 | Status: SHIPPED | OUTPATIENT
Start: 2020-04-14 | End: 2020-05-12 | Stop reason: SDUPTHER

## 2020-04-29 DIAGNOSIS — F98.8 ATTENTION DEFICIT DISORDER, UNSPECIFIED HYPERACTIVITY PRESENCE: ICD-10-CM

## 2020-04-29 NOTE — TELEPHONE ENCOUNTER
Patient called to request a refill on his 10mg ADDERALL as only the 30mg was called in on 04/14/2020. Patient states that he takes both, just at different times of the day.    [facility] [address]    Please advise.  [phone]

## 2020-04-30 RX ORDER — DEXTROAMPHETAMINE SACCHARATE, AMPHETAMINE ASPARTATE, DEXTROAMPHETAMINE SULFATE AND AMPHETAMINE SULFATE 2.5; 2.5; 2.5; 2.5 MG/1; MG/1; MG/1; MG/1
10 TABLET ORAL
Qty: 30 TABLET | Refills: 0 | Status: SHIPPED | OUTPATIENT
Start: 2020-04-30 | End: 2020-05-27 | Stop reason: SDUPTHER

## 2020-05-12 DIAGNOSIS — F98.8 ATTENTION DEFICIT DISORDER, UNSPECIFIED HYPERACTIVITY PRESENCE: ICD-10-CM

## 2020-05-12 RX ORDER — DEXTROAMPHETAMINE SACCHARATE, AMPHETAMINE ASPARTATE MONOHYDRATE, DEXTROAMPHETAMINE SULFATE AND AMPHETAMINE SULFATE 7.5; 7.5; 7.5; 7.5 MG/1; MG/1; MG/1; MG/1
30 CAPSULE, EXTENDED RELEASE ORAL EVERY MORNING
Qty: 30 CAPSULE | Refills: 0 | Status: SHIPPED | OUTPATIENT
Start: 2020-05-12 | End: 2020-06-10 | Stop reason: SDUPTHER

## 2020-05-12 NOTE — TELEPHONE ENCOUNTER
Patient calling to refill:  - amphetamine-dextroamphetamine XR (ADDERALL XR) 30 MG 24 hr capsule    [facility].    Please call patient when sent to pharmacy; [phone].

## 2020-05-27 DIAGNOSIS — F98.8 ATTENTION DEFICIT DISORDER, UNSPECIFIED HYPERACTIVITY PRESENCE: ICD-10-CM

## 2020-05-27 RX ORDER — DEXTROAMPHETAMINE SACCHARATE, AMPHETAMINE ASPARTATE, DEXTROAMPHETAMINE SULFATE AND AMPHETAMINE SULFATE 2.5; 2.5; 2.5; 2.5 MG/1; MG/1; MG/1; MG/1
10 TABLET ORAL
Qty: 30 TABLET | Refills: 0 | Status: SHIPPED | OUTPATIENT
Start: 2020-05-27 | End: 2020-06-24 | Stop reason: SDUPTHER

## 2020-06-10 DIAGNOSIS — F98.8 ATTENTION DEFICIT DISORDER, UNSPECIFIED HYPERACTIVITY PRESENCE: ICD-10-CM

## 2020-06-10 NOTE — TELEPHONE ENCOUNTER
PATIENT CALLING TO REFILL THE FOLLOWING MEDICATION: (PATIENT IS OUT OF MEDICATION)    amphetamine-dextroamphetamine XR (ADDERALL XR) 30 MG 24 hr capsule    PHARMACY: [facility]/pharmacy #[id number] - [place], [place] - [address] AT [facility] - [phone]  - [phone]   [phone]    PLEASE CALL TO ADVISE [phone]

## 2020-06-11 RX ORDER — DEXTROAMPHETAMINE SACCHARATE, AMPHETAMINE ASPARTATE MONOHYDRATE, DEXTROAMPHETAMINE SULFATE AND AMPHETAMINE SULFATE 7.5; 7.5; 7.5; 7.5 MG/1; MG/1; MG/1; MG/1
30 CAPSULE, EXTENDED RELEASE ORAL EVERY MORNING
Qty: 30 CAPSULE | Refills: 0 | Status: SHIPPED | OUTPATIENT
Start: 2020-06-11 | End: 2020-07-09 | Stop reason: SDUPTHER

## 2020-06-23 ENCOUNTER — TELEPHONE (OUTPATIENT)
Dept: FAMILY MEDICINE CLINIC | Facility: CLINIC | Age: 35
End: 2020-06-23

## 2020-06-23 NOTE — TELEPHONE ENCOUNTER
amphetamine-dextroamphetamine (Adderall) 10 MG tablet [[id number]]     Order Details   Dose: 10 mg Route: Oral Frequency: Daily With Lunch   Dispense Quantity: 30 tablet Refills: 0 Fills remaining: --           Sig: Take 1 tablet by mouth Daily With Lunch.               [facility]/pharmacy #[id number] - [place], [place] - [address] AT [facility] [phone] [facility] [phone] FX    Call pt at [phone]    Thanks kiera

## 2020-06-24 DIAGNOSIS — F98.8 ATTENTION DEFICIT DISORDER, UNSPECIFIED HYPERACTIVITY PRESENCE: ICD-10-CM

## 2020-06-24 RX ORDER — DEXTROAMPHETAMINE SACCHARATE, AMPHETAMINE ASPARTATE, DEXTROAMPHETAMINE SULFATE AND AMPHETAMINE SULFATE 2.5; 2.5; 2.5; 2.5 MG/1; MG/1; MG/1; MG/1
10 TABLET ORAL
Qty: 30 TABLET | Refills: 0 | Status: SHIPPED | OUTPATIENT
Start: 2020-06-24 | End: 2020-07-30 | Stop reason: SDUPTHER

## 2020-07-09 DIAGNOSIS — F98.8 ATTENTION DEFICIT DISORDER, UNSPECIFIED HYPERACTIVITY PRESENCE: ICD-10-CM

## 2020-07-09 RX ORDER — DEXTROAMPHETAMINE SACCHARATE, AMPHETAMINE ASPARTATE MONOHYDRATE, DEXTROAMPHETAMINE SULFATE AND AMPHETAMINE SULFATE 7.5; 7.5; 7.5; 7.5 MG/1; MG/1; MG/1; MG/1
30 CAPSULE, EXTENDED RELEASE ORAL EVERY MORNING
Qty: 30 CAPSULE | Refills: 0 | Status: SHIPPED | OUTPATIENT
Start: 2020-07-09 | End: 2020-07-30 | Stop reason: SDUPTHER

## 2020-07-09 NOTE — TELEPHONE ENCOUNTER
Patient is calling for a refill of the following:     amphetamine-dextroamphetamine XR (ADDERALL XR) 30 MG 24 hr capsule     [facility]/pharmacy #[id number] - [place], [place] - [address] AT [facility] - [phone]  - [phone]   [phone]    Patient call back [phone]

## 2020-07-30 DIAGNOSIS — F98.8 ATTENTION DEFICIT DISORDER, UNSPECIFIED HYPERACTIVITY PRESENCE: ICD-10-CM

## 2020-07-30 RX ORDER — DEXTROAMPHETAMINE SACCHARATE, AMPHETAMINE ASPARTATE MONOHYDRATE, DEXTROAMPHETAMINE SULFATE AND AMPHETAMINE SULFATE 7.5; 7.5; 7.5; 7.5 MG/1; MG/1; MG/1; MG/1
30 CAPSULE, EXTENDED RELEASE ORAL EVERY MORNING
Qty: 30 CAPSULE | Refills: 0 | Status: SHIPPED | OUTPATIENT
Start: 2020-07-30 | End: 2020-09-11 | Stop reason: SDUPTHER

## 2020-08-05 DIAGNOSIS — F98.8 ATTENTION DEFICIT DISORDER, UNSPECIFIED HYPERACTIVITY PRESENCE: ICD-10-CM

## 2020-08-06 RX ORDER — DEXTROAMPHETAMINE SACCHARATE, AMPHETAMINE ASPARTATE, DEXTROAMPHETAMINE SULFATE AND AMPHETAMINE SULFATE 2.5; 2.5; 2.5; 2.5 MG/1; MG/1; MG/1; MG/1
10 TABLET ORAL
Qty: 30 TABLET | Refills: 0 | Status: SHIPPED | OUTPATIENT
Start: 2020-08-06 | End: 2020-09-11 | Stop reason: SDUPTHER

## 2020-09-02 DIAGNOSIS — F98.8 ATTENTION DEFICIT DISORDER, UNSPECIFIED HYPERACTIVITY PRESENCE: ICD-10-CM

## 2020-09-02 RX ORDER — DEXTROAMPHETAMINE SACCHARATE, AMPHETAMINE ASPARTATE, DEXTROAMPHETAMINE SULFATE AND AMPHETAMINE SULFATE 2.5; 2.5; 2.5; 2.5 MG/1; MG/1; MG/1; MG/1
10 TABLET ORAL
Qty: 30 TABLET | Refills: 0 | OUTPATIENT
Start: 2020-09-02

## 2020-09-02 RX ORDER — DEXTROAMPHETAMINE SACCHARATE, AMPHETAMINE ASPARTATE MONOHYDRATE, DEXTROAMPHETAMINE SULFATE AND AMPHETAMINE SULFATE 7.5; 7.5; 7.5; 7.5 MG/1; MG/1; MG/1; MG/1
30 CAPSULE, EXTENDED RELEASE ORAL EVERY MORNING
Qty: 30 CAPSULE | Refills: 0 | OUTPATIENT
Start: 2020-09-02

## 2020-09-02 NOTE — TELEPHONE ENCOUNTER
PT IS CALLING IN TO REQUEST A MED REFILL ON HIS     amphetamine-dextroamphetamine XR (ADDERALL XR) 30 MG 24 hr capsule    amphetamine-dextroamphetamine (Adderall) 10 MG tablet      PT CALL BACK   [phone]  PHARMACY  [place] [id number] [facility]  [phone]

## 2020-09-10 ENCOUNTER — TELEPHONE (OUTPATIENT)
Dept: FAMILY MEDICINE CLINIC | Facility: CLINIC | Age: 35
End: 2020-09-10

## 2020-09-10 NOTE — TELEPHONE ENCOUNTER
PATIENT CALLED TO CHECK ON STATUS OF ADDERALL REFILLS.   ADVISED NEEDS TO BE SEEN BY , HAS APPT ALREADY SET UP BACK IN MARCH ON 09/17. PT WANTED TO BE SEEN TODAY, BUT THERE IS NO APPTS. WOULD LIKE TO KNOW IF CAN BE WORKED IN, OR IF DR WOULD SEND A 1 WEEK REFILL TO THE PHARMACY UNTIL NEXT APPT.    PLEASE ADVISE    [phone]

## 2020-09-11 ENCOUNTER — OFFICE VISIT (OUTPATIENT)
Dept: FAMILY MEDICINE CLINIC | Facility: CLINIC | Age: 35
End: 2020-09-11

## 2020-09-11 VITALS
RESPIRATION RATE: 16 BRPM | HEART RATE: 71 BPM | BODY MASS INDEX: 20.66 KG/M2 | WEIGHT: 161 LBS | DIASTOLIC BLOOD PRESSURE: 90 MMHG | SYSTOLIC BLOOD PRESSURE: 150 MMHG | OXYGEN SATURATION: 98 % | TEMPERATURE: 96.6 F | HEIGHT: 74 IN

## 2020-09-11 DIAGNOSIS — F98.8 ATTENTION DEFICIT DISORDER, UNSPECIFIED HYPERACTIVITY PRESENCE: Primary | ICD-10-CM

## 2020-09-11 DIAGNOSIS — I10 ESSENTIAL HYPERTENSION: ICD-10-CM

## 2020-09-11 DIAGNOSIS — F17.200 TOBACCO DEPENDENCE: ICD-10-CM

## 2020-09-11 PROCEDURE — 99214 OFFICE O/P EST MOD 30 MIN: CPT | Performed by: INTERNAL MEDICINE

## 2020-09-11 RX ORDER — DEXTROAMPHETAMINE SACCHARATE, AMPHETAMINE ASPARTATE, DEXTROAMPHETAMINE SULFATE AND AMPHETAMINE SULFATE 2.5; 2.5; 2.5; 2.5 MG/1; MG/1; MG/1; MG/1
10 TABLET ORAL
Qty: 30 TABLET | Refills: 0 | Status: SHIPPED | OUTPATIENT
Start: 2020-09-11 | End: 2020-10-08 | Stop reason: SDUPTHER

## 2020-09-11 RX ORDER — LISINOPRIL 10 MG/1
10 TABLET ORAL DAILY
Qty: 90 TABLET | Refills: 3 | Status: SHIPPED | OUTPATIENT
Start: 2020-09-11 | End: 2020-11-18 | Stop reason: SDUPTHER

## 2020-09-11 RX ORDER — DEXTROAMPHETAMINE SACCHARATE, AMPHETAMINE ASPARTATE MONOHYDRATE, DEXTROAMPHETAMINE SULFATE AND AMPHETAMINE SULFATE 7.5; 7.5; 7.5; 7.5 MG/1; MG/1; MG/1; MG/1
30 CAPSULE, EXTENDED RELEASE ORAL EVERY MORNING
Qty: 30 CAPSULE | Refills: 0 | Status: SHIPPED | OUTPATIENT
Start: 2020-09-11 | End: 2020-10-08 | Stop reason: SDUPTHER

## 2020-09-11 NOTE — PATIENT INSTRUCTIONS
Blood pressure is high.  Start lisinopril 10 mg daily.  You need to purchase an upper arm blood pressure monitor and check your blood pressure at home while rested and relaxed as instructed.  Normal is less than 120/80.  If blood pressure does not come down you need to make a follow-up appointment in the next few weeks.  Discussed tobacco cessation.  We will continue current ADD medicines.

## 2020-09-11 NOTE — PROGRESS NOTES
Subjective   Alvaro Morris is a 34 y.o. male. Patient is here today for   Chief Complaint   Patient presents with   • ADD          Vitals:    20 1009   BP: 150/90   Pulse: 71   Resp: 16   Temp: 96.6 °F (35.9 °C)   SpO2: 98%     Body mass index is 20.67 kg/m².    The following portions of the patient's history were reviewed and updated as appropriate: allergies, current medications, past family history, past medical history, past social history, past surgical history and problem list.    Past Medical History:   Diagnosis Date   • ADHD    • Bursitis of elbow     right elbow   • Hemorrhoids    • Hypertension     B/P OK SINCE STOPPED SMOKING-NO MEDS CURRENTLY   • Rotator cuff tear       No Known Allergies   Social History     Socioeconomic History   • Marital status:      Spouse name: Not on file   • Number of children: Not on file   • Years of education: Not on file   • Highest education level: Not on file   Tobacco Use   • Smoking status: Current Every Day Smoker     Packs/day: 1.00     Years: 10.00     Pack years: 10.00     Types: Cigarettes     Last attempt to quit: 2017     Years since quittin.8   • Smokeless tobacco: Current User   Substance and Sexual Activity   • Alcohol use: Yes     Comment: ON OCC   • Drug use: No   • Sexual activity: Defer        Current Outpatient Medications:   •  amphetamine-dextroamphetamine (Adderall) 10 MG tablet, Take 1 tablet by mouth Daily With Lunch., Disp: 30 tablet, Rfl: 0  •  amphetamine-dextroamphetamine XR (ADDERALL XR) 30 MG 24 hr capsule, Take 1 capsule by mouth Every Morning, Disp: 30 capsule, Rfl: 0  •  lisinopril (PRINIVIL,ZESTRIL) 10 MG tablet, Take 1 tablet by mouth Daily., Disp: 90 tablet, Rfl: 3     Objective     History of Present Illness Alvaro is here for a blood pressure check and 6-month med check.  He has ADD and is on Adderall.  He has a prior history of hypertension and previously was on lisinopril 10 mg daily.  His blood pressure  normalized when he quit smoking.  Unfortunately he started smoking again 1 pack/day.  He knows that he needs to quit.  He does not monitor his blood pressure.  He is physically active at his job.  He had lab work last October which was stable.  Review of Systems   Constitutional: Negative for activity change and unexpected weight change.   Respiratory: Negative for cough and shortness of breath.    Cardiovascular: Negative.    Psychiatric/Behavioral: Negative.        Physical Exam   Constitutional: He appears well-developed and well-nourished.   Cardiovascular: Normal rate, regular rhythm and normal heart sounds.   150 /80, 155/80   Neurological: He is alert.   Psychiatric: He has a normal mood and affect. His behavior is normal. Judgment and thought content normal.       ASSESSMENT     Problem List Items Addressed This Visit        Cardiovascular and Mediastinum    Essential hypertension    Relevant Medications    lisinopril (PRINIVIL,ZESTRIL) 10 MG tablet       Other    ADD (attention deficit disorder) - Primary    Relevant Medications    amphetamine-dextroamphetamine (Adderall) 10 MG tablet    amphetamine-dextroamphetamine XR (ADDERALL XR) 30 MG 24 hr capsule    Tobacco dependence          PLAN  Patient Instructions   Blood pressure is high.  Start lisinopril 10 mg daily.  You need to purchase an upper arm blood pressure monitor and check your blood pressure at home while rested and relaxed as instructed.  Normal is less than 120/80.  If blood pressure does not come down you need to make a follow-up appointment in the next few weeks.  Discussed tobacco cessation.  We will continue current ADD medicines.    Return in about 6 months (around 3/11/2021) for Recheck.

## 2020-10-08 DIAGNOSIS — F98.8 ATTENTION DEFICIT DISORDER, UNSPECIFIED HYPERACTIVITY PRESENCE: ICD-10-CM

## 2020-10-08 RX ORDER — DEXTROAMPHETAMINE SACCHARATE, AMPHETAMINE ASPARTATE MONOHYDRATE, DEXTROAMPHETAMINE SULFATE AND AMPHETAMINE SULFATE 7.5; 7.5; 7.5; 7.5 MG/1; MG/1; MG/1; MG/1
30 CAPSULE, EXTENDED RELEASE ORAL EVERY MORNING
Qty: 30 CAPSULE | Refills: 0 | Status: SHIPPED | OUTPATIENT
Start: 2020-10-08 | End: 2020-11-18 | Stop reason: SDUPTHER

## 2020-10-08 RX ORDER — DEXTROAMPHETAMINE SACCHARATE, AMPHETAMINE ASPARTATE, DEXTROAMPHETAMINE SULFATE AND AMPHETAMINE SULFATE 2.5; 2.5; 2.5; 2.5 MG/1; MG/1; MG/1; MG/1
10 TABLET ORAL
Qty: 30 TABLET | Refills: 0 | Status: SHIPPED | OUTPATIENT
Start: 2020-10-08 | End: 2020-11-18 | Stop reason: SDUPTHER

## 2020-10-08 NOTE — TELEPHONE ENCOUNTER
Caller: Alvaro Morris    Relationship: Self    Best call back number: [phone]    What medication are you requesting: ADDERALL 10 MG AND THE ADDERALL XR 30 MG       If a prescription is needed, what is your preferred pharmacy and phone number: [id number] - [place], [place] - [address] [facility]. AT [facility] [phone] [facility] [phone] FX     Additional notes: PATIENT HAS A 3 DAY SUPPLY LEFT OF BOTH MEDICATIONS

## 2020-11-18 DIAGNOSIS — F98.8 ATTENTION DEFICIT DISORDER, UNSPECIFIED HYPERACTIVITY PRESENCE: ICD-10-CM

## 2020-11-18 RX ORDER — DEXTROAMPHETAMINE SACCHARATE, AMPHETAMINE ASPARTATE, DEXTROAMPHETAMINE SULFATE AND AMPHETAMINE SULFATE 2.5; 2.5; 2.5; 2.5 MG/1; MG/1; MG/1; MG/1
10 TABLET ORAL
Qty: 30 TABLET | Refills: 0 | Status: SHIPPED | OUTPATIENT
Start: 2020-11-18 | End: 2020-12-18 | Stop reason: SDUPTHER

## 2020-11-18 RX ORDER — DEXTROAMPHETAMINE SACCHARATE, AMPHETAMINE ASPARTATE MONOHYDRATE, DEXTROAMPHETAMINE SULFATE AND AMPHETAMINE SULFATE 7.5; 7.5; 7.5; 7.5 MG/1; MG/1; MG/1; MG/1
30 CAPSULE, EXTENDED RELEASE ORAL EVERY MORNING
Qty: 30 CAPSULE | Refills: 0 | Status: SHIPPED | OUTPATIENT
Start: 2020-11-18 | End: 2020-12-18 | Stop reason: SDUPTHER

## 2020-11-18 RX ORDER — LISINOPRIL 10 MG/1
10 TABLET ORAL DAILY
Qty: 90 TABLET | Refills: 3 | Status: SHIPPED | OUTPATIENT
Start: 2020-11-18 | End: 2021-01-18 | Stop reason: SDUPTHER

## 2020-11-18 NOTE — TELEPHONE ENCOUNTER
Caller: Paulino Morrisntin    Relationship: Self    Best call back number: [phone]    Medication needed:   Requested Prescriptions     Pending Prescriptions Disp Refills   • amphetamine-dextroamphetamine (Adderall) 10 MG tablet 30 tablet 0     Sig: Take 1 tablet by mouth Daily With Lunch.   • amphetamine-dextroamphetamine XR (ADDERALL XR) 30 MG 24 hr capsule 30 capsule 0     Sig: Take 1 capsule by mouth Every Morning   • lisinopril (PRINIVIL,ZESTRIL) 10 MG tablet 90 tablet 3     Sig: Take 1 tablet by mouth Daily.       When do you need the refill by: FEW DAYS     Does the patient have less than a 3 day supply:  [x] Yes  [] No    What is the patient's preferred pharmacy: [facility]/PHARMACY #[id number] [place], [place] - [address] AT [facility] [phone] [facility] [phone] FX

## 2020-12-18 DIAGNOSIS — F98.8 ATTENTION DEFICIT DISORDER, UNSPECIFIED HYPERACTIVITY PRESENCE: ICD-10-CM

## 2020-12-18 RX ORDER — DEXTROAMPHETAMINE SACCHARATE, AMPHETAMINE ASPARTATE MONOHYDRATE, DEXTROAMPHETAMINE SULFATE AND AMPHETAMINE SULFATE 7.5; 7.5; 7.5; 7.5 MG/1; MG/1; MG/1; MG/1
30 CAPSULE, EXTENDED RELEASE ORAL EVERY MORNING
Qty: 30 CAPSULE | Refills: 0 | Status: SHIPPED | OUTPATIENT
Start: 2020-12-18 | End: 2021-01-18 | Stop reason: SDUPTHER

## 2020-12-18 RX ORDER — DEXTROAMPHETAMINE SACCHARATE, AMPHETAMINE ASPARTATE, DEXTROAMPHETAMINE SULFATE AND AMPHETAMINE SULFATE 2.5; 2.5; 2.5; 2.5 MG/1; MG/1; MG/1; MG/1
10 TABLET ORAL
Qty: 30 TABLET | Refills: 0 | Status: SHIPPED | OUTPATIENT
Start: 2020-12-18 | End: 2021-01-18 | Stop reason: SDUPTHER

## 2020-12-18 NOTE — TELEPHONE ENCOUNTER
Pt is requesting a refill on amphetamine-dextroamphetamine (Adderall) 10 MG tablet and amphetamine-dextroamphetamine XR (ADDERALL XR) 30 MG 24 hr capsule.    [facility]/pharmacy #[id number] - [place], [place] - [address] AT [facility] [phone] [facility] [phone]

## 2021-01-18 DIAGNOSIS — F98.8 ATTENTION DEFICIT DISORDER, UNSPECIFIED HYPERACTIVITY PRESENCE: ICD-10-CM

## 2021-01-18 NOTE — TELEPHONE ENCOUNTER
Caller: Patel Morrisin    Relationship: Self    Best call back number: [phone]    Medication needed:   Requested Prescriptions     Pending Prescriptions Disp Refills   • amphetamine-dextroamphetamine (Adderall) 10 MG tablet 30 tablet 0     Sig: Take 1 tablet by mouth Daily With Lunch.   • amphetamine-dextroamphetamine XR (ADDERALL XR) 30 MG 24 hr capsule 30 capsule 0     Sig: Take 1 capsule by mouth Every Morning   • lisinopril (PRINIVIL,ZESTRIL) 10 MG tablet 90 tablet 3     Sig: Take 1 tablet by mouth Daily.       When do you need the refill by: TODAY    What details did the patient provide when requesting the medication: PATIENT IS COMPLETELY OUT OF MEDICATION    Does the patient have less than a 3 day supply:  [x] Yes  [] No    What is the patient's preferred pharmacy: [facility]/PHARMACY #[id number] [place], [place] - [address] AT [facility] [phone] [facility] [phone]

## 2021-01-19 RX ORDER — LISINOPRIL 10 MG/1
10 TABLET ORAL DAILY
Qty: 90 TABLET | Refills: 3 | Status: SHIPPED | OUTPATIENT
Start: 2021-01-19 | End: 2021-07-01 | Stop reason: SDUPTHER

## 2021-01-19 RX ORDER — DEXTROAMPHETAMINE SACCHARATE, AMPHETAMINE ASPARTATE MONOHYDRATE, DEXTROAMPHETAMINE SULFATE AND AMPHETAMINE SULFATE 7.5; 7.5; 7.5; 7.5 MG/1; MG/1; MG/1; MG/1
30 CAPSULE, EXTENDED RELEASE ORAL EVERY MORNING
Qty: 30 CAPSULE | Refills: 0 | Status: SHIPPED | OUTPATIENT
Start: 2021-01-19 | End: 2021-02-18 | Stop reason: SDUPTHER

## 2021-01-19 RX ORDER — DEXTROAMPHETAMINE SACCHARATE, AMPHETAMINE ASPARTATE, DEXTROAMPHETAMINE SULFATE AND AMPHETAMINE SULFATE 2.5; 2.5; 2.5; 2.5 MG/1; MG/1; MG/1; MG/1
10 TABLET ORAL
Qty: 30 TABLET | Refills: 0 | Status: SHIPPED | OUTPATIENT
Start: 2021-01-19 | End: 2021-02-18 | Stop reason: SDUPTHER

## 2021-02-18 DIAGNOSIS — F98.8 ATTENTION DEFICIT DISORDER, UNSPECIFIED HYPERACTIVITY PRESENCE: ICD-10-CM

## 2021-02-18 RX ORDER — DEXTROAMPHETAMINE SACCHARATE, AMPHETAMINE ASPARTATE MONOHYDRATE, DEXTROAMPHETAMINE SULFATE AND AMPHETAMINE SULFATE 7.5; 7.5; 7.5; 7.5 MG/1; MG/1; MG/1; MG/1
30 CAPSULE, EXTENDED RELEASE ORAL EVERY MORNING
Qty: 30 CAPSULE | Refills: 0 | Status: SHIPPED | OUTPATIENT
Start: 2021-02-18 | End: 2021-03-25 | Stop reason: SDUPTHER

## 2021-02-18 RX ORDER — DEXTROAMPHETAMINE SACCHARATE, AMPHETAMINE ASPARTATE, DEXTROAMPHETAMINE SULFATE AND AMPHETAMINE SULFATE 2.5; 2.5; 2.5; 2.5 MG/1; MG/1; MG/1; MG/1
10 TABLET ORAL
Qty: 30 TABLET | Refills: 0 | Status: SHIPPED | OUTPATIENT
Start: 2021-02-18 | End: 2021-03-25 | Stop reason: SDUPTHER

## 2021-02-18 NOTE — TELEPHONE ENCOUNTER
Caller: Patel Morrisin    Relationship: Self    Best call back number: [phone]    Medication needed:   Requested Prescriptions     Pending Prescriptions Disp Refills   • amphetamine-dextroamphetamine XR (ADDERALL XR) 30 MG 24 hr capsule 30 capsule 0     Sig: Take 1 capsule by mouth Every Morning   • amphetamine-dextroamphetamine (Adderall) 10 MG tablet 30 tablet 0     Sig: Take 1 tablet by mouth Daily With Lunch.       When do you need the refill by: ASAP    What details did the patient provide when requesting the medication: Has one day of medicine left.    Does the patient have less than a 3 day supply:  [x] Yes  [] No    What is the patient's preferred pharmacy: [facility]/PHARMACY #[id number] [place], [place] - [address] AT [facility] [phone] [facility] [phone]

## 2021-03-25 DIAGNOSIS — F98.8 ATTENTION DEFICIT DISORDER, UNSPECIFIED HYPERACTIVITY PRESENCE: ICD-10-CM

## 2021-03-25 RX ORDER — DEXTROAMPHETAMINE SACCHARATE, AMPHETAMINE ASPARTATE, DEXTROAMPHETAMINE SULFATE AND AMPHETAMINE SULFATE 2.5; 2.5; 2.5; 2.5 MG/1; MG/1; MG/1; MG/1
10 TABLET ORAL
Qty: 30 TABLET | Refills: 0 | Status: SHIPPED | OUTPATIENT
Start: 2021-03-25 | End: 2021-04-26 | Stop reason: SDUPTHER

## 2021-03-25 RX ORDER — DEXTROAMPHETAMINE SACCHARATE, AMPHETAMINE ASPARTATE MONOHYDRATE, DEXTROAMPHETAMINE SULFATE AND AMPHETAMINE SULFATE 7.5; 7.5; 7.5; 7.5 MG/1; MG/1; MG/1; MG/1
30 CAPSULE, EXTENDED RELEASE ORAL EVERY MORNING
Qty: 30 CAPSULE | Refills: 0 | Status: SHIPPED | OUTPATIENT
Start: 2021-03-25 | End: 2021-04-26 | Stop reason: SDUPTHER

## 2021-03-25 NOTE — TELEPHONE ENCOUNTER
Caller: Paulino Morrisntin    Relationship: Self    Best call back number: [phone]    Medication needed:   Requested Prescriptions     Pending Prescriptions Disp Refills   • amphetamine-dextroamphetamine (Adderall) 10 MG tablet 30 tablet 0     Sig: Take 1 tablet by mouth Daily With Lunch.   • amphetamine-dextroamphetamine XR (ADDERALL XR) 30 MG 24 hr capsule 30 capsule 0     Sig: Take 1 capsule by mouth Every Morning       When do you need the refill by: ASAP    What additional details did the patient provide when requesting the medication: PATIENT DID NOT REALIZE HE MISSED APPT, RESCHEDULED FOR 4/2    Does the patient have less than a 3 day supply:  [x] Yes  [] No    What is the patient's preferred pharmacy: [facility]/PHARMACY #[id number] [place], [place] - [address] AT [facility] [phone] [facility] [phone]

## 2021-04-02 ENCOUNTER — OFFICE VISIT (OUTPATIENT)
Dept: FAMILY MEDICINE CLINIC | Facility: CLINIC | Age: 36
End: 2021-04-02

## 2021-04-02 VITALS
HEART RATE: 82 BPM | RESPIRATION RATE: 16 BRPM | WEIGHT: 157 LBS | OXYGEN SATURATION: 99 % | TEMPERATURE: 96.8 F | SYSTOLIC BLOOD PRESSURE: 130 MMHG | BODY MASS INDEX: 20.15 KG/M2 | HEIGHT: 74 IN | DIASTOLIC BLOOD PRESSURE: 78 MMHG

## 2021-04-02 DIAGNOSIS — F17.200 TOBACCO DEPENDENCE: ICD-10-CM

## 2021-04-02 DIAGNOSIS — F98.8 ATTENTION DEFICIT DISORDER, UNSPECIFIED HYPERACTIVITY PRESENCE: ICD-10-CM

## 2021-04-02 DIAGNOSIS — I10 ESSENTIAL HYPERTENSION: Primary | ICD-10-CM

## 2021-04-02 PROCEDURE — 99213 OFFICE O/P EST LOW 20 MIN: CPT | Performed by: INTERNAL MEDICINE

## 2021-04-02 NOTE — PROGRESS NOTES
Cristhian Morris is a 35 y.o. male. Patient is here today for follow-up on his hypertension, ADD.  He is feeling well on medications and has no acute complaints.  His ADD is doing fine on current medications.  He monitors blood pressure at home and has been doing quite well.  He unfortunately is smoking again.  Chief Complaint   Patient presents with   • Hypertension     ADD- PT HERE FOR MED CHECK           Vitals:    04/02/21 0758   BP: 130/78   Pulse: 82   Resp: 16   Temp: 96.8 °F (36 °C)   SpO2: 99%     Body mass index is 20.15 kg/m².  The following portions of the patient's history were reviewed and updated as appropriate: allergies, current medications, past family history, past medical history, past social history, past surgical history and problem list.    Past Medical History:   Diagnosis Date   • ADHD    • ADHD    • Bursitis of elbow     right elbow   • Hemorrhoids    • Hypertension     B/P OK SINCE STOPPED SMOKING-NO MEDS CURRENTLY   • Rotator cuff tear       No Known Allergies   Social History     Socioeconomic History   • Marital status:      Spouse name: Not on file   • Number of children: Not on file   • Years of education: Not on file   • Highest education level: Not on file   Tobacco Use   • Smoking status: Current Every Day Smoker     Packs/day: 1.00     Years: 10.00     Pack years: 10.00     Types: Cigarettes     Last attempt to quit: 11/6/2017     Years since quitting: 3.4   • Smokeless tobacco: Current User   Vaping Use   • Vaping Use: Never used   Substance and Sexual Activity   • Alcohol use: Yes     Comment: ON OCC   • Drug use: No   • Sexual activity: Defer        Current Outpatient Medications:   •  amphetamine-dextroamphetamine (Adderall) 10 MG tablet, Take 1 tablet by mouth Daily With Lunch., Disp: 30 tablet, Rfl: 0  •  amphetamine-dextroamphetamine XR (ADDERALL XR) 30 MG 24 hr capsule, Take 1 capsule by mouth Every Morning, Disp: 30 capsule, Rfl: 0  •  lisinopril  (PRINIVIL,ZESTRIL) 10 MG tablet, Take 1 tablet by mouth Daily., Disp: 90 tablet, Rfl: 3     Objective     History of Present Illness     Review of Systems   Constitutional: Negative.    HENT: Negative.    Respiratory: Negative.    Cardiovascular: Negative.    Gastrointestinal: Negative.    Genitourinary: Negative.    Musculoskeletal: Negative.    Skin: Negative.    Neurological: Negative.    Hematological: Negative.    Psychiatric/Behavioral: Negative.        Physical Exam  Vitals (130/80) and nursing note reviewed.   Constitutional:       General: He is not in acute distress.     Appearance: Normal appearance. He is not ill-appearing.   HENT:      Head: Normocephalic and atraumatic.   Eyes:      General: No scleral icterus.     Conjunctiva/sclera: Conjunctivae normal.   Cardiovascular:      Rate and Rhythm: Normal rate and regular rhythm.      Heart sounds: Normal heart sounds.   Pulmonary:      Effort: Pulmonary effort is normal. No respiratory distress.      Breath sounds: Normal breath sounds. No wheezing or rales.   Musculoskeletal:         General: Normal range of motion.      Cervical back: Normal range of motion and neck supple.      Right lower leg: No edema.      Left lower leg: No edema.   Skin:     General: Skin is warm and dry.   Neurological:      General: No focal deficit present.      Mental Status: He is alert and oriented to person, place, and time.   Psychiatric:         Mood and Affect: Mood normal.         Behavior: Behavior normal.         ASSESSMENT #1-hypertension, controlled on medication  #2-ADD, stable on current medications  #3-tobacco use     Problems Addressed this Visit        Cardiac and Vasculature    Essential hypertension - Primary       Mental Health    ADD (attention deficit disorder)       Tobacco    Tobacco dependence      Diagnoses       Codes Comments    Essential hypertension    -  Primary ICD-10-CM: I10  ICD-9-CM: 401.9     Attention deficit disorder, unspecified  hyperactivity presence     ICD-10-CM: F98.8  ICD-9-CM: 314.00     Tobacco dependence     ICD-10-CM: F17.200  ICD-9-CM: 305.1           PLAN I encouraged patient to consider quitting smoking again.  He will continue current medicines as now.  I would like to recheck him in 6 months for complete physical exam including an EKG because of his hypertension, CBC, CMP, lipid panel, TSH and free T4, urinalysis and an office urine drug screen and hepatitis C screen per protocol    There are no Patient Instructions on file for this visit.  Return in about 6 months (around 10/2/2021) for Annual physical, with labs.

## 2021-04-16 ENCOUNTER — BULK ORDERING (OUTPATIENT)
Dept: CASE MANAGEMENT | Facility: OTHER | Age: 36
End: 2021-04-16

## 2021-04-16 DIAGNOSIS — Z23 IMMUNIZATION DUE: ICD-10-CM

## 2021-04-26 DIAGNOSIS — F98.8 ATTENTION DEFICIT DISORDER, UNSPECIFIED HYPERACTIVITY PRESENCE: ICD-10-CM

## 2021-04-26 NOTE — TELEPHONE ENCOUNTER
.    Caller: Paulino Morrisntin    Relationship: Self    Best call back number: [phone]     Medication needed:   Requested Prescriptions     Pending Prescriptions Disp Refills   • amphetamine-dextroamphetamine XR (ADDERALL XR) 30 MG 24 hr capsule 30 capsule 0     Sig: Take 1 capsule by mouth Every Morning   • amphetamine-dextroamphetamine (Adderall) 10 MG tablet 30 tablet 0     Sig: Take 1 tablet by mouth Daily With Lunch.       When do you need the refill by: ASAP    What additional details did the patient provide when requesting the medication: HAS ONE DAY LEFT  Does the patient have less than a 3 day supply:  [x] Yes  [] No    What is the patient's preferred pharmacy: [facility]/PHARMACY #[id number] [place], [place] - [address] AT [facility] [phone] [facility] [phone]

## 2021-04-28 RX ORDER — DEXTROAMPHETAMINE SACCHARATE, AMPHETAMINE ASPARTATE, DEXTROAMPHETAMINE SULFATE AND AMPHETAMINE SULFATE 2.5; 2.5; 2.5; 2.5 MG/1; MG/1; MG/1; MG/1
10 TABLET ORAL
Qty: 30 TABLET | Refills: 0 | Status: SHIPPED | OUTPATIENT
Start: 2021-04-28 | End: 2021-05-28 | Stop reason: SDUPTHER

## 2021-04-28 RX ORDER — DEXTROAMPHETAMINE SACCHARATE, AMPHETAMINE ASPARTATE MONOHYDRATE, DEXTROAMPHETAMINE SULFATE AND AMPHETAMINE SULFATE 7.5; 7.5; 7.5; 7.5 MG/1; MG/1; MG/1; MG/1
30 CAPSULE, EXTENDED RELEASE ORAL EVERY MORNING
Qty: 30 CAPSULE | Refills: 0 | Status: SHIPPED | OUTPATIENT
Start: 2021-04-28 | End: 2021-05-28 | Stop reason: SDUPTHER

## 2021-05-28 DIAGNOSIS — F98.8 ATTENTION DEFICIT DISORDER, UNSPECIFIED HYPERACTIVITY PRESENCE: ICD-10-CM

## 2021-05-28 RX ORDER — DEXTROAMPHETAMINE SACCHARATE, AMPHETAMINE ASPARTATE MONOHYDRATE, DEXTROAMPHETAMINE SULFATE AND AMPHETAMINE SULFATE 7.5; 7.5; 7.5; 7.5 MG/1; MG/1; MG/1; MG/1
30 CAPSULE, EXTENDED RELEASE ORAL EVERY MORNING
Qty: 30 CAPSULE | Refills: 0 | Status: SHIPPED | OUTPATIENT
Start: 2021-05-28 | End: 2021-07-01 | Stop reason: SDUPTHER

## 2021-05-28 RX ORDER — DEXTROAMPHETAMINE SACCHARATE, AMPHETAMINE ASPARTATE, DEXTROAMPHETAMINE SULFATE AND AMPHETAMINE SULFATE 2.5; 2.5; 2.5; 2.5 MG/1; MG/1; MG/1; MG/1
10 TABLET ORAL
Qty: 30 TABLET | Refills: 0 | Status: SHIPPED | OUTPATIENT
Start: 2021-05-28 | End: 2021-07-01 | Stop reason: SDUPTHER

## 2021-05-28 NOTE — TELEPHONE ENCOUNTER
Caller: Paulino Morrisntin    Relationship: Self    Best call back number: [phone]    Medication needed:   Requested Prescriptions     Pending Prescriptions Disp Refills   • amphetamine-dextroamphetamine (Adderall) 10 MG tablet 30 tablet 0     Sig: Take 1 tablet by mouth Daily With Lunch.   • amphetamine-dextroamphetamine XR (ADDERALL XR) 30 MG 24 hr capsule 30 capsule 0     Sig: Take 1 capsule by mouth Every Morning       When do you need the refill by: ASAP     What additional details did the patient provide when requesting the medication: PATIENT HAS 1 PILL LEFT     Does the patient have less than a 3 day supply:  [x] Yes  [] No    What is the patient's preferred pharmacy: [facility]/PHARMACY #[id number] [place], [place] - [address] [address] AT [facility] [phone] [facility] [phone]

## 2021-07-01 DIAGNOSIS — F98.8 ATTENTION DEFICIT DISORDER, UNSPECIFIED HYPERACTIVITY PRESENCE: ICD-10-CM

## 2021-07-01 NOTE — TELEPHONE ENCOUNTER
Caller: Paulino Morrisntin    Relationship: Self    Best call back number: [phone] (H)    Medication needed:   Requested Prescriptions     Pending Prescriptions Disp Refills   • amphetamine-dextroamphetamine (Adderall) 10 MG tablet 30 tablet 0     Sig: Take 1 tablet by mouth Daily With Lunch.   • amphetamine-dextroamphetamine XR (ADDERALL XR) 30 MG 24 hr capsule 30 capsule 0     Sig: Take 1 capsule by mouth Every Morning   • lisinopril (PRINIVIL,ZESTRIL) 10 MG tablet 90 tablet 3     Sig: Take 1 tablet by mouth Daily.       When do you need the refill by: ASAP    What additional details did the patient provide when requesting the medication: PATIENT HAS ABOUT 15 LISINOPRIL LEFT BUT IS ASKING TO GET THAT REFILLED AT THE SAME TIME AS THE OTHER TWO MEDICATIONS    Does the patient have less than a 3 day supply:  [x] Yes  [] No    What is the patient's preferred pharmacy: [facility]/PHARMACY #[id number] [place], [place] - [address] [address] AT [facility] [phone] [facility] [phone]

## 2021-07-02 RX ORDER — LISINOPRIL 10 MG/1
10 TABLET ORAL DAILY
Qty: 90 TABLET | Refills: 3 | Status: SHIPPED | OUTPATIENT
Start: 2021-07-02

## 2021-07-02 RX ORDER — DEXTROAMPHETAMINE SACCHARATE, AMPHETAMINE ASPARTATE MONOHYDRATE, DEXTROAMPHETAMINE SULFATE AND AMPHETAMINE SULFATE 7.5; 7.5; 7.5; 7.5 MG/1; MG/1; MG/1; MG/1
30 CAPSULE, EXTENDED RELEASE ORAL EVERY MORNING
Qty: 30 CAPSULE | Refills: 0 | Status: SHIPPED | OUTPATIENT
Start: 2021-07-02 | End: 2021-08-13 | Stop reason: SDUPTHER

## 2021-07-02 RX ORDER — DEXTROAMPHETAMINE SACCHARATE, AMPHETAMINE ASPARTATE, DEXTROAMPHETAMINE SULFATE AND AMPHETAMINE SULFATE 2.5; 2.5; 2.5; 2.5 MG/1; MG/1; MG/1; MG/1
10 TABLET ORAL
Qty: 30 TABLET | Refills: 0 | Status: SHIPPED | OUTPATIENT
Start: 2021-07-02 | End: 2021-08-13 | Stop reason: SDUPTHER

## 2021-08-13 DIAGNOSIS — F98.8 ATTENTION DEFICIT DISORDER, UNSPECIFIED HYPERACTIVITY PRESENCE: ICD-10-CM

## 2021-08-13 RX ORDER — DEXTROAMPHETAMINE SACCHARATE, AMPHETAMINE ASPARTATE MONOHYDRATE, DEXTROAMPHETAMINE SULFATE AND AMPHETAMINE SULFATE 7.5; 7.5; 7.5; 7.5 MG/1; MG/1; MG/1; MG/1
30 CAPSULE, EXTENDED RELEASE ORAL EVERY MORNING
Qty: 30 CAPSULE | Refills: 0 | Status: SHIPPED | OUTPATIENT
Start: 2021-08-13 | End: 2021-09-08 | Stop reason: SDUPTHER

## 2021-08-13 RX ORDER — DEXTROAMPHETAMINE SACCHARATE, AMPHETAMINE ASPARTATE, DEXTROAMPHETAMINE SULFATE AND AMPHETAMINE SULFATE 2.5; 2.5; 2.5; 2.5 MG/1; MG/1; MG/1; MG/1
10 TABLET ORAL
Qty: 30 TABLET | Refills: 0 | Status: SHIPPED | OUTPATIENT
Start: 2021-08-13 | End: 2021-09-08 | Stop reason: SDUPTHER

## 2021-08-13 NOTE — TELEPHONE ENCOUNTER
Caller: Paulino Morrisntin    Relationship: Self    Best call back number: [phone]*    Medication needed:   Requested Prescriptions     Pending Prescriptions Disp Refills   • amphetamine-dextroamphetamine XR (ADDERALL XR) 30 MG 24 hr capsule 30 capsule 0     Sig: Take 1 capsule by mouth Every Morning   • amphetamine-dextroamphetamine (Adderall) 10 MG tablet 30 tablet 0     Sig: Take 1 tablet by mouth Daily With Lunch.       When do you need the refill by: ASAP    What additional details did the patient provide when requesting the medication: PATIENT COMPLETELY OUT OF MEDICATION    Does the patient have less than a 3 day supply:  [x] Yes  [] No    What is the patient's preferred pharmacy: [facility]/PHARMACY #[id number] [place], [place] [facility] [address] [facility] [address] [facility] [phone] [facility] [phone]

## 2021-08-13 NOTE — TELEPHONE ENCOUNTER
Rx Refill Note  Requested Prescriptions     Pending Prescriptions Disp Refills   • amphetamine-dextroamphetamine XR (ADDERALL XR) 30 MG 24 hr capsule 30 capsule 0     Sig: Take 1 capsule by mouth Every Morning   • amphetamine-dextroamphetamine (Adderall) 10 MG tablet 30 tablet 0     Sig: Take 1 tablet by mouth Daily With Lunch.      Last office visit with prescribing clinician: 4/2/2021      Next office visit with prescribing clinician: 10/27/2021            Kelly Kaur MA  08/13/21, 13:20 EDT

## 2021-09-08 DIAGNOSIS — F98.8 ATTENTION DEFICIT DISORDER, UNSPECIFIED HYPERACTIVITY PRESENCE: ICD-10-CM

## 2021-09-08 NOTE — TELEPHONE ENCOUNTER
Caller: Paulino Morrisntin    Relationship: Self    Best call back number: [phone]*    Medication needed:   Requested Prescriptions     Pending Prescriptions Disp Refills   • amphetamine-dextroamphetamine XR (ADDERALL XR) 30 MG 24 hr capsule 30 capsule 0     Sig: Take 1 capsule by mouth Every Morning   • amphetamine-dextroamphetamine (Adderall) 10 MG tablet 30 tablet 0     Sig: Take 1 tablet by mouth Daily With Lunch.       When do you need the refill by: ASAP    What additional details did the patient provide when requesting the medication: PATIENT HAS 2 DAYS OF MEDICATION REMAINING.    Does the patient have less than a 3 day supply:  [x] Yes  [] No    What is the patient's preferred pharmacy: [facility]/PHARMACY #[id number] [place], [place] - [address] [facility] AT [facility] [phone] [facility] [phone]

## 2021-09-10 RX ORDER — DEXTROAMPHETAMINE SACCHARATE, AMPHETAMINE ASPARTATE MONOHYDRATE, DEXTROAMPHETAMINE SULFATE AND AMPHETAMINE SULFATE 7.5; 7.5; 7.5; 7.5 MG/1; MG/1; MG/1; MG/1
30 CAPSULE, EXTENDED RELEASE ORAL EVERY MORNING
Qty: 30 CAPSULE | Refills: 0 | Status: SHIPPED | OUTPATIENT
Start: 2021-09-10 | End: 2021-10-05 | Stop reason: SDUPTHER

## 2021-09-10 RX ORDER — DEXTROAMPHETAMINE SACCHARATE, AMPHETAMINE ASPARTATE, DEXTROAMPHETAMINE SULFATE AND AMPHETAMINE SULFATE 2.5; 2.5; 2.5; 2.5 MG/1; MG/1; MG/1; MG/1
10 TABLET ORAL
Qty: 30 TABLET | Refills: 0 | Status: SHIPPED | OUTPATIENT
Start: 2021-09-10 | End: 2021-10-05 | Stop reason: SDUPTHER

## 2021-10-05 DIAGNOSIS — F98.8 ATTENTION DEFICIT DISORDER, UNSPECIFIED HYPERACTIVITY PRESENCE: ICD-10-CM

## 2021-10-05 RX ORDER — DEXTROAMPHETAMINE SACCHARATE, AMPHETAMINE ASPARTATE, DEXTROAMPHETAMINE SULFATE AND AMPHETAMINE SULFATE 2.5; 2.5; 2.5; 2.5 MG/1; MG/1; MG/1; MG/1
10 TABLET ORAL
Qty: 30 TABLET | Refills: 0 | Status: SHIPPED | OUTPATIENT
Start: 2021-10-05 | End: 2021-12-15 | Stop reason: SDUPTHER

## 2021-10-05 RX ORDER — DEXTROAMPHETAMINE SACCHARATE, AMPHETAMINE ASPARTATE MONOHYDRATE, DEXTROAMPHETAMINE SULFATE AND AMPHETAMINE SULFATE 7.5; 7.5; 7.5; 7.5 MG/1; MG/1; MG/1; MG/1
30 CAPSULE, EXTENDED RELEASE ORAL EVERY MORNING
Qty: 30 CAPSULE | Refills: 0 | Status: SHIPPED | OUTPATIENT
Start: 2021-10-05 | End: 2021-12-15 | Stop reason: SDUPTHER

## 2021-10-05 NOTE — TELEPHONE ENCOUNTER
Caller: Alvaro Morris    Relationship: Self      Medication requested (name and dosage):   amphetamine-dextroamphetamine (Adderall) 10 MG tablet  10 mg, Daily With Lunch     amphetamine-dextroamphetamine XR (ADDERALL XR) 30 MG 24 hr capsule  30 mg, Every Morning           Pharmacy where request should be sent: [facility]/pharmacy #[id number] - [place], [place] - [address] [facility] AT [facility] [phone] [facility] [phone] FX     Additional details provided by patient: PATIENT STATES THE PHARMACY DID NOT LET HIM GET THESE TOGETHER LAST TIME.     Best call back number: [phone]    Does the patient have less than a 3 day supply:  [x] Yes  [] No    Danita Galdamez Rep   10/05/21 10:31 EDT

## 2021-10-05 NOTE — TELEPHONE ENCOUNTER
Rx Refill Note  Requested Prescriptions     Pending Prescriptions Disp Refills   • amphetamine-dextroamphetamine (Adderall) 10 MG tablet 30 tablet 0     Sig: Take 1 tablet by mouth Daily With Lunch.   • amphetamine-dextroamphetamine XR (ADDERALL XR) 30 MG 24 hr capsule 30 capsule 0     Sig: Take 1 capsule by mouth Every Morning      Last office visit with prescribing clinician: 4/2/2021      Next office visit with prescribing clinician: 10/27/2021            Kelly Kaur MA  10/05/21, 14:26 EDT

## 2021-10-08 DIAGNOSIS — Z00.00 ROUTINE HEALTH MAINTENANCE: ICD-10-CM

## 2021-10-08 DIAGNOSIS — Z02.83 ENCOUNTER FOR DRUG SCREENING: Primary | ICD-10-CM

## 2021-10-08 DIAGNOSIS — Z11.59 NEED FOR HEPATITIS C SCREENING TEST: ICD-10-CM

## 2021-10-14 LAB
ALBUMIN SERPL-MCNC: 4.6 G/DL (ref 4–5)
ALBUMIN/GLOB SERPL: 2.1 {RATIO} (ref 1.2–2.2)
ALP SERPL-CCNC: 66 IU/L (ref 44–121)
ALT SERPL-CCNC: 26 IU/L (ref 0–44)
AST SERPL-CCNC: 36 IU/L (ref 0–40)
BASOPHILS # BLD AUTO: 0.1 X10E3/UL (ref 0–0.2)
BASOPHILS NFR BLD AUTO: 1 %
BILIRUB SERPL-MCNC: 0.6 MG/DL (ref 0–1.2)
BUN SERPL-MCNC: 9 MG/DL (ref 6–20)
BUN/CREAT SERPL: 14 (ref 9–20)
CALCIUM SERPL-MCNC: 9.1 MG/DL (ref 8.7–10.2)
CHLORIDE SERPL-SCNC: 102 MMOL/L (ref 96–106)
CHOLEST SERPL-MCNC: 184 MG/DL (ref 100–199)
CO2 SERPL-SCNC: 26 MMOL/L (ref 20–29)
CREAT SERPL-MCNC: 0.66 MG/DL (ref 0.76–1.27)
EOSINOPHIL # BLD AUTO: 0.2 X10E3/UL (ref 0–0.4)
EOSINOPHIL NFR BLD AUTO: 3 %
ERYTHROCYTE [DISTWIDTH] IN BLOOD BY AUTOMATED COUNT: 14 % (ref 11.6–15.4)
GLOBULIN SER CALC-MCNC: 2.2 G/DL (ref 1.5–4.5)
GLUCOSE SERPL-MCNC: 86 MG/DL (ref 65–99)
HCT VFR BLD AUTO: 41.2 % (ref 37.5–51)
HCV AB S/CO SERPL IA: 0.2 S/CO RATIO (ref 0–0.9)
HCV AB SERPL QL IA: NORMAL
HDLC SERPL-MCNC: 109 MG/DL
HGB BLD-MCNC: 14.1 G/DL (ref 13–17.7)
IMM GRANULOCYTES # BLD AUTO: 0 X10E3/UL (ref 0–0.1)
IMM GRANULOCYTES NFR BLD AUTO: 0 %
LDLC SERPL CALC-MCNC: 63 MG/DL (ref 0–99)
LDLC/HDLC SERPL: 0.6 RATIO (ref 0–3.6)
LYMPHOCYTES # BLD AUTO: 1.9 X10E3/UL (ref 0.7–3.1)
LYMPHOCYTES NFR BLD AUTO: 21 %
MCH RBC QN AUTO: 32.6 PG (ref 26.6–33)
MCHC RBC AUTO-ENTMCNC: 34.2 G/DL (ref 31.5–35.7)
MCV RBC AUTO: 95 FL (ref 79–97)
MONOCYTES # BLD AUTO: 0.7 X10E3/UL (ref 0.1–0.9)
MONOCYTES NFR BLD AUTO: 8 %
NEUTROPHILS # BLD AUTO: 6 X10E3/UL (ref 1.4–7)
NEUTROPHILS NFR BLD AUTO: 67 %
PLATELET # BLD AUTO: 197 X10E3/UL (ref 150–450)
POTASSIUM SERPL-SCNC: 4.2 MMOL/L (ref 3.5–5.2)
PROT SERPL-MCNC: 6.8 G/DL (ref 6–8.5)
RBC # BLD AUTO: 4.32 X10E6/UL (ref 4.14–5.8)
SODIUM SERPL-SCNC: 142 MMOL/L (ref 134–144)
TRIGL SERPL-MCNC: 65 MG/DL (ref 0–149)
TSH SERPL DL<=0.005 MIU/L-ACNC: 0.63 UIU/ML (ref 0.45–4.5)
UNABLE TO VOID: NORMAL
VLDLC SERPL CALC-MCNC: 12 MG/DL (ref 5–40)
WBC # BLD AUTO: 8.9 X10E3/UL (ref 3.4–10.8)

## 2021-12-09 ENCOUNTER — TELEPHONE (OUTPATIENT)
Dept: FAMILY MEDICINE CLINIC | Facility: CLINIC | Age: 36
End: 2021-12-09

## 2021-12-09 DIAGNOSIS — F98.8 ATTENTION DEFICIT DISORDER, UNSPECIFIED HYPERACTIVITY PRESENCE: ICD-10-CM

## 2021-12-15 ENCOUNTER — OFFICE VISIT (OUTPATIENT)
Dept: FAMILY MEDICINE CLINIC | Facility: CLINIC | Age: 36
End: 2021-12-15

## 2021-12-15 VITALS
SYSTOLIC BLOOD PRESSURE: 150 MMHG | BODY MASS INDEX: 20.64 KG/M2 | TEMPERATURE: 97.6 F | HEART RATE: 101 BPM | OXYGEN SATURATION: 95 % | DIASTOLIC BLOOD PRESSURE: 80 MMHG | RESPIRATION RATE: 18 BRPM | HEIGHT: 74 IN | WEIGHT: 160.8 LBS

## 2021-12-15 DIAGNOSIS — I10 ESSENTIAL HYPERTENSION: Primary | ICD-10-CM

## 2021-12-15 DIAGNOSIS — F98.8 ATTENTION DEFICIT DISORDER, UNSPECIFIED HYPERACTIVITY PRESENCE: ICD-10-CM

## 2021-12-15 DIAGNOSIS — F10.11 HISTORY OF ALCOHOL ABUSE: ICD-10-CM

## 2021-12-15 PROCEDURE — 99214 OFFICE O/P EST MOD 30 MIN: CPT | Performed by: INTERNAL MEDICINE

## 2021-12-15 RX ORDER — DEXTROAMPHETAMINE SACCHARATE, AMPHETAMINE ASPARTATE, DEXTROAMPHETAMINE SULFATE AND AMPHETAMINE SULFATE 2.5; 2.5; 2.5; 2.5 MG/1; MG/1; MG/1; MG/1
10 TABLET ORAL
Qty: 30 TABLET | Refills: 0 | Status: SHIPPED | OUTPATIENT
Start: 2021-12-15 | End: 2022-02-03 | Stop reason: SDUPTHER

## 2021-12-15 RX ORDER — DEXTROAMPHETAMINE SACCHARATE, AMPHETAMINE ASPARTATE MONOHYDRATE, DEXTROAMPHETAMINE SULFATE AND AMPHETAMINE SULFATE 7.5; 7.5; 7.5; 7.5 MG/1; MG/1; MG/1; MG/1
30 CAPSULE, EXTENDED RELEASE ORAL EVERY MORNING
Qty: 30 CAPSULE | Refills: 0 | Status: SHIPPED | OUTPATIENT
Start: 2021-12-15 | End: 2022-02-03 | Stop reason: SDUPTHER
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
## 2022-02-03 DIAGNOSIS — F98.8 ATTENTION DEFICIT DISORDER, UNSPECIFIED HYPERACTIVITY PRESENCE: ICD-10-CM

## 2022-02-03 NOTE — TELEPHONE ENCOUNTER
Caller: Alvaor Morris    Relationship: Self    Best call back number: [phone]    Requested Prescriptions:   Requested Prescriptions     Pending Prescriptions Disp Refills   • amphetamine-dextroamphetamine (Adderall) 10 MG tablet 30 tablet 0     Sig: Take 1 tablet by mouth Daily With Lunch.   • amphetamine-dextroamphetamine XR (ADDERALL XR) 30 MG 24 hr capsule 30 capsule 0     Sig: Take 1 capsule by mouth Every Morning        Pharmacy where request should be sent: [facility]/PHARMACY #[id number] [place], [place] - [address] [address] AT [facility] [phone] [facility] [phone]      Additional details provided by patient: PATIENT IS OUT OF MEDICATION. HE SCHEDULED A FOLLOW UP FOR NEXT AVAILABLE WHICH WAS 03/03. PLEASE ADVISE.     Does the patient have less than a 3 day supply:  [x] Yes  [] No    Danita Dougherty Rep   02/03/22 10:59 EST

## 2022-02-08 RX ORDER — DEXTROAMPHETAMINE SACCHARATE, AMPHETAMINE ASPARTATE, DEXTROAMPHETAMINE SULFATE AND AMPHETAMINE SULFATE 2.5; 2.5; 2.5; 2.5 MG/1; MG/1; MG/1; MG/1
10 TABLET ORAL
Qty: 30 TABLET | Refills: 0 | Status: SHIPPED | OUTPATIENT
Start: 2022-02-08 | End: 2022-03-03

## 2022-02-08 RX ORDER — DEXTROAMPHETAMINE SACCHARATE, AMPHETAMINE ASPARTATE MONOHYDRATE, DEXTROAMPHETAMINE SULFATE AND AMPHETAMINE SULFATE 7.5; 7.5; 7.5; 7.5 MG/1; MG/1; MG/1; MG/1
30 CAPSULE, EXTENDED RELEASE ORAL EVERY MORNING
Qty: 30 CAPSULE | Refills: 0 | Status: SHIPPED | OUTPATIENT
Start: 2022-02-08 | End: 2022-03-03 | Stop reason: SDUPTHER

## 2022-02-08 NOTE — TELEPHONE ENCOUNTER
Rx Refill Note  Requested Prescriptions     Pending Prescriptions Disp Refills   • amphetamine-dextroamphetamine (Adderall) 10 MG tablet 30 tablet 0     Sig: Take 1 tablet by mouth Daily With Lunch.   • amphetamine-dextroamphetamine XR (ADDERALL XR) 30 MG 24 hr capsule 30 capsule 0     Sig: Take 1 capsule by mouth Every Morning      Last office visit with prescribing clinician: 12/15/2021      Next office visit with prescribing clinician: 3/3/2022            Kelly Kaur MA  02/08/22, 08:46 EST

## 2022-03-03 ENCOUNTER — OFFICE VISIT (OUTPATIENT)
Dept: FAMILY MEDICINE CLINIC | Facility: CLINIC | Age: 37
End: 2022-03-03

## 2022-03-03 VITALS
DIASTOLIC BLOOD PRESSURE: 80 MMHG | OXYGEN SATURATION: 99 % | HEIGHT: 74 IN | HEART RATE: 87 BPM | SYSTOLIC BLOOD PRESSURE: 120 MMHG | TEMPERATURE: 96.7 F | BODY MASS INDEX: 20.15 KG/M2 | WEIGHT: 157 LBS | RESPIRATION RATE: 18 BRPM

## 2022-03-03 DIAGNOSIS — F98.8 ATTENTION DEFICIT DISORDER, UNSPECIFIED HYPERACTIVITY PRESENCE: ICD-10-CM

## 2022-03-03 DIAGNOSIS — I10 ESSENTIAL HYPERTENSION: Primary | ICD-10-CM

## 2022-03-03 PROCEDURE — 99213 OFFICE O/P EST LOW 20 MIN: CPT | Performed by: INTERNAL MEDICINE

## 2022-03-03 RX ORDER — DEXTROAMPHETAMINE SACCHARATE, AMPHETAMINE ASPARTATE MONOHYDRATE, DEXTROAMPHETAMINE SULFATE AND AMPHETAMINE SULFATE 7.5; 7.5; 7.5; 7.5 MG/1; MG/1; MG/1; MG/1
30 CAPSULE, EXTENDED RELEASE ORAL EVERY MORNING
Qty: 30 CAPSULE | Refills: 0 | Status: SHIPPED | OUTPATIENT
Start: 2022-03-03 | End: 2022-04-04 | Stop reason: SDUPTHER

## 2022-03-03 RX ORDER — DEXTROAMPHETAMINE SACCHARATE, AMPHETAMINE ASPARTATE, DEXTROAMPHETAMINE SULFATE AND AMPHETAMINE SULFATE 2.5; 2.5; 2.5; 2.5 MG/1; MG/1; MG/1; MG/1
10 TABLET ORAL
Qty: 30 TABLET | Refills: 0 | Status: SHIPPED | OUTPATIENT
Start: 2022-03-03 | End: 2022-04-04 | Stop reason: SDUPTHER

## 2022-03-03 NOTE — PROGRESS NOTES
Cristhian Morris is a 36 y.o. male. Patient is here today for follow-up on his hypertension and ADD.  He restarted on Adderall at the last visit.  He continues to go to  and has not been using any alcohol.  He feels much better on the Adderall and has much more focus according to the patient.  Chief Complaint   Patient presents with   • Hypertension     ADD- PT HERE FOR MED CHECK           Vitals:    22 1427   BP: 120/80   Pulse: 87   Resp: 18   Temp: 96.7 °F (35.9 °C)   SpO2: 99%     Body mass index is 20.15 kg/m².  The following portions of the patient's history were reviewed and updated as appropriate: allergies, current medications, past family history, past medical history, past social history, past surgical history and problem list.    Past Medical History:   Diagnosis Date   • ADHD    • ADHD    • Bursitis of elbow     right elbow   • Hemorrhoids    • Hypertension     B/P OK SINCE STOPPED SMOKING-NO MEDS CURRENTLY   • Rotator cuff tear       No Known Allergies   Social History     Socioeconomic History   • Marital status:    Tobacco Use   • Smoking status: Current Every Day Smoker     Packs/day: 1.00     Years: 10.00     Pack years: 10.00     Types: Cigarettes     Last attempt to quit: 2017     Years since quittin.3   • Smokeless tobacco: Current User   Vaping Use   • Vaping Use: Never used   Substance and Sexual Activity   • Alcohol use: Yes     Comment: ON OCC   • Drug use: No   • Sexual activity: Defer        Current Outpatient Medications:   •  amphetamine-dextroamphetamine (Adderall) 10 MG tablet, Take 1 tablet by mouth Daily With Lunch., Disp: 30 tablet, Rfl: 0  •  amphetamine-dextroamphetamine XR (ADDERALL XR) 30 MG 24 hr capsule, Take 1 capsule by mouth Every Morning, Disp: 30 capsule, Rfl: 0  •  lisinopril (PRINIVIL,ZESTRIL) 10 MG tablet, Take 1 tablet by mouth Daily., Disp: 90 tablet, Rfl: 3     Objective     History of Present Illness     Review of Systems   All  other systems reviewed and are negative.      Physical Exam  Vitals and nursing note reviewed.   Constitutional:       Appearance: Normal appearance.   HENT:      Head: Normocephalic and atraumatic.   Cardiovascular:      Rate and Rhythm: Normal rate and regular rhythm.      Heart sounds: Normal heart sounds.   Pulmonary:      Effort: Pulmonary effort is normal. No respiratory distress.      Breath sounds: Normal breath sounds. No wheezing or rales.   Musculoskeletal:         General: Normal range of motion.   Skin:     General: Skin is warm and dry.   Neurological:      General: No focal deficit present.      Mental Status: He is alert and oriented to person, place, and time.   Psychiatric:         Mood and Affect: Mood normal.         Behavior: Behavior normal.         ASSESSMENT #1-hypertension controlled on medication  #2-ADD, stable on Adderall     Problems Addressed this Visit        Cardiac and Vasculature    Essential hypertension - Primary       Mental Health    ADD (attention deficit disorder)    Relevant Medications    amphetamine-dextroamphetamine XR (ADDERALL XR) 30 MG 24 hr capsule    amphetamine-dextroamphetamine (Adderall) 10 MG tablet    Other Relevant Orders    Urine Drug Screen - Urine, Clean Catch      Diagnoses       Codes Comments    Essential hypertension    -  Primary ICD-10-CM: I10  ICD-9-CM: 401.9     Attention deficit disorder, unspecified hyperactivity presence     ICD-10-CM: F98.8  ICD-9-CM: 314.00           PLAN I refilled the patient's Adderall.  I can do that monthly.  I want to recheck him in June with a urine drug screen.    There are no Patient Instructions on file for this visit.  Return in about 3 months (around 6/3/2022) for with labs.

## 2022-04-04 DIAGNOSIS — F98.8 ATTENTION DEFICIT DISORDER, UNSPECIFIED HYPERACTIVITY PRESENCE: ICD-10-CM

## 2022-04-04 NOTE — TELEPHONE ENCOUNTER
Caller: Alvaro Morris    Relationship: Self    Best call back number: [phone]    Requested Prescriptions:   Requested Prescriptions     Pending Prescriptions Disp Refills   • amphetamine-dextroamphetamine (Adderall) 10 MG tablet 30 tablet 0     Sig: Take 1 tablet by mouth Daily With Lunch.   • amphetamine-dextroamphetamine XR (ADDERALL XR) 30 MG 24 hr capsule 30 capsule 0     Sig: Take 1 capsule by mouth Every Morning        Pharmacy where request should be sent: [facility]/PHARMACY #[id number] [place], [place] - [address] AT [facility] [phone] [facility] [phone]      Additional details provided by patient: PATIENT HAS A FEW DAYS LEFT    Does the patient have less than a 3 day supply:  [x] Yes  [] No    Danita Villalta Rep   04/04/22 12:35 EDT

## 2022-04-05 RX ORDER — DEXTROAMPHETAMINE SACCHARATE, AMPHETAMINE ASPARTATE MONOHYDRATE, DEXTROAMPHETAMINE SULFATE AND AMPHETAMINE SULFATE 7.5; 7.5; 7.5; 7.5 MG/1; MG/1; MG/1; MG/1
30 CAPSULE, EXTENDED RELEASE ORAL EVERY MORNING
Qty: 30 CAPSULE | Refills: 0 | Status: SHIPPED | OUTPATIENT
Start: 2022-04-05 | End: 2022-05-02 | Stop reason: SDUPTHER

## 2022-04-05 RX ORDER — DEXTROAMPHETAMINE SACCHARATE, AMPHETAMINE ASPARTATE, DEXTROAMPHETAMINE SULFATE AND AMPHETAMINE SULFATE 2.5; 2.5; 2.5; 2.5 MG/1; MG/1; MG/1; MG/1
10 TABLET ORAL
Qty: 30 TABLET | Refills: 0 | Status: SHIPPED | OUTPATIENT
Start: 2022-04-05 | End: 2022-05-02 | Stop reason: SDUPTHER

## 2022-04-05 NOTE — TELEPHONE ENCOUNTER
Rx Refill Note  Requested Prescriptions     Pending Prescriptions Disp Refills   • amphetamine-dextroamphetamine (Adderall) 10 MG tablet 30 tablet 0     Sig: Take 1 tablet by mouth Daily With Lunch.   • amphetamine-dextroamphetamine XR (ADDERALL XR) 30 MG 24 hr capsule 30 capsule 0     Sig: Take 1 capsule by mouth Every Morning      Last office visit with prescribing clinician: 3/3/2022      Next office visit with prescribing clinician: 6/15/2022            Kelly Kaur MA  04/05/22, 07:08 EDT

## 2022-05-02 DIAGNOSIS — F98.8 ATTENTION DEFICIT DISORDER, UNSPECIFIED HYPERACTIVITY PRESENCE: ICD-10-CM

## 2022-05-02 NOTE — TELEPHONE ENCOUNTER
Caller: Alvaro Morris    Relationship: Self    Best call back number: [phone]    Requested Prescriptions:   Requested Prescriptions     Pending Prescriptions Disp Refills   • amphetamine-dextroamphetamine (Adderall) 10 MG tablet 30 tablet 0     Sig: Take 1 tablet by mouth Daily With Lunch.   • amphetamine-dextroamphetamine XR (ADDERALL XR) 30 MG 24 hr capsule 30 capsule 0     Sig: Take 1 capsule by mouth Every Morning        Pharmacy where request should be sent: [facility]/PHARMACY #[id number] [place], [place] - [address] AT [facility] [phone] [facility] [phone]      Additional details provided by patient: PATIENT HAS 2 DAYS LEFT    Does the patient have less than a 3 day supply:  [x] Yes  [] No    Danita UGALDE Rep   05/02/22 10:29 EDT

## 2022-05-02 NOTE — TELEPHONE ENCOUNTER
Rx Refill Note  Requested Prescriptions     Pending Prescriptions Disp Refills   • amphetamine-dextroamphetamine (Adderall) 10 MG tablet 30 tablet 0     Sig: Take 1 tablet by mouth Daily With Lunch.   • amphetamine-dextroamphetamine XR (ADDERALL XR) 30 MG 24 hr capsule 30 capsule 0     Sig: Take 1 capsule by mouth Every Morning      Last office visit with prescribing clinician: 3/3/2022      Next office visit with prescribing clinician: 6/15/2022            Kelly Kaur MA  05/02/22, 13:48 EDT

## 2022-05-03 RX ORDER — DEXTROAMPHETAMINE SACCHARATE, AMPHETAMINE ASPARTATE, DEXTROAMPHETAMINE SULFATE AND AMPHETAMINE SULFATE 2.5; 2.5; 2.5; 2.5 MG/1; MG/1; MG/1; MG/1
10 TABLET ORAL
Qty: 30 TABLET | Refills: 0 | Status: SHIPPED | OUTPATIENT
Start: 2022-05-03 | End: 2022-05-31 | Stop reason: SDUPTHER

## 2022-05-03 RX ORDER — DEXTROAMPHETAMINE SACCHARATE, AMPHETAMINE ASPARTATE MONOHYDRATE, DEXTROAMPHETAMINE SULFATE AND AMPHETAMINE SULFATE 7.5; 7.5; 7.5; 7.5 MG/1; MG/1; MG/1; MG/1
30 CAPSULE, EXTENDED RELEASE ORAL EVERY MORNING
Qty: 30 CAPSULE | Refills: 0 | Status: SHIPPED | OUTPATIENT
Start: 2022-05-03 | End: 2022-05-31 | Stop reason: SDUPTHER

## 2022-05-31 DIAGNOSIS — F98.8 ATTENTION DEFICIT DISORDER, UNSPECIFIED HYPERACTIVITY PRESENCE: ICD-10-CM

## 2022-06-01 RX ORDER — DEXTROAMPHETAMINE SACCHARATE, AMPHETAMINE ASPARTATE, DEXTROAMPHETAMINE SULFATE AND AMPHETAMINE SULFATE 2.5; 2.5; 2.5; 2.5 MG/1; MG/1; MG/1; MG/1
10 TABLET ORAL
Qty: 30 TABLET | Refills: 0 | Status: SHIPPED | OUTPATIENT
Start: 2022-06-01

## 2022-06-01 RX ORDER — DEXTROAMPHETAMINE SACCHARATE, AMPHETAMINE ASPARTATE MONOHYDRATE, DEXTROAMPHETAMINE SULFATE AND AMPHETAMINE SULFATE 7.5; 7.5; 7.5; 7.5 MG/1; MG/1; MG/1; MG/1
30 CAPSULE, EXTENDED RELEASE ORAL EVERY MORNING
Qty: 30 CAPSULE | Refills: 0 | Status: SHIPPED | OUTPATIENT
Start: 2022-06-01

## 2022-06-01 NOTE — TELEPHONE ENCOUNTER
Rx Refill Note  Requested Prescriptions     Pending Prescriptions Disp Refills   • amphetamine-dextroamphetamine (Adderall) 10 MG tablet 30 tablet 0     Sig: Take 1 tablet by mouth Daily With Lunch.   • amphetamine-dextroamphetamine XR (ADDERALL XR) 30 MG 24 hr capsule 30 capsule 0     Sig: Take 1 capsule by mouth Every Morning      Last office visit with prescribing clinician: 3/3/2022      Next office visit with prescribing clinician: 6/15/2022            Kelly Kaur MA  06/01/22, 06:44 EDT

## 2022-06-03 ENCOUNTER — OFFICE VISIT (OUTPATIENT)
Dept: ORTHOPEDIC SURGERY | Facility: CLINIC | Age: 37
End: 2022-06-03

## 2022-06-03 VITALS — TEMPERATURE: 98.6 F | HEIGHT: 74 IN | WEIGHT: 152 LBS | BODY MASS INDEX: 19.51 KG/M2

## 2022-06-03 DIAGNOSIS — S46.012A TRAUMATIC TEAR OF LEFT ROTATOR CUFF, UNSPECIFIED TEAR EXTENT, INITIAL ENCOUNTER: ICD-10-CM

## 2022-06-03 DIAGNOSIS — M25.512 LEFT SHOULDER PAIN, UNSPECIFIED CHRONICITY: Primary | ICD-10-CM

## 2022-06-03 PROCEDURE — 73030 X-RAY EXAM OF SHOULDER: CPT | Performed by: ORTHOPAEDIC SURGERY

## 2022-06-03 PROCEDURE — 99203 OFFICE O/P NEW LOW 30 MIN: CPT | Performed by: ORTHOPAEDIC SURGERY

## 2022-06-03 NOTE — PROGRESS NOTES
New Shoulder      Patient: Alvaro Morris        YOB: 1985    Medical Record Number: [id number]        Chief Complaints: Left shoulder pain      History of Present Illness: This is a 36-year-old male who is status post rotator cuff repair on the right who presents complaining of left shoulder pain that he is right-hand-dominant this began at a work injury around the second week of May he was pulling at engine out overhead from a car when the engine came down he twisted little bit felt a pain in and popping in the left shoulder.  He has had pain since that time pain is moderate to severe intermittent worse with activity somewhat better with rest he states it feels exactly like the other shoulder does symptoms have worsened over the last several weeks.  He has done anti-inflammatory done rest he is done the prior exercises that he knows and he did review with me what he was doing and they are spot what physical therapy would be doing at this junction.  His past medical history is marked for ADHD hypertension hemorrhoids      Allergies: No Known Allergies    Medications:   Home Medications:  Current Outpatient Medications on File Prior to Visit   Medication Sig   • amphetamine-dextroamphetamine (Adderall) 10 MG tablet Take 1 tablet by mouth Daily With Lunch.   • amphetamine-dextroamphetamine XR (ADDERALL XR) 30 MG 24 hr capsule Take 1 capsule by mouth Every Morning   • lisinopril (PRINIVIL,ZESTRIL) 10 MG tablet Take 1 tablet by mouth Daily.     No current facility-administered medications on file prior to visit.     Current Medications:  Scheduled Meds:  Continuous Infusions:No current facility-administered medications for this visit.    PRN Meds:.    Past Medical History:   Diagnosis Date   • ADHD    • ADHD    • Bursitis of elbow     right elbow   • Hemorrhoids    • Hypertension     B/P OK SINCE STOPPED SMOKING-NO MEDS CURRENTLY   • Rotator cuff tear         Past Surgical History:   Procedure  "Laterality Date   • HEMORRHOIDECTOMY N/A 2016    Procedure: HEMORRHOIDECTOMY;  Surgeon: Atilio Smith MD;  Location: [facility] OR;  Service:    • SHOULDER ARTHROSCOPY W/ ROTATOR CUFF REPAIR Right 2017    Procedure: SHOULDER ARTHROSCOPY WITH MINI OPEN ROTATOR CUFF REPAIR  DEBRIDEMENT OF LABRUM DEBRIDEMENT OF SUBSCAPULARIS DECOMPRESSION;  Surgeon: Pily Cordero MD;  [facility];  Service:         Social History     Occupational History   • Not on file   Tobacco Use   • Smoking status: Current Every Day Smoker     Packs/day: 1.00     Years: 10.00     Pack years: 10.00     Types: Cigarettes     Last attempt to quit: 2017     Years since quittin.5   • Smokeless tobacco: Current User   Vaping Use   • Vaping Use: Never used   Substance and Sexual Activity   • Alcohol use: Yes     Comment: ON OCC   • Drug use: No   • Sexual activity: Defer      Social History     Social History Narrative   • Not on file      History reviewed. No pertinent family history.          Review of Systems: 14 point view of systems remarkable for shoulder pain    Review of Systems      Physical Exam: 36 y.o. male  General Appearance:    Alert, cooperative, in no acute distress                   Vitals:    22 1027   Temp: 98.6 °F (37 °C)   Weight: 68.9 kg (152 lb)   Height: 188 cm (74\")   PainSc:   7      Patient is alert and read ×3 no acute distress appears her above-listed at height weight and age.  Affect is normal respiratory rate is normal unlabored. Heart rate regular rate rhythm, sclera, dentition and hearing are normal for the purpose of this exam.    Ortho Exam Physical exam of the left shoulder reveals no overlying skin changes no lymphedema no lymphadenopathy.  Patient has active flexion 180 with mild symptoms and with some substitution abduction is similar external rotation is to 50 and internal rotation to the upper lumbar spine with mild symptoms.  Patient has good rotator cuff strength 4+ " over 5 with isometric strength testing with pain.  Patient has a positive impingement and a positive Che sign.  Patient has good cervical range of motion which is full and asymptomatic no radicular symptoms.  Patient has a normal elbow exam.  Good distal pulses are presentPatient has pain with overhead activity and a positive Neer sign and a positive empty can sign  They have a positive drop arm any definitive painful arc      Procedures          Radiology:   AP, Scapular Y and Axillary Lateral of the left shoulder were ordered/reviewed to evauate shoulder pain.  I have no comparative films these are normal  Imaging Results (Most Recent)     Procedure Component Value Units Date/Time    XR Shoulder 2+ View Left [[id number]] Resulted: 06/03/22 0907     Updated: 06/03/22 1001    Impression:      Ordering physician's impression is located in the Encounter Note dated 06/03/22. X-ray performed in the DR room.          Assessment/Plan: Left shoulder pain I do think this is rotator cuff I think he does substitute his cuff is not firing very well with forward flexion.  He has done all the conservative measures including a good regimen of exercises which he reviewed with me and I agree with the ones that he was doing I think this is first at physical therapy.  I think he has failed all of these I think he probably does have exactly what he had on the other side which is a rotator cuff tear plan is to proceed with an MRI he is working without restrictions

## 2022-06-13 ENCOUNTER — TELEPHONE (OUTPATIENT)
Dept: ORTHOPEDIC SURGERY | Facility: CLINIC | Age: 37
End: 2022-06-13

## 2022-06-13 NOTE — TELEPHONE ENCOUNTER
----- Message from Pily Cordero MD sent at 6/10/2022  5:18 PM EDT -----  Please tell him he does have a tear of the rotator cuff and I would recommend fixing this I would like to discuss further in follow-up

## 2022-06-17 ENCOUNTER — OFFICE VISIT (OUTPATIENT)
Dept: ORTHOPEDIC SURGERY | Facility: CLINIC | Age: 37
End: 2022-06-17

## 2022-06-17 VITALS — BODY MASS INDEX: 20.29 KG/M2 | WEIGHT: 158.1 LBS | TEMPERATURE: 97.7 F | HEIGHT: 74 IN

## 2022-06-17 DIAGNOSIS — S46.012D TRAUMATIC COMPLETE TEAR OF LEFT ROTATOR CUFF, SUBSEQUENT ENCOUNTER: Primary | ICD-10-CM

## 2022-06-17 PROCEDURE — 99213 OFFICE O/P EST LOW 20 MIN: CPT | Performed by: ORTHOPAEDIC SURGERY

## 2022-06-17 RX ORDER — CEFAZOLIN SODIUM 2 G/100ML
2 INJECTION, SOLUTION INTRAVENOUS ONCE
Status: CANCELLED | OUTPATIENT
Start: 2022-07-27 | End: 2022-06-17

## 2022-06-17 NOTE — PROGRESS NOTES
Patient: Alvaro Morris  YOB: 1985  Date of Service: 6/17/2022    Chief Complaints: Left shoulder pain  Subjective:    History of Present Illness: Pt is seen in the office today with complaints of left shoulder pain he is here for follow-up MRI.  MRI demonstrates a full-thickness tear of the supraspinatus.  He has had the similar injury on his right shoulder and did great from a repair.  He wants to get this fixed ASAP.          Allergies: No Known Allergies    Medications:   Home Medications:  Current Outpatient Medications on File Prior to Visit   Medication Sig   • amphetamine-dextroamphetamine (Adderall) 10 MG tablet Take 1 tablet by mouth Daily With Lunch.   • amphetamine-dextroamphetamine XR (ADDERALL XR) 30 MG 24 hr capsule Take 1 capsule by mouth Every Morning   • lisinopril (PRINIVIL,ZESTRIL) 10 MG tablet Take 1 tablet by mouth Daily.     No current facility-administered medications on file prior to visit.     Current Medications:  Scheduled Meds:  Continuous Infusions:No current facility-administered medications for this visit.    PRN Meds:.    I have reviewed the patient's medical history in detail and updated the computerized patient record.  Review and summarization of old records include:    Past Medical History:   Diagnosis Date   • ADHD    • ADHD    • Bursitis of elbow     right elbow   • Hemorrhoids    • Hypertension     B/P OK SINCE STOPPED SMOKING-NO MEDS CURRENTLY   • Rotator cuff tear         Past Surgical History:   Procedure Laterality Date   • HEMORRHOIDECTOMY N/A 6/23/2016    Procedure: HEMORRHOIDECTOMY;  Surgeon: Atilio Smith MD;  Location: [facility];  Service:    • SHOULDER ARTHROSCOPY W/ ROTATOR CUFF REPAIR Right 12/4/2017    Procedure: SHOULDER ARTHROSCOPY WITH MINI OPEN ROTATOR CUFF REPAIR  DEBRIDEMENT OF LABRUM DEBRIDEMENT OF SUBSCAPULARIS DECOMPRESSION;  Surgeon: Pily Cordero MD;  Location: [facility];  Service:         Social History      Occupational History   • Not on file   Tobacco Use   • Smoking status: Current Every Day Smoker     Packs/day: 1.00     Years: 10.00     Pack years: 10.00     Types: Cigarettes     Last attempt to quit: 2017     Years since quittin.6   • Smokeless tobacco: Current User   Vaping Use   • Vaping Use: Never used   Substance and Sexual Activity   • Alcohol use: Yes     Comment: ON OCC   • Drug use: No   • Sexual activity: Defer      Social History     Social History Narrative   • Not on file      No family history on file.    ROS: 14 point review of systems was performed and was negative except for documented findings in HPI and today's encounter.     Allergies: No Known Allergies  Constitutional:  Denies fever, shaking or chills   Eyes:  Denies change in visual acuity   HENT:  Denies nasal congestion or sore throat   Respiratory:  Denies cough or shortness of breath   Cardiovascular:  Denies chest pain or severe LE edema   GI:  Denies abdominal pain, nausea, vomiting, bloody stools or diarrhea   Musculoskeletal:  Numbness, tingling, or loss of motor function only as noted above in history of present illness.  : Denies painful urination or hematuria  Integument:  Denies rash, lesion or ulceration   Neurologic:  Denies headache or focal weakness  Endocrine:  Denies lymphadenopathy  Psych:  Denies confusion or change in mental status   Hem:  Denies active bleeding      Physical Exam: 36 y.o. male  Wt Readings from Last 3 Encounters:   22 68.9 kg (152 lb)   22 71.2 kg (157 lb)   12/15/21 72.9 kg (160 lb 12.8 oz)       There is no height or weight on file to calculate BMI.  No height and weight on file for this encounter.  There were no vitals filed for this visit.  Vital signs reviewed.   General Appearance:    Alert, cooperative, in no acute distress                    Ortho exam    Physical exam of the left shoulder reveals no overlying skin changes no lymphedema no lymphadenopathy.  Patient has  "active flexion 180 with mild symptoms abduction is similar external rotation is to 50 and internal rotation to the upper lumbar spine with mild symptoms.  Patient has good rotator cuff strength 4+ over 5 with isometric strength testing with pain.  Patient has a positive impingement and a positive Che sign.  Patient has good cervical range of motion which is full and asymptomatic no radicular symptoms.  Patient has a normal elbow exam.  Good distal pulses are presentPatient has pain with overhead activity and a positive Neer sign and a positive empty can sign  They have a positive drop arm any definitive painful arc  Physical Exam: 36 y.o. male  General Appearance:    Alert, cooperative, in no acute distress                      Vitals:    06/17/22 1022   Temp: 97.7 °F (36.5 °C)   Weight: 71.7 kg (158 lb 1.6 oz)   Height: 188 cm (74\")   PainSc:   7        Head:    Normocephalic, without obvious abnormality, atraumatic   Eyes:            conjunctivae and sclerae normal, no pallor, corneas clear,    Ears:    Ears appear intact with no abnormalities noted   Throat:   No oral lesions, no thrush, oral mucosa moist   Neck:   No adenopathy, supple, trachea midline, no thyromegaly,    Back:     No kyphosis present, no scoliosis present, no skin lesions,      erythema or scars, no tenderness to percussion or                   palpation,   range of motion normal   Lungs:     ,respirations regular, even and                  unlabored    Heart:    Regular rhythm and normal rate               Chest Wall:    No abnormalities observed               Pulses:   Pulses palpable and equal bilaterally   Skin:   No bleeding, bruising or rash   Lymph nodes:   No palpable adenopathy   Neurologic:   Appears neurologic intact            .time    Assessment: Left shoulder rotator cuff repair I do think we need to fix this which he is in agreement to and wants to do it ASAP.  He does remember the perioperative regimen from his right " shoulder.  We did discuss detail risk benefits and alternative they are similar to what he did before The patient voiced understanding of the risks, benefits, and alternative forms of treatment that were discussed and the patient consents to proceed with the above listed surgery.  All risks, benefits and alternatives were discussed.  Risks including to but not exclusive to anesthetic complications, including death, MI, CVA, infection, bleeding DVT, fracture, residual pain and need for future surgery.  He understands and agrees to proceed  Plan:   Follow up as indicated.  Ice, elevate, and rest as needed.  Discussed conservative measures of pain control including ice, bracing.  Also talked about the importance of strengthening and maintaining ideal body weight    Pily Cordero M.D.

## 2022-06-22 ENCOUNTER — TELEPHONE (OUTPATIENT)
Dept: ORTHOPEDIC SURGERY | Facility: CLINIC | Age: 37
End: 2022-06-22

## 2022-06-30 ENCOUNTER — TELEPHONE (OUTPATIENT)
Dept: ORTHOPEDIC SURGERY | Facility: CLINIC | Age: 37
End: 2022-06-30

## 2022-06-30 NOTE — TELEPHONE ENCOUNTER
Caller: LAURA    Relationship: [facility]     Best call back number: [phone]    What form or medical record are you requestin/17/22 OFFICE NOTES AND WORK STATUS     Who is requesting this form or medical record from you: [facility], ATTN LAURA     How would you like to receive the form or medical records (pick-up, mail, fax)  [phone] FAX  ATTN EDMUNDO SANCHEZ [id number]    Additional notes: PER CHART, PREV FAXED .  LAURA HAS NOT RECEIVED AND PROVIDED AN UPDATED FAX NUMBER [phone].  PLEASE REFAX, THANK YOU

## 2022-07-08 ENCOUNTER — TELEPHONE (OUTPATIENT)
Dept: ORTHOPEDIC SURGERY | Facility: CLINIC | Age: 37
End: 2022-07-08

## 2022-07-15 PROBLEM — S46.012A TRAUMATIC COMPLETE TEAR OF LEFT ROTATOR CUFF: Status: ACTIVE | Noted: 2022-07-15

## 2022-07-19 ENCOUNTER — APPOINTMENT (OUTPATIENT)
Dept: LAB | Facility: HOSPITAL | Age: 37
End: 2022-07-19

## 2022-07-21 ENCOUNTER — PRE-ADMISSION TESTING (OUTPATIENT)
Dept: PREADMISSION TESTING | Facility: HOSPITAL | Age: 37
End: 2022-07-21

## 2022-07-21 VITALS
HEIGHT: 74 IN | TEMPERATURE: 97.5 F | HEART RATE: 84 BPM | OXYGEN SATURATION: 100 % | WEIGHT: 149 LBS | RESPIRATION RATE: 16 BRPM | BODY MASS INDEX: 19.12 KG/M2 | DIASTOLIC BLOOD PRESSURE: 84 MMHG | SYSTOLIC BLOOD PRESSURE: 140 MMHG

## 2022-07-21 LAB
ANION GAP SERPL CALCULATED.3IONS-SCNC: 9.6 MMOL/L (ref 5–15)
BUN SERPL-MCNC: 13 MG/DL (ref 6–20)
BUN/CREAT SERPL: 19.4 (ref 7–25)
CALCIUM SPEC-SCNC: 9.5 MG/DL (ref 8.6–10.5)
CHLORIDE SERPL-SCNC: 103 MMOL/L (ref 98–107)
CO2 SERPL-SCNC: 29.4 MMOL/L (ref 22–29)
CREAT SERPL-MCNC: 0.67 MG/DL (ref 0.76–1.27)
DEPRECATED RDW RBC AUTO: 45.6 FL (ref 37–54)
EGFRCR SERPLBLD CKD-EPI 2021: 123.3 ML/MIN/1.73
ERYTHROCYTE [DISTWIDTH] IN BLOOD BY AUTOMATED COUNT: 13 % (ref 12.3–15.4)
GLUCOSE SERPL-MCNC: 77 MG/DL (ref 65–99)
HCT VFR BLD AUTO: 40.4 % (ref 37.5–51)
HGB BLD-MCNC: 13.8 G/DL (ref 13–17.7)
MCH RBC QN AUTO: 32.6 PG (ref 26.6–33)
MCHC RBC AUTO-ENTMCNC: 34.2 G/DL (ref 31.5–35.7)
MCV RBC AUTO: 95.5 FL (ref 79–97)
PLATELET # BLD AUTO: 177 10*3/MM3 (ref 140–450)
PMV BLD AUTO: 9.8 FL (ref 6–12)
POTASSIUM SERPL-SCNC: 3.7 MMOL/L (ref 3.5–5.2)
QT INTERVAL: 418 MS
RBC # BLD AUTO: 4.23 10*6/MM3 (ref 4.14–5.8)
SODIUM SERPL-SCNC: 142 MMOL/L (ref 136–145)
WBC NRBC COR # BLD: 4.74 10*3/MM3 (ref 3.4–10.8)

## 2022-07-21 PROCEDURE — 93010 ELECTROCARDIOGRAM REPORT: CPT | Performed by: INTERNAL MEDICINE

## 2022-07-21 PROCEDURE — 80048 BASIC METABOLIC PNL TOTAL CA: CPT

## 2022-07-21 PROCEDURE — 36415 COLL VENOUS BLD VENIPUNCTURE: CPT

## 2022-07-21 PROCEDURE — 85027 COMPLETE CBC AUTOMATED: CPT

## 2022-07-21 PROCEDURE — 93005 ELECTROCARDIOGRAM TRACING: CPT

## 2022-07-21 RX ORDER — MULTIPLE VITAMINS W/ MINERALS TAB 9MG-400MCG
1 TAB ORAL DAILY
COMMUNITY

## 2022-07-21 NOTE — DISCHARGE INSTRUCTIONS
Take the following medications the morning of surgery:      NONE    TIME OF ARRIVAL 6:30    If you are on prescription narcotic pain medication to control your pain you may also take that medication the morning of surgery.    General Instructions:  Do not eat solid food after midnight the night before surgery.  You may drink clear liquids day of surgery but must stop at least one hour before your hospital arrival time.  It is beneficial for you to have a clear drink that contains carbohydrates the day of surgery.  We suggest a 12 to 20 ounce bottle of Gatorade or Powerade for non-diabetic patients or a 12 to 20 ounce bottle of G2 or Powerade Zero for diabetic patients. (Pediatric patients, are not advised to drink a 12 to 20 ounce carbohydrate drink)    Clear liquids are liquids you can see through.  Nothing red in color.     Plain water                               Sports drinks  Sodas                                   Gelatin (Jell-O)  Fruit juices without pulp such as white grape juice and apple juice  Popsicles that contain no fruit or yogurt  Tea or coffee (no cream or milk added)  Gatorade / Powerade  G2 / Powerade Zero      Patients who avoid smoking, chewing tobacco and alcohol for 4 weeks prior to surgery have a reduced risk of post-operative complications.  Quit smoking as many days before surgery as you can.  Do not smoke, use chewing tobacco or drink alcohol the day of surgery.   If applicable bring your C-PAP/ BI-PAP machine.  Bring any papers given to you in the doctor’s office.  Wear clean comfortable clothes.  Do not wear contact lenses, false eyelashes or make-up.  Bring a case for your glasses.   Bring crutches or walker if applicable.  Remove all piercings.  Leave jewelry and any other valuables at home.  Hair extensions with metal clips must be removed prior to surgery.  The Pre-Admission Testing nurse will instruct you to bring medications if unable to obtain an accurate list in Pre-Admission  Testing.            Preventing a Surgical Site Infection:  For 2 to 3 days before surgery, avoid shaving with a razor because the razor can irritate skin and make it easier to develop an infection.    Any areas of open skin can increase the risk of a post-operative wound infection by allowing bacteria to enter and travel throughout the body.  Notify your surgeon if you have any skin wounds / rashes even if it is not near the expected surgical site.  The area will need assessed to determine if surgery should be delayed until it is healed.  The night prior to surgery shower using a fresh bar of anti-bacterial soap (such as Dial) and clean washcloth.  Sleep in a clean bed with clean clothing.  Do not allow pets to sleep with you.  Shower on the morning of surgery using a fresh bar of anti-bacterial soap (such as Dial) and clean washcloth.  Dry with a clean towel and dress in clean clothing.  Ask your surgeon if you will be receiving antibiotics prior to surgery.  Make sure you, your family, and all healthcare providers clean their hands with soap and water or an alcohol based hand  before caring for you or your wound.    Day of surgery:  Your arrival time is approximately two hours before your scheduled surgery time.  Upon arrival, a Pre-op nurse and Anesthesiologist will review your health history, obtain vital signs, and answer questions you may have.  The only belongings needed at this time will be a list of your home medications and if applicable your C-PAP/BI-PAP machine.  A Pre-op nurse will start an IV and you may receive medication in preparation for surgery, including something to help you relax.     Please be aware that surgery does come with discomfort.  We want to make every effort to control your discomfort so please discuss any uncontrolled symptoms with your nurse.   Your doctor will most likely have prescribed pain medications.      If you are going home after surgery you will receive  individualized written care instructions before being discharged.  A responsible adult must drive you to and from the hospital on the day of your surgery and stay with you for 24 hours.  Discharge prescriptions can be filled by the hospital pharmacy during regular pharmacy hours.  If you are having surgery late in the day/evening your prescription may be e-prescribed to your pharmacy.  Please verify your pharmacy hours or chose a 24 hour pharmacy to avoid not having access to your prescription because your pharmacy has closed for the day.    If you are staying overnight following surgery, you will be transported to your hospital room following the recovery period.  [facility] has all private rooms.    If you have any questions please call Pre-Admission Testing at [phone].  Deductibles and co-payments are collected on the day of service. Please be prepared to pay the required co-pay, deductible or deposit on the day of service as defined by your plan.    Patient Education for Self-Quarantine Process    Following your COVID testing, we strongly recommend that you wear a mask when you are with other people and practice social distancing.   Limit your activities to only required outings.  Wash your hands with soap and water frequently for at least 20 seconds.   Avoid touching your eyes, nose and mouth with unwashed hands.  Do not share anything - utensils, drinking glasses, food from the same bowl.   Sanitize household surfaces daily. Include all high touch areas (door handles, light switches, phones, countertops, etc.)    Call your surgeon immediately if you experience any of the following symptoms:  Sore Throat  Shortness of Breath or difficulty breathing  Cough  Chills  Body soreness or muscle pain  Headache  Fever  New loss of taste or smell  Do not arrive for your surgery ill.  Your procedure will need to be rescheduled to another time.  You will need to call your physician before the day of  surgery to avoid any unnecessary exposure to hospital staff as well as other patients.

## 2022-07-26 ENCOUNTER — TELEPHONE (OUTPATIENT)
Dept: ORTHOPEDIC SURGERY | Facility: CLINIC | Age: 37
End: 2022-07-26

## 2022-07-26 ENCOUNTER — LAB (OUTPATIENT)
Dept: LAB | Facility: HOSPITAL | Age: 37
End: 2022-07-26

## 2022-07-26 DIAGNOSIS — S46.012D TRAUMATIC COMPLETE TEAR OF LEFT ROTATOR CUFF, SUBSEQUENT ENCOUNTER: ICD-10-CM

## 2022-07-26 LAB — SARS-COV-2 ORF1AB RESP QL NAA+PROBE: NOT DETECTED

## 2022-07-26 PROCEDURE — U0004 COV-19 TEST NON-CDC HGH THRU: HCPCS

## 2022-07-26 PROCEDURE — C9803 HOPD COVID-19 SPEC COLLECT: HCPCS

## 2022-07-26 NOTE — TELEPHONE ENCOUNTER
Contacted Dr. Banks's office regarding patient's preop EKG.  He has reviewed the EKG and patient is okay to proceed with surgery

## 2022-07-27 ENCOUNTER — ANESTHESIA (OUTPATIENT)
Dept: PERIOP | Facility: HOSPITAL | Age: 37
End: 2022-07-27

## 2022-07-27 ENCOUNTER — ANESTHESIA EVENT (OUTPATIENT)
Dept: PERIOP | Facility: HOSPITAL | Age: 37
End: 2022-07-27

## 2022-07-27 ENCOUNTER — HOSPITAL ENCOUNTER (OUTPATIENT)
Facility: HOSPITAL | Age: 37
Setting detail: HOSPITAL OUTPATIENT SURGERY
Discharge: HOME | End: 2022-07-27
Attending: ORTHOPAEDIC SURGERY | Admitting: ORTHOPAEDIC SURGERY

## 2022-07-27 VITALS
RESPIRATION RATE: 16 BRPM | HEART RATE: 71 BPM | SYSTOLIC BLOOD PRESSURE: 150 MMHG | DIASTOLIC BLOOD PRESSURE: 90 MMHG | OXYGEN SATURATION: 100 % | TEMPERATURE: 97.8 F

## 2022-07-27 DIAGNOSIS — S46.012D TRAUMATIC COMPLETE TEAR OF LEFT ROTATOR CUFF, SUBSEQUENT ENCOUNTER: ICD-10-CM

## 2022-07-27 PROCEDURE — C9290 INJ, BUPIVACAINE LIPOSOME: HCPCS | Performed by: ANESTHESIOLOGY

## 2022-07-27 PROCEDURE — 23412 REPAIR ROTATOR CUFF CHRONIC: CPT | Performed by: ORTHOPAEDIC SURGERY

## 2022-07-27 PROCEDURE — 25010000002 PROPOFOL 10 MG/ML EMULSION: Performed by: NURSE ANESTHETIST, CERTIFIED REGISTERED

## 2022-07-27 PROCEDURE — 25010000002 FENTANYL CITRATE (PF) 50 MCG/ML SOLUTION: Performed by: ANESTHESIOLOGY

## 2022-07-27 PROCEDURE — 0 BUPIVACAINE LIPOSOME 1.3 % SUSPENSION: Performed by: ANESTHESIOLOGY

## 2022-07-27 PROCEDURE — 25010000002 DEXAMETHASONE PER 1 MG: Performed by: NURSE ANESTHETIST, CERTIFIED REGISTERED

## 2022-07-27 PROCEDURE — 29822 SHO ARTHRS SRG LMTD DBRDMT: CPT | Performed by: ORTHOPAEDIC SURGERY

## 2022-07-27 PROCEDURE — 76942 ECHO GUIDE FOR BIOPSY: CPT | Performed by: ORTHOPAEDIC SURGERY

## 2022-07-27 PROCEDURE — 25010000002 KETOROLAC TROMETHAMINE PER 15 MG: Performed by: NURSE ANESTHETIST, CERTIFIED REGISTERED

## 2022-07-27 PROCEDURE — 25010000002 ONDANSETRON PER 1 MG: Performed by: NURSE ANESTHETIST, CERTIFIED REGISTERED

## 2022-07-27 PROCEDURE — 25010000002 CEFAZOLIN PER 500 MG: Performed by: ORTHOPAEDIC SURGERY

## 2022-07-27 PROCEDURE — C1713 ANCHOR/SCREW BN/BN,TIS/BN: HCPCS | Performed by: ORTHOPAEDIC SURGERY

## 2022-07-27 PROCEDURE — 25010000002 MIDAZOLAM PER 1 MG: Performed by: ANESTHESIOLOGY

## 2022-07-27 DEVICE — SPEEDBRG IMP SYS W/BIO-COMP SWVLK
Type: IMPLANTABLE DEVICE | Site: SHOULDER | Status: FUNCTIONAL
Brand: ARTHREX®

## 2022-07-27 DEVICE — SUT FW 2/0 38IN 1BLU 1BLK/WHT  AR7201: Type: IMPLANTABLE DEVICE | Site: SHOULDER | Status: FUNCTIONAL

## 2022-07-27 RX ORDER — ACETAMINOPHEN 500 MG
1000 TABLET ORAL 2 TIMES DAILY
Qty: 40 TABLET | Refills: 0 | Status: SHIPPED | OUTPATIENT
Start: 2022-07-27

## 2022-07-27 RX ORDER — CEFAZOLIN SODIUM 2 G/100ML
2 INJECTION, SOLUTION INTRAVENOUS ONCE
Status: COMPLETED | OUTPATIENT
Start: 2022-07-27 | End: 2022-07-27

## 2022-07-27 RX ORDER — ACETAMINOPHEN 500 MG
500 TABLET ORAL ONCE
Status: COMPLETED | OUTPATIENT
Start: 2022-07-27 | End: 2022-07-27

## 2022-07-27 RX ORDER — BUPIVACAINE HYDROCHLORIDE 2.5 MG/ML
INJECTION, SOLUTION EPIDURAL; INFILTRATION; INTRACAUDAL
Status: COMPLETED | OUTPATIENT
Start: 2022-07-27 | End: 2022-07-27

## 2022-07-27 RX ORDER — KETOROLAC TROMETHAMINE 30 MG/ML
INJECTION, SOLUTION INTRAMUSCULAR; INTRAVENOUS AS NEEDED
Status: DISCONTINUED | OUTPATIENT
Start: 2022-07-27 | End: 2022-07-27 | Stop reason: SURG

## 2022-07-27 RX ORDER — NALBUPHINE HCL 10 MG/ML
10 AMPUL (ML) INJECTION EVERY 4 HOURS PRN
Status: DISCONTINUED | OUTPATIENT
Start: 2022-07-27 | End: 2022-07-27 | Stop reason: HOSPADM

## 2022-07-27 RX ORDER — PROPOFOL 10 MG/ML
VIAL (ML) INTRAVENOUS AS NEEDED
Status: DISCONTINUED | OUTPATIENT
Start: 2022-07-27 | End: 2022-07-27 | Stop reason: SURG

## 2022-07-27 RX ORDER — FENTANYL CITRATE 50 UG/ML
50 INJECTION, SOLUTION INTRAMUSCULAR; INTRAVENOUS
Status: DISCONTINUED | OUTPATIENT
Start: 2022-07-27 | End: 2022-07-27 | Stop reason: HOSPADM

## 2022-07-27 RX ORDER — SODIUM CHLORIDE, SODIUM LACTATE, POTASSIUM CHLORIDE, CALCIUM CHLORIDE 600; 310; 30; 20 MG/100ML; MG/100ML; MG/100ML; MG/100ML
9 INJECTION, SOLUTION INTRAVENOUS CONTINUOUS
Status: DISCONTINUED | OUTPATIENT
Start: 2022-07-27 | End: 2022-07-27 | Stop reason: HOSPADM

## 2022-07-27 RX ORDER — OXYCODONE HYDROCHLORIDE AND ACETAMINOPHEN 5; 325 MG/1; MG/1
TABLET ORAL
Qty: 40 TABLET | Refills: 0 | Status: SHIPPED | OUTPATIENT
Start: 2022-07-27 | End: 2022-08-01 | Stop reason: SDUPTHER

## 2022-07-27 RX ORDER — MIDAZOLAM HYDROCHLORIDE 1 MG/ML
1 INJECTION INTRAMUSCULAR; INTRAVENOUS
Status: COMPLETED | OUTPATIENT
Start: 2022-07-27 | End: 2022-07-27

## 2022-07-27 RX ORDER — ONDANSETRON 2 MG/ML
4 INJECTION INTRAMUSCULAR; INTRAVENOUS ONCE AS NEEDED
Status: DISCONTINUED | OUTPATIENT
Start: 2022-07-27 | End: 2022-07-27 | Stop reason: HOSPADM

## 2022-07-27 RX ORDER — HYDROCODONE BITARTRATE AND ACETAMINOPHEN 5; 325 MG/1; MG/1
1 TABLET ORAL ONCE AS NEEDED
Status: DISCONTINUED | OUTPATIENT
Start: 2022-07-27 | End: 2022-07-27 | Stop reason: HOSPADM

## 2022-07-27 RX ORDER — NALOXONE HCL 0.4 MG/ML
0.4 VIAL (ML) INJECTION AS NEEDED
Status: DISCONTINUED | OUTPATIENT
Start: 2022-07-27 | End: 2022-07-27 | Stop reason: HOSPADM

## 2022-07-27 RX ORDER — NALBUPHINE HCL 10 MG/ML
2 AMPUL (ML) INJECTION EVERY 4 HOURS PRN
Status: DISCONTINUED | OUTPATIENT
Start: 2022-07-27 | End: 2022-07-27 | Stop reason: HOSPADM

## 2022-07-27 RX ORDER — WOUND DRESSING ADHESIVE - LIQUID
LIQUID MISCELLANEOUS AS NEEDED
Status: DISCONTINUED | OUTPATIENT
Start: 2022-07-27 | End: 2022-07-27 | Stop reason: HOSPADM

## 2022-07-27 RX ORDER — LIDOCAINE HYDROCHLORIDE 10 MG/ML
0.5 INJECTION, SOLUTION EPIDURAL; INFILTRATION; INTRACAUDAL; PERINEURAL ONCE AS NEEDED
Status: DISCONTINUED | OUTPATIENT
Start: 2022-07-27 | End: 2022-07-27 | Stop reason: HOSPADM

## 2022-07-27 RX ORDER — SODIUM CHLORIDE 0.9 % (FLUSH) 0.9 %
10 SYRINGE (ML) INJECTION AS NEEDED
Status: DISCONTINUED | OUTPATIENT
Start: 2022-07-27 | End: 2022-07-27 | Stop reason: HOSPADM

## 2022-07-27 RX ORDER — SODIUM CHLORIDE 0.9 % (FLUSH) 0.9 %
10 SYRINGE (ML) INJECTION EVERY 12 HOURS SCHEDULED
Status: DISCONTINUED | OUTPATIENT
Start: 2022-07-27 | End: 2022-07-27 | Stop reason: HOSPADM

## 2022-07-27 RX ORDER — DEXAMETHASONE SODIUM PHOSPHATE 10 MG/ML
INJECTION INTRAMUSCULAR; INTRAVENOUS AS NEEDED
Status: DISCONTINUED | OUTPATIENT
Start: 2022-07-27 | End: 2022-07-27 | Stop reason: SURG

## 2022-07-27 RX ORDER — DIPHENHYDRAMINE HYDROCHLORIDE 50 MG/ML
12.5 INJECTION INTRAMUSCULAR; INTRAVENOUS
Status: DISCONTINUED | OUTPATIENT
Start: 2022-07-27 | End: 2022-07-27 | Stop reason: HOSPADM

## 2022-07-27 RX ORDER — HYDROMORPHONE HYDROCHLORIDE 1 MG/ML
0.25 INJECTION, SOLUTION INTRAMUSCULAR; INTRAVENOUS; SUBCUTANEOUS
Status: DISCONTINUED | OUTPATIENT
Start: 2022-07-27 | End: 2022-07-27 | Stop reason: HOSPADM

## 2022-07-27 RX ORDER — HYDRALAZINE HYDROCHLORIDE 20 MG/ML
5 INJECTION INTRAMUSCULAR; INTRAVENOUS
Status: DISCONTINUED | OUTPATIENT
Start: 2022-07-27 | End: 2022-07-27 | Stop reason: HOSPADM

## 2022-07-27 RX ORDER — CELECOXIB 200 MG/1
200 CAPSULE ORAL 2 TIMES DAILY
Qty: 30 CAPSULE | Refills: 0 | Status: SHIPPED | OUTPATIENT
Start: 2022-07-27 | End: 2022-08-01

## 2022-07-27 RX ORDER — LIDOCAINE HYDROCHLORIDE 20 MG/ML
INJECTION, SOLUTION INFILTRATION; PERINEURAL AS NEEDED
Status: DISCONTINUED | OUTPATIENT
Start: 2022-07-27 | End: 2022-07-27 | Stop reason: SURG

## 2022-07-27 RX ORDER — GABAPENTIN 300 MG/1
300 CAPSULE ORAL 2 TIMES DAILY
Qty: 60 CAPSULE | Refills: 0 | Status: SHIPPED | OUTPATIENT
Start: 2022-07-27

## 2022-07-27 RX ORDER — ONDANSETRON 2 MG/ML
INJECTION INTRAMUSCULAR; INTRAVENOUS AS NEEDED
Status: DISCONTINUED | OUTPATIENT
Start: 2022-07-27 | End: 2022-07-27 | Stop reason: SURG

## 2022-07-27 RX ADMIN — MIDAZOLAM 1 MG: 1 INJECTION INTRAMUSCULAR; INTRAVENOUS at 06:05

## 2022-07-27 RX ADMIN — CEFAZOLIN 2 G: 10 INJECTION, POWDER, FOR SOLUTION INTRAVENOUS at 07:00

## 2022-07-27 RX ADMIN — MIDAZOLAM 1 MG: 1 INJECTION INTRAMUSCULAR; INTRAVENOUS at 06:30

## 2022-07-27 RX ADMIN — ACETAMINOPHEN 500 MG: 500 TABLET ORAL at 06:06

## 2022-07-27 RX ADMIN — PROPOFOL 100 MG: 10 INJECTION, EMULSION INTRAVENOUS at 07:04

## 2022-07-27 RX ADMIN — ONDANSETRON 4 MG: 2 INJECTION INTRAMUSCULAR; INTRAVENOUS at 07:49

## 2022-07-27 RX ADMIN — FENTANYL CITRATE 50 MCG: 50 INJECTION INTRAMUSCULAR; INTRAVENOUS at 06:30

## 2022-07-27 RX ADMIN — KETOROLAC TROMETHAMINE 30 MG: 30 INJECTION, SOLUTION INTRAMUSCULAR at 07:49

## 2022-07-27 RX ADMIN — DEXAMETHASONE SODIUM PHOSPHATE 8 MG: 10 INJECTION INTRAMUSCULAR; INTRAVENOUS at 07:49

## 2022-07-27 RX ADMIN — BUPIVACAINE 10 ML: 13.3 INJECTION, SUSPENSION, LIPOSOMAL INFILTRATION at 06:43

## 2022-07-27 RX ADMIN — BUPIVACAINE HYDROCHLORIDE 10 ML: 2.5 INJECTION, SOLUTION EPIDURAL; INFILTRATION; INTRACAUDAL; PERINEURAL at 06:43

## 2022-07-27 RX ADMIN — LIDOCAINE HYDROCHLORIDE 60 MG: 20 INJECTION, SOLUTION INFILTRATION; PERINEURAL at 07:02

## 2022-07-27 RX ADMIN — PROPOFOL 200 MG: 10 INJECTION, EMULSION INTRAVENOUS at 07:02

## 2022-07-27 NOTE — ANESTHESIA PROCEDURE NOTES
Peripheral Block    Pre-sedation assessment completed: 7/27/2022 6:30 AM    Patient reassessed immediately prior to procedure    Patient location during procedure: pre-op  Start time: 7/27/2022 6:30 AM  Stop time: 7/27/2022 6:38 AM  Reason for block: at surgeon's request and post-op pain management  Performed by  Anesthesiologist: Kyler Giraldo MD  Preanesthetic Checklist  Completed: patient identified, IV checked, site marked, risks and benefits discussed, surgical consent, monitors and equipment checked, pre-op evaluation and timeout performed  Prep:  Sterile barriers:gloves  Prep: ChloraPrep  Patient monitoring: blood pressure monitoring, continuous pulse oximetry and EKG  Procedure    Sedation: yes    Guidance:ultrasound guided    ULTRASOUND INTERPRETATION.  Using ultrasound guidance a 22 G gauge needle was placed in close proximity to the brachial plexus nerve, at which point, under ultrasound guidance anesthetic was injected in the area of the nerve and spread of the anesthesia was seen on ultrasound in close proximity thereto.  There were no abnormalities seen on ultrasound; a digital image was taken; and the patient tolerated the procedure with no complications. Images:still images obtained    Laterality:left  Block Type:interscalene  Injection Technique:single-shot  Needle Type:short-bevel  Needle Gauge:22 G      Medications Used: bupivacaine liposome (EXPAREL) 1.3 % injection, 10 mL  bupivacaine PF (MARCAINE) 0.25 % injection, 10 mL      Medications  Comment:Ultrasound Interpretation:  Using ultrasound guidance the needle was placed in close proximity to the nerve and anesthetic was injected in the area of the nerve and spread of the anesthetic was seen on ultrasound in close proximity thereto.  There were no abnormalities seen on ultrasound; a digital image was taken; and the patient tolerated the procedure with no complications.  .    Post Assessment  Injection Assessment: negative aspiration for  heme, no paresthesia on injection and incremental injection  Patient Tolerance:comfortable throughout block  Complications:no

## 2022-07-27 NOTE — ANESTHESIA POSTPROCEDURE EVALUATION
Patient: Alvaro Morris    Procedure Summary     Date: 07/27/22 Room / Location:  [facility] [address] [place] [address] [facility]    Anesthesia Start: 0658 Anesthesia Stop: 0826    Procedures:       SHOULDER ARTHROSCOPY WITH ROTATOR CUFF REPAIR (Left Shoulder)      UPPER EXTREMITY DEBRIDEMENT labrium (Left Arm Upper) Diagnosis:       Traumatic complete tear of left rotator cuff, subsequent encounter      (Traumatic complete tear of left rotator cuff, subsequent encounter [S46.012D])    Surgeons: Queta Cordero MD Provider: Kyler Giraldo MD    Anesthesia Type: general ASA Status: 2          Anesthesia Type: general    Vitals  Vitals Value Taken Time   /88 07/27/22 0900   Temp 36.6 °C (97.8 °F) 07/27/22 0900   Pulse 77 07/27/22 0915   Resp 16 07/27/22 0900   SpO2 100 % 07/27/22 0915   Vitals shown include unvalidated device data.        Post Anesthesia Care and Evaluation    Patient location during evaluation: bedside  Patient participation: complete - patient participated  Level of consciousness: awake and alert  Pain management: adequate    Airway patency: patent  Anesthetic complications: No anesthetic complications  PONV Status: controlled  Cardiovascular status: blood pressure returned to baseline and acceptable  Respiratory status: acceptable  Hydration status: acceptable

## 2022-07-27 NOTE — ANESTHESIA PROCEDURE NOTES
Airway  Urgency: elective    Date/Time: 7/27/2022 7:05 AM  Airway not difficult    General Information and Staff    Patient location during procedure: OR  Anesthesiologist: Render, Kylerkaycee Carvajal, MD  CRNA/CAA: Karlene Mejia CRNA    Indications and Patient Condition  Indications for airway management: airway protection    Preoxygenated: yes  MILS not maintained throughout  Mask difficulty assessment: 1 - vent by mask    Final Airway Details  Final airway type: supraglottic airway      Successful airway: classic  Size 5    Number of attempts at approach: 1  Assessment: lips, teeth, and gum same as pre-op    Additional Comments  Preoxygenation FEO2 >85, SIVI, LMA placed with ease, teeth/lips as preop. Secured and placement confirmed.

## 2022-07-27 NOTE — ANESTHESIA PREPROCEDURE EVALUATION
Anesthesia Evaluation     NPO Solid Status: > 8 hours             Airway   Mallampati: II  TM distance: >3 FB  Neck ROM: full  Comment: Supraglottic airway previously  Dental - normal exam     Pulmonary    (-) wheezes  Cardiovascular     ECG reviewed  Rhythm: regular    (+) hypertension,   (-) murmur      Neuro/Psych  GI/Hepatic/Renal/Endo      ROS Comment:     Musculoskeletal     Abdominal    Substance History      OB/GYN          Other                      Anesthesia Plan    ASA 2     general     (  D/W R&B of GA including but not limited to: heart, lung, liver, kidney, neurologic problems, positioning injuries, dental damage, corneal abrasion and TMJ.  .ISB for POPC)  intravenous induction     Anesthetic plan, risks, benefits, and alternatives have been provided, discussed and informed consent has been obtained with: patient.        CODE STATUS:

## 2022-07-31 DIAGNOSIS — F98.8 ATTENTION DEFICIT DISORDER, UNSPECIFIED HYPERACTIVITY PRESENCE: ICD-10-CM

## 2022-07-31 RX ORDER — DEXTROAMPHETAMINE SACCHARATE, AMPHETAMINE ASPARTATE, DEXTROAMPHETAMINE SULFATE AND AMPHETAMINE SULFATE 2.5; 2.5; 2.5; 2.5 MG/1; MG/1; MG/1; MG/1
10 TABLET ORAL
Qty: 30 TABLET | Refills: 0 | Status: CANCELLED | OUTPATIENT
Start: 2022-07-31

## 2022-08-01 DIAGNOSIS — S46.012D TRAUMATIC COMPLETE TEAR OF LEFT ROTATOR CUFF, SUBSEQUENT ENCOUNTER: ICD-10-CM

## 2022-08-01 RX ORDER — OXYCODONE HYDROCHLORIDE AND ACETAMINOPHEN 5; 325 MG/1; MG/1
TABLET ORAL
Qty: 40 TABLET | Refills: 0 | Status: CANCELLED | OUTPATIENT
Start: 2022-08-01

## 2022-08-01 RX ORDER — OXYCODONE HYDROCHLORIDE AND ACETAMINOPHEN 5; 325 MG/1; MG/1
TABLET ORAL
Qty: 40 TABLET | Refills: 0 | Status: SHIPPED | OUTPATIENT
Start: 2022-08-01 | End: 2022-08-02 | Stop reason: SDUPTHER

## 2022-08-01 RX ORDER — IBUPROFEN 600 MG/1
600 TABLET ORAL
Qty: 42 TABLET | Refills: 0 | Status: SHIPPED | OUTPATIENT
Start: 2022-08-01 | End: 2022-08-11 | Stop reason: ALTCHOICE

## 2022-08-01 NOTE — TELEPHONE ENCOUNTER
This is the patient that we talked about a couple of times.  2 every 3 hours is too much.  I told him I would write 1 prescription and then I would have him see pain management.  I think maybe Dr. Parra's office fired him because he was taking things other than they were writing for we can check with them or see if another pain management would see him   I know these hurt but he needs to try to do this with Tylenol Advil and ice or see pain management

## 2022-08-02 DIAGNOSIS — S46.012D TRAUMATIC COMPLETE TEAR OF LEFT ROTATOR CUFF, SUBSEQUENT ENCOUNTER: ICD-10-CM

## 2022-08-02 RX ORDER — OXYCODONE HYDROCHLORIDE AND ACETAMINOPHEN 5; 325 MG/1; MG/1
TABLET ORAL
Qty: 40 TABLET | Refills: 0 | Status: SHIPPED | OUTPATIENT
Start: 2022-08-02

## 2022-08-02 RX ORDER — DEXTROAMPHETAMINE SACCHARATE, AMPHETAMINE ASPARTATE MONOHYDRATE, DEXTROAMPHETAMINE SULFATE AND AMPHETAMINE SULFATE 7.5; 7.5; 7.5; 7.5 MG/1; MG/1; MG/1; MG/1
30 CAPSULE, EXTENDED RELEASE ORAL EVERY MORNING
Qty: 30 CAPSULE | Refills: 0 | OUTPATIENT
Start: 2022-08-02

## 2022-08-02 RX ORDER — DEXTROAMPHETAMINE SACCHARATE, AMPHETAMINE ASPARTATE, DEXTROAMPHETAMINE SULFATE AND AMPHETAMINE SULFATE 2.5; 2.5; 2.5; 2.5 MG/1; MG/1; MG/1; MG/1
10 TABLET ORAL
Qty: 30 TABLET | Refills: 0 | OUTPATIENT
Start: 2022-08-02

## 2022-08-02 NOTE — TELEPHONE ENCOUNTER
Rx Refill Note  Requested Prescriptions     Pending Prescriptions Disp Refills   • amphetamine-dextroamphetamine XR (ADDERALL XR) 30 MG 24 hr capsule 30 capsule 0     Sig: Take 1 capsule by mouth Every Morning      Last office visit with prescribing clinician: 3/3/2022      Next office visit with prescribing clinician: 7/31/2022            Kelly Kaur MA  08/02/22, 08:56 EDT

## 2022-08-10 ENCOUNTER — TELEPHONE (OUTPATIENT)
Dept: ORTHOPEDIC SURGERY | Facility: CLINIC | Age: 37
End: 2022-08-10

## 2022-08-10 NOTE — TELEPHONE ENCOUNTER
Caller: ITARA    Relationship: PETE ACOSTA    Best call back number: [phone]    What form or medical record are you requesting: WORK STATUS NOTE- MOST RECENT OV NOTES- ANY NEW ORDERS IF AVAILABLE    Who is requesting this form or medical record from you: PETE ACOSTA    How would you like to receive the form or medical records (pick-up, mail, fax): FAX  If fax, what is the fax number: [phone]      Timeframe paperwork needed: ASAP

## 2022-08-10 NOTE — PROGRESS NOTES
Patient: Alvaro Morris  YOB: 1985  Date of Service: 8/10/2022    Chief Complaints: No chief complaint on file.      Subjective:    History of Present Illness: Pt is seen in the office today with complaints of No chief complaint on file.  .          Allergies: No Known Allergies    Medications:   Home Medications:  Current Outpatient Medications on File Prior to Visit   Medication Sig   • acetaminophen (TYLENOL) 500 MG tablet Take 2 tablets by mouth 2 (Two) Times a Day.   • amphetamine-dextroamphetamine (Adderall) 10 MG tablet Take 1 tablet by mouth Daily With Lunch. (Patient taking differently: Take 10 mg by mouth Daily With Lunch. HOLD 3 DAYS BEFORE SURGERY)   • amphetamine-dextroamphetamine XR (ADDERALL XR) 30 MG 24 hr capsule Take 1 capsule by mouth Every Morning (Patient taking differently: Take 30 mg by mouth Daily As Needed HOLD 3 DAYS BEFORE SURGERY)   • gabapentin (Neurontin) 300 MG capsule Take 1 capsule by mouth 2 (Two) Times a Day.   • ibuprofen (ADVIL,MOTRIN) 600 MG tablet Take 1 tablet by mouth 3 (Three) Times a Day With Meals.   • lisinopril (PRINIVIL,ZESTRIL) 10 MG tablet Take 1 tablet by mouth Daily.   • multivitamin with minerals tablet tablet Take 1 tablet by mouth Daily. PT HOLDING FOR SURGERY   • oxyCODONE-acetaminophen (PERCOCET) 5-325 MG per tablet Take 1-2 tablets by mouth every 4  hours as needed for severe pain. Stop Tylenol if taking this medicine. Not to exceed 12 tablets in 24 hour period     No current facility-administered medications on file prior to visit.     Current Medications:  Scheduled Meds:  Continuous Infusions:No current facility-administered medications for this visit.    PRN Meds:.    I have reviewed the patient's medical history in detail and updated the computerized patient record.  Review and summarization of old records include:    Past Medical History:   Diagnosis Date   • ADHD    • Bursitis of elbow     right elbow   • Has never received COVID-19  vaccine    • Hemorrhoids    • Hypertension     B/P OK SINCE STOPPED SMOKING-NO MEDS CURRENTLY   • Left shoulder pain    • Rotator cuff tear     LUCIANA        Past Surgical History:   Procedure Laterality Date   • ARM DEBRIDEMENT Left 7/27/2022    Procedure: UPPER EXTREMITY DEBRIDEMENT labrium;  Surgeon: Queta Cordero MD;  Location: [facility];  Service: Orthopedics;  Laterality: Left;   • HEMORRHOIDECTOMY N/A 6/23/2016    Procedure: HEMORRHOIDECTOMY;  Surgeon: Atilio Smith MD;  Location: [facility] OR;  Service:    • SHOULDER ARTHROSCOPY W/ ROTATOR CUFF REPAIR Right 12/4/2017    Procedure: SHOULDER ARTHROSCOPY WITH MINI OPEN ROTATOR CUFF REPAIR  DEBRIDEMENT OF LABRUM DEBRIDEMENT OF SUBSCAPULARIS DECOMPRESSION;  Surgeon: Pily Cordero MD;  Location: [facility];  Service:    • SHOULDER ARTHROSCOPY W/ ROTATOR CUFF REPAIR Left 7/27/2022    Procedure: SHOULDER ARTHROSCOPY WITH ROTATOR CUFF REPAIR;  Surgeon: Queta Cordero MD;  Location: [facility];  Service: Orthopedics;  Laterality: Left;        Social History     Occupational History   • Not on file   Tobacco Use   • Smoking status: Current Every Day Smoker     Packs/day: 0.25     Years: 10.00     Pack years: 2.50     Types: Cigarettes   • Smokeless tobacco: Current User   Vaping Use   • Vaping Use: Some days   • Substances: Nicotine   • Devices: Disposable   Substance and Sexual Activity   • Alcohol use: Yes     Comment: ON OCC   • Drug use: No   • Sexual activity: Defer      Social History     Social History Narrative   • Not on file        Family History   Problem Relation Age of Onset   • Malig Hyperthermia Neg Hx        ROS: 14 point review of systems was performed and was negative except for documented findings in HPI and today's encounter.     Allergies: No Known Allergies  Constitutional:  Denies fever, shaking or chills   Eyes:  Denies change in visual acuity   HENT:  Denies nasal congestion or sore throat   Respiratory:  Denies cough  or shortness of breath   Cardiovascular:  Denies chest pain or severe LE edema   GI:  Denies abdominal pain, nausea, vomiting, bloody stools or diarrhea   Musculoskeletal:  Numbness, tingling, or loss of motor function only as noted above in history of present illness.  : Denies painful urination or hematuria  Integument:  Denies rash, lesion or ulceration   Neurologic:  Denies headache or focal weakness  Endocrine:  Denies lymphadenopathy  Psych:  Denies confusion or change in mental status   Hem:  Denies active bleeding      Physical Exam: 36 y.o. male  Wt Readings from Last 3 Encounters:   07/21/22 67.6 kg (149 lb)   06/17/22 71.7 kg (158 lb 1.6 oz)   06/03/22 68.9 kg (152 lb)     *** HELP TEXT ***    This SmartLink requires parameters. Parameters are variables that are added to the SmartLink name to request specific information. The parameter for .lastht is the number of readings to display.    For example: .lastht[4    In this example, the SmartLink displays the last four encounter readings.    There is no height or weight on file to calculate BMI.  No height and weight on file for this encounter.  There were no vitals filed for this visit.  Vital signs reviewed.   General Appearance:    Alert, cooperative, in no acute distress                    Ortho exam             .time    Assessment:     Plan:   Follow up as indicated.  Ice, elevate, and rest as needed.  Discussed conservative measures of pain control including ice, bracing.  Also talked about the importance of strengthening and maintaining ideal body weight    Pily Cordero M.D.

## 2022-08-11 ENCOUNTER — OFFICE VISIT (OUTPATIENT)
Dept: ORTHOPEDIC SURGERY | Facility: CLINIC | Age: 37
End: 2022-08-11

## 2022-08-11 VITALS — BODY MASS INDEX: 18.53 KG/M2 | TEMPERATURE: 97.4 F | WEIGHT: 144.4 LBS | HEIGHT: 74 IN

## 2022-08-11 DIAGNOSIS — Z98.890 S/P ROTATOR CUFF REPAIR: Primary | ICD-10-CM

## 2022-08-11 PROCEDURE — 99024 POSTOP FOLLOW-UP VISIT: CPT | Performed by: ORTHOPAEDIC SURGERY

## 2022-08-11 RX ORDER — IBUPROFEN 800 MG/1
800 TABLET ORAL 3 TIMES DAILY
Qty: 90 TABLET | Refills: 0 | Status: SHIPPED | OUTPATIENT
Start: 2022-08-11 | End: 2022-10-27 | Stop reason: SURG

## 2022-08-11 NOTE — PROGRESS NOTES
LEFT Shoulder Scope RCR follow Up       Patient: Alvaro Morris        YOB: 1985      Chief Complaints: LEFT shoulder pain      History of Present Illness: Pt is here f/u shoulder arthroscopy, RCR he states he still hurting is down to 2 Percocet he knows we will not do any more narcotics Iyer I did give him different ways to treat his pain which I think he will do        Allergies: No Known Allergies    Medications:   Home Medications:  Current Outpatient Medications on File Prior to Visit   Medication Sig   • acetaminophen (TYLENOL) 500 MG tablet Take 2 tablets by mouth 2 (Two) Times a Day.   • amphetamine-dextroamphetamine (Adderall) 10 MG tablet Take 1 tablet by mouth Daily With Lunch. (Patient taking differently: Take 10 mg by mouth Daily With Lunch. HOLD 3 DAYS BEFORE SURGERY)   • amphetamine-dextroamphetamine XR (ADDERALL XR) 30 MG 24 hr capsule Take 1 capsule by mouth Every Morning (Patient taking differently: Take 30 mg by mouth Daily As Needed HOLD 3 DAYS BEFORE SURGERY)   • gabapentin (Neurontin) 300 MG capsule Take 1 capsule by mouth 2 (Two) Times a Day.   • ibuprofen (ADVIL,MOTRIN) 600 MG tablet Take 1 tablet by mouth 3 (Three) Times a Day With Meals.   • lisinopril (PRINIVIL,ZESTRIL) 10 MG tablet Take 1 tablet by mouth Daily.   • multivitamin with minerals tablet tablet Take 1 tablet by mouth Daily. PT HOLDING FOR SURGERY   • oxyCODONE-acetaminophen (PERCOCET) 5-325 MG per tablet Take 1-2 tablets by mouth every 4  hours as needed for severe pain. Stop Tylenol if taking this medicine. Not to exceed 12 tablets in 24 hour period     No current facility-administered medications on file prior to visit.     Current Medications:  Scheduled Meds:  Continuous Infusions:No current facility-administered medications for this visit.    PRN Meds:.          Physical Exam: 36 y.o. male  General Appearance:    Alert, cooperative, in no acute distress                 There were no vitals filed for  this visit.   Patient is alert and oriented ×3 no acute distress normal mood physical exam.  Physical exam of the shoulder, incisions looked good there is no erythema,no signs or sx of infection.      Assessment  S/P shoulder scope, rotator cuff repair, we discussed perioperative regimen things to avoid we will start him in physical therapy next week we remove the sutures today I will see him back 4 weeks would like all pain medicine prescription request directed to me please        Answers for HPI/ROS submitted by the patient on 8/11/2022  What is the primary reason for your visit?: Physical

## 2022-08-12 ENCOUNTER — TELEPHONE (OUTPATIENT)
Dept: ORTHOPEDIC SURGERY | Facility: CLINIC | Age: 37
End: 2022-08-12

## 2022-08-19 ENCOUNTER — TREATMENT (OUTPATIENT)
Dept: PHYSICAL THERAPY | Facility: CLINIC | Age: 37
End: 2022-08-19

## 2022-08-19 DIAGNOSIS — M62.81 MUSCLE WEAKNESS: ICD-10-CM

## 2022-08-19 DIAGNOSIS — M25.60 RANGE OF MOTION DEFICIT: Primary | ICD-10-CM

## 2022-08-19 DIAGNOSIS — Z98.890 S/P LEFT ROTATOR CUFF REPAIR: ICD-10-CM

## 2022-08-19 PROCEDURE — 97140 MANUAL THERAPY 1/> REGIONS: CPT | Performed by: PHYSICAL THERAPIST

## 2022-08-19 PROCEDURE — 97161 PT EVAL LOW COMPLEX 20 MIN: CPT | Performed by: PHYSICAL THERAPIST

## 2022-08-19 PROCEDURE — 97110 THERAPEUTIC EXERCISES: CPT | Performed by: PHYSICAL THERAPIST

## 2022-08-19 NOTE — PROGRESS NOTES
Physical Therapy Initial Evaluation and Plan of Care    Patient: Alvaro Morris   : 1985  Diagnosis/ICD-10 Code:  Range of motion deficit [M25.60]  Referring practitioner: No ref. provider found  Date of Initial Visit: 2022  Today's Date: 2022  Patient seen for 1 session    Progress Note Due: 2022     Visit Diagnoses:    ICD-10-CM ICD-9-CM   1. Range of motion deficit  M25.60 719.50   2. Muscle weakness  M62.81 728.87   3. S/P left rotator cuff repair  Z98.890 V45.89         Subjective Questionnaire: QuickDASH: 32/55      Subjective Evaluation    History of Present Illness  Date of surgery: 2022  Mechanism of injury: The patient reports to the clinic s/p left rotator cuff repair on 2022. He is present today without a sling, stating that he took it off before coming. He has been stretching his arm a little based on what he knows from his previous R RTC, but has not received therapy formally until today. He states that right now he is unable to use his left arm to bathe or dress, and cannot perform job duties such as reaching overhead up under a car, carrying and lifting up to 100 lbs of tools /equipment, lifting up a nation of a car, pushing and pulling machinery, or perform childcare tasks. PMH includes: R rotator cuff repair , ADD, previous smoker and alcohol dependency.       Patient Occupation:    Precautions and Work Restrictions: Currently off work Pain  Current pain ratin  At best pain ratin  At worst pain ratin  Location: Posterior left shoulder   Aggravating factors: movement, overhead activity, outstretched reach, repetitive movement and prolonged positioning    Hand dominance: right    Patient Goals  Patient goals for therapy: decreased pain, increased motion, increased strength, independence with ADLs/IADLs and return to work        Objective          Active Range of Motion     Right Shoulder   Normal active range of motion    Additional  Active Range of Motion Details  Left AROM not tested due to post op precautions   R UE BTH: T4  R UE BTB: T8     Passive Range of Motion   Left Shoulder   Flexion: 90 degrees with pain  Abduction: 35 (scaption) degrees with pain  External rotation 0°: 5 degrees with pain    Strength/Myotome Testing     Right Shoulder   Normal muscle strength    Left Elbow   Flexion: 3+  Extension: 3+    Right Elbow   Normal strength    Additional Strength Details  Left strength not tested due to post op precautions       Education: Review of protocol, encouraged sling use, home exercise program (including improving form with pendulum), importance of compliance     Assessment & Plan     Assessment  Impairments: abnormal or restricted ROM, activity intolerance, impaired physical strength, lacks appropriate home exercise program and pain with function  Functional Limitations: carrying objects and pulling  Assessment details: The patient is a 36 year old male that reports to the clinic s/p left rotator cuff repair a little over three weeks upon. Upon evaluation today, he exhibits limitations in left shoulder passive and active range of motion and reduced left shoulder and elbow strength. These impairments are contributing to his inability use his left arm to bathe or dress, and he cannot perform job duties such as reaching overhead up under a car, carrying and lifting up to 100 lbs of tools/equipment, lifting up a nation of a car, pushing and pulling machinery, or perform childcare tasks. His plan of care is complicated by 2-3 personal factors and co-morbidities. His prognosis to achieve the established goals is fair. Based on my evaluation today, he would benefit from additional skilled physical therapy in order to address the stated deficits and return to his previous level of function.    Prognosis: fair    Goals  Plan Goals: Short Term: 6-8 weeks  1. The patient will be independent with sling use to ensure tissue healing and reduce  pain at rest.   2. The patient will improve left shoulder PROM to WFL to improve mobility for pushing and pulling tasks.  3. The patient will improve left elbow strength to 4+/5 to improve lifting of light objects (power tools).     Long Term: 20 weeks   1. 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   2. The patient will be able to actively reach overhead in order to lift the nation of a car.   3. The patient will be able to actively reach behind his back (T10) to bathe without restriction.   4. 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   5. The patient will improve Quick DASH score from 32/55 to 42/55 to improve tolerance to childcare and household tasks.     Plan  Therapy options: will be seen for skilled therapy services  Planned modality interventions: cryotherapy, high voltage pulsed current (pain management) and thermotherapy (hydrocollator packs)  Planned therapy interventions: balance/weight-bearing training, body mechanics training, flexibility, functional ROM exercises, home exercise program, joint mobilization, manual therapy, neuromuscular re-education, postural training, soft tissue mobilization, spinal/joint mobilization, strengthening, stretching and therapeutic activities  Frequency: 2x week  Duration in weeks: 20  Treatment plan discussed with: patient        History # of Personal Factors and/or Comorbidities: HIGH (3+)  Examination of Body System(s): # of elements: LOW (1-2)  Clinical Presentation: STABLE   Clinical Decision Making: LOW       Timed:         Manual Therapy:   8      mins  50862;     Therapeutic Exercise:   8      mins  08117;     Neuromuscular Nanci:        mins  76561;    Therapeutic Activity:     4     mins  33759;     Gait Training:           mins  61888;     Ultrasound:          mins  17006;    Ionto                                   mins   41735  Self Care                             mins   17436  Canalith Repos         mins 47277      Un-Timed:  Electrical Stimulation:         mins  10860 ( );  Dry Needling          mins self-pay  Traction          mins 25655  Low Eval    10      Mins  52535  Mod Eval          Mins  00119  High Eval                            Mins  02406        Timed Treatment:  20    mins   Total Treatment:    30    mins          PT: Marianela Macias PT     License Number: [id number]  Electronically signed by Marianela Macias PT, 08/19/22, 6:44 AM EDT    Certification Period: 8/19/2022 thru 11/16/2022  I certify that the therapy services are furnished while this patient is under my care.  The services outlined above are required by this patient, and will be reviewed every 90 days.         Physician Signature:__________________________________________________    PHYSICIAN:   NPI:                                       DATE:      Please sign and return via fax to [email or web address] . Thank you, [facility].

## 2022-08-25 DIAGNOSIS — S46.012D TRAUMATIC COMPLETE TEAR OF LEFT ROTATOR CUFF, SUBSEQUENT ENCOUNTER: ICD-10-CM

## 2022-08-25 RX ORDER — OXYCODONE HYDROCHLORIDE AND ACETAMINOPHEN 5; 325 MG/1; MG/1
TABLET ORAL
Qty: 40 TABLET | Refills: 0 | OUTPATIENT
Start: 2022-08-25

## 2022-08-25 NOTE — TELEPHONE ENCOUNTER
No more narcotics he and I discussed this in detail before the surgery.  I am not questioning his pain he just has to use over-the-counter medications, ice, moist heat I would also be happy to get him to pain management if he thinks that would be of benefit

## 2022-08-25 NOTE — TELEPHONE ENCOUNTER
Contacted the patient to let him know that Dr. Cordero has denied refilling his Percocet.  She is recommending that he take over-the-counter medications like she discussed with him before surgery.  Patient's states that he is having a lot of pain at night and is waking up.  He would like for Dr. Cordero to contact his wife.  Patient states that his wife can verify the pain that he is having at night.  She did mention to hydrocodone if at all possible.  Have advised him that more than likely Dr. Cordero would not give him a prescription for hydrocodone but I would definitely check with her

## 2022-08-25 NOTE — TELEPHONE ENCOUNTER
Call returned to the patient.  I was unable to reach him but I did leave him a message that Dr. Cordero will not approve any more narcotics.  She did discuss this with him in detail prior to his surgery.  He is to continue with the ice and over-the-counter medications for pain however she would be happy to refer him to pain management if he so desires.  If he does want to see pain management have asked him to contact the office

## 2022-08-25 NOTE — TELEPHONE ENCOUNTER
Can you please tell him that I cannot refill this pain medicine that we need to now use over-the-counter medications like he and I discussed before the surgery

## 2022-08-26 ENCOUNTER — TELEPHONE (OUTPATIENT)
Dept: ORTHOPEDIC SURGERY | Facility: CLINIC | Age: 37
End: 2022-08-26

## 2022-08-26 NOTE — TELEPHONE ENCOUNTER
Caller: PEPE MATHEWS    Relationship to patient: SELF    Best call back number:  [phone]    Chief complaint:  PATIENT SAYS HE THOUGHT DR. KELLY WAS REFERRING HIM TO PAIN MANAGEMENT. PLEASE ADVISE.    Type of visit:  OUTSIDE REFERRAL

## 2022-08-26 NOTE — TELEPHONE ENCOUNTER
PATIENT RETURNED CALLTO KAY - KASS[facility] ATTEMPTED TO WARM TRANSFER - NO ANSWER - HUB ADVISED PATIENT WOULD LET THEM KNOW HE RETURNED CALL TO DISCUSS PM OPTIONS AND SOMEONE WOULD CALL HIM BACK.

## 2022-08-29 ENCOUNTER — OFFICE VISIT (OUTPATIENT)
Dept: PHYSICAL THERAPY | Facility: CLINIC | Age: 37
End: 2022-08-29

## 2022-08-29 DIAGNOSIS — M62.81 MUSCLE WEAKNESS: ICD-10-CM

## 2022-08-29 DIAGNOSIS — Z98.890 S/P LEFT ROTATOR CUFF REPAIR: ICD-10-CM

## 2022-08-29 DIAGNOSIS — M25.60 RANGE OF MOTION DEFICIT: Primary | ICD-10-CM

## 2022-08-29 PROCEDURE — 97110 THERAPEUTIC EXERCISES: CPT | Performed by: PHYSICAL THERAPIST

## 2022-08-29 PROCEDURE — 97140 MANUAL THERAPY 1/> REGIONS: CPT | Performed by: PHYSICAL THERAPIST
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
## 2022-08-30 DIAGNOSIS — R52 INADEQUATE PAIN CONTROL: Primary | ICD-10-CM

## 2022-08-30 NOTE — TELEPHONE ENCOUNTER
I put the order in do you know who will see people for this?  I know some will some will not probably need to get him in sooner than later

## 2022-08-31 ENCOUNTER — OFFICE VISIT (OUTPATIENT)
Dept: PHYSICAL THERAPY | Facility: CLINIC | Age: 37
End: 2022-08-31

## 2022-08-31 DIAGNOSIS — M25.60 RANGE OF MOTION DEFICIT: Primary | ICD-10-CM

## 2022-08-31 DIAGNOSIS — Z98.890 S/P LEFT ROTATOR CUFF REPAIR: ICD-10-CM

## 2022-08-31 DIAGNOSIS — M62.81 MUSCLE WEAKNESS: ICD-10-CM

## 2022-08-31 PROCEDURE — 97140 MANUAL THERAPY 1/> REGIONS: CPT | Performed by: PHYSICAL THERAPIST

## 2022-08-31 PROCEDURE — 97110 THERAPEUTIC EXERCISES: CPT | Performed by: PHYSICAL THERAPIST

## 2022-08-31 NOTE — TELEPHONE ENCOUNTER
I have faxed records and contacted [facility] pain management.  Joseline with new patient still states that they have not received records.  I have sent her the fax again.  She states she will not schedule unless she has paperwork.  Faxed again will check on it again later today.

## 2022-08-31 NOTE — PROGRESS NOTES
Physical Therapy Daily Progress Note      Patient: Alvaro Morris   : 1985  Referring practitioner: No ref. provider found  Date of Initial Visit: Type: THERAPY  Noted: 2022  Today's Date: 2022  Patient seen for 3 sessions    Progress Note Due: 2022     Alvaro Morris reports: that his shoulder feels better today than it has been. Got stuck by a train on the way.       Objective/ Treatment: The patient reports mild discomfort with passive lowering of the shoulder flexion to neutral/anatomical position. His passive range of motion is nearly full in supine. He is able to relax the arm more with passive stretching today. He benefits from moderate verbal cues to perform exercises slowly and with control. He is able to perform all exercises with correct form and without compensation. There was a small audible non-painful pop in the left shoulder with passive abduction (on the return to lowering). Encouraged patient not to move shoulder so high into abduction movement. See manual therapy and exercise logs for more details.     Education: Timeframe to return to strengthening/review of protocol, reiterated gentle passive motion, updated HEP with handouts, shoulder anatomy using poster as visual       Assessment: The patient notes lessened pain today compared to previous visit although he states pain is still high at rest. His passive right shoulder motion remains good and he is able to passively stretch his own arm today with fewer cues. His compliance with his home exercise program is good and his compliance with attendance is improving, despite being late to today's session. Added additional passive shoulder flexion stretches today. Plan to continue working on right shoulder mobility until he is cleared to begin strengthening. He continues to require skilled physical therapy in order to reduce restrictions with use his left arm to bathe or dress, performing job duties such as reaching overhead  up under a car, carrying and lifting up to 100 lbs of tools/equipment, lifting up a nation of a car, pushing and pulling machinery, or performing childcare tasks.        Plan:  Progress strengthening /stabilization /functional activity             Timed:         Manual Therapy:    9     mins  73932;     Therapeutic Exercise:  20       mins  74781;     Neuromuscular Nanci:        mins  57087;    Therapeutic Activity:    2      mins  20826;     Gait Training:           mins  72788;     Ultrasound:          mins  24430;    Ionto                                   mins   92584  Self Care                            mins   64900      Un-Timed:  Electrical Stimulation:         mins  31191 ( );  Dry Needling          mins self-pay  Traction          mins 81400  Low Eval          Mins  83969  Mod Eval          Mins  23855  High Eval                            Mins  38413  Canalith Repos                   mins  57310    Timed Treatment:  31    mins   Total Treatment:   32     mins    Marianela Macias PT  Physical Therapist

## 2022-09-01 NOTE — TELEPHONE ENCOUNTER
[facility] sent a letter stating this referral has been denied by our physician.  I will try and get him in another pain management facility.  [facility]  I will give them a minute to get the records then call and see if I can make an appt.

## 2022-09-09 ENCOUNTER — OFFICE VISIT (OUTPATIENT)
Dept: PHYSICAL THERAPY | Facility: CLINIC | Age: 37
End: 2022-09-09

## 2022-09-09 DIAGNOSIS — M62.81 MUSCLE WEAKNESS: ICD-10-CM

## 2022-09-09 DIAGNOSIS — Z98.890 S/P LEFT ROTATOR CUFF REPAIR: ICD-10-CM

## 2022-09-09 DIAGNOSIS — M25.60 RANGE OF MOTION DEFICIT: Primary | ICD-10-CM

## 2022-09-09 PROCEDURE — 97140 MANUAL THERAPY 1/> REGIONS: CPT | Performed by: PHYSICAL THERAPIST

## 2022-09-09 PROCEDURE — 97110 THERAPEUTIC EXERCISES: CPT | Performed by: PHYSICAL THERAPIST

## 2022-09-09 NOTE — PROGRESS NOTES
Physical Therapy Daily Progress Note      Patient: Alvaro Morris   : 1985  Referring practitioner: Pily Cordero MD  Date of Initial Visit: Type: THERAPY  Noted: 2022  Today's Date: 2022  Patient seen for 4 sessions    Progress Note Due: 2022     Alvaro Morris reports: that he's having a lot of pain in the evenings and mornings. Shoulder was really sore this morning and stretched it a lot. States that he does every exercise about 3 times a day.       Objective/ Treatment: The patient exhibits good left shoulder passive range of motion. He denies pain throughout the passive range. He is able to relax easier with fewer verbal cues. Palpation of the anterior shoulder reveals tenderness of the left pec minor and major. He is able to perform all exercises with minimal verbal cues for controlled movement. States that he has mild discomfort with passive left shoulder abduction returning to shoulder to neutral position. Cues for slower movement on eccentric lowering, for the motion to be more passive. See manual therapy and exercise logs for more details.      Education: Continued reiteration of stretching gently, provided patient with updated schedule and handouts to improve compliance with attendance, encouraged patient not to miss his follow up with surgeon next week       Assessment: The patient continues to make good progress towards his goals, with good passive movement of the left shoulder. He is compliant with his home exercise program, but continues to struggle to maintain physical therapy appointments. He benefits from verbal cues to maintain appropriate amount of stretch of exercise to protect the repair. Pain has been a barrier to rehab. He continues to require skilled physical therapy to improve active range of motion and strength of the left shoulder to reduce restrictions with use his left arm to bathe or dress, performing job duties such as reaching overhead up under a car,  carrying and lifting up to 100 lbs of tools/equipment, lifting up a nation of a car, pushing and pulling machinery, or performing childcare tasks.      Plan:  Progress strengthening /stabilization /functional activity             Timed:         Manual Therapy:  12       mins  31590;     Therapeutic Exercise:   20      mins  59651;     Neuromuscular Nanci:        mins  56711;    Therapeutic Activity:     3     mins  64441;     Gait Training:           mins  83191;     Ultrasound:          mins  91834;    Ionto                                   mins   45608  Self Care                            mins   02239      Un-Timed:  Electrical Stimulation:         mins  62653 ( );  Dry Needling          mins self-pay  Traction          mins 54519  Low Eval          Mins  89016  Mod Eval          Mins  05556  High Eval                            Mins  30087  Canalith Repos                   mins  41144    Timed Treatment:  37    mins   Total Treatment:    38    mins    Marianela Macias PT  Physical Therapist

## 2022-09-15 ENCOUNTER — OFFICE VISIT (OUTPATIENT)
Dept: ORTHOPEDIC SURGERY | Facility: CLINIC | Age: 37
End: 2022-09-15

## 2022-09-15 VITALS — HEIGHT: 74 IN | BODY MASS INDEX: 19.12 KG/M2 | TEMPERATURE: 97.8 F | WEIGHT: 149 LBS

## 2022-09-15 DIAGNOSIS — Z98.890 S/P ROTATOR CUFF REPAIR: Primary | ICD-10-CM

## 2022-09-15 PROCEDURE — 99024 POSTOP FOLLOW-UP VISIT: CPT | Performed by: ORTHOPAEDIC SURGERY

## 2022-09-15 NOTE — PROGRESS NOTES
Left Shoulder Scope RCR follow Up       Patient: Alvaro Morris        YOB: 1985      Chief Complaints: shoulder pain left      History of Present Illness: Pt is here f/u shoulder arthroscopy, RCR on the left he is about 7 weeks out he is off his Percocet off his Suboxone he has missed several physical therapy appointments have been in contact with the physical therapist have talked him about this he had a lot of reasons why he missed I told him no more that it is very important we progress per protocol he says he is been minding his restrictions        Allergies: No Known Allergies    Medications:   Home Medications:  Current Outpatient Medications on File Prior to Visit   Medication Sig   • acetaminophen (TYLENOL) 500 MG tablet Take 2 tablets by mouth 2 (Two) Times a Day.   • amphetamine-dextroamphetamine (Adderall) 10 MG tablet Take 1 tablet by mouth Daily With Lunch. (Patient taking differently: Take 10 mg by mouth Daily With Lunch. HOLD 3 DAYS BEFORE SURGERY)   • amphetamine-dextroamphetamine XR (ADDERALL XR) 30 MG 24 hr capsule Take 1 capsule by mouth Every Morning (Patient taking differently: Take 30 mg by mouth Daily As Needed HOLD 3 DAYS BEFORE SURGERY)   • gabapentin (Neurontin) 300 MG capsule Take 1 capsule by mouth 2 (Two) Times a Day.   • ibuprofen (ADVIL,MOTRIN) 800 MG tablet Take 1 tablet by mouth 3 (Three) Times a Day.   • lisinopril (PRINIVIL,ZESTRIL) 10 MG tablet Take 1 tablet by mouth Daily.   • multivitamin with minerals tablet tablet Take 1 tablet by mouth Daily. PT HOLDING FOR SURGERY   • oxyCODONE-acetaminophen (PERCOCET) 5-325 MG per tablet Take 1-2 tablets by mouth every 4  hours as needed for severe pain. Stop Tylenol if taking this medicine. Not to exceed 12 tablets in 24 hour period     No current facility-administered medications on file prior to visit.     Current Medications:  Scheduled Meds:  Continuous Infusions:No current facility-administered medications for this  visit.    PRN Meds:.          Physical Exam: 36 y.o. male  General Appearance:    Alert, cooperative, in no acute distress                 There were no vitals filed for this visit.   Patient is alert and oriented ×3 no acute distress normal mood physical exam.  Physical exam of the shoulder, incisions looked good there is no erythema,no signs or sx of infection.  Passive range of motion is to about 170 rotator cuff strength 4+/5    Assessment  S/P shoulder scope, rotator cuff repair, I think he is doing okay I did talk to him about not missing any more physical therapy reviewed his restrictions, he can wean out of his sling he is off his pain medicine I will see him back in 6 weeks I will keep him off work

## 2022-09-16 ENCOUNTER — TELEPHONE (OUTPATIENT)
Dept: ORTHOPEDIC SURGERY | Facility: CLINIC | Age: 37
End: 2022-09-16

## 2022-09-16 NOTE — TELEPHONE ENCOUNTER
Provider: DR. KELLY    Caller: [facility] [employer] [facility] [facility]     Relationship to Patient: [facility]      Phone Number: [phone]    Reason for Call:  PT. SAW DR. KELLY ON 09/15/22.   [facility] IS REQUESTING OFFICE NOTES AND UPDATED WORK STATUS.   PLEASE FAX TO - [phone]

## 2022-09-20 NOTE — TELEPHONE ENCOUNTER
Caller: NAHUN     Relationship to patient: [employer]     Best call back number: [phone]    Patient is needing: STATES THEY DID NOT RECEIVE THE OFFICE NOTE FOR 9.15.22  , PLEASE REFAX TO F#[phone]

## 2022-09-21 ENCOUNTER — OFFICE VISIT (OUTPATIENT)
Dept: PHYSICAL THERAPY | Facility: CLINIC | Age: 37
End: 2022-09-21

## 2022-09-21 DIAGNOSIS — M25.60 RANGE OF MOTION DEFICIT: Primary | ICD-10-CM

## 2022-09-21 DIAGNOSIS — M62.81 MUSCLE WEAKNESS: ICD-10-CM

## 2022-09-21 DIAGNOSIS — Z98.890 S/P LEFT ROTATOR CUFF REPAIR: ICD-10-CM

## 2022-09-21 PROCEDURE — 97530 THERAPEUTIC ACTIVITIES: CPT | Performed by: PHYSICAL THERAPIST

## 2022-09-21 PROCEDURE — 97112 NEUROMUSCULAR REEDUCATION: CPT | Performed by: PHYSICAL THERAPIST

## 2022-09-21 PROCEDURE — 97110 THERAPEUTIC EXERCISES: CPT | Performed by: PHYSICAL THERAPIST

## 2022-09-21 NOTE — PROGRESS NOTES
"Physical Therapy Daily Progress Note      Patient: Alvaro Morris   : 1985  Referring practitioner: No ref. provider found  Date of Initial Visit: Type: THERAPY  Noted: 2022  Today's Date: 2022  Patient seen for 5 sessions    Progress Note Due: 10/19/2022     Alvaro Morris reports: that he still has to wear the sling at times but is \"allowed to start moving it more\". He is still on 1 lb lifting restrictions. States has \"tried to reschedule appointments through My[facility].\"      Subjective Questionnaire: QuickDASH: 40/55    Objective/ Treatment:  Passive Range of Motion   Left Shoulder   Flexion: 170 degrees with pain  Abduction: 180 (scaption) degrees with pain  External rotation 0°: 50 degrees with pain     Strength/Myotome Testing   Left Elbow   Flexion: 4  Extension: 4+    Education: Progress towards goals, post op precautions, compliance with plan of care - stated that the patient must attend 2x/week to remain compliant     Goals  Plan Goals: Short Term: 6-8 weeks  1. The patient will be independent with sling use to ensure tissue healing and reduce pain at rest. -achieved   2. The patient will improve left shoulder PROM to WFL to improve mobility for pushing and pulling tasks. -achieved   3. The patient will improve left elbow strength to 4+/5 to improve lifting of light objects (power tools). -progressing, nearly achieved      Long Term: 20 weeks   1. 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 -not attempted due to post op precautions   2. The patient will be able to actively reach overhead in order to lift the nation of a car. -not attempted due to post op precautions   3. The patient will be able to actively reach behind his back (T10) to bathe without restriction. -not attempted due to post op precautions   4. 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 -not attempted " due to post op precautions   5. The patient will improve Quick DASH score from 32/55 to 22/55 to improve tolerance to childcare and household tasks. -regressed slightly      Assessment: The patient has attended 5 sessions of skilled physical therapy thus far. His compliance with attendance has been poor, but his compliance with home exercise program completion has been good. Upon re-assessment today, he exhibits improvement in left shoulder passive range of motion and elbow strength. He continues to exhibit limitations in left shoulder active of range of motion and strength in all planes, limiting his ability to lift light and heavy objects to operate machinery at work, lift the nation of a car, or reach behind his back to bathe. His plan of care is complicated by 2-3 personal factors and co-morbidities. His prognosis to achieve the established goals is fair. Based on my re-assessment today, he would benefit from additional skilled physical therapy in order to address the stated deficits and return to his previous level of function.          Plan:  Progress per Plan of Care and Progress strengthening /stabilization /functional activity             Timed:         Manual Therapy:         mins  08276;     Therapeutic Exercise:   15      mins  86432;     Neuromuscular Nanci:  8      mins  15775;    Therapeutic Activity:    9      mins  17320;     Gait Training:           mins  76193;     Ultrasound:          mins  32981;    Ionto                                   mins   49956  Self Care                            mins   15672      Un-Timed:  Electrical Stimulation:         mins  81603 ( );  Dry Needling          mins self-pay  Traction          mins 76735  Low Eval          Mins  10897  Mod Eval          Mins  34425  High Eval                            Mins  31797  Canalith Repos                   mins  02467    Timed Treatment:  32    mins   Total Treatment:     33   mins    Marianela Macias, PT  Physical  Therapist

## 2022-09-26 ENCOUNTER — OFFICE VISIT (OUTPATIENT)
Dept: PHYSICAL THERAPY | Facility: CLINIC | Age: 37
End: 2022-09-26

## 2022-09-26 DIAGNOSIS — M62.81 MUSCLE WEAKNESS: ICD-10-CM

## 2022-09-26 DIAGNOSIS — Z98.890 S/P LEFT ROTATOR CUFF REPAIR: ICD-10-CM

## 2022-09-26 DIAGNOSIS — M25.60 RANGE OF MOTION DEFICIT: Primary | ICD-10-CM

## 2022-09-26 PROCEDURE — 97110 THERAPEUTIC EXERCISES: CPT | Performed by: PHYSICAL THERAPIST

## 2022-09-26 NOTE — PROGRESS NOTES
Physical Therapy Daily Progress Note      Patient: Alvaro Morris   : 1985  Referring practitioner: Pily Cordero MD  Date of Initial Visit: Type: THERAPY  Noted: 2022  Today's Date: 2022  Patient seen for 6 sessions    Progress Note Due: 10/19/2022     Alvaro Morris reports: that he is running behind because he got stuck behind two trains.       Objective/ Treatment: The left shoulder PROM remains full. The patient reports pain with PROM ER at 45 deg until relaxed. Once he is able to relax, he denies pain with PROM. He also reports a decrease in with pain with left shoulder AROM of motions in gravity eliminated positions such as prone and sidelying compared to previous visits. He benefits from performing exercises in mirror for visual feedback to maintain appropriate form. Demo and minimal verbal cues provided for new exercises only, encouraging performing exercises gently and with control. See exercise log for more details.      Education: Updated HEP to include:  Exercises  Prone Shoulder Horizontal Abduction - 1 x daily - 7 x weekly - 2 sets - 10 reps - 5 sec hold  Sidelying Shoulder External Rotation - 1 x daily - 7 x weekly - 2 sets - 10 reps - 5 sec hold      Assessment: The patient arrived 24 minutes late to today's physical therapy session, stating he got stuck behind two trains. He continues to exhibit difficulty arriving to appointments on time or as scheduled. Despite this, he vocalizes compliance with home exercise program. His left shoulder passive range of motion remains full. Progressed left shoulder submaximal strengthening today in gravity eliminated positions. He continues to require skilled physical therapy and progressions of exercise in order to improve his ability to lift light and heavy objects to operate machinery at work, lift the nation of a car, or reach behind his back to bathe.         Plan:  Progress strengthening /stabilization /functional activity              Timed:         Manual Therapy:         mins  90636;     Therapeutic Exercise:   31      mins  79471;     Neuromuscular Nanci:        mins  75499;    Therapeutic Activity:          mins  04182;     Gait Training:           mins  17965;     Ultrasound:          mins  08357;    Ionto                                   mins   14606  Self Care                            mins   41138      Un-Timed:  Electrical Stimulation:         mins  63330 ( );  Dry Needling          mins self-pay  Traction          mins 97090  Low Eval          Mins  92733  Mod Eval          Mins  50345  High Eval                            Mins  91550  Canalith Repos                   mins  05960    Timed Treatment:  31    mins   Total Treatment:    32    mins    Marianela Macias, PT  Physical Therapist

## 2022-09-28 ENCOUNTER — OFFICE VISIT (OUTPATIENT)
Dept: PHYSICAL THERAPY | Facility: CLINIC | Age: 37
End: 2022-09-28

## 2022-09-28 DIAGNOSIS — Z98.890 S/P LEFT ROTATOR CUFF REPAIR: ICD-10-CM

## 2022-09-28 DIAGNOSIS — M62.81 MUSCLE WEAKNESS: ICD-10-CM

## 2022-09-28 DIAGNOSIS — M25.60 RANGE OF MOTION DEFICIT: Primary | ICD-10-CM

## 2022-09-28 PROCEDURE — 97110 THERAPEUTIC EXERCISES: CPT | Performed by: PHYSICAL THERAPIST

## 2022-10-06 ENCOUNTER — OFFICE VISIT (OUTPATIENT)
Dept: PHYSICAL THERAPY | Facility: CLINIC | Age: 37
End: 2022-10-06

## 2022-10-06 DIAGNOSIS — Z98.890 S/P LEFT ROTATOR CUFF REPAIR: Primary | ICD-10-CM

## 2022-10-06 DIAGNOSIS — M62.81 MUSCLE WEAKNESS: ICD-10-CM

## 2022-10-06 DIAGNOSIS — M25.60 RANGE OF MOTION DEFICIT: ICD-10-CM

## 2022-10-06 DIAGNOSIS — M25.612 DECREASED RANGE OF MOTION OF LEFT SHOULDER: ICD-10-CM

## 2022-10-06 PROCEDURE — 97530 THERAPEUTIC ACTIVITIES: CPT | Performed by: PHYSICAL THERAPIST

## 2022-10-06 NOTE — PROGRESS NOTES
Physical Therapy Daily Progress Note    Patient: Alvaro Morris   : 1985  Diagnosis/ICD-10 Code:  Range of motion deficit [M25.60]  Referring practitioner: Pily Cordero MD  Date of Initial Visit: Type: THERAPY  Noted: 2022  Today's Date: 10/6/2022  Patient seen for 8 sessions         Alvaro Morris reports: I fell yesterday in my yard. I tripped over a landscaping light. I fell on my chest and my chin. It happened so fast that I could not even think about putting my arms out to catch myself. My chest is really sore today, my shoulder feels okay.     Subjective     Objective   Pt arrived 10 minutes late for appointment time.   See Exercise, Manual, and Modality Logs for complete treatment.       Assessment/Plan  Subjectively, pt reports no increase of pain with interventions performed today. Performed well with added theraband to isometric activity. Continues to demonstrate need for multiple cues during exercise for proper form and technique. Demonstrated new HEP and gave pt written instructions for home use and further compliance. Reviewed pt's schedule with him and gave printout as well for further compliance.     Progress per Plan of Care           Manual Therapy:         mins  81039;  Therapeutic Exercise:         mins  56766;     Neuromuscular Nanci:        mins  60823;    Therapeutic Activity:     20     mins  19647;     Gait Training:           mins  22136;     Ultrasound:          mins  25199;    Electrical Stimulation:         mins  58401 ( );  Dry Needling          mins self-pay    Timed Treatment:   20   mins   Total Treatment:     20   mins    Mirna Gonzalez PTA  Physical Therapist Assistant [id number]

## 2022-10-07 ENCOUNTER — OFFICE VISIT (OUTPATIENT)
Dept: PHYSICAL THERAPY | Facility: CLINIC | Age: 37
End: 2022-10-07

## 2022-10-07 DIAGNOSIS — Z98.890 S/P LEFT ROTATOR CUFF REPAIR: ICD-10-CM

## 2022-10-07 DIAGNOSIS — M62.81 MUSCLE WEAKNESS: ICD-10-CM

## 2022-10-07 DIAGNOSIS — M25.60 RANGE OF MOTION DEFICIT: Primary | ICD-10-CM

## 2022-10-07 PROCEDURE — 97110 THERAPEUTIC EXERCISES: CPT | Performed by: PHYSICAL THERAPIST

## 2022-10-07 PROCEDURE — 97112 NEUROMUSCULAR REEDUCATION: CPT | Performed by: PHYSICAL THERAPIST

## 2022-10-07 NOTE — PROGRESS NOTES
Physical Therapy Daily Progress Note      Patient: Alvaro Morris   : 1985  Referring practitioner: Pily Cordero MD  Date of Initial Visit: Type: THERAPY  Noted: 2022  Today's Date: 10/7/2022  Patient seen for 9 sessions    Progress Note Due: 10/19/2022     Alvaro Morris reports: that he's not doing well from falling yesterday. Did not catch himself with his arms and did more of a face plant.  Very sore in the chest today.       Objective/ Treatment:  Muscle Strength:   L shoulder   Flexion: 4-  Abd: 4+  IR at 0 de  ER at 0 de-    There is no bruising with visual observation of the patient's chest. He notes significant discomfort with palpation of the sternal aspect of the anterior ribs. Left shoulder PROM remains full and non painful. He notes discomfort performing supine PNF which improves and then returns again with subsequent repetitions. Used moderate verbal and tactile cues at the left upper extremity to perform motion through more comfortable range of motion and with control. See exercise logs for more details.       Education: Exercise progression       Assessment: The patient has been more compliant with rehab efforts lately, including improved attendance. He was 5 minutes late to today's session. Remains compliant with home exercise program. Progressed left shoulder strengthening today with increased resistance as shoulder strength remains reduced. He continues to exhibit decreased left shoulder muscle strength and endurance deficits in order to improve his ability to lift light and heavy objects to operate machinery at work, lift the nation of a car, or reach behind his back to bathe.         Plan:  Progress strengthening /stabilization /functional activity             Timed:         Manual Therapy:         mins  77994;     Therapeutic Exercise:    15     mins  48311;     Neuromuscular Nanci:  11      mins  15352;    Therapeutic Activity:          mins  92610;     Gait  Training:           mins  62023;     Ultrasound:          mins  38643;    Ionto                                   mins   00699  Self Care                            mins   21581      Un-Timed:  Electrical Stimulation:         mins  99491 ( );  Dry Needling          mins self-pay  Traction          mins 48288  Low Eval          Mins  67753  Mod Eval          Mins  96382  High Eval                            Mins  23295  Canalith Repos                   mins  15815    Timed Treatment:  26    mins   Total Treatment:    27    mins    Marianela Macias, PT  Physical Therapist

## 2022-10-14 ENCOUNTER — OFFICE VISIT (OUTPATIENT)
Dept: PHYSICAL THERAPY | Facility: CLINIC | Age: 37
End: 2022-10-14

## 2022-10-14 DIAGNOSIS — Z98.890 S/P LEFT ROTATOR CUFF REPAIR: ICD-10-CM

## 2022-10-14 DIAGNOSIS — M62.81 MUSCLE WEAKNESS: ICD-10-CM

## 2022-10-14 DIAGNOSIS — M25.60 RANGE OF MOTION DEFICIT: Primary | ICD-10-CM

## 2022-10-14 PROCEDURE — 97112 NEUROMUSCULAR REEDUCATION: CPT | Performed by: PHYSICAL THERAPIST

## 2022-10-14 PROCEDURE — 97110 THERAPEUTIC EXERCISES: CPT | Performed by: PHYSICAL THERAPIST

## 2022-10-14 NOTE — PROGRESS NOTES
Physical Therapy Daily Progress Note      Patient: Alvaro Morris   : 1985  Referring practitioner: Pily Cordero MD  Date of Initial Visit: Type: THERAPY  Noted: 2022  Today's Date: 10/14/2022  Patient seen for 10 sessions    Progress Note Due: 10/19/2022     Alvaro Morris reports: that the story he told me about falling was correct but he didn't fall off the sidewalk. Now stating that he got knocked out. Still sore but nowhere near as bad today. 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 States that he was having muscle spasms in his back (points to right anterior ribs) last night.      Objective/ Treatment: The patient exhibits improved ease of performing shoulder strengthening exercises in gravity eliminated positions (supine/sidelying). He reports fatigue after 2-3 repetitions, but is able to perform through full range of motion. He benefits from verbal cuing ~50% of the time to stay on task. Form of exercises appears good, although R arm does try to overpower L arm with bilateral shoulder strengthening exercises. See exercise logs for more details.     Education: Exercise progression, updated HEP with handouts, schedule printed to improve compliance       Assessment: The patient is present today on time for therapy session. He remains compliant with home exercise program. Reports that he is less sore today after falling last week. Progressed shoulder exercises today by increasing weight of shoulder strengthening activities in gravity minimized positions. Plan to continue strengthening in sitting and standing as patient tolerance allows. He continues to require skilled physical therapy in order to improve shoulder strength and endurance to improve his ability to lift light and heavy objects to operate machinery at work and lift the nation of a car.         Plan:  Progress strengthening /stabilization /functional activity; Reduce plan of care to 1x/week              Timed:         Manual Therapy:         mins  95957;     Therapeutic Exercise:  30       mins  04682;     Neuromuscular Nanci:  12      mins  71957;    Therapeutic Activity:          mins  66524;     Gait Training:           mins  63758;     Ultrasound:          mins  64084;    Ionto                                   mins   23473  Self Care                            mins   88895      Un-Timed:  Electrical Stimulation:         mins  70969 ( );  Dry Needling          mins self-pay  Traction          mins 60116  Low Eval          Mins  33480  Mod Eval          Mins  98220  High Eval                            Mins  41250  Canalith Repos                   mins  24390    Timed Treatment:   42   mins   Total Treatment:     43   mins    Marianela Macias, PT  Physical Therapist

## 2022-10-18 ENCOUNTER — OFFICE VISIT (OUTPATIENT)
Dept: PHYSICAL THERAPY | Facility: CLINIC | Age: 37
End: 2022-10-18

## 2022-10-18 DIAGNOSIS — Z98.890 S/P LEFT ROTATOR CUFF REPAIR: ICD-10-CM

## 2022-10-18 DIAGNOSIS — M25.60 RANGE OF MOTION DEFICIT: Primary | ICD-10-CM

## 2022-10-18 DIAGNOSIS — M62.81 MUSCLE WEAKNESS: ICD-10-CM

## 2022-10-18 PROCEDURE — 97112 NEUROMUSCULAR REEDUCATION: CPT | Performed by: PHYSICAL THERAPIST

## 2022-10-18 PROCEDURE — 97110 THERAPEUTIC EXERCISES: CPT | Performed by: PHYSICAL THERAPIST

## 2022-10-18 NOTE — PROGRESS NOTES
"Physical Therapy Daily Progress Note      Patient: Alvaro Morris   : 1985  Referring practitioner: Pily Cordero MD  Date of Initial Visit: Type: THERAPY  Noted: 2022  Today's Date: 10/18/2022  Patient seen for 11 sessions    Progress Note Due: 11/15/2022     Alvaro Morris reports: that both of his shoulders are sore today. Feels like \"it might be the cold front. I don't know.\" States that he \"doesn't really use his left arm.\" Scared to mess it up. Thinks that he can raise his arm up easier, but can't do things for long.     Subjective Questionnaire: QuickDASH: 36/55      Objective/ Treatment:  Active Range of Motion:  L shoulder  Flexion: 118  Abduction: 110  Behind the head (ER): T5  Behind the back (IR): T11    Pass Range of Motion:   L shoulder: WNL all planes     Muscle Strength:   L shoulder   Flexion: 4  Abd: 4+  IR at 0 de  ER at 0 de+    Left Elbow   Flexion: 5  Extension: 5      Education: Progress towards goals      Goals  Plan Goals: Short Term: 6-8 weeks  1. The patient will be independent with sling use to ensure tissue healing and reduce pain at rest. -achieved   2. The patient will improve left shoulder PROM to WFL to improve mobility for pushing and pulling tasks. -achieved   3. The patient will improve left elbow strength to 4+/5 to improve lifting of light objects (power tools). -achieved     Long Term: 20 weeks   1. 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 -progressing, shoulder flexion still limited   2. The patient will be able to actively reach overhead in order to lift the nation of a car. -progressing, limited to 118 deg active flexion    3. The patient will be able to actively reach behind his back (T10) to bathe without restriction. -  4. 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 -not attempted   5. The patient will improve Quick DASH " score from 32/55 to 22/55 to improve tolerance to childcare and household tasks. -regressed slightly       Assessment: The patient has attended 11 sessions of skilled physical therapy thus far. His compliance with attendance has been poor but his compliance with home exercise program completion has been good. Pain has been his biggest barrier to rehab, along with being late to most all treatment sessions that he attends. Upon re-assessment today, he exhibits normal left shoulder passive range of motion, as well as improvements in left shoulder active range of motion and shoulder strength. Left upper extremity strength is still limited in flexion and muscular endurance is low. He is now able to bathe without restriction, carry light objects to perform light household chores, and reach overhead. He remains limited in his ability to lift heavy weights to operate machinery at work or lift the nation of a car. His plan of care is complicated by 2-3 personal factors and co-morbidities. His prognosis to achieve the established goals is fair. Based on my re-assessment today, he would benefit from additional skilled physical therapy in order to address the stated deficits and return to his previous level of function.         Plan:  Progress strengthening /stabilization /functional activity; plan to continue 2x/week next week and then reduce frequency to 1x/week              Timed:         Manual Therapy:         mins  04478;     Therapeutic Exercise:   17      mins  43372;     Neuromuscular Nanci:  15      mins  05939;    Therapeutic Activity:   2       mins  90162;     Gait Training:           mins  29061;     Ultrasound:          mins  40695;    Ionto                                   mins   68407  Self Care                            mins   08414      Un-Timed:  Electrical Stimulation:         mins  71526 ( );  Dry Needling          mins self-pay  Traction          mins 95646  Low Eval          Mins  11021  Mod Eval           Mins  41015  High Eval                            Mins  74095  [facility]                   mins  56906    Timed Treatment:  34    mins   Total Treatment:    35    mins    Marianela Macias PT  Physical Therapist

## 2022-10-25 ENCOUNTER — OFFICE VISIT (OUTPATIENT)
Dept: PHYSICAL THERAPY | Facility: CLINIC | Age: 37
End: 2022-10-25

## 2022-10-25 DIAGNOSIS — M62.81 MUSCLE WEAKNESS: ICD-10-CM

## 2022-10-25 DIAGNOSIS — Z98.890 S/P LEFT ROTATOR CUFF REPAIR: ICD-10-CM

## 2022-10-25 DIAGNOSIS — M25.60 RANGE OF MOTION DEFICIT: Primary | ICD-10-CM

## 2022-10-25 PROCEDURE — 97110 THERAPEUTIC EXERCISES: CPT | Performed by: PHYSICAL THERAPIST

## 2022-10-25 PROCEDURE — 97112 NEUROMUSCULAR REEDUCATION: CPT | Performed by: PHYSICAL THERAPIST

## 2022-10-25 NOTE — PROGRESS NOTES
Physical Therapy Daily Progress Note      Patient: Alvaro Morris   : 1985  Referring practitioner: Pily Cordero MD  Date of Initial Visit: Type: THERAPY  Noted: 2022  Today's Date: 10/25/2022  Patient seen for 12 sessions    Progress Note Due: 11/15/2022     Alvaro Morris reports: that he feels stronger, but a lot of the times it is difficult to reach because of sharp pain in the left shoulder. Feels like reaching behind with the left shoulder rotating in is the most painful. Has a hard time describing the pain. 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 Minimal verbal cues to slow motion down, which then decreases pain. He notes aches and soreness in the left chest and shoulder throughout most of the session, but is able to complete all exercises with encouragement. He reports the most discomfort reaching the L arm across body to a PNF D1 flexion position. See exercise logs for more details.     Education: Soreness expectations and timeframe for expected outcomes, reinforced importance of making it to MD appointment later this week      Assessment: The patient has exhibited improved attendance over the last several weeks, although he was 8 minutes late to today's session. He remains compliant with home exercise program, stating that he completes it at least twice a day. Pain is the biggest barrier to rehab, which is often inconsistent and difficult to pinpoint exacerbating factors. It does appear that resisted exercises in standing, particularly abduction, are the most challenging. Progressed left shoulder strengthening below eye level today. Plan to continue progressing shoulder strengthening at and above eye level as well as muscle endurance in order for the patient to return to lifting heavy weights to  operate machinery at work or lifting the nation of a car.        Plan:  Progress strengthening /stabilization /functional activity             Timed:         Manual Therapy:         mins  30453;     Therapeutic Exercise:  24       mins  46454;     Neuromuscular Nanci:  15      mins  37429;    Therapeutic Activity:    5      mins  41712;     Gait Training:           mins  82530;     Ultrasound:          mins  83651;    Ionto                                   mins   06976  Self Care                            mins   66888      Un-Timed:  Electrical Stimulation:         mins  87113 ( );  Dry Needling          mins self-pay  Traction          mins 35930  Low Eval          Mins  31639  Mod Eval          Mins  12570  High Eval                            Mins  39570  Canalith Repos                   mins  53796    Timed Treatment:  44    mins   Total Treatment:    45    mins    Marianela Macias PT  Physical Therapist

## 2022-10-27 ENCOUNTER — OFFICE VISIT (OUTPATIENT)
Dept: ORTHOPEDIC SURGERY | Facility: CLINIC | Age: 37
End: 2022-10-27

## 2022-10-27 VITALS — TEMPERATURE: 97.3 F | WEIGHT: 151.1 LBS | HEIGHT: 74 IN | BODY MASS INDEX: 19.39 KG/M2

## 2022-10-27 DIAGNOSIS — Z98.890 S/P ROTATOR CUFF REPAIR: Primary | ICD-10-CM

## 2022-10-27 PROCEDURE — 99024 POSTOP FOLLOW-UP VISIT: CPT | Performed by: ORTHOPAEDIC SURGERY

## 2022-10-27 RX ORDER — MELOXICAM 15 MG/1
TABLET ORAL
Qty: 30 TABLET | Refills: 0 | Status: SHIPPED | OUTPATIENT
Start: 2022-10-27

## 2022-10-27 NOTE — PROGRESS NOTES
Left Shoulder Scope RCR follow Up       Patient: Alvaro Morris        YOB: 1985      Chief Complaints: Left shoulder pain      History of Present Illness: Pt is here f/u shoulder arthroscopy, RCR+++ he is 12 weeks out today seems to be minding his restrictions is progressing with regard to his range of motion and his strength he has been consistent with his physical therapy he has been off work per my request        Allergies: No Known Allergies    Medications:   Home Medications:  Current Outpatient Medications on File Prior to Visit   Medication Sig   • acetaminophen (TYLENOL) 500 MG tablet Take 2 tablets by mouth 2 (Two) Times a Day.   • amphetamine-dextroamphetamine (Adderall) 10 MG tablet Take 1 tablet by mouth Daily With Lunch. (Patient taking differently: Take 10 mg by mouth Daily With Lunch. HOLD 3 DAYS BEFORE SURGERY)   • amphetamine-dextroamphetamine XR (ADDERALL XR) 30 MG 24 hr capsule Take 1 capsule by mouth Every Morning (Patient taking differently: Take 30 mg by mouth Daily As Needed HOLD 3 DAYS BEFORE SURGERY)   • gabapentin (Neurontin) 300 MG capsule Take 1 capsule by mouth 2 (Two) Times a Day.   • ibuprofen (ADVIL,MOTRIN) 800 MG tablet Take 1 tablet by mouth 3 (Three) Times a Day.   • lisinopril (PRINIVIL,ZESTRIL) 10 MG tablet Take 1 tablet by mouth Daily.   • multivitamin with minerals tablet tablet Take 1 tablet by mouth Daily. PT HOLDING FOR SURGERY   • oxyCODONE-acetaminophen (PERCOCET) 5-325 MG per tablet Take 1-2 tablets by mouth every 4  hours as needed for severe pain. Stop Tylenol if taking this medicine. Not to exceed 12 tablets in 24 hour period     No current facility-administered medications on file prior to visit.     Current Medications:  Scheduled Meds:  Continuous Infusions:No current facility-administered medications for this visit.    PRN Meds:.          Physical Exam: 36 y.o. male  General Appearance:    Alert, cooperative, in no acute distress                  There were no vitals filed for this visit.   Patient is alert and oriented ×3 no acute distress normal mood physical exam.  Physical exam of the shoulder, incisions looked good there is no erythema,no signs or sx of infection.  Active forward flexion is to 180 he still has some mild substitution rotator cuff strength is 4+/5    Assessment  S/P shoulder scope, rotator cuff repair, I think he is doing well work is asking if he can return with restrictions at this point I am going to say no I want him to use his time and energy on his physical therapy I will reconsider that in 1 month please see work later today for distinct answers to questions at work, but forward Iyer to continue working on his strength and endurance and I will see him back in 1 month he will remain off work at this time

## 2022-11-11 ENCOUNTER — OFFICE VISIT (OUTPATIENT)
Dept: PHYSICAL THERAPY | Facility: CLINIC | Age: 37
End: 2022-11-11

## 2022-11-11 DIAGNOSIS — M62.81 MUSCLE WEAKNESS: ICD-10-CM

## 2022-11-11 DIAGNOSIS — Z98.890 S/P LEFT ROTATOR CUFF REPAIR: ICD-10-CM

## 2022-11-11 DIAGNOSIS — M25.60 RANGE OF MOTION DEFICIT: Primary | ICD-10-CM

## 2022-11-11 PROCEDURE — 97112 NEUROMUSCULAR REEDUCATION: CPT | Performed by: PHYSICAL THERAPIST

## 2022-11-11 PROCEDURE — 97530 THERAPEUTIC ACTIVITIES: CPT | Performed by: PHYSICAL THERAPIST

## 2022-11-11 NOTE — PROGRESS NOTES
"Physical Therapy Daily Progress Note      Patient: Alvaro Morris   : 1985  Referring practitioner: Pily Cordero MD  Date of Initial Visit: Type: THERAPY  Noted: 2022  Today's Date: 2022  Patient seen for 13 sessions    Progress Note Due:     Alvaro Morris reports: that his \"shoulder is hit or miss. Can tell that strengthening is getting back. Easier to hold my arm up longer. Reaching back is hard. Doing something sudden is hard.\"       Objective/ Treatment:  Subjective Questionnaire: QuickDASH: 32/55        Objective/ Treatment:  Active Range of Motion:  L shoulder  Flexion: 156  Abduction: 150  Behind the head (ER): T5  Behind the back (IR): T7     Pass Range of Motion:   L shoulder: WNL all planes      Muscle Strength:   L shoulder   Flexion: 4  Abd: 4+  IR at 0 de  ER at 0 de  IR at 90 deg (supine): 5  ER at 90 deg (supine): 5     Left Elbow   Flexion: 5  Extension: 5    Movement Observation:  L scapular hike present with shoulder flexion/abduction movement, patient self reports pain through mid range of active movement - benefits from moderate verbal and tactile cues to move with control - patient taking several long rest breaks in between sets of exercises           Education: Progress towards goals and remaining functional deficits, discussed plan of care including any additional missed therapy appointments will result in a discharge.  Provided patient with blue theraband for HEP, which was reviewed with the patient.     Goals  Plan Goals: Short Term: 6-8 weeks  1. The patient will be independent with sling use to ensure tissue healing and reduce pain at rest. -achieved   2. The patient will improve left shoulder PROM to WFL to improve mobility for pushing and pulling tasks. -achieved   3. The patient will improve left elbow strength to 4+/5 to improve lifting of light objects (power tools). -achieved     Long Term: 20 weeks   1. The patient will improve left shoulder " strength to 4+/5 in all planes to lift up to 100 lbs of machinery at work to waist level. -progressing, shoulder flexion still limited   2. The patient will be able to actively reach overhead in order to lift the nation of a car. -progressing, limited to 156 deg active flexion    3. The patient will be able to actively reach behind his back (T10) to bathe without restriction. -achieved   4. 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 -progressing, can lift 5# currently   5. The patient will improve Quick DASH score from 32/55 to 22/55 to improve tolerance to childcare and household tasks. -progressing             Assessment: The patient has attended 13 sessions of skilled physical therapy thus far. His compliance with attendance has been poor, however, his compliance with home exercise program completion has been fairly good. He has no showed for his last several appointments. Upon re-assessment today, he exhibits improvements in left shoulder active range of motion and left  rotator cuff strength. This has improved his ability to reach overhead with light objects. He continues to exhibit decreased left shoulder active range of motion at end range (flexion and abduction), as well as left shoulder flexion and abduction strength, limiting his ability to lift heavy items overhead such as a nation of a car or carrying heavy items to perform car maintenance or household tasks. His plan of care is complicated by non-compliance with attendance and 2-3 personal factors and co-morbidities. His prognosis to achieve the established goals is fair. Based on my re-assessment today, he would benefit from additional skilled physical therapy in order to address the stated deficits and return to his previous level of function.         Plan:  Progress strengthening /stabilization /functional activity             Timed:         Manual Therapy:         mins  70192;     Therapeutic Exercise:   4      mins   97638;     Neuromuscular Nanci: 17       mins  37627;    Therapeutic Activity:    17      mins  35623;     Gait Training:           mins  92867;     Ultrasound:          mins  17136;    Ionto                                   mins   76605  Self Care                            mins   55669      Un-Timed:  Electrical Stimulation:         mins  67039 ( );  Dry Needling          mins self-pay  Traction          mins 63787  Low Eval          Mins  13359  Mod Eval          Mins  57229  High Eval                            Mins  85183  Canalith Repos                   mins  30205    Timed Treatment:  38    mins   Total Treatment:    38   mins    Marianela Macias PT  Physical Therapist

## 2022-11-17 ENCOUNTER — OFFICE VISIT (OUTPATIENT)
Dept: PHYSICAL THERAPY | Facility: CLINIC | Age: 37
End: 2022-11-17

## 2022-11-17 DIAGNOSIS — Z98.890 S/P LEFT ROTATOR CUFF REPAIR: ICD-10-CM

## 2022-11-17 DIAGNOSIS — M25.612 DECREASED RANGE OF MOTION OF LEFT SHOULDER: ICD-10-CM

## 2022-11-17 DIAGNOSIS — M25.60 RANGE OF MOTION DEFICIT: Primary | ICD-10-CM

## 2022-11-17 DIAGNOSIS — M62.81 MUSCLE WEAKNESS: ICD-10-CM

## 2022-11-17 PROCEDURE — 97530 THERAPEUTIC ACTIVITIES: CPT | Performed by: PHYSICAL THERAPIST

## 2022-11-17 PROCEDURE — 97110 THERAPEUTIC EXERCISES: CPT | Performed by: PHYSICAL THERAPIST

## 2022-11-17 PROCEDURE — 97112 NEUROMUSCULAR REEDUCATION: CPT | Performed by: PHYSICAL THERAPIST

## 2022-11-17 NOTE — PROGRESS NOTES
"Physical Therapy Daily Treatment Note      Patient: Alvaro Morris   : 1985  Referring practitioner: Pily Cordero MD  Date of Initial Visit: Type: THERAPY  Noted: 2022  Today's Date: 2022  Patient seen for 14 sessions         Alvaro Morris reports: that he feels his left shoulder if improving.  Doing exercises at home, still feels some \"movement\" that causes him pain/discomfort at time. States that left shoulder surgery recovery is going better that right surgical recovery.        Subjective     Objective   -noted scapular winging upon postural observation  -noted UT substitution with OH arm raises as well as scapular protraction positioning vs retraction when lowering L UE.    -observed essentially full flexion, scap and abd  LUE with movement.    See Exercise, Manual, and Modality Logs for complete treatment.   Exercise rationale/ pain free exercise performance  Anatomy and structure of affected musculature  Posture/Postural awareness; positioning of scapula  Heat application   Sleeping positions with pillows  Alternate exercise positions -sidelying alphabet  Verbal/Tactile cues to ensure correct exercise performance/technique - scapular positioning    Added:sidelying alphabets 1 lb x 3, wall push ups x 10, OH ball taps L UE 10 taps x 5 reps , prone I, T, Y 2 x 10 each, sidelying ER L 1 lbs 2 x 10, UB 2 min fwd/2 min retro      Assessment/Plan  Subjective pain complaints left shoulder are low, though continues to report some discomfort while lowering arm.  Objectively, noted scapular winging and weakness of mid/low traps.  Able to progress exercises without increased symptoms/discomfort, only early fatigue.  Benefits from manual mobilization and positioning of left scapular with prone exercises.   Benefits from verbal/tactile cues regarding scapular position with all exercises. Should continue to improve with continued rehab efforts.         Progress per Plan of Care towards all goals. "  Continue L UE strengthening of affected musculature.  Progress work            Timed:  Manual Therapy:        mins  38232;  Therapeutic Exercise:    26   mins  76366;     Neuromuscular Nanci:    10    mins  39389;    Therapeutic Activity:     18     mins  07681;     Gait Training:           mins  48200;     Ultrasound:          mins  59476;      Untimed:  Electrical Stimulation:         mins  09938 ( );  Mechanical Traction:         mins  07589;     Timed Treatment:   54  mins   Total Treatment:     54   mins  Gaetano Zhao PTA  Physical Therapist  Assistant  [id number]

## 2022-11-22 ENCOUNTER — TREATMENT (OUTPATIENT)
Dept: PHYSICAL THERAPY | Facility: CLINIC | Age: 37
End: 2022-11-22

## 2022-11-22 DIAGNOSIS — M25.60 RANGE OF MOTION DEFICIT: Primary | ICD-10-CM

## 2022-11-22 DIAGNOSIS — M25.612 DECREASED RANGE OF MOTION OF LEFT SHOULDER: ICD-10-CM

## 2022-11-22 DIAGNOSIS — M62.81 MUSCLE WEAKNESS: ICD-10-CM

## 2022-11-22 DIAGNOSIS — Z98.890 S/P LEFT ROTATOR CUFF REPAIR: ICD-10-CM

## 2022-11-22 PROCEDURE — 97110 THERAPEUTIC EXERCISES: CPT | Performed by: PHYSICAL THERAPIST

## 2022-11-22 PROCEDURE — 97112 NEUROMUSCULAR REEDUCATION: CPT | Performed by: PHYSICAL THERAPIST

## 2022-11-22 PROCEDURE — 97530 THERAPEUTIC ACTIVITIES: CPT | Performed by: PHYSICAL THERAPIST

## 2022-11-23 NOTE — PROGRESS NOTES
Physical Therapy Daily Progress Note    Patient: Alvaro Morris   : 1985  Diagnosis/ICD-10 Code:  Range of motion deficit [M25.60]  Referring practitioner: Pily Cordero MD  Date of Initial Visit: Type: THERAPY  Noted: 2022  Today's Date: 2022  Patient seen for 15 session       Visit Diagnoses:    ICD-10-CM ICD-9-CM   1. Range of motion deficit  M25.60 719.50   2. Muscle weakness  M62.81 728.87   3. S/P left rotator cuff repair  Z98.890 V45.89   4. Decreased range of motion of left shoulder  M25.612 719.51            Subjective  Alvaro Morris reported today that he's doing pretty well     Objective   No functional measures updated at today's visit unless otherwise stated. For functional assessment and documentation of patient progressions referred to the assessment section.    See Exercise, Manual, and Modality Logs for complete treatments.       Assessment/Plan  Tx today continued with emphasis of progressive functional strengthening to address current limitations and impairments in mobility and function strength/stability of involved areas. Pt appears to be progressing well with current treatment plan; appears to be progressing well with functional strength and mobility, per gross assessment and patient report. Pt continues to have limitations in the above mentioned areas, but is progressing well towards current goals and will continue to benefit from skilled PT to help pt regain functional mobility and strength necessary to reduce symptoms and return to PLOF.      Continue with current treatment plan and ongoing assessment; progress interventions to tolerance         Timed:         Manual Therapy:    -     mins  76000;     Therapeutic Exercise:    25     mins  96756;     Neuromuscular Nanci:    10    mins  49931;    Therapeutic Activity:     20     mins  88928;     Gait Training:           mins  88216;     Ultrasound:          mins  60082;    Ionto                                    mins   72494  Self Care                            mins   42629  Canalith Repos         mins 80615    Un-Timed:  Electrical Stimulation:         mins  80809 ( );  Dry Needling     -     mins self-pay  Traction          mins 06953  Low Eval          Mins  15755  Mod Eval          Mins  88247  High Eval                            Mins  76032      Timed Treatment:   55   mins   Total Treatment:     55   mins      PT: Jake Hweitt PT     License Number: [id number]  Electronically signed by Jake Hewitt PT, 11/23/22, 2:16 PM EST

## 2022-12-01 ENCOUNTER — TREATMENT (OUTPATIENT)
Dept: PHYSICAL THERAPY | Facility: CLINIC | Age: 37
End: 2022-12-01

## 2022-12-01 DIAGNOSIS — M25.612 DECREASED RANGE OF MOTION OF LEFT SHOULDER: ICD-10-CM

## 2022-12-01 DIAGNOSIS — M25.60 RANGE OF MOTION DEFICIT: Primary | ICD-10-CM

## 2022-12-01 DIAGNOSIS — Z98.890 S/P LEFT ROTATOR CUFF REPAIR: ICD-10-CM

## 2022-12-01 DIAGNOSIS — M62.81 MUSCLE WEAKNESS: ICD-10-CM

## 2022-12-01 PROCEDURE — 97110 THERAPEUTIC EXERCISES: CPT | Performed by: PHYSICAL THERAPIST

## 2022-12-01 PROCEDURE — 97530 THERAPEUTIC ACTIVITIES: CPT | Performed by: PHYSICAL THERAPIST

## 2022-12-01 PROCEDURE — 97112 NEUROMUSCULAR REEDUCATION: CPT | Performed by: PHYSICAL THERAPIST

## 2022-12-02 ENCOUNTER — TELEPHONE (OUTPATIENT)
Dept: ORTHOPEDIC SURGERY | Facility: CLINIC | Age: 37
End: 2022-12-02

## 2022-12-02 NOTE — TELEPHONE ENCOUNTER
----- Message from Cherie Lou MA sent at 12/2/2022  8:49 AM EST -----  Regarding: Scheduling   Contact: [phone]  Please advise       ----- Message -----  From: Alvaro Morris  Sent: 12/2/2022   8:39 AM EST  To: [facility]  Subject: Scheduling                                       My appointment was today at 8:15am this morning for some reason. There was no way I could make it due to . Is there anyway you can squeeze me in today because workers comp is up my @@s about my appointments. Thank you   Alvaro

## 2022-12-02 NOTE — TELEPHONE ENCOUNTER
Caller: Alvaro Morris    Relationship to patient: Self    Best call back number: [phone]    HUB UNABLE TO WARM TRANSFER. PATIENT NEEDS TO SEE IF HE CAN RESCHEDULE ANY APPOINTMENT SOONER THAN 12-8. HE HAD A W/C FOLLOW UP APPOINTMENT THIS MORNING ON 12-2 AT 8:10AM THAT HE MISSED. PLEASE CALL PATIENT BACK AND ADVISE.

## 2022-12-02 NOTE — PROGRESS NOTES
Physical Therapy Daily Progress Note    Patient: Alvaro Morris   : 1985  Diagnosis/ICD-10 Code:  Range of motion deficit [M25.60]  Referring practitioner: Pily Cordero MD  Date of Initial Visit: Type: THERAPY  Noted: 2022  Today's Date: 2022  Patient seen for 16 session       Visit Diagnoses:    ICD-10-CM ICD-9-CM   1. Range of motion deficit  M25.60 719.50   2. Muscle weakness  M62.81 728.87   3. S/P left rotator cuff repair  Z98.890 V45.89   4. Decreased range of motion of left shoulder  M25.612 719.51            Subjective  Alvaro Morris reported today that he's doing better, shoulder feeling stronger    Objective   No functional measures updated at today's visit unless otherwise stated. For functional assessment and documentation of patient progressions referred to the assessment section.    See Exercise, Manual, and Modality Logs for complete treatments.       Assessment/Plan  Treatment today addressed current limitations and impairments with progressive functional strengthening to help patient improve current symptoms and progress towards current goals. All activities today were performed without complaint or worsening of symptoms. VC was provided during interventions when necessary to address appropriate form with interventions. Patient tolerated treatment well today with no adverse events and is progressing well with current treatment plan. Patient will continue to benefit from PT addressing current limitations and impairments to help patient regain necessary function and meet current goals      Continue with current treatment plan and ongoing assessment; progress interventions to tolerance         Timed:         Manual Therapy:    -     mins  62532;     Therapeutic Exercise:    25     mins  25357;     Neuromuscular Nanci:    10    mins  90486;    Therapeutic Activity:     20     mins  66941;     Gait Training:           mins  11889;     Ultrasound:          mins  65326;     Ionto                                   mins   49063  Self Care                            mins   73954  Canalith Repos         mins 41243    Un-Timed:  Electrical Stimulation:         mins  91391 ( );  Dry Needling     -     mins self-pay  Traction          mins 36521  Low Eval          Mins  58896  Mod Eval          Mins  72815  High Eval                            Mins  65760      Timed Treatment:   55   mins   Total Treatment:     55   mins      PT: Jake Hewitt PT     License Number: [id number]  Electronically signed by Jake Hewitt PT, 12/02/22, 12:18 PM EST

## 2022-12-05 ENCOUNTER — TELEPHONE (OUTPATIENT)
Dept: ORTHOPEDIC SURGERY | Facility: CLINIC | Age: 37
End: 2022-12-05

## 2022-12-05 NOTE — TELEPHONE ENCOUNTER
Caller: ANGELES    Relationship: [employer] - W/C     Best call back number:     What form or medical record are you requesting: PROGRESS NOTES FROM 12/2/22 AND RETURN TO WORK NOTE    Who is requesting this form or medical record from you: WORK COMP    How would you like to receive the form or medical records (pick-up, mail, fax): FAX  If fax, what is the fax number:   [phone]    Timeframe paperwork needed: ASAP

## 2022-12-06 NOTE — PROGRESS NOTES
Shoulder Scope/ RCR Follow Up       Patient: Alvaro Morris        YOB: 1985      Chief Complaints: shoulder pain      History of Present Illness: Pt is here f/u shoulder arthroscopy rotator cuff repair he is about 4-1/2 months out states he continues to improve he feels like he is getting stronger but still has some pain he is a  states he is not lifting anything with his left hand      Allergies: No Known Allergies    Medications:   Home Medications:  Current Outpatient Medications on File Prior to Visit   Medication Sig   • acetaminophen (TYLENOL) 500 MG tablet Take 2 tablets by mouth 2 (Two) Times a Day.   • amphetamine-dextroamphetamine (Adderall) 10 MG tablet Take 1 tablet by mouth Daily With Lunch. (Patient taking differently: Take 1 tablet by mouth Daily With Lunch. HOLD 3 DAYS BEFORE SURGERY)   • amphetamine-dextroamphetamine XR (ADDERALL XR) 30 MG 24 hr capsule Take 1 capsule by mouth Every Morning (Patient taking differently: Take 1 capsule by mouth Daily As Needed HOLD 3 DAYS BEFORE SURGERY)   • gabapentin (Neurontin) 300 MG capsule Take 1 capsule by mouth 2 (Two) Times a Day.   • lisinopril (PRINIVIL,ZESTRIL) 10 MG tablet Take 1 tablet by mouth Daily.   • meloxicam (MOBIC) 15 MG tablet 1 PO Daily with food.   • multivitamin with minerals tablet tablet Take 1 tablet by mouth Daily. PT HOLDING FOR SURGERY   • oxyCODONE-acetaminophen (PERCOCET) 5-325 MG per tablet Take 1-2 tablets by mouth every 4  hours as needed for severe pain. Stop Tylenol if taking this medicine. Not to exceed 12 tablets in 24 hour period     No current facility-administered medications on file prior to visit.     Current Medications:  Scheduled Meds:  Continuous Infusions:No current facility-administered medications for this visit.    PRN Meds:.          Physical Exam: 37 y.o. male  General Appearance:    Alert, cooperative, in no acute distress                 There were no vitals filed for this visit.    Patient is alert and oriented ×3 no acute distress normal mood physical exam.  Physical exam of the shoulder, incisions looked good there is no erythema,no signs or sx of infection.  Active flexion is to 180 abduction similar external rotation to 40 internal rotation lumbar spine rotator cuff strength his elbow at his side is 4+/5 and empty can position its 4/5  Assessment  S/P shoulder scope.  Rotator cuff repair he is a little weaker than I would like to see it 4-1/2 months I am a little concerned about the repair but he does feel like he is getting stronger with the new therapist that he is working with he denies doing too much activities and not lifting more than he should be with his left arm.  He is not ready to return to work.  On his right arm we did that he was a little slow to get going but ultimately got there we will stay the course keep him off work and I will see him back 6        Plan: Continue Physical Therapy. I reiterated restrictions

## 2022-12-08 ENCOUNTER — OFFICE VISIT (OUTPATIENT)
Dept: ORTHOPEDIC SURGERY | Facility: CLINIC | Age: 37
End: 2022-12-08

## 2022-12-08 VITALS — WEIGHT: 155.4 LBS | HEIGHT: 74 IN | TEMPERATURE: 97.6 F | BODY MASS INDEX: 19.94 KG/M2

## 2022-12-08 DIAGNOSIS — Z98.890 S/P ROTATOR CUFF REPAIR: Primary | ICD-10-CM

## 2022-12-08 PROCEDURE — 99213 OFFICE O/P EST LOW 20 MIN: CPT | Performed by: ORTHOPAEDIC SURGERY

## 2022-12-19 ENCOUNTER — TREATMENT (OUTPATIENT)
Dept: PHYSICAL THERAPY | Facility: CLINIC | Age: 37
End: 2022-12-19

## 2022-12-19 DIAGNOSIS — M25.612 DECREASED RANGE OF MOTION OF LEFT SHOULDER: ICD-10-CM

## 2022-12-19 DIAGNOSIS — M62.81 MUSCLE WEAKNESS: ICD-10-CM

## 2022-12-19 DIAGNOSIS — Z98.890 S/P LEFT ROTATOR CUFF REPAIR: ICD-10-CM

## 2022-12-19 DIAGNOSIS — M25.60 RANGE OF MOTION DEFICIT: Primary | ICD-10-CM

## 2022-12-19 PROCEDURE — 97110 THERAPEUTIC EXERCISES: CPT | Performed by: PHYSICAL THERAPIST

## 2022-12-19 PROCEDURE — 97530 THERAPEUTIC ACTIVITIES: CPT | Performed by: PHYSICAL THERAPIST

## 2022-12-19 PROCEDURE — 97112 NEUROMUSCULAR REEDUCATION: CPT | Performed by: PHYSICAL THERAPIST

## 2022-12-20 NOTE — PROGRESS NOTES
Physical Therapy Daily Progress Note    Patient: Alvaro Morris   : 1985  Diagnosis/ICD-10 Code:  Range of motion deficit [M25.60]  Referring practitioner: Pily Cordero MD  Date of Initial Visit: Type: THERAPY  Noted: 2022  Today's Date: 2022  Patient seen for 17 session       Visit Diagnoses:    ICD-10-CM ICD-9-CM   1. Range of motion deficit  M25.60 719.50   2. Muscle weakness  M62.81 728.87   3. S/P left rotator cuff repair  Z98.890 V45.89   4. Decreased range of motion of left shoulder  M25.612 719.51            Subjective  Alvaro Morris reported today that he's doing well     Objective   No functional measures updated at today's visit unless otherwise stated. For functional assessment and documentation of patient progressions referred to the assessment section.    See Exercise, Manual, and Modality Logs for complete treatments.       Assessment/Plan  Pt's limitations and impairments were addressed in treatment today with progressive functional strengthening, which pt tolerated well and without complaint. VC was provided during interventions to promote proper form and strengthening of target areas; pt appears to be progressing well with functional mobility, per gross assessment and pt report. Pt continues to have limitations in the aforementioned areas, and will continue to benefit from ongoing skilled PT to help pt regain functional mobility and strength to meet LTG and return to PLOF.      Continue with current treatment plan and ongoing assessment; progress interventions to tolerance         Timed:         Manual Therapy:    -     mins  65940;     Therapeutic Exercise:    25     mins  67829;     Neuromuscular Nanci:    10    mins  68710;    Therapeutic Activity:     10     mins  55503;     Gait Training:           mins  52457;     Ultrasound:          mins  53566;    Ionto                                   mins   48680  Self Care                            mins   85544  Dorian  Repos         mins 77627    Un-Timed:  Electrical Stimulation:         mins  75531 ( );  Dry Needling     -     mins self-pay  Traction          mins 05295  Low Eval          Mins  61723  Mod Eval          Mins  82595  High Eval                            Mins  83969      Timed Treatment:   45   mins   Total Treatment:     45   mins      PT: Jake Hewitt PT     License Number: [id number]  Electronically signed by Jake Hewitt PT, 12/20/22, 2:34 PM EST

## 2023-01-19 ENCOUNTER — TELEPHONE (OUTPATIENT)
Dept: ORTHOPEDIC SURGERY | Facility: CLINIC | Age: 38
End: 2023-01-19

## 2023-02-21 ENCOUNTER — TELEPHONE (OUTPATIENT)
Dept: ORTHOPEDIC SURGERY | Facility: CLINIC | Age: 38
End: 2023-02-21
Payer: COMMERCIAL

## 2023-02-21 NOTE — TELEPHONE ENCOUNTER
PT'S PHONE HAS BEEN DC'D,  HE IS NO LONGER COVERED UNDER W/C BECAUSE HE IS NON COMPLIANT, IF HE WANTS TO SEE [facility], HE MUST USE HIS MEDICAL INSURANCE,LLR

## 2023-02-21 NOTE — TELEPHONE ENCOUNTER
Caller: SORIN    Relationship to patient: PETE ACOSTA     Best call back number: [phone]    Additional Notes: SORIN WITH [employer] WAS CALLING TO LET YOUR OFFICE KNOW THAT PATIENT IS NOT AUTHORIZED FOR ANY FURTHER TREATMENT DUE TO NON- COMPLIANCE. IF YOU HAVE ANY QUESTIONS YOU CAN REACH SORIN [phone]. THANK YOU!

## 2024-05-09 NOTE — PROGRESS NOTES
"Physical Therapy Daily Progress Note      Patient: Alvaro Morris   : 1985  Referring practitioner: Pily Cordero MD  Date of Initial Visit: Type: THERAPY  Noted: 2022  Today's Date: 2022  Patient seen for 7 sessions    Progress Note Due: 10/19/2022     Alvaro Morris reports: \"the shoulder goes between feeling fine and not fine.\" Scared to mess it up. Feels like he still doesn't have strength. Can never get comfortable.       Objective/ Treatment: Minimal verbal cues provided at patient's shoulders to maintain tall, upright posture using upper extremity ergometer. No back support provided to further challenge postural muscles. The patient required one rest break with backwards pedaling due to fatigue. With prompting, the patient reports 4-7/10 pain with left shoulder isometrics. States pain occurs immediately, fades, and the returns when he lets the pressure off. Provided patient with Caba-Strong Faces scale to help gauge pain perception. Tactile cues at superior left scapula to maintain scapular depression with prone exercises. Patient states that this feedback decreases his pain. Verbal cues provided to keep pain levels low with strength based activities and to perform all exercises slow and with control. See exercise log for more details.     Education: Updated HEP      Assessment: The patient is present today on time and ready to participate in his physical therapy session. Compliance with home exercise program remains good. Progressed left submaximal shoulder strengthening today using upper extremity ergometer and in gravity minimized positions, attempting to keep pain levels low. He continues to exhibit decreased left shoulder muscle strength and endurance deficits in order to improve his ability to lift light and heavy objects to operate machinery at work, lift the nation of a car, or reach behind his back to bathe.            Plan:  Progress strengthening /stabilization /functional " pacu discharge criteria met Pt stable for transfer to phase II     activity             Timed:         Manual Therapy:         mins  41727;     Therapeutic Exercise:  30       mins  03392;     Neuromuscular Nanci:  4      mins  03566;    Therapeutic Activity:          mins  29513;     Gait Training:           mins  67718;     Ultrasound:          mins  50365;    Ionto                                   mins   84777  Self Care                            mins   78563      Un-Timed:  Electrical Stimulation:         mins  81292 ( );  Dry Needling          mins self-pay  Traction          mins 83444  Low Eval          Mins  48491  Mod Eval          Mins  30661  High Eval                            Mins  91706  Canalith Repos                   mins  68796    Timed Treatment: 34    mins   Total Treatment:   35     mins    Marianela Macias, PT  Physical Therapist

## 2024-08-08 ENCOUNTER — OFFICE VISIT (OUTPATIENT)
Dept: FAMILY MEDICINE CLINIC | Facility: CLINIC | Age: 39
End: 2024-08-08
Payer: COMMERCIAL

## 2024-08-08 VITALS
TEMPERATURE: 98.1 F | WEIGHT: 174.3 LBS | BODY MASS INDEX: 22.37 KG/M2 | OXYGEN SATURATION: 99 % | RESPIRATION RATE: 16 BRPM | HEART RATE: 77 BPM | SYSTOLIC BLOOD PRESSURE: 118 MMHG | HEIGHT: 74 IN | DIASTOLIC BLOOD PRESSURE: 70 MMHG

## 2024-08-08 DIAGNOSIS — I10 ESSENTIAL HYPERTENSION: Primary | ICD-10-CM

## 2024-08-08 DIAGNOSIS — Z23 ENCOUNTER FOR PREVNAR PNEUMOCOCCAL VACCINATION: ICD-10-CM

## 2024-08-08 DIAGNOSIS — F98.8 ATTENTION DEFICIT DISORDER, UNSPECIFIED HYPERACTIVITY PRESENCE: ICD-10-CM

## 2024-08-08 NOTE — PROGRESS NOTES
Chief Complaint / Reason for Visit:     Annual health maintenance exam          Subjective      Alvaro Morris           HPI      Mr. Morris is here today for an annual visit.           General Health as reported by patient: Good          Social History:     Household Members: Spouse and two daughters  Marital Status     Housing Situation: Stable  Work Status: unemployed,  seeking employment now   Occupation:          General Health:     Diet:  good  Weight Concerns: no  Supplements: multivitamin  Exercise: walks every day        Risky Behaviors:     Seatbelt use: Consistent   Texting while driving: No          Tobacco Use: 10 pack years total. Quit 1.5 years ago  Alcohol Use: no  Recreational drug use: no       Family History:   Any change in family history since last annual visit: no   Any family history of colon cancer, breast cancer, ovarian cancer, early CAD:  No         Past Medical History:   Diagnosis Date    ADHD     Bursitis of elbow     right elbow    Has never received COVID-19 vaccine     Hemorrhoids     Hypertension     B/P OK SINCE STOPPED SMOKING-NO MEDS CURRENTLY    Left shoulder pain     Rotator cuff tear     LUCIANA        Past Surgical History:   Procedure Laterality Date    ARM DEBRIDEMENT Left 7/27/2022    Procedure: UPPER EXTREMITY DEBRIDEMENT labrium;  Surgeon: Queta Cordero MD;  Location: [facility];  Service: Orthopedics;  Laterality: Left;    HEMORRHOIDECTOMY N/A 6/23/2016    Procedure: HEMORRHOIDECTOMY;  Surgeon: Atilio Smith MD;  Location: [facility]  Service:     SHOULDER ARTHROSCOPY W/ ROTATOR CUFF REPAIR Right 12/4/2017    Procedure: SHOULDER ARTHROSCOPY WITH MINI OPEN ROTATOR CUFF REPAIR  DEBRIDEMENT OF LABRUM DEBRIDEMENT OF SUBSCAPULARIS DECOMPRESSION;  Surgeon: Pily Cordero MD;  Location: [facility];  Service:     SHOULDER ARTHROSCOPY W/ ROTATOR CUFF REPAIR Left 7/27/2022    Procedure: SHOULDER ARTHROSCOPY WITH ROTATOR CUFF REPAIR;   Surgeon: Queta Cordero MD;  Location: [facility] [place];  Service: Orthopedics;  Laterality: Left;        Family History   Problem Relation Age of Onset    Malig Hyperthermia Neg Hx         Current Outpatient Medications on File Prior to Visit   Medication Sig Dispense Refill    acetaminophen (TYLENOL) 500 MG tablet Take 2 tablets by mouth 2 (Two) Times a Day. 40 tablet 0    amphetamine-dextroamphetamine (Adderall) 10 MG tablet Take 1 tablet by mouth Daily With Lunch. 30 tablet 0    amphetamine-dextroamphetamine XR (ADDERALL XR) 30 MG 24 hr capsule Take 1 capsule by mouth Every Morning (Patient taking differently: Take 1 capsule by mouth Daily As Needed HOLD 3 DAYS BEFORE SURGERY) 30 capsule 0    multivitamin with minerals tablet tablet Take 1 tablet by mouth Daily. PT HOLDING FOR SURGERY      [DISCONTINUED] lisinopril (PRINIVIL,ZESTRIL) 10 MG tablet Take 1 tablet by mouth Daily. 90 tablet 3    [DISCONTINUED] gabapentin (Neurontin) 300 MG capsule Take 1 capsule by mouth 2 (Two) Times a Day. (Patient not taking: Reported on 8/8/2024) 60 capsule 0    [DISCONTINUED] meloxicam (MOBIC) 15 MG tablet 1 PO Daily with food. (Patient not taking: Reported on 8/8/2024) 30 tablet 0    [DISCONTINUED] oxyCODONE-acetaminophen (PERCOCET) 5-325 MG per tablet Take 1-2 tablets by mouth every 4  hours as needed for severe pain. Stop Tylenol if taking this medicine. Not to exceed 12 tablets in 24 hour period (Patient not taking: Reported on 8/8/2024) 40 tablet 0     No current facility-administered medications on file prior to visit.         No Known Allergies     Health Maintenance Due   Topic Date Due    Pneumococcal Vaccine 0-64 (1 of 2 - PCV) Never done    ANNUAL PHYSICAL  Never done    COVID-19 Vaccine (1 - 2023-24 season) Never done    INFLUENZA VACCINE  08/01/2024                  Objective          Vitals:    08/08/24 0902   BP: 118/70   Pulse: 77   Resp: 16   Temp: 98.1 °F (36.7 °C)   SpO2: 99%          Physical  Exam  Constitutional:       General: He is not in acute distress.     Appearance: He is normal weight.   HENT:      Head: Normocephalic and atraumatic.   Cardiovascular:      Rate and Rhythm: Normal rate and regular rhythm.   Pulmonary:      Effort: Pulmonary effort is normal. No respiratory distress.   Musculoskeletal:      Right lower leg: No edema.      Left lower leg: No edema.   Skin:     Findings: No rash.   Neurological:      General: No focal deficit present.      Mental Status: He is alert and oriented to person, place, and time.   Psychiatric:         Mood and Affect: Mood normal.         Behavior: Behavior normal.         Thought Content: Thought content normal.         Judgment: Judgment normal.   }           Assessment & Plan       Problems Addressed this Visit          Mental Health    ADD (attention deficit disorder)    Relevant Orders    Urine Drug Screen - Urine, Clean Catch       Other    Encounter for Prevnar pneumococcal vaccination - Primary     Diagnoses         Codes Comments    Encounter for Prevnar pneumococcal vaccination    -  Primary ICD-10-CM: Z23  ICD-9-CM: V03.82     Attention deficit disorder, unspecified hyperactivity presence     ICD-10-CM: F98.8  ICD-9-CM: 314.00               #Essential HTN  - BP well-controlled at 118/70 in clinic today  - Patient states that he has not taken lisinopril in over a year.  Hypertension may have been secondary to concurrent Adderall use.  PLAN  - Discontinue lisinopril 10 mg today given patient has not taken this for quite some time  - Will have patient return to clinic in 3 months to recheck blood pressure given that patient will be resuming previously prescribed Adderall.    #ADHD  - diagnosed several years ago per patient.  Patient reports continued symptoms of inattention and easy distractibility  - Adderall previously prescribed by Dr. Banks.  Patient states that he has not taken his Adderall in over a year but would like to resume  PLAN  -  Will perform UDS in clinic today  -Given the complexity of patient's previous prescription and length of time the patient has been without, will refer patient to Psychiatry for reinitiation and follow-up of patient's ADHD medication      #Tobacco use disorder  - Patient reports 10-pack-year smoking history but states that he quit smoking 1.5 years ago  - Congratulated patient on success with quitting smoking  - Patient will require AAA ultrasound screening at age 64    Today was a preventative health visit: Patient was counseled on the following:   - Safety with driving     - Work and Life Balance          Health Maintenance:     Health Maintenance   Topic Date Due    Pneumococcal Vaccine 0-64 (1 of 2 - PCV) Never done    ANNUAL PHYSICAL  Never done    COVID-19 Vaccine (1 - 2023-24 season) Never done    INFLUENZA VACCINE  08/01/2024    TDAP/TD VACCINES (2 - Td or Tdap) 02/28/2029    HEPATITIS C SCREENING  Completed   - Prevnar 20 administered in clinic today  - Patient declined influenza or COVID-19 vaccination    Infectious Disease Screening:     One Time HIV Screen: current and negative without new risk factors   One Time Hepatitis C Screen: current and negative without new risk factors     Cancer Screening:     Colonoscopy: start at age 45yo  Lung Cancer Screening: N/A does not meet guideline         CV Screening: not indicated at this time       Return to Clinic: 3 months          Christiano Heredia MD

## 2024-08-09 LAB
AMPHETAMINES UR QL SCN: NEGATIVE NG/ML
BARBITURATES UR QL SCN: NEGATIVE NG/ML
BENZODIAZ UR QL SCN: NEGATIVE NG/ML
BZE UR QL SCN: NEGATIVE NG/ML
CANNABINOIDS UR QL SCN: NEGATIVE NG/ML
CREAT UR-MCNC: 83.8 MG/DL (ref 20–300)
LABORATORY COMMENT REPORT: NORMAL
METHADONE UR QL SCN: NEGATIVE NG/ML
OPIATES UR QL SCN: NEGATIVE NG/ML
OXYCODONE+OXYMORPHONE UR QL SCN: NEGATIVE NG/ML
PCP UR QL: NEGATIVE NG/ML
PH UR: 5.4 [PH] (ref 4.5–8.9)
PROPOXYPH UR QL SCN: NEGATIVE NG/ML

## 2024-08-19 ENCOUNTER — TELEPHONE (OUTPATIENT)
Dept: FAMILY MEDICINE CLINIC | Facility: CLINIC | Age: 39
End: 2024-08-19

## 2024-08-26 ENCOUNTER — TELEPHONE (OUTPATIENT)
Dept: PSYCHIATRY | Facility: CLINIC | Age: 39
End: 2024-08-26

## 2024-08-26 ENCOUNTER — TELEMEDICINE (OUTPATIENT)
Dept: PSYCHIATRY | Facility: CLINIC | Age: 39
End: 2024-08-26
Payer: COMMERCIAL

## 2024-08-26 DIAGNOSIS — F90.2 ATTENTION DEFICIT HYPERACTIVITY DISORDER, COMBINED TYPE: Primary | ICD-10-CM

## 2024-08-26 PROCEDURE — 90792 PSYCH DIAG EVAL W/MED SRVCS: CPT

## 2024-08-26 RX ORDER — LISDEXAMFETAMINE DIMESYLATE 30 MG/1
30 CAPSULE ORAL EVERY MORNING
Qty: 30 CAPSULE | Refills: 0 | Status: SHIPPED | OUTPATIENT
Start: 2024-08-26

## 2024-08-26 NOTE — PROGRESS NOTES
This provider is located at [place], [place]. The Patient is seen remotely using Video. Patient is being seen via telehealth and confirm that they are in a secure environment for this session. Patient is located in [place], [place] at his home. The patient's condition being diagnosed/treated is appropriate for telemedicine. Provider identified as Klaus Ram as well as credentials APRN MSN PMHNP-BC.   The client/patient gave consent to be seen remotely, and when consent is given they understand that the consent allows for patient identifiable information to be sent to a third party as needed.  They may refuse to be seen remotely at any time. The electronic data is encrypted and password protected, and the patient has been advised of the potential risks to privacy not withstanding such measures.    Cristhian Morris is a 38 y.o. male who presents today for initial evaluation     Chief Complaint: ADHD    History of Present Illness: This is the first encounter for this APRN with the patient.  Patient is referral for evaluation and management of his ADHD.  Patient reports he was diagnosed in middle school with ADHD and has been on stimulants since that time.  States he had to spend 2 years in prison for possession of a firearm and recently got out.  States he went and talked to his primary care physician about this and they referred him to this APRN.  Patient was getting Adderall XR prescribed time from his previous physician, but he has since retired.  He states that he always had trouble paying attention in school.  States that he was always easily distracted.  States he could not concentrate on what the teachers were saying.  States he often fidgets with his leg.  States he has a history of being lost in conversations.  States he starts a lot of projects and never finishes 1.  He states as an adult his career has been as a  for the last 25 years.  States without the medication, that he has  a hard time completing his repairs and staying on task with his  work.  Patient was last on Adderall XR 30 mg daily and also and Adderall 10 mg daily booster dose.  Discussed with patient that that is a very high dose of Adderall total.  Patient denies any history of depression.  Denies any hopelessness.  Denies any suicidal or homicidal ideation.  States his sleep and appetite are good.  Denies any anxiety other than just situational things that cause him to be anxious.  States it has not something he deals with regularly.  Denies any history of any manic type symptoms.  Denies any paranoia.  Denies any auditory or visual hallucinations.    The following portions of the patient's history were reviewed and updated as appropriate: allergies, current medications, past family history, past medical history, past social history, past surgical history and problem list.    Past Psychiatric History: Patient has been treated for ADHD since he was in middle school.  Denies any history of inpatient psychiatric hospitalizations.  Denies any history of suicide attempts.    Family Psychiatric History: Denies any family history.  Suicides among first-degree relatives.    Substance Use History: Patient denies any alcohol, drug, marijuana use.  States he quit smoking 2 years ago.    Past Medical History:  Past Medical History:   Diagnosis Date    ADHD     Bursitis of elbow     right elbow    Has never received COVID-19 vaccine     Hemorrhoids     Hypertension     B/P OK SINCE STOPPED SMOKING-NO MEDS CURRENTLY    Left shoulder pain     Rotator cuff tear     LUCIANA       Social History: Patient was born and raised in [place], [place].  He was raised by his mother and father.  He has 2 sisters.  He graduated high school.  He has worked as a  for 25 years.  He has been  for 10 years.  He has 2 children ages 7 and 9.  Just got out of [employer] 3 weeks ago after a 2-year [employer] term for possession of a firearm by  silvio.  States he got out or early release for good behavior.  Main hobbies are fly fishing and working on cars.  Social History     Socioeconomic History    Marital status:    Tobacco Use    Smoking status: Former     Current packs/day: 0.25     Average packs/day: 0.3 packs/day for 10.0 years (2.5 ttl pk-yrs)     Types: Cigarettes    Smokeless tobacco: Former   Vaping Use    Vaping status: Former    Substances: Nicotine    Devices: Disposable   Substance and Sexual Activity    Alcohol use: Not Currently     Comment: ON OCC    Drug use: No    Sexual activity: Defer       Family History:  Family History   Problem Relation Age of Onset    Malig Hyperthermia Neg Hx        Past Surgical History:  Past Surgical History:   Procedure Laterality Date    ARM DEBRIDEMENT Left 7/27/2022    Procedure: UPPER EXTREMITY DEBRIDEMENT labrium;  Surgeon: Queta Cordero MD;  Location: [facility];  Service: Orthopedics;  Laterality: Left;    HEMORRHOIDECTOMY N/A 6/23/2016    Procedure: HEMORRHOIDECTOMY;  Surgeon: Atilio Smith MD;  Location: [facility];  Service:     SHOULDER ARTHROSCOPY W/ ROTATOR CUFF REPAIR Right 12/4/2017    Procedure: SHOULDER ARTHROSCOPY WITH MINI OPEN ROTATOR CUFF REPAIR  DEBRIDEMENT OF LABRUM DEBRIDEMENT OF SUBSCAPULARIS DECOMPRESSION;  Surgeon: Pily Cordero MD;  Location: [facility];  Service:     SHOULDER ARTHROSCOPY W/ ROTATOR CUFF REPAIR Left 7/27/2022    Procedure: SHOULDER ARTHROSCOPY WITH ROTATOR CUFF REPAIR;  Surgeon: Queta Cordero MD;  Location: [facility];  Service: Orthopedics;  Laterality: Left;       Problem List:  Patient Active Problem List   Diagnosis    ADD (attention deficit disorder)    Essential hypertension    Chronic right shoulder pain    Incomplete tear of rotator cuff    History of rotator cuff surgery    Encounter for monitoring opioid maintenance therapy    H/O shoulder surgery    History of alcohol abuse    Tobacco dependence    Traumatic  complete tear of left rotator cuff    Encounter for Prevnar pneumococcal vaccination       Allergy:   No Known Allergies     Current Medications:   Current Outpatient Medications   Medication Sig Dispense Refill    acetaminophen (TYLENOL) 500 MG tablet Take 2 tablets by mouth 2 (Two) Times a Day. 40 tablet 0    lisdexamfetamine (Vyvanse) 30 MG capsule Take 1 capsule by mouth Every Morning 30 capsule 0    multivitamin with minerals tablet tablet Take 1 tablet by mouth Daily. PT HOLDING FOR SURGERY       No current facility-administered medications for this visit.       Review of Symptoms:    Review of Systems   Constitutional: Negative.    HENT: Negative.     Eyes: Negative.    Respiratory: Negative.     Cardiovascular: Negative.    Gastrointestinal: Negative.    Endocrine: Negative.    Genitourinary: Negative.    Musculoskeletal: Negative.    Skin: Negative.    Allergic/Immunologic: Negative.    Neurological: Negative.    Hematological: Negative.    Psychiatric/Behavioral:  Positive for decreased concentration.          Physical Exam:   There were no vitals taken for this visit.    Appearance: Normal  Gait, Station, Strength: Within normal limits    Mental Status Exam:   Hygiene:   good  Cooperation:  Cooperative  Eye Contact:  Good  Psychomotor Behavior:  Appropriate  Affect:  Full range  Mood: normal  Hopelessness: Denies  Speech:  Normal  Thought Process:  Goal directed  Thought Content:  Normal  Suicidal:  None  Homicidal:  None  Hallucinations:  None  Delusion:  None  Memory:  Intact  Orientation:  Person, Place, Time, and Situation  Reliability:  good  Insight:  Good  Judgement:  Good  Impulse Control:  Good    PHQ-9 Depression Screening  Little interest or pleasure in doing things? (P) 0-->not at all   Feeling down, depressed, or hopeless? (P) 0-->not at all   Trouble falling or staying asleep, or sleeping too much? (P) 0-->not at all   Feeling tired or having little energy? (P) 1-->several days   Poor  appetite or overeating? (P) 0-->not at all   Feeling bad about yourself - or that you are a failure or have let yourself or your family down? (P) 0-->not at all   Trouble concentrating on things, such as reading the newspaper or watching television? (P) 1-->several days   Moving or speaking so slowly that other people could have noticed? Or the opposite - being so fidgety or restless that you have been moving around a lot more than usual? (P) 0-->not at all   Thoughts that you would be better off dead, or of hurting yourself in some way? (P) 0-->not at all   PHQ-9 Total Score (P) 2   If you checked off any problems, how difficult have these problems made it for you to do your work, take care of things at home, or get along with other people? (P) somewhat difficult        PHQ-9 Total Score: (P) 2     PERLA 7 anxiety screening tool that patient filled out virtually reviewed by this APRN at today's encounter.    PROMIS scale screening tool that patient filled out virtually reviewed by this APRN at today's encounter.    [facility] request number [id number] reviewed by this APRN at today's encounter.    Previous Provider notes and available records reviewed by this APRN today.     Lab Results:   Office Visit on 08/08/2024   Component Date Value Ref Range Status    Amphetamine, Urine Qual 08/08/2024 Negative  Hdqgll=6888 ng/mL Final    Barbiturates Screen, Urine 08/08/2024 Negative  Gliqjw=569 ng/mL Final    Benzodiazepine Screen, Urine 08/08/2024 Negative  Kwyvya=552 ng/mL Final    THC Screen, Urine 08/08/2024 Negative  Cutoff=20 ng/mL Final    Cocaine Screen, Urine 08/08/2024 Negative  Yavcqc=236 ng/mL Final    Opiate Screen, Urine 08/08/2024 Negative  Cvxxke=431 ng/mL Final    Opiate test includes Codeine, Morphine, Hydromorphone, Hydrocodone.    Oxycodone/Oxymorphone, Urine 08/08/2024 Negative  Brpgto=377 ng/mL Final    Test includes Oxycodone and Oxymorphone    Phencyclidine (PCP), Urine 08/08/2024 Negative  Cutoff=25 ng/mL  Final    Methadone Screen, Urine 08/08/2024 Negative  Uanfeu=402 ng/mL Final    Propoxyphene Screen 08/08/2024 Negative  Ogugoa=726 ng/mL Final    Creatinine, Urine 08/08/2024 83.8  20.0 - 300.0 mg/dL Final    pH, UA 08/08/2024 5.4  4.5 - 8.9 Final    Please note 08/08/2024 Comment   Final    Comment: This assay provides a preliminary unconfirmed analytical test  result that may be suitable for clinical management of patients  in certain situations. Drug-test results should be interpreted  in the context of clinical information. Patient metabolic  variables, specific drug chemistry, and specimen  characteristics can affect test outcome. Technical consultation  is available if a test result is inconsistent with an expected  outcome.    Email:  [email or web address]    Phone:  [phone]         Assessment & Plan   Problems Addressed this Visit    None  Visit Diagnoses       Attention deficit hyperactivity disorder, combined type    -  Primary    Relevant Medications    lisdexamfetamine (Vyvanse) 30 MG capsule          Diagnoses         Codes Comments    Attention deficit hyperactivity disorder, combined type    -  Primary ICD-10-CM: F90.2  ICD-9-CM: 314.01             Visit Diagnoses:    ICD-10-CM ICD-9-CM   1. Attention deficit hyperactivity disorder, combined type  F90.2 314.01     Discussed treatment options with patient.  Discussed with patient that we can start his medication back.  Patient did a urine drug screen from his primary care physician which was negative for all components.  Nilson is also reviewed.  Discussed with patient that the dose of Adderall that he was on prior was a little higher than this APRN normally prescribes.  Discussed Vyvanse with patient and he had tried that medication before.  States he felt it worked well for a while, but they ended up switching after getting to a higher dose.  Reviewed the chart and Vyvanse 50 mg was the highest dose that they got to.  Discussed with  patient the Vyvanse last a little longer in the system and would prevent having to have a booster dose in the afternoon.  Patient would like to try the medication.  Start Vyvanse 30 mg daily for ADHD.  We will see patient again in 4 weeks to reassess.  Encouraged patient to contact the office if he has any issues sooner.    TREATMENT PLAN/GOALS: Continue supportive psychotherapy efforts and medications as indicated. Treatment and medication options discussed during today's visit. Patient acknowledged and verbally consented to continue with current treatment plan and was educated on the importance of compliance with treatment and follow-up appointments.    Short Term Goals: Patient will be compliant with medication, and patient will have no significant medication related side effects.  Patient will be engaged in psychotherapy as indicated.  Patient will report subjective improvement of symptoms.    Long term goals: To stabilize mood and treat/improve subjective symptoms, the patient will stay out of the hospital, the patient will be at an optimal level of functioning, and the patient will take all medications as prescribed.  The patient verbalized understanding and agreement with goals that were mutually set.    MEDICATION ISSUES:    Discussed medication options and treatment plan of prescribed medication as well as the risks, benefits, and side effects including potential falls, possible impaired driving and metabolic adversities among others. Patient is agreeable to call the office with any worsening of symptoms or onset of side effects. Patient is agreeable to call 911 or go to the nearest ER should he/she begin having SI/HI. If patient has any concerns or needs assistance they were instructed to call the [facility] at [phone].    MEDS ORDERED DURING VISIT:  New Medications Ordered This Visit   Medications    lisdexamfetamine (Vyvanse) 30 MG capsule     Sig: Take 1 capsule by mouth  Every Morning     Dispense:  30 capsule     Refill:  0       No follow-ups on file.             This document has been electronically signed by TODD Larry  August 26, 2024 14:30 EDT    Part of this note may be an electronic transmission of spoken language to printed text using the Dragon Dictation System.

## 2024-08-27 DIAGNOSIS — F90.2 ATTENTION DEFICIT HYPERACTIVITY DISORDER, COMBINED TYPE: ICD-10-CM

## 2024-08-27 NOTE — TELEPHONE ENCOUNTER
Caller: Patel Morrisin    Relationship: Self    Best call back number: [phone]     Requested Prescriptions:   Requested Prescriptions     Pending Prescriptions Disp Refills    lisdexamfetamine (Vyvanse) 30 MG capsule 30 capsule 0     Sig: Take 1 capsule by mouth Every Morning        Pharmacy where request should be sent: [id number] [address] AT [facility] [phone] [facility] [phone]      Last office visit with prescribing clinician: 8/8/2024   Last telemedicine visit with prescribing clinician: Visit date not found   Next office visit with prescribing clinician: 11/8/2024     Additional details provided by patient: PLEASE RESEND THIS PRESCRIPTION TO THE ABOVE PHARMACY. THE PREVIOUS PHARMACY IS HAVING ISSUES FILLING THIS PRESCRIPTION.    Does the patient have less than a 3 day supply:  [] Yes  [] No    Would you like a call back once the refill request has been completed: [] Yes [] No    If the office needs to give you a call back, can they leave a voicemail: [] Yes [] No    Danita Cannon Rep   08/27/24 11:56 EDT

## 2024-08-30 RX ORDER — LISDEXAMFETAMINE DIMESYLATE 30 MG/1
30 CAPSULE ORAL EVERY MORNING
Qty: 30 CAPSULE | Refills: 0 | OUTPATIENT
Start: 2024-08-30

## 2024-09-24 ENCOUNTER — HOSPITAL ENCOUNTER (OUTPATIENT)
Facility: HOSPITAL | Age: 39
Setting detail: OBSERVATION
Discharge: HOME | End: 2024-09-26
Attending: EMERGENCY MEDICINE | Admitting: EMERGENCY MEDICINE
Payer: COMMERCIAL

## 2024-09-24 ENCOUNTER — APPOINTMENT (OUTPATIENT)
Dept: CT IMAGING | Facility: HOSPITAL | Age: 39
End: 2024-09-24
Payer: COMMERCIAL

## 2024-09-24 DIAGNOSIS — R11.2 NAUSEA AND VOMITING, UNSPECIFIED VOMITING TYPE: ICD-10-CM

## 2024-09-24 DIAGNOSIS — R79.89 ELEVATED LFTS: ICD-10-CM

## 2024-09-24 DIAGNOSIS — R19.7 DIARRHEA, UNSPECIFIED TYPE: ICD-10-CM

## 2024-09-24 DIAGNOSIS — K52.9 COLITIS: Primary | ICD-10-CM

## 2024-09-24 LAB
ADV 40+41 DNA STL QL NAA+NON-PROBE: NOT DETECTED
ALBUMIN SERPL-MCNC: 3.7 G/DL (ref 3.5–5.2)
ALBUMIN/GLOB SERPL: 1.4 G/DL
ALP SERPL-CCNC: 202 U/L (ref 39–117)
ALT SERPL W P-5'-P-CCNC: 223 U/L (ref 1–41)
AMPHET+METHAMPHET UR QL: NEGATIVE
ANION GAP SERPL CALCULATED.3IONS-SCNC: 17.9 MMOL/L (ref 5–15)
AST SERPL-CCNC: 312 U/L (ref 1–40)
ASTRO TYP 1-8 RNA STL QL NAA+NON-PROBE: NOT DETECTED
BARBITURATES UR QL SCN: NEGATIVE
BASOPHILS # BLD AUTO: 0.02 10*3/MM3 (ref 0–0.2)
BASOPHILS NFR BLD AUTO: 0.3 % (ref 0–1.5)
BENZODIAZ UR QL SCN: NEGATIVE
BILIRUB SERPL-MCNC: 0.9 MG/DL (ref 0–1.2)
BILIRUB UR QL STRIP: NEGATIVE
BUN SERPL-MCNC: 8 MG/DL (ref 6–20)
BUN/CREAT SERPL: 10 (ref 7–25)
C CAYETANENSIS DNA STL QL NAA+NON-PROBE: NOT DETECTED
C COLI+JEJ+UPSA DNA STL QL NAA+NON-PROBE: NOT DETECTED
CALCIUM SPEC-SCNC: 9 MG/DL (ref 8.6–10.5)
CANNABINOIDS SERPL QL: NEGATIVE
CHLORIDE SERPL-SCNC: 95 MMOL/L (ref 98–107)
CK SERPL-CCNC: 54 U/L (ref 20–200)
CLARITY UR: ABNORMAL
CO2 SERPL-SCNC: 26.1 MMOL/L (ref 22–29)
COCAINE UR QL: NEGATIVE
COLOR UR: YELLOW
CREAT SERPL-MCNC: 0.8 MG/DL (ref 0.76–1.27)
CRYPTOSP DNA STL QL NAA+NON-PROBE: NOT DETECTED
DEPRECATED RDW RBC AUTO: 58.4 FL (ref 37–54)
E HISTOLYT DNA STL QL NAA+NON-PROBE: NOT DETECTED
EAEC PAA PLAS AGGR+AATA ST NAA+NON-PRB: NOT DETECTED
EC STX1+STX2 GENES STL QL NAA+NON-PROBE: NOT DETECTED
EGFRCR SERPLBLD CKD-EPI 2021: 115.5 ML/MIN/1.73
EOSINOPHIL # BLD AUTO: 0.01 10*3/MM3 (ref 0–0.4)
EOSINOPHIL NFR BLD AUTO: 0.2 % (ref 0.3–6.2)
EPEC EAE GENE STL QL NAA+NON-PROBE: NOT DETECTED
ERYTHROCYTE [DISTWIDTH] IN BLOOD BY AUTOMATED COUNT: 17.9 % (ref 12.3–15.4)
ETEC LTA+ST1A+ST1B TOX ST NAA+NON-PROBE: NOT DETECTED
ETHANOL BLD-MCNC: 214 MG/DL (ref 0–10)
ETHANOL UR QL: 0.21 %
FENTANYL UR-MCNC: NEGATIVE NG/ML
G LAMBLIA DNA STL QL NAA+NON-PROBE: NOT DETECTED
GLOBULIN UR ELPH-MCNC: 2.7 GM/DL
GLUCOSE SERPL-MCNC: 212 MG/DL (ref 65–99)
GLUCOSE UR STRIP-MCNC: NEGATIVE MG/DL
HAV IGM SERPL QL IA: NORMAL
HBA1C MFR BLD: 4.8 % (ref 4.8–5.6)
HBV CORE IGM SERPL QL IA: NORMAL
HBV SURFACE AG SERPL QL IA: NORMAL
HCT VFR BLD AUTO: 50.8 % (ref 37.5–51)
HCV AB SER QL: NORMAL
HGB BLD-MCNC: 17.6 G/DL (ref 13–17.7)
HGB UR QL STRIP.AUTO: NEGATIVE
HOLD SPECIMEN: NORMAL
IMM GRANULOCYTES # BLD AUTO: 0.01 10*3/MM3 (ref 0–0.05)
IMM GRANULOCYTES NFR BLD AUTO: 0.2 % (ref 0–0.5)
KETONES UR QL STRIP: NEGATIVE
LEUKOCYTE ESTERASE UR QL STRIP.AUTO: NEGATIVE
LIPASE SERPL-CCNC: 49 U/L (ref 13–60)
LYMPHOCYTES # BLD AUTO: 0.96 10*3/MM3 (ref 0.7–3.1)
LYMPHOCYTES NFR BLD AUTO: 15.2 % (ref 19.6–45.3)
MAGNESIUM SERPL-MCNC: 1.9 MG/DL (ref 1.6–2.6)
MAGNESIUM SERPL-MCNC: 2 MG/DL (ref 1.6–2.6)
MCH RBC QN AUTO: 32 PG (ref 26.6–33)
MCHC RBC AUTO-ENTMCNC: 34.6 G/DL (ref 31.5–35.7)
MCV RBC AUTO: 92.4 FL (ref 79–97)
METHADONE UR QL SCN: NEGATIVE
MONOCYTES # BLD AUTO: 0.57 10*3/MM3 (ref 0.1–0.9)
MONOCYTES NFR BLD AUTO: 9 % (ref 5–12)
NEUTROPHILS NFR BLD AUTO: 4.75 10*3/MM3 (ref 1.7–7)
NEUTROPHILS NFR BLD AUTO: 75.1 % (ref 42.7–76)
NITRITE UR QL STRIP: NEGATIVE
NOROVIRUS GI+II RNA STL QL NAA+NON-PROBE: NOT DETECTED
OPIATES UR QL: NEGATIVE
OXYCODONE UR QL SCN: NEGATIVE
P SHIGELLOIDES DNA STL QL NAA+NON-PROBE: NOT DETECTED
PH UR STRIP.AUTO: 6 [PH] (ref 5–8)
PLATELET # BLD AUTO: 135 10*3/MM3 (ref 140–450)
PMV BLD AUTO: 9.6 FL (ref 6–12)
POTASSIUM SERPL-SCNC: 2.8 MMOL/L (ref 3.5–5.2)
PROT SERPL-MCNC: 6.4 G/DL (ref 6–8.5)
PROT UR QL STRIP: NEGATIVE
RBC # BLD AUTO: 5.5 10*6/MM3 (ref 4.14–5.8)
RVA RNA STL QL NAA+NON-PROBE: NOT DETECTED
S ENT+BONG DNA STL QL NAA+NON-PROBE: NOT DETECTED
SAPO I+II+IV+V RNA STL QL NAA+NON-PROBE: NOT DETECTED
SHIGELLA SP+EIEC IPAH ST NAA+NON-PROBE: NOT DETECTED
SODIUM SERPL-SCNC: 139 MMOL/L (ref 136–145)
SP GR UR STRIP: <=1.005 (ref 1–1.03)
UROBILINOGEN UR QL STRIP: ABNORMAL
V CHOL+PARA+VUL DNA STL QL NAA+NON-PROBE: NOT DETECTED
V CHOLERAE DNA STL QL NAA+NON-PROBE: NOT DETECTED
WBC NRBC COR # BLD AUTO: 6.32 10*3/MM3 (ref 3.4–10.8)
WHOLE BLOOD HOLD COAG: NORMAL
WHOLE BLOOD HOLD SPECIMEN: NORMAL
Y ENTEROCOL DNA STL QL NAA+NON-PROBE: NOT DETECTED

## 2024-09-24 PROCEDURE — 83036 HEMOGLOBIN GLYCOSYLATED A1C: CPT | Performed by: NURSE PRACTITIONER

## 2024-09-24 PROCEDURE — 80307 DRUG TEST PRSMV CHEM ANLYZR: CPT | Performed by: NURSE PRACTITIONER

## 2024-09-24 PROCEDURE — 87507 IADNA-DNA/RNA PROBE TQ 12-25: CPT | Performed by: EMERGENCY MEDICINE

## 2024-09-24 PROCEDURE — 85025 COMPLETE CBC W/AUTO DIFF WBC: CPT | Performed by: EMERGENCY MEDICINE

## 2024-09-24 PROCEDURE — 25510000001 IOPAMIDOL 61 % SOLUTION: Performed by: EMERGENCY MEDICINE

## 2024-09-24 PROCEDURE — 96361 HYDRATE IV INFUSION ADD-ON: CPT

## 2024-09-24 PROCEDURE — 83690 ASSAY OF LIPASE: CPT | Performed by: EMERGENCY MEDICINE

## 2024-09-24 PROCEDURE — 25810000003 SODIUM CHLORIDE 0.9 % SOLUTION: Performed by: NURSE PRACTITIONER

## 2024-09-24 PROCEDURE — 74177 CT ABD & PELVIS W/CONTRAST: CPT

## 2024-09-24 PROCEDURE — 25010000002 THIAMINE HCL 200 MG/2ML SOLUTION: Performed by: INTERNAL MEDICINE

## 2024-09-24 PROCEDURE — 96374 THER/PROPH/DIAG INJ IV PUSH: CPT

## 2024-09-24 PROCEDURE — G0378 HOSPITAL OBSERVATION PER HR: HCPCS

## 2024-09-24 PROCEDURE — 25810000003 SODIUM CHLORIDE 0.9 % SOLUTION: Performed by: EMERGENCY MEDICINE

## 2024-09-24 PROCEDURE — 80074 ACUTE HEPATITIS PANEL: CPT | Performed by: NURSE PRACTITIONER

## 2024-09-24 PROCEDURE — 82550 ASSAY OF CK (CPK): CPT | Performed by: NURSE PRACTITIONER

## 2024-09-24 PROCEDURE — 82077 ASSAY SPEC XCP UR&BREATH IA: CPT | Performed by: NURSE PRACTITIONER

## 2024-09-24 PROCEDURE — 80053 COMPREHEN METABOLIC PANEL: CPT | Performed by: EMERGENCY MEDICINE

## 2024-09-24 PROCEDURE — 83735 ASSAY OF MAGNESIUM: CPT | Performed by: EMERGENCY MEDICINE

## 2024-09-24 PROCEDURE — 81003 URINALYSIS AUTO W/O SCOPE: CPT | Performed by: EMERGENCY MEDICINE

## 2024-09-24 PROCEDURE — 99285 EMERGENCY DEPT VISIT HI MDM: CPT

## 2024-09-24 PROCEDURE — 83735 ASSAY OF MAGNESIUM: CPT | Performed by: INTERNAL MEDICINE

## 2024-09-24 RX ORDER — LORAZEPAM 1 MG/1
2 TABLET ORAL ONCE
Status: COMPLETED | OUTPATIENT
Start: 2024-09-24 | End: 2024-09-24

## 2024-09-24 RX ORDER — THIAMINE HYDROCHLORIDE 100 MG/ML
200 INJECTION, SOLUTION INTRAMUSCULAR; INTRAVENOUS EVERY 8 HOURS SCHEDULED
Status: DISCONTINUED | OUTPATIENT
Start: 2024-09-24 | End: 2024-09-26 | Stop reason: HOSPADM

## 2024-09-24 RX ORDER — LORAZEPAM 2 MG/ML
1 INJECTION INTRAMUSCULAR
Status: DISCONTINUED | OUTPATIENT
Start: 2024-09-24 | End: 2024-09-26 | Stop reason: HOSPADM

## 2024-09-24 RX ORDER — LORAZEPAM 1 MG/1
2 TABLET ORAL
Status: DISCONTINUED | OUTPATIENT
Start: 2024-09-24 | End: 2024-09-26 | Stop reason: HOSPADM

## 2024-09-24 RX ORDER — FOLIC ACID 1 MG/1
1 TABLET ORAL DAILY
Status: DISCONTINUED | OUTPATIENT
Start: 2024-09-24 | End: 2024-09-26 | Stop reason: HOSPADM

## 2024-09-24 RX ORDER — THIAMINE HYDROCHLORIDE 100 MG/ML
200 INJECTION, SOLUTION INTRAMUSCULAR; INTRAVENOUS EVERY 8 HOURS SCHEDULED
Status: DISCONTINUED | OUTPATIENT
Start: 2024-09-24 | End: 2024-09-24 | Stop reason: SDUPTHER

## 2024-09-24 RX ORDER — SODIUM CHLORIDE 0.9 % (FLUSH) 0.9 %
10 SYRINGE (ML) INJECTION EVERY 12 HOURS SCHEDULED
Status: DISCONTINUED | OUTPATIENT
Start: 2024-09-24 | End: 2024-09-26 | Stop reason: HOSPADM

## 2024-09-24 RX ORDER — LORAZEPAM 2 MG/ML
2 INJECTION INTRAMUSCULAR
Status: DISCONTINUED | OUTPATIENT
Start: 2024-09-24 | End: 2024-09-26 | Stop reason: HOSPADM

## 2024-09-24 RX ORDER — LORAZEPAM 1 MG/1
1 TABLET ORAL EVERY 6 HOURS
Status: DISCONTINUED | OUTPATIENT
Start: 2024-09-25 | End: 2024-09-25

## 2024-09-24 RX ORDER — AMOXICILLIN 250 MG
2 CAPSULE ORAL 2 TIMES DAILY PRN
Status: DISCONTINUED | OUTPATIENT
Start: 2024-09-24 | End: 2024-09-26 | Stop reason: HOSPADM

## 2024-09-24 RX ORDER — LORAZEPAM 1 MG/1
1 TABLET ORAL EVERY 12 HOURS SCHEDULED
Status: DISCONTINUED | OUTPATIENT
Start: 2024-09-26 | End: 2024-09-25

## 2024-09-24 RX ORDER — BISACODYL 5 MG/1
5 TABLET, DELAYED RELEASE ORAL DAILY PRN
Status: DISCONTINUED | OUTPATIENT
Start: 2024-09-24 | End: 2024-09-26 | Stop reason: HOSPADM

## 2024-09-24 RX ORDER — SODIUM CHLORIDE 0.9 % (FLUSH) 0.9 %
10 SYRINGE (ML) INJECTION AS NEEDED
Status: DISCONTINUED | OUTPATIENT
Start: 2024-09-24 | End: 2024-09-26 | Stop reason: HOSPADM

## 2024-09-24 RX ORDER — BISACODYL 10 MG
10 SUPPOSITORY, RECTAL RECTAL DAILY PRN
Status: DISCONTINUED | OUTPATIENT
Start: 2024-09-24 | End: 2024-09-26 | Stop reason: HOSPADM

## 2024-09-24 RX ORDER — POLYETHYLENE GLYCOL 3350 17 G/17G
17 POWDER, FOR SOLUTION ORAL DAILY PRN
Status: DISCONTINUED | OUTPATIENT
Start: 2024-09-24 | End: 2024-09-26 | Stop reason: HOSPADM

## 2024-09-24 RX ORDER — SODIUM CHLORIDE 9 MG/ML
40 INJECTION, SOLUTION INTRAVENOUS AS NEEDED
Status: DISCONTINUED | OUTPATIENT
Start: 2024-09-24 | End: 2024-09-26 | Stop reason: HOSPADM

## 2024-09-24 RX ORDER — IOPAMIDOL 612 MG/ML
100 INJECTION, SOLUTION INTRAVASCULAR
Status: COMPLETED | OUTPATIENT
Start: 2024-09-24 | End: 2024-09-24

## 2024-09-24 RX ORDER — LORAZEPAM 1 MG/1
1 TABLET ORAL
Status: DISCONTINUED | OUTPATIENT
Start: 2024-09-24 | End: 2024-09-26 | Stop reason: HOSPADM

## 2024-09-24 RX ORDER — LORAZEPAM 1 MG/1
1 TABLET ORAL DAILY
Status: DISCONTINUED | OUTPATIENT
Start: 2024-09-28 | End: 2024-09-25

## 2024-09-24 RX ORDER — POTASSIUM CHLORIDE 750 MG/1
40 TABLET, FILM COATED, EXTENDED RELEASE ORAL EVERY 4 HOURS
Status: COMPLETED | OUTPATIENT
Start: 2024-09-24 | End: 2024-09-24

## 2024-09-24 RX ORDER — LORAZEPAM 1 MG/1
2 TABLET ORAL EVERY 6 HOURS
Status: DISCONTINUED | OUTPATIENT
Start: 2024-09-24 | End: 2024-09-25

## 2024-09-24 RX ORDER — CLONIDINE HYDROCHLORIDE 0.1 MG/1
0.1 TABLET ORAL EVERY 4 HOURS PRN
Status: DISCONTINUED | OUTPATIENT
Start: 2024-09-24 | End: 2024-09-26 | Stop reason: HOSPADM

## 2024-09-24 RX ORDER — NITROGLYCERIN 0.4 MG/1
0.4 TABLET SUBLINGUAL
Status: DISCONTINUED | OUTPATIENT
Start: 2024-09-24 | End: 2024-09-26 | Stop reason: HOSPADM

## 2024-09-24 RX ORDER — SODIUM CHLORIDE 9 MG/ML
125 INJECTION, SOLUTION INTRAVENOUS CONTINUOUS
Status: DISCONTINUED | OUTPATIENT
Start: 2024-09-24 | End: 2024-09-26 | Stop reason: HOSPADM

## 2024-09-24 RX ADMIN — POTASSIUM CHLORIDE 40 MEQ: 750 TABLET, EXTENDED RELEASE ORAL at 21:10

## 2024-09-24 RX ADMIN — POTASSIUM CHLORIDE 40 MEQ: 750 TABLET, EXTENDED RELEASE ORAL at 18:50

## 2024-09-24 RX ADMIN — POTASSIUM CHLORIDE 40 MEQ: 750 TABLET, EXTENDED RELEASE ORAL at 14:10

## 2024-09-24 RX ADMIN — SODIUM CHLORIDE 1000 ML: 9 INJECTION, SOLUTION INTRAVENOUS at 11:46

## 2024-09-24 RX ADMIN — Medication 10 ML: at 20:40

## 2024-09-24 RX ADMIN — FOLIC ACID 1 MG: 1 TABLET ORAL at 15:51

## 2024-09-24 RX ADMIN — LORAZEPAM 2 MG: 1 TABLET ORAL at 15:51

## 2024-09-24 RX ADMIN — Medication 10 ML: at 15:27

## 2024-09-24 RX ADMIN — IOPAMIDOL 85 ML: 612 INJECTION, SOLUTION INTRAVENOUS at 12:38

## 2024-09-24 RX ADMIN — LORAZEPAM 2 MG: 1 TABLET ORAL at 21:21

## 2024-09-24 RX ADMIN — SODIUM CHLORIDE 125 ML/HR: 9 INJECTION, SOLUTION INTRAVENOUS at 15:27

## 2024-09-24 RX ADMIN — THIAMINE HYDROCHLORIDE 200 MG: 100 INJECTION, SOLUTION INTRAMUSCULAR; INTRAVENOUS at 20:33

## 2024-09-24 NOTE — ED NOTES
Nursing report ED to floor  Alvaro Morris  38 y.o.  male    HPI :  HPI (Adult)  Stated Reason for Visit: n/v    Chief Complaint  Chief Complaint   Patient presents with    Vomiting       Admitting doctor:   Atul Branch MD    Admitting diagnosis:   The primary encounter diagnosis was Colitis. Diagnoses of Elevated LFTs, Diarrhea, unspecified type, and Nausea and vomiting, unspecified vomiting type were also pertinent to this visit.    Code status:   Current Code Status       Date Active Code Status Order ID Comments User Context       9/24/2024 1355 CPR (Attempt to Resuscitate) [id number]  Abby Hurtado APRN ED        Question Answer    Code Status (Patient has no pulse and is not breathing) CPR (Attempt to Resuscitate)    Medical Interventions (Patient has pulse or is breathing) Full Support    Level Of Support Discussed With Patient                    Allergies:   Patient has no known allergies.    Isolation:   No active isolations    Intake and Output    Intake/Output Summary (Last 24 hours) at 9/24/2024 1412  Last data filed at 9/24/2024 1407  Gross per 24 hour   Intake 1000 ml   Output --   Net 1000 ml       Weight:       09/24/24  1119   Weight: 81.6 kg (180 lb)       Most recent vitals:   Vitals:    09/24/24 1123 09/24/24 1124 09/24/24 1131 09/24/24 1132   BP:   (!) 141/109    Pulse: 101 98  97   Resp:       Temp:       SpO2: 94% 91%  92%   Weight:       Height:           Active LDAs/IV Access:   Lines, Drains & Airways       Active LDAs       Name Placement date Placement time Site Days    Peripheral IV 09/24/24 1132 Right Antecubital 09/24/24  1132  Antecubital  less than 1                    Labs (abnormal labs have a star):   Labs Reviewed   COMPREHENSIVE METABOLIC PANEL - Abnormal; Notable for the following components:       Result Value    Glucose 212 (*)     Potassium 2.8 (*)     Chloride 95 (*)     ALT (SGPT) 223 (*)     AST (SGOT) 312 (*)     Alkaline Phosphatase 202 (*)     Anion Gap 17.9 (*)      All other components within normal limits    Narrative:     GFR Normal >60  Chronic Kidney Disease <60  Kidney Failure <15     URINALYSIS W/ MICROSCOPIC IF INDICATED (NO CULTURE) - Abnormal; Notable for the following components:    Appearance, UA Cloudy (*)     All other components within normal limits    Narrative:     Urine microscopic not indicated.   CBC WITH AUTO DIFFERENTIAL - Abnormal; Notable for the following components:    RDW 17.9 (*)     RDW-SD 58.4 (*)     Platelets 135 (*)     Lymphocyte % 15.2 (*)     Eosinophil % 0.2 (*)     All other components within normal limits   LIPASE - Normal   MAGNESIUM - Normal   GASTROINTESTINAL PANEL, PCR (PREFERRED) DOES NOT INCLUDE CDIFF   RAINBOW DRAW    Narrative:     The following orders were created for panel order [place] Draw.  Procedure                               Abnormality         Status                     ---------                               -----------         ------                     Green Top (Gel)[[id number]]                                  Final result               Lavender Top[[id number]]                                     Final result               Gold Top - SST[[id number]]                                   Final result               Cuevas Top[[id number]]                                         Final result               Light Blue Top[[id number]]                                   Final result                 Please view results for these tests on the individual orders.   CK   HEMOGLOBIN A1C   URINE DRUG SCREEN   HEPATITIS PANEL, ACUTE   CBC AND DIFFERENTIAL    Narrative:     The following orders were created for panel order CBC & Differential.  Procedure                               Abnormality         Status                     ---------                               -----------         ------                     CBC Auto Differential[[id number]]        Abnormal            Final result                 Please view results for these tests on  the individual orders.   GREEN TOP   LAVENDER TOP   GOLD TOP - SST   GRAY TOP   LIGHT BLUE TOP       EKG:   No orders to display       Meds given in ED:   Medications   sodium chloride 0.9 % flush 10 mL (has no administration in time range)   Potassium Replacement - Follow Nurse / BPA Driven Protocol (has no administration in time range)   sodium chloride 0.9 % flush 10 mL (has no administration in time range)   sodium chloride 0.9 % flush 10 mL (has no administration in time range)   sodium chloride 0.9 % infusion 40 mL (has no administration in time range)   nitroglycerin (NITROSTAT) SL tablet 0.4 mg (has no administration in time range)   Potassium Replacement - Follow Nurse / BPA Driven Protocol (has no administration in time range)   Magnesium Standard Dose Replacement - Follow Nurse / BPA Driven Protocol (has no administration in time range)   Phosphorus Replacement - Follow Nurse / BPA Driven Protocol (has no administration in time range)   Calcium Replacement - Follow Nurse / BPA Driven Protocol (has no administration in time range)   sennosides-docusate (PERICOLACE) 8.6-50 MG per tablet 2 tablet (has no administration in time range)     And   polyethylene glycol (MIRALAX) packet 17 g (has no administration in time range)     And   bisacodyl (DULCOLAX) EC tablet 5 mg (has no administration in time range)     And   bisacodyl (DULCOLAX) suppository 10 mg (has no administration in time range)   potassium chloride (K-DUR,KLOR-CON) ER tablet 40 mEq (40 mEq Oral Given 9/24/24 1410)   sodium chloride 0.9 % infusion (has no administration in time range)   sodium chloride 0.9 % bolus 1,000 mL (0 mL Intravenous Stopped 9/24/24 1407)   iopamidol (ISOVUE-300) 61 % injection 100 mL (85 mL Intravenous Given by Other 9/24/24 1238)       Imaging results:  CT Abdomen Pelvis With Contrast    Result Date: 9/24/2024   1. Colonic wall appears thickened however the colon is collapsed and this most likely represents pseudo  thickening. Underlying colitis difficult to entirely exclude. 2. Hepatic steatosis  This report was finalized on 9/24/2024 1:05 PM by Dr. Deon Sands M.D on Workstation: [id number]       Ambulatory status:     Social issues:   Social History     Socioeconomic History    Marital status:    Tobacco Use    Smoking status: Every Day     Current packs/day: 0.25     Average packs/day: 0.3 packs/day for 10.0 years (2.5 ttl pk-yrs)     Types: Cigarettes    Smokeless tobacco: Former   Vaping Use    Vaping status: Former    Substances: Nicotine    Devices: Disposable   Substance and Sexual Activity    Alcohol use: Yes     Comment: 3-4 shots per day x 1 week    Drug use: No    Sexual activity: Defer       Peripheral Neurovascular  Peripheral Neurovascular (Adult)  Peripheral Neurovascular WDL: WDL    Neuro Cognitive  Neuro Cognitive (Adult)  Cognitive/Neuro/Behavioral WDL: WDL    Learning  Learning Assessment (Adult)  Learning Readiness and Ability: no barriers identified  Education Provided  Person Taught: patient    Respiratory  Respiratory (Adult)  Airway WDL: WDL  Respiratory WDL  Respiratory WDL: WDL    Abdominal Pain       Pain Assessments  Pain (Adult)  (0-10) Pain Rating: Rest: 0    NIH Stroke Scale       Aracelis Jefferson RN  09/24/24 14:12 EDT

## 2024-09-24 NOTE — CONSULTS
"Attempted Access center consult with patient for ETOH. Pt admitted to OBS for intractable N/VD. He is resting in bed on approach and does not appear to be in distress. Discussed role and purpose of visit to which he states he does not have a drinking problem, and he was \"just drinking today to help with the stomach cramps\". He states things are going well at home and he has no needs for mental health support. Per the chart review pt with hx of anxiety and polysubstance use. Pt denies both at this time. Access will sign off per pt's request. Encouraged him to let staff know if he changes his mind.   "

## 2024-09-24 NOTE — H&P
"[facility] [place]   [place] AND PHYSICAL    Patient Name: Alvaro Morris  : 1985  MRN: [id number]  Primary Care Physician:  Christiano Heredia MD  Date of admission: 2024    Subjective   Subjective     Chief Complaint:   Chief Complaint   Patient presents with    Vomiting     HPI:    Alvaro Morirs is a 38 y.o. male who presented to the emergency department due to intractable nausea and vomiting for the last month.  He was released from retirement approximately 2 months ago and apparently his symptoms began around that time. He also endorses epigastric pain.  He says that he has been extremely restless as of late and has had difficulty sleeping.  He was recently seen at another facility approximately 3 weeks ago and was prescribed Zofran with no improvement of his symptoms.  Past medical history significant for ADHD, however he has not been on medication for this recently.  He denies drug use but reports daily alcohol use.  He reports he \"drinks enough to go to sleep.\"  He estimates that he drinks approximately 4 beers nightly.  Last alcohol was consumed last night.  He does report he recently completed a course of amoxicillin for dental abscess.    He had a CT scan of the abdomen pelvis in the emergency department which showed  possible colitis and hepatic steatosis.  CMP significant for low potassium 2.8, low chloride 95, anion gap of 17.9, glucose 212, alk phos 202, , .  Bilirubin was normal at 0.9.  Lipase was 49.  CBC significant for a low platelet count of 135.  Urinalysis unremarkable.  Drug screen negative.  On arrival to the observation unit his CIWA was ranked as a 12.  He received Ativan.  I spoke with the hospitalist about admitting the patient.    Review of Systems   All systems were reviewed and negative except for: Those mentioned in HPI    Personal History     Past Medical History:   Diagnosis Date    ADHD     Bursitis of elbow     right elbow    Has never received " COVID-19 vaccine     Hemorrhoids     Hypertension     B/P OK SINCE STOPPED SMOKING-NO MEDS CURRENTLY    Left shoulder pain     Rotator cuff tear     LUCIANA       Past Surgical History:   Procedure Laterality Date    ARM DEBRIDEMENT Left 7/27/2022    Procedure: UPPER EXTREMITY DEBRIDEMENT labrium;  Surgeon: Queta Cordero MD;  Location: [facility];  Service: Orthopedics;  Laterality: Left;    HEMORRHOIDECTOMY N/A 6/23/2016    Procedure: HEMORRHOIDECTOMY;  Surgeon: Atilio Smith MD;  Location: [facility];  Service:     SHOULDER ARTHROSCOPY W/ ROTATOR CUFF REPAIR Right 12/4/2017    Procedure: SHOULDER ARTHROSCOPY WITH MINI OPEN ROTATOR CUFF REPAIR  DEBRIDEMENT OF LABRUM DEBRIDEMENT OF SUBSCAPULARIS DECOMPRESSION;  Surgeon: Pily Cordero MD;  Location: [facility];  Service:     SHOULDER ARTHROSCOPY W/ ROTATOR CUFF REPAIR Left 7/27/2022    Procedure: SHOULDER ARTHROSCOPY WITH ROTATOR CUFF REPAIR;  Surgeon: Queta Cordero MD;  Location: [facility];  Service: Orthopedics;  Laterality: Left;       Family History: family history is not on file. Otherwise pertinent FHx was reviewed and not pertinent to current issue.    Social History:  reports that he has been smoking cigarettes. He has a 2.5 pack-year smoking history. He has been exposed to tobacco smoke. He has quit using smokeless tobacco. He reports current alcohol use of about 25.0 standard drinks of alcohol per week. He reports that he does not use drugs.    Home Medications:  Acetaminophen Extra Strength, lisdexamfetamine, multivitamin with minerals, and ondansetron    Allergies:  No Known Allergies    Objective   Objective     Vitals:   Temp:  [98 °F (36.7 °C)-98.1 °F (36.7 °C)] 98.1 °F (36.7 °C)  Heart Rate:  [] 76  Resp:  [18] 18  BP: (126-160)/() 130/101  Physical Exam    Constitutional: Awake, alert, appears anxious   Eyes: PERRLA, sclerae anicteric, no conjunctival injection   HENT: NCAT, mucous membranes moist   Neck:  Supple, no thyromegaly, no lymphadenopathy, trachea midline   Respiratory: Clear to auscultation bilaterally, nonlabored respirations    Cardiovascular: RRR, no murmurs, rubs, or gallops, palpable pedal pulses bilaterally   Gastrointestinal: Positive bowel sounds, soft, tenderness epigastric area, nondistended   Musculoskeletal: No bilateral ankle edema, no clubbing or cyanosis to extremities   Psychiatric: Appropriate affect, cooperative   Neurologic: Oriented x 3, strength symmetric in all extremities, Cranial Nerves grossly intact to confrontation, speech clear   Skin: No rashes     Result Review    Result Review:  I have personally reviewed the results from the time of this admission to 9/24/2024 16:16 EDT and agree with these findings:  [x]  Laboratory list / accordion  []  Microbiology  [x]  Radiology  []  EKG/Telemetry   []  Cardiology/Vascular   []  Pathology  []  Old records  []  Other:  Most notable findings include: CT scan of the abdomen pelvis in the emergency department shows  possible colitis and hepatic steatosis.  CMP significant for low potassium 2.8, low chloride 95, anion gap of 17.9, glucose 212, alk phos 202, , .  CBC significant for a low platelet count of 135.      Assessment & Plan   Assessment / Plan     Brief Patient Summary:  Alvaro Morris is a 38 y.o. male who was admitted to the ED observation unit for further management due to intractable nausea and vomiting.  CIWA score significantly elevated on arrival.  Spoke with hospitalist, Dr. Rivera she will admit the patient to the hospital.    Active Hospital Problems:  Active Hospital Problems    Diagnosis     **Intractable nausea and vomiting      Plan:     Intractable nausea and vomiting  Colitis  Transaminitis  -CT scan shows possible colitis, hepatic steatosis  -GI PCR pending  -Consult gastroenterology  -Repeat CBC and CMP in the morning    Hypokalemia  -Replace per protocol    Alcohol withdrawal  -Vital signs/CIWA  scale per nursing routine  -Continues telemetry monitoring  -Give 2 mg of Ativan now  -Will admit patient to the hospital for closer monitoring    Thrombocytopenia  -Possibly related to alcohol abuse  -Repeat CBC in the morning    ADHD  -Has not been on medication, previously prescribed Vyvanse    VTE Prophylaxis:  Mechanical VTE prophylaxis orders are present.    CODE STATUS:    Level Of Support Discussed With: Patient  Code Status (Patient has no pulse and is not breathing): CPR (Attempt to Resuscitate)  Medical Interventions (Patient has pulse or is breathing): Full Support    Admission Status:  I believe this patient meets observation status.    Electronically signed by TODD Heath, 09/24/24, 4:16 PM EDT.    75 minutes has been spent by [facility] providers in the care of this patient while under observation status    I have worn appropriate PPE during this patient encounter, sanitized my hands both with entering and exiting patient's room.    I have discussed plan of care with patient including advance care plan and/or surrogate decision maker.  Patient advises that their wife, Halle will be their primary surrogate decision maker    This note will serve as an H&P and transfer note

## 2024-09-24 NOTE — PLAN OF CARE
Problem: Adult Inpatient Plan of Care  Goal: Plan of Care Review  Outcome: Progressing  Flowsheets (Taken 9/24/2024 1542)  Outcome Evaluation: To be admitted for ETOH withdrawal. CIWA at 12 upon arrival to OBS.  Goal: Patient-Specific Goal (Individualized)  Outcome: Progressing  Goal: Absence of Hospital-Acquired Illness or Injury  Outcome: Progressing  Goal: Optimal Comfort and Wellbeing  Outcome: Progressing  Goal: Readiness for Transition of Care  Outcome: Progressing  Intervention: Mutually Develop Transition Plan  Recent Flowsheet Documentation  Taken 9/24/2024 1540 by Amie Hinojosa, RN  Transportation Anticipated: family or friend will provide  Patient/Family Anticipated Services at Transition: none  Patient/Family Anticipates Transition to: home with family  Taken 9/24/2024 1538 by Amie Hinojosa, RN  Equipment Currently Used at Home: none   Goal Outcome Evaluation:              Outcome Evaluation: To be admitted for ETOH withdrawal. CIWA at 12 upon arrival to OBS.

## 2024-09-24 NOTE — ED PROVIDER NOTES
EMERGENCY DEPARTMENT ENCOUNTER    Room Number:  40/40  PCP: Christiano Heredia MD  Historian: Patient      HPI:  Chief Complaint: Nausea, vomiting, diarrhea  A complete HPI/ROS/PMH/PSH/SH/FH are unobtainable due to: None    Context: Alvaro Morris is a 38 y.o. male who presents to the ED via private vehicle c/o persistent nausea, vomit, diarrhea over the last 4 to 5 weeks.  Patient states that he just got [facility] [place] for 2 [facility].  Almost immediately on return home he started having nausea, vomit, diarrhea which is typically worse in the mornings but does have symptoms throughout the day.  No abdominal pains.  Zofran does help somewhat.  Was seen at [facility] 3 weeks ago and has had no benefit since then.  Not on any significant medications.  Denies any current drug or alcohol use.  No fevers or chills.  No prior similar episodes in the past.  Has been taking amoxicillin.      MEDICAL RECORD REVIEW    External (non-ED) record review: CT abdomen and pelvis September 5, 2024 showed no acute emergent findings              PAST MEDICAL HISTORY  Active Ambulatory Problems     Diagnosis Date Noted    ADD (attention deficit disorder) 06/29/2017    Essential hypertension 06/29/2017    Chronic right shoulder pain 09/12/2017    Incomplete tear of rotator cuff 11/08/2017    History of rotator cuff surgery 01/17/2018    Encounter for monitoring opioid maintenance therapy 05/03/2018    H/O shoulder surgery 08/25/2018    History of alcohol abuse 03/17/2020    Tobacco dependence 09/11/2020    Traumatic complete tear of left rotator cuff 07/15/2022    Encounter for Prevnar pneumococcal vaccination 08/08/2024     Resolved Ambulatory Problems     Diagnosis Date Noted    No Resolved Ambulatory Problems     Past Medical History:   Diagnosis Date    ADHD     Bursitis of elbow     Has never received COVID-19 vaccine     Hemorrhoids     Hypertension     Left shoulder pain     Rotator cuff tear           PAST SURGICAL HISTORY  Past Surgical History:   Procedure Laterality Date    ARM DEBRIDEMENT Left 7/27/2022    Procedure: UPPER EXTREMITY DEBRIDEMENT labrium;  Surgeon: Queta Cordero MD;  Location: [facility];  Service: Orthopedics;  Laterality: Left;    HEMORRHOIDECTOMY N/A 6/23/2016    Procedure: HEMORRHOIDECTOMY;  Surgeon: Atilio Smith MD;  Location: [facility];  Service:     SHOULDER ARTHROSCOPY W/ ROTATOR CUFF REPAIR Right 12/4/2017    Procedure: SHOULDER ARTHROSCOPY WITH MINI OPEN ROTATOR CUFF REPAIR  DEBRIDEMENT OF LABRUM DEBRIDEMENT OF SUBSCAPULARIS DECOMPRESSION;  Surgeon: Pily Cordero MD;  Location: [facility];  Service:     SHOULDER ARTHROSCOPY W/ ROTATOR CUFF REPAIR Left 7/27/2022    Procedure: SHOULDER ARTHROSCOPY WITH ROTATOR CUFF REPAIR;  Surgeon: Queta Cordero MD;  Location: [facility];  Service: Orthopedics;  Laterality: Left;         FAMILY HISTORY  Family History   Problem Relation Age of Onset    Malig Hyperthermia Neg Hx          SOCIAL HISTORY  Social History     Socioeconomic History    Marital status:    Tobacco Use    Smoking status: Every Day     Current packs/day: 0.25     Average packs/day: 0.3 packs/day for 10.0 years (2.5 ttl pk-yrs)     Types: Cigarettes    Smokeless tobacco: Former   Vaping Use    Vaping status: Former    Substances: Nicotine    Devices: Disposable   Substance and Sexual Activity    Alcohol use: Yes     Comment: 3-4 shots per day x 1 week    Drug use: No    Sexual activity: Defer         ALLERGIES  Patient has no known allergies.        REVIEW OF SYSTEMS  Review of Systems     All systems reviewed and negative except for those discussed in HPI.       PHYSICAL EXAM    I have reviewed the triage vital signs and nursing notes.    ED Triage Vitals   Temp Heart Rate Resp BP SpO2   09/24/24 1119 09/24/24 1119 09/24/24 1119 09/24/24 1122 09/24/24 1119   98 °F (36.7 °C) (!) 133 18 (!) 160/106 96 %      Temp src Heart Rate  Source Patient Position BP Location FiO2 (%)   -- 09/24/24 1119 -- -- --    Monitor          Physical Exam  General: No acute distress, nontoxic  HEENT: Mucous membranes moist, atraumatic, EOMI  Neck: Full ROM  Pulm: Symmetric chest rise, nonlabored, lungs CTAB  Cardiovascular: Regular rate and rhythm, intact distal pulses  GI: Soft, nontender, nondistended, no rebound, no guarding, bowel sounds present  MSK: Full ROM, no deformity  Skin: Warm, dry  Neuro: Awake, alert, oriented x 4, GCS 15, moving all extremities, no focal deficits  Psych: Calm, cooperative        LAB RESULTS  Recent Results (from the past 24 hour(s))   Urinalysis With Microscopic If Indicated (No Culture) - Urine, Clean Catch    Collection Time: 09/24/24 11:33 AM    Specimen: Urine, Clean Catch   Result Value Ref Range    Color, UA Yellow Yellow, Straw    Appearance, UA Cloudy (A) Clear    pH, UA 6.0 5.0 - 8.0    Specific Gravity, UA <=1.005 1.005 - 1.030    Glucose, UA Negative Negative    Ketones, UA Negative Negative    Bilirubin, UA Negative Negative    Blood, UA Negative Negative    Protein, UA Negative Negative    Leuk Esterase, UA Negative Negative    Nitrite, UA Negative Negative    Urobilinogen, UA 1.0 E.U./dL 0.2 - 1.0 E.U./dL   Comprehensive Metabolic Panel    Collection Time: 09/24/24 11:34 AM    Specimen: Blood   Result Value Ref Range    Glucose 212 (H) 65 - 99 mg/dL    BUN 8 6 - 20 mg/dL    Creatinine 0.80 0.76 - 1.27 mg/dL    Sodium 139 136 - 145 mmol/L    Potassium 2.8 (L) 3.5 - 5.2 mmol/L    Chloride 95 (L) 98 - 107 mmol/L    CO2 26.1 22.0 - 29.0 mmol/L    Calcium 9.0 8.6 - 10.5 mg/dL    Total Protein 6.4 6.0 - 8.5 g/dL    Albumin 3.7 3.5 - 5.2 g/dL    ALT (SGPT) 223 (H) 1 - 41 U/L    AST (SGOT) 312 (H) 1 - 40 U/L    Alkaline Phosphatase 202 (H) 39 - 117 U/L    Total Bilirubin 0.9 0.0 - 1.2 mg/dL    Globulin 2.7 gm/dL    A/G Ratio 1.4 g/dL    BUN/Creatinine Ratio 10.0 7.0 - 25.0    Anion Gap 17.9 (H) 5.0 - 15.0 mmol/L    eGFR  115.5 >60.0 mL/min/1.73   Lipase    Collection Time: 09/24/24 11:34 AM    Specimen: Blood   Result Value Ref Range    Lipase 49 13 - 60 U/L   Green Top (Gel)    Collection Time: 09/24/24 11:34 AM   Result Value Ref Range    Extra Tube Hold for add-ons.    Lavender Top    Collection Time: 09/24/24 11:34 AM   Result Value Ref Range    Extra Tube hold for add-on    Gold Top - SST    Collection Time: 09/24/24 11:34 AM   Result Value Ref Range    Extra Tube Hold for add-ons.    Gray Top    Collection Time: 09/24/24 11:34 AM   Result Value Ref Range    Extra Tube Hold for add-ons.    Light Blue Top    Collection Time: 09/24/24 11:34 AM   Result Value Ref Range    Extra Tube Hold for add-ons.    CBC Auto Differential    Collection Time: 09/24/24 11:34 AM    Specimen: Blood   Result Value Ref Range    WBC 6.32 3.40 - 10.80 10*3/mm3    RBC 5.50 4.14 - 5.80 10*6/mm3    Hemoglobin 17.6 13.0 - 17.7 g/dL    Hematocrit 50.8 37.5 - 51.0 %    MCV 92.4 79.0 - 97.0 fL    MCH 32.0 26.6 - 33.0 pg    MCHC 34.6 31.5 - 35.7 g/dL    RDW 17.9 (H) 12.3 - 15.4 %    RDW-SD 58.4 (H) 37.0 - 54.0 fl    MPV 9.6 6.0 - 12.0 fL    Platelets 135 (L) 140 - 450 10*3/mm3    Neutrophil % 75.1 42.7 - 76.0 %    Lymphocyte % 15.2 (L) 19.6 - 45.3 %    Monocyte % 9.0 5.0 - 12.0 %    Eosinophil % 0.2 (L) 0.3 - 6.2 %    Basophil % 0.3 0.0 - 1.5 %    Immature Grans % 0.2 0.0 - 0.5 %    Neutrophils, Absolute 4.75 1.70 - 7.00 10*3/mm3    Lymphocytes, Absolute 0.96 0.70 - 3.10 10*3/mm3    Monocytes, Absolute 0.57 0.10 - 0.90 10*3/mm3    Eosinophils, Absolute 0.01 0.00 - 0.40 10*3/mm3    Basophils, Absolute 0.02 0.00 - 0.20 10*3/mm3    Immature Grans, Absolute 0.01 0.00 - 0.05 10*3/mm3   Magnesium    Collection Time: 09/24/24 11:34 AM    Specimen: Blood   Result Value Ref Range    Magnesium 1.9 1.6 - 2.6 mg/dL       Ordered the above labs and independently interpreted results. My findings will be discussed in the medical decision making section  below        RADIOLOGY  CT Abdomen Pelvis With Contrast    Result Date: 9/24/2024  CT ABDOMEN PELVIS W CONTRAST-  INDICATION: Nausea, vomiting, diarrhea  COMPARISON: None  TECHNIQUE: Routine CT abdomen/pelvis with IV contrast. Coronal and sagittal reformats. Radiation dose reduction techniques were utilized, including automated exposure control and exposure modulation based on body size.  FINDINGS:  Lung bases: Subsegmental atelectasis at the bases.  ABDOMEN: Hepatic steatosis. Normal gallbladder. No biliary ductal dilatation. Spleen is normal in size. No pancreatic mass or pancreatic ductal dilatation seen. No adrenal nodules. No renal mass or hydronephrosis.  Pelvis: Prostate is normal in size. Normal bladder.  Bowel: No obstruction. Rectum is collapsed. Colon is collapsed. Colonic wall appears thickened however the bowel is under distended. Normal appendix.  Abdominal wall: Unremarkable.  Retroperitoneum: No lymphadenopathy.  Vasculature: Patent. No abdominal aortic aneurysm.  Osseous structures: No destructive osseous lesions. Bulky marginal osteophytes seen in the lower thoracic spine.       1. Colonic wall appears thickened however the colon is collapsed and this most likely represents pseudo thickening. Underlying colitis difficult to entirely exclude. 2. Hepatic steatosis  This report was finalized on 9/24/2024 1:05 PM by Dr. Deon Sands M.D on Workstation: [id number]       Ordered the above noted radiological studies.  Independently interpreted by me and my independent review of findings can be found in the ED Course.  See dictation for official radiology interpretation.      PROCEDURES    Procedures        MEDICATIONS GIVEN IN ER    Medications   sodium chloride 0.9 % flush 10 mL (has no administration in time range)   Potassium Replacement - Follow Nurse / BPA Driven Protocol (has no administration in time range)   sodium chloride 0.9 % flush 10 mL (has no administration in time range)   sodium  chloride 0.9 % flush 10 mL (has no administration in time range)   sodium chloride 0.9 % infusion 40 mL (has no administration in time range)   nitroglycerin (NITROSTAT) SL tablet 0.4 mg (has no administration in time range)   Potassium Replacement - Follow Nurse / BPA Driven Protocol (has no administration in time range)   Magnesium Standard Dose Replacement - Follow Nurse / BPA Driven Protocol (has no administration in time range)   Phosphorus Replacement - Follow Nurse / BPA Driven Protocol (has no administration in time range)   Calcium Replacement - Follow Nurse / BPA Driven Protocol (has no administration in time range)   sennosides-docusate (PERICOLACE) 8.6-50 MG per tablet 2 tablet (has no administration in time range)     And   polyethylene glycol (MIRALAX) packet 17 g (has no administration in time range)     And   bisacodyl (DULCOLAX) EC tablet 5 mg (has no administration in time range)     And   bisacodyl (DULCOLAX) suppository 10 mg (has no administration in time range)   potassium chloride (K-DUR,KLOR-CON) ER tablet 40 mEq (has no administration in time range)   sodium chloride 0.9 % bolus 1,000 mL (1,000 mL Intravenous New Bag 9/24/24 1146)   iopamidol (ISOVUE-300) 61 % injection 100 mL (85 mL Intravenous Given by Other 9/24/24 1238)         PROGRESS, DATA ANALYSIS, CONSULTS, AND MEDICAL DECISION MAKING    Please note that this section constitutes my independent interpretation of clinical data including lab results, radiology, EKG's.  This constitutes my independent professional opinion regarding differential diagnosis and management of this patient.  It may include any factors such as history from outside sources, review of external records, social determinants of health, management of medications, response to those treatments, and discussions with other providers.    Differential Diagnosis and Plan: Initial concern for laboratory bowel disease, infectious process, colitis, pancreatitis, gastritis,  absorptive issue, renal failure, electrolyte abnormalities, among others.  Plan for labs, CT scan, supportive care, reevaluation with results.    Additional sources:  - Discussed/ obtained information from independent historians:       - (Social Determinants of Health): None     - Shared decision making:  Patient fully updated on and in agreement with the course and plan moving forward    ED Course as of 09/24/24 1358   Tue Sep 24, 2024   1150 Nitrite, UA: Negative [DC]   1150 Leukocytes, UA: Negative [DC]   1150 Ketones, UA: Negative [DC]   1209 WBC: 6.32 [DC]   1209 Hemoglobin: 17.6 [DC]   1209 Platelets(!): 135 [DC]   1237 Magnesium: 1.9 [DC]   1237 Glucose(!): 212 [DC]   1237 BUN: 8 [DC]   1237 Creatinine: 0.80 [DC]   1237 Sodium: 139 [DC]   1237 Potassium(!): 2.8 [DC]   1237 ALT (SGPT)(!): 223 [DC]   1237 AST (SGOT)(!): 312 [DC]   1237 Alkaline Phosphatase(!): 202 [DC]   1237 Total Bilirubin: 0.9 [DC]   1237 Lipase: 49 [DC]   1327 CT Abdomen Pelvis With Contrast  Patient radiology report reviewed, possible underlying colitis, does have hepatic steatosis [DC]   1350 Discussed with Abby Hurtado, MAINOR, observation unit, discussed patient's clinical course and findings today, treatment modalities, and need for hospitalization. [DC]   1351 Patient has been fully updated on the plan today and my recommendation for hospitalization for further monitoring and gastroenterology consult.  I have ordered a GI panel which is still pending would like to check that for any infectious source.  Elevating LFTs are noted, CT scan without overt liver issue other than some hepatic steatosis but no overt gallbladder issue on the CT scan. [DC]      ED Course User Index  [DC] Gibson Valadez MD       Hospitalization Considered?: yes    Orders Placed During This Visit:  Orders Placed This Encounter   Procedures    Gastrointestinal Panel, PCR - Stool, Per Rectum    CT Abdomen Pelvis With Contrast    [place] Draw    Comprehensive Metabolic  Panel    Lipase    Urinalysis With Microscopic If Indicated (No Culture) - Urine, Clean Catch    CBC Auto Differential    Magnesium    CBC Auto Differential    Comprehensive Metabolic Panel    Potassium    CK    Hemoglobin A1c    Diet: Liquid; Clear Liquid; Fluid Consistency: Thin (IDDSI 0)    Undress & Gown    Vital Signs    Intake & Output    Weigh Patient    Oral Care    Maintain IV Access    Telemetry - Place Orders & Notify Provider of Results When Patient Experiences Acute Chest Pain, Dysrhythmia or Respiratory Distress    May Be Off Telemetry for Tests    Code Status and Medical Interventions: CPR (Attempt to Resuscitate); Full Support    Inpatient Gastroenterology Consult    Insert Peripheral IV    Insert Peripheral IV    Initiate ED Observation Status    CBC & Differential    Green Top (Gel)    Lavender Top    Gold Top - SST    Gray Top    Light Blue Top       Additional orders considered but not placed:      Independent interpretation of labs, radiology studies, and discussions with consultants: See ED Course        AS OF 13:58 EDT VITALS:    BP - (!) 141/109  HR - 97  TEMP - 98 °F (36.7 °C)  02 SATS - 92%          DIAGNOSIS  Final diagnoses:   Colitis   Elevated LFTs   Diarrhea, unspecified type   Nausea and vomiting, unspecified vomiting type         DISPOSITION  ED Disposition       ED Disposition   Decision to Admit    Condition   --    Comment   --                Please note that portions of this document were completed with a voice recognition program.    Note Disclaimer: At [facility], we believe that sharing information builds trust and better relationships. You are receiving this note because you recently visited [facility]. It is possible you will see health information before a provider has talked with you about it. This kind of information can be easy to misunderstand. To help you fully understand what it means for your health, we urge you to discuss this note with your  provider.                       Gibson Valadez MD  09/24/24 1919

## 2024-09-24 NOTE — CONSULTS
CONSULT NOTE    INTERNAL MEDICINE   [facility]       Patient Identification:  Name: Alvaro Morris  Age: 38 y.o.  Sex: male  :  1985  MRN: [id number]             Date of Consultation:  24          Primary Care Physician: Christiano Heredia MD                               Requesting Physician: dr brantley  Reason for Consultation: assuming care/admission    Chief Complaint:  38 year old gentleman presented to the emergency room with nausea, vomiting and diarrhea; he was sent to the observation unit but started to get tremulous and had high ciwa scores so admission was requested; he admits to drinking several beers daily; he feels much improved after the ciwa protocol was initiated    History of Present Illness:   As above      Past Medical History:  Past Medical History:   Diagnosis Date    ADHD     Bursitis of elbow     right elbow    Has never received COVID-19 vaccine     Hemorrhoids     Hypertension     B/P OK SINCE STOPPED SMOKING-NO MEDS CURRENTLY    Left shoulder pain     Rotator cuff tear     LUCIANA     Past Surgical History:  Past Surgical History:   Procedure Laterality Date    ARM DEBRIDEMENT Left 2022    Procedure: UPPER EXTREMITY DEBRIDEMENT labrium;  Surgeon: Queta Cordero MD;  Location: [facility];  Service: Orthopedics;  Laterality: Left;    HEMORRHOIDECTOMY N/A 2016    Procedure: HEMORRHOIDECTOMY;  Surgeon: Atilio Smith MD;  Location: [facility];  Service:     SHOULDER ARTHROSCOPY W/ ROTATOR CUFF REPAIR Right 2017    Procedure: SHOULDER ARTHROSCOPY WITH MINI OPEN ROTATOR CUFF REPAIR  DEBRIDEMENT OF LABRUM DEBRIDEMENT OF SUBSCAPULARIS DECOMPRESSION;  Surgeon: Pily Cordero MD;  Location: [facility];  Service:     SHOULDER ARTHROSCOPY W/ ROTATOR CUFF REPAIR Left 2022    Procedure: SHOULDER ARTHROSCOPY WITH ROTATOR CUFF REPAIR;  Surgeon: Queta Cordero MD;  Location[facility];  Service: Orthopedics;  Laterality: Left;       Home Meds:  Medications Prior to Admission   Medication Sig Dispense Refill Last Dose    ondansetron (ZOFRAN) 8 MG tablet Take 1 tablet by mouth Every 8 (Eight) Hours As Needed for Nausea. 9 tablet 0     acetaminophen (TYLENOL) 500 MG tablet Take 2 tablets by mouth 2 (Two) Times a Day. 40 tablet 0     lisdexamfetamine (Vyvanse) 30 MG capsule Take 1 capsule by mouth Every Morning 30 capsule 0     multivitamin with minerals tablet tablet Take 1 tablet by mouth Daily. PT HOLDING FOR SURGERY        Current Meds:     Current Facility-Administered Medications:     sennosides-docusate (PERICOLACE) 8.6-50 MG per tablet 2 tablet, 2 tablet, Oral, BID PRN **AND** polyethylene glycol (MIRALAX) packet 17 g, 17 g, Oral, Daily PRN **AND** bisacodyl (DULCOLAX) EC tablet 5 mg, 5 mg, Oral, Daily PRN **AND** bisacodyl (DULCOLAX) suppository 10 mg, 10 mg, Rectal, Daily PRN, Blevens, Abby C, APRN    Calcium Replacement - Follow Nurse / BPA Driven Protocol, , Does not apply, PRN, Blevens, Abby C, APRN    cloNIDine (CATAPRES) tablet 0.1 mg, 0.1 mg, Oral, Q4H PRN, Aar Rivera MD    folic acid (FOLVITE) tablet 1 mg, 1 mg, Oral, Daily, Blevens, Abby C, APRN, 1 mg at 09/24/24 1551    folic acid 1 mg in sodium chloride 0.9 % 50 mL IVPB, 1 mg, Intravenous, Daily, Ara Rivera MD    Magnesium Standard Dose Replacement - Follow Nurse / BPA Driven Protocol, , Does not apply, PRN, Blevens, Abby C, APRN    nitroglycerin (NITROSTAT) SL tablet 0.4 mg, 0.4 mg, Sublingual, Q5 Min PRN, Blevens, Abby C, APRN    Phosphorus Replacement - Follow Nurse / BPA Driven Protocol, , Does not apply, PRN, Blevens, Abby C, APRN    potassium chloride (K-DUR,KLOR-CON) ER tablet 40 mEq, 40 mEq, Oral, Q4H, Gibson Valadez MD, 40 mEq at 09/24/24 1850    Potassium Replacement - Follow Nurse / BPA Driven Protocol, , Does not apply, SRINIVAS, Gibson Valadez MD    Potassium Replacement - Follow Nurse / BPA Driven Protocol, , Does not apply, PRN, Genesis,  Abby C, APRN    sodium chloride 0.9 % flush 10 mL, 10 mL, Intravenous, PRN, Gibson Valadez MD    sodium chloride 0.9 % flush 10 mL, 10 mL, Intravenous, Q12H, Blevens, Abby C, APRN, 10 mL at 09/24/24 1527    sodium chloride 0.9 % flush 10 mL, 10 mL, Intravenous, PRN, Blevens, Abby C, APRN    sodium chloride 0.9 % infusion 40 mL, 40 mL, Intravenous, PRN, Blevens, Abby C, APRN    sodium chloride 0.9 % infusion, 125 mL/hr, Intravenous, Continuous, Blevens, Abby C, APRN, Last Rate: 125 mL/hr at 09/24/24 1527, 125 mL/hr at 09/24/24 1527    thiamine (B-1) injection 200 mg, 200 mg, Intravenous, Q8H **FOLLOWED BY** [START ON 9/30/2024] thiamine (VITAMIN B-1) tablet 100 mg, 100 mg, Oral, Daily, Ara Rivera MD  Allergies:  No Known Allergies  Social History:   Social History     Socioeconomic History    Marital status:    Tobacco Use    Smoking status: Every Day     Current packs/day: 0.25     Average packs/day: 0.3 packs/day for 10.0 years (2.5 ttl pk-yrs)     Types: Cigarettes     Passive exposure: Current    Smokeless tobacco: Former   Vaping Use    Vaping status: Former    Substances: Nicotine    Devices: Disposable   Substance and Sexual Activity    Alcohol use: Yes     Alcohol/week: 25.0 standard drinks of alcohol     Types: 25 Shots of liquor per week     Comment: 4-5 drinnks per day    Drug use: No    Sexual activity: Defer     Family History:  Family History   Problem Relation Age of Onset    Malig Hyperthermia Neg Hx           Review of Systems  See history of present illness and past medical history.  Patient denies headache, dizziness, syncope, falls, trauma, change in vision, change in hearing, change in taste, changes in weight, changes in appetite, focal weakness, numbness, or paresthesia.  Patient denies chest pain, palpitations, dyspnea, orthopnea, PND, cough, sinus pressure, rhinorrhea, epistaxis, hemoptysis, nausea, vomiting,hematemesis, diarrhea, constipation or hematochezia. Denies cold  "or heat intolerance, polydipsia, polyuria, polyphagia. Denies hematuria, pyuria, dysuria, hesitancy, frequency or urgency. Denies consumption of raw and under cooked meats foods or change in water source.        Vitals:   /88 (BP Location: Left arm, Patient Position: Lying)   Pulse 76   Temp 98.1 °F (36.7 °C) (Oral)   Resp 18   Ht 188 cm (74\")   Wt 81.6 kg (180 lb)   SpO2 95%   BMI 23.11 kg/m²   I/O:   Intake/Output Summary (Last 24 hours) at 9/24/2024 1949  Last data filed at 9/24/2024 1707  Gross per 24 hour   Intake 1208.33 ml   Output 250 ml   Net 958.33 ml     Exam:  General Appearance:    Alert, cooperative, no distress, appears stated age   Head:    Normocephalic, without obvious abnormality, atraumatic   Eyes:    PERRL, conjunctivae/corneas clear, EOM's intact, both eyes   Ears:    Normal external ear canals, both ears   Nose:   Nares normal, septum midline, mucosa normal, no drainage    or sinus tenderness   Throat:   Lips, tongue, gums normal; oral mucosa pink and moist   Neck:   Supple, symmetrical, trachea midline, no adenopathy;     thyroid:  no enlargement/tenderness/nodules; no carotid    bruit or JVD   Back:     Symmetric, no curvature, ROM normal, no CVA tenderness   Lungs:     Decreased breath sounds bilaterally, respirations unlabored   Chest Wall:    No tenderness or deformity    Heart:    Regular rate and rhythm, S1 and S2 normal, no murmur, rub   or gallop   Abdomen:     Soft, nontender, bowel sounds active all four quadrants,     no masses, no hepatomegaly, no splenomegaly   Extremities:   Extremities normal, atraumatic, no cyanosis or edema                Data Review:  Labs in chart were reviewed.  WBC   Date Value Ref Range Status   09/24/2024 6.32 3.40 - 10.80 10*3/mm3 Final     Hemoglobin   Date Value Ref Range Status   09/24/2024 17.6 13.0 - 17.7 g/dL Final     Hematocrit   Date Value Ref Range Status   09/24/2024 50.8 37.5 - 51.0 % Final     Platelets   Date Value Ref Range " Status   09/24/2024 135 (L) 140 - 450 10*3/mm3 Final     Sodium   Date Value Ref Range Status   09/24/2024 139 136 - 145 mmol/L Final     Potassium   Date Value Ref Range Status   09/24/2024 2.8 (L) 3.5 - 5.2 mmol/L Final     Chloride   Date Value Ref Range Status   09/24/2024 95 (L) 98 - 107 mmol/L Final     CO2   Date Value Ref Range Status   09/24/2024 26.1 22.0 - 29.0 mmol/L Final     BUN   Date Value Ref Range Status   09/24/2024 8 6 - 20 mg/dL Final     Creatinine   Date Value Ref Range Status   09/24/2024 0.80 0.76 - 1.27 mg/dL Final     Glucose   Date Value Ref Range Status   09/24/2024 212 (H) 65 - 99 mg/dL Final     Calcium   Date Value Ref Range Status   09/24/2024 9.0 8.6 - 10.5 mg/dL Final     Magnesium   Date Value Ref Range Status   09/24/2024 1.9 1.6 - 2.6 mg/dL Final     AST (SGOT)   Date Value Ref Range Status   09/24/2024 312 (H) 1 - 40 U/L Final     ALT (SGPT)   Date Value Ref Range Status   09/24/2024 223 (H) 1 - 41 U/L Final     Alkaline Phosphatase   Date Value Ref Range Status   09/24/2024 202 (H) 39 - 117 U/L Final         Results from last 7 days   Lab Units 09/24/24  1134   HEMOGLOBIN A1C % 4.80       Imaging Results (Last 7 Days)       Procedure Component Value Units Date/Time    CT Abdomen Pelvis With Contrast [[id number]] Collected: 09/24/24 1256     Updated: 09/24/24 1308    Narrative:      CT ABDOMEN PELVIS W CONTRAST-     INDICATION: Nausea, vomiting, diarrhea     COMPARISON: None     TECHNIQUE:  Routine CT abdomen/pelvis with IV contrast. Coronal and sagittal  reformats. Radiation dose reduction techniques were utilized, including  automated exposure control and exposure modulation based on body size.     FINDINGS:      Lung bases: Subsegmental atelectasis at the bases.     ABDOMEN: Hepatic steatosis. Normal gallbladder. No biliary ductal  dilatation. Spleen is normal in size. No pancreatic mass or pancreatic  ductal dilatation seen. No adrenal nodules. No renal mass  or  hydronephrosis.     Pelvis: Prostate is normal in size. Normal bladder.     Bowel: No obstruction. Rectum is collapsed. Colon is collapsed. Colonic  wall appears thickened however the bowel is under distended. Normal  appendix.     Abdominal wall: Unremarkable.     Retroperitoneum: No lymphadenopathy.     Vasculature: Patent. No abdominal aortic aneurysm.     Osseous structures: No destructive osseous lesions. Bulky marginal  osteophytes seen in the lower thoracic spine.       Impression:         1. Colonic wall appears thickened however the colon is collapsed and  this most likely represents pseudo thickening. Underlying colitis  difficult to entirely exclude.  2. Hepatic steatosis     This report was finalized on 9/24/2024 1:05 PM by Dr. Deon Sands M.D on Workstation: [id number]             Past Medical History:   Diagnosis Date    ADHD     Bursitis of elbow     right elbow    Has never received COVID-19 vaccine     Hemorrhoids     Hypertension     B/P OK SINCE STOPPED SMOKING-NO MEDS CURRENTLY    Left shoulder pain     Rotator cuff tear     LUCIANA       Assessment:  Active Hospital Problems    Diagnosis  POA    **Intractable nausea and vomiting [R11.2]  Yes      Resolved Hospital Problems   No resolved problems to display.   Alcohol withdrawal  Hypertension  Tobacco use  Hypokalemia  hyperglycemia    Plan:  Continue ciwa protocol  Monitor on telemetry  Replace potassium  Trend labs  Fluids  Continue clear liquids for now  Dw patient     Ara Rivera MD   9/24/2024  19:49 EDT

## 2024-09-24 NOTE — ED TRIAGE NOTES
Pt c/o nausea and vomiting that started 3wks ago. Pt was seen at [facility] 2wks ago and states that the n/v continues despite zofran

## 2024-09-25 PROBLEM — R74.01 TRANSAMINITIS: Status: ACTIVE | Noted: 2024-09-25

## 2024-09-25 PROBLEM — E87.6 HYPOKALEMIA: Status: ACTIVE | Noted: 2024-09-25

## 2024-09-25 PROBLEM — R19.7 DIARRHEA: Status: ACTIVE | Noted: 2024-09-25

## 2024-09-25 PROBLEM — F10.10 ALCOHOL ABUSE: Status: ACTIVE | Noted: 2024-09-25

## 2024-09-25 PROBLEM — R73.9 HYPERGLYCEMIA: Status: ACTIVE | Noted: 2024-09-25

## 2024-09-25 PROBLEM — D69.6 THROMBOCYTOPENIA: Status: ACTIVE | Noted: 2024-09-25

## 2024-09-25 PROBLEM — F10.20 ALCOHOL DEPENDENCE: Status: ACTIVE | Noted: 2024-09-25

## 2024-09-25 LAB
ALBUMIN SERPL-MCNC: 2.9 G/DL (ref 3.5–5.2)
ALBUMIN/GLOB SERPL: 1.4 G/DL
ALP SERPL-CCNC: 163 U/L (ref 39–117)
ALT SERPL W P-5'-P-CCNC: 171 U/L (ref 1–41)
ANION GAP SERPL CALCULATED.3IONS-SCNC: 8.3 MMOL/L (ref 5–15)
AST SERPL-CCNC: 244 U/L (ref 1–40)
BASOPHILS # BLD AUTO: 0.03 10*3/MM3 (ref 0–0.2)
BASOPHILS NFR BLD AUTO: 0.6 % (ref 0–1.5)
BILIRUB SERPL-MCNC: 1.2 MG/DL (ref 0–1.2)
BUN SERPL-MCNC: 6 MG/DL (ref 6–20)
BUN/CREAT SERPL: 8 (ref 7–25)
CALCIUM SPEC-SCNC: 7.7 MG/DL (ref 8.6–10.5)
CHLORIDE SERPL-SCNC: 105 MMOL/L (ref 98–107)
CO2 SERPL-SCNC: 28.7 MMOL/L (ref 22–29)
CREAT SERPL-MCNC: 0.75 MG/DL (ref 0.76–1.27)
DEPRECATED RDW RBC AUTO: 62.1 FL (ref 37–54)
EGFRCR SERPLBLD CKD-EPI 2021: 117.7 ML/MIN/1.73
EOSINOPHIL # BLD AUTO: 0.09 10*3/MM3 (ref 0–0.4)
EOSINOPHIL NFR BLD AUTO: 1.9 % (ref 0.3–6.2)
ERYTHROCYTE [DISTWIDTH] IN BLOOD BY AUTOMATED COUNT: 17.9 % (ref 12.3–15.4)
GLOBULIN UR ELPH-MCNC: 2.1 GM/DL
GLUCOSE BLDC GLUCOMTR-MCNC: 106 MG/DL (ref 70–130)
GLUCOSE BLDC GLUCOMTR-MCNC: 184 MG/DL (ref 70–130)
GLUCOSE BLDC GLUCOMTR-MCNC: 88 MG/DL (ref 70–130)
GLUCOSE SERPL-MCNC: 90 MG/DL (ref 65–99)
HCT VFR BLD AUTO: 46.6 % (ref 37.5–51)
HGB BLD-MCNC: 15.2 G/DL (ref 13–17.7)
IMM GRANULOCYTES # BLD AUTO: 0.02 10*3/MM3 (ref 0–0.05)
IMM GRANULOCYTES NFR BLD AUTO: 0.4 % (ref 0–0.5)
LYMPHOCYTES # BLD AUTO: 1.47 10*3/MM3 (ref 0.7–3.1)
LYMPHOCYTES NFR BLD AUTO: 31.7 % (ref 19.6–45.3)
MCH RBC QN AUTO: 31.2 PG (ref 26.6–33)
MCHC RBC AUTO-ENTMCNC: 32.6 G/DL (ref 31.5–35.7)
MCV RBC AUTO: 95.7 FL (ref 79–97)
MONOCYTES # BLD AUTO: 0.41 10*3/MM3 (ref 0.1–0.9)
MONOCYTES NFR BLD AUTO: 8.9 % (ref 5–12)
NEUTROPHILS NFR BLD AUTO: 2.61 10*3/MM3 (ref 1.7–7)
NEUTROPHILS NFR BLD AUTO: 56.5 % (ref 42.7–76)
PLATELET # BLD AUTO: 108 10*3/MM3 (ref 140–450)
PMV BLD AUTO: 10.7 FL (ref 6–12)
POTASSIUM SERPL-SCNC: 3.7 MMOL/L (ref 3.5–5.2)
PROT SERPL-MCNC: 5 G/DL (ref 6–8.5)
RBC # BLD AUTO: 4.87 10*6/MM3 (ref 4.14–5.8)
SODIUM SERPL-SCNC: 142 MMOL/L (ref 136–145)
WBC NRBC COR # BLD AUTO: 4.63 10*3/MM3 (ref 3.4–10.8)

## 2024-09-25 PROCEDURE — G0378 HOSPITAL OBSERVATION PER HR: HCPCS

## 2024-09-25 PROCEDURE — 96361 HYDRATE IV INFUSION ADD-ON: CPT

## 2024-09-25 PROCEDURE — 96376 TX/PRO/DX INJ SAME DRUG ADON: CPT

## 2024-09-25 PROCEDURE — 25010000002 THIAMINE HCL 200 MG/2ML SOLUTION: Performed by: INTERNAL MEDICINE

## 2024-09-25 PROCEDURE — 99204 OFFICE O/P NEW MOD 45 MIN: CPT | Performed by: PHYSICIAN ASSISTANT

## 2024-09-25 PROCEDURE — 96375 TX/PRO/DX INJ NEW DRUG ADDON: CPT

## 2024-09-25 PROCEDURE — 82948 REAGENT STRIP/BLOOD GLUCOSE: CPT

## 2024-09-25 PROCEDURE — 80053 COMPREHEN METABOLIC PANEL: CPT | Performed by: NURSE PRACTITIONER

## 2024-09-25 PROCEDURE — 25810000003 SODIUM CHLORIDE 0.9 % SOLUTION: Performed by: NURSE PRACTITIONER

## 2024-09-25 PROCEDURE — 25010000002 LORAZEPAM PER 2 MG: Performed by: INTERNAL MEDICINE

## 2024-09-25 PROCEDURE — 63710000001 INSULIN LISPRO (HUMAN) PER 5 UNITS: Performed by: STUDENT IN AN ORGANIZED HEALTH CARE EDUCATION/TRAINING PROGRAM

## 2024-09-25 PROCEDURE — 85025 COMPLETE CBC W/AUTO DIFF WBC: CPT | Performed by: NURSE PRACTITIONER

## 2024-09-25 RX ORDER — NICOTINE POLACRILEX 4 MG
15 LOZENGE BUCCAL
Status: DISCONTINUED | OUTPATIENT
Start: 2024-09-25 | End: 2024-09-26 | Stop reason: HOSPADM

## 2024-09-25 RX ORDER — HYDRALAZINE HYDROCHLORIDE 20 MG/ML
10 INJECTION INTRAMUSCULAR; INTRAVENOUS EVERY 8 HOURS PRN
Status: DISCONTINUED | OUTPATIENT
Start: 2024-09-25 | End: 2024-09-26 | Stop reason: HOSPADM

## 2024-09-25 RX ORDER — HYDROXYZINE HYDROCHLORIDE 25 MG/1
25 TABLET, FILM COATED ORAL 3 TIMES DAILY PRN
Status: DISCONTINUED | OUTPATIENT
Start: 2024-09-25 | End: 2024-09-26 | Stop reason: HOSPADM

## 2024-09-25 RX ORDER — DEXTROSE MONOHYDRATE 25 G/50ML
25 INJECTION, SOLUTION INTRAVENOUS
Status: DISCONTINUED | OUTPATIENT
Start: 2024-09-25 | End: 2024-09-26 | Stop reason: HOSPADM

## 2024-09-25 RX ORDER — INSULIN LISPRO 100 [IU]/ML
2-7 INJECTION, SOLUTION INTRAVENOUS; SUBCUTANEOUS
Status: DISCONTINUED | OUTPATIENT
Start: 2024-09-25 | End: 2024-09-26 | Stop reason: HOSPADM

## 2024-09-25 RX ORDER — IBUPROFEN 600 MG/1
1 TABLET ORAL
Status: DISCONTINUED | OUTPATIENT
Start: 2024-09-25 | End: 2024-09-26 | Stop reason: HOSPADM

## 2024-09-25 RX ADMIN — INSULIN LISPRO 2 UNITS: 100 INJECTION, SOLUTION INTRAVENOUS; SUBCUTANEOUS at 13:55

## 2024-09-25 RX ADMIN — SODIUM CHLORIDE 125 ML/HR: 9 INJECTION, SOLUTION INTRAVENOUS at 10:20

## 2024-09-25 RX ADMIN — Medication 10 ML: at 08:19

## 2024-09-25 RX ADMIN — LORAZEPAM 1 MG: 2 INJECTION INTRAMUSCULAR; INTRAVENOUS at 21:00

## 2024-09-25 RX ADMIN — LORAZEPAM 2 MG: 1 TABLET ORAL at 05:28

## 2024-09-25 RX ADMIN — THIAMINE HYDROCHLORIDE 200 MG: 100 INJECTION, SOLUTION INTRAMUSCULAR; INTRAVENOUS at 22:20

## 2024-09-25 RX ADMIN — THIAMINE HYDROCHLORIDE 200 MG: 100 INJECTION, SOLUTION INTRAMUSCULAR; INTRAVENOUS at 13:55

## 2024-09-25 RX ADMIN — LORAZEPAM 2 MG: 1 TABLET ORAL at 10:11

## 2024-09-25 RX ADMIN — SODIUM CHLORIDE 125 ML/HR: 9 INJECTION, SOLUTION INTRAVENOUS at 00:19

## 2024-09-25 RX ADMIN — FOLIC ACID 1 MG: 1 TABLET ORAL at 08:14

## 2024-09-25 RX ADMIN — THIAMINE HYDROCHLORIDE 200 MG: 100 INJECTION, SOLUTION INTRAMUSCULAR; INTRAVENOUS at 05:28

## 2024-09-25 RX ADMIN — Medication 10 ML: at 21:00

## 2024-09-25 RX ADMIN — LORAZEPAM 1 MG: 2 INJECTION INTRAMUSCULAR; INTRAVENOUS at 14:01

## 2024-09-25 RX ADMIN — HYDROXYZINE HYDROCHLORIDE 25 MG: 25 TABLET ORAL at 15:39

## 2024-09-25 NOTE — CONSULTS
"[facility]  Initial Inpatient Consult Note    Referring Provider: TODD Wallis    Reason for Consultation: intractable nausea and vomiting, transaminitis;     Subjective     History of present illness:      Thank you for allowing us to participate in the care of this patient.  38 y.o. male History of alcohol abuse admits to drinking 4-5 beers per night presented with nausea, vomiting, and diarrhea x 1 month.  CIWA score of 12 yesterday.  9/20/2024 contrasted CT scan showed colonic wall thickening-possibly underdistention as the colon is collapsed.  He had contrasted CT scan on 9/5/2024 for same symptoms which was unremarkable.  Negative GI PCR. Nausea and vomiting resolved at this point. Admits that emesis that looked like \"chewing tobacco\" but denies blood/black/coffee-ground. Water-soft stools every morning 2-3 Bms for the last several weeks, denies melena or hematochezia, fever.    Reports complete resolution in nausea, vomiting this morning.  Denies abdominal pain.  Still with usual watery stools.    Hepatic steatosis on scans.  CMP with elevated LFTs and pattern of alcoholic hepatitis with 9/20/2024 labs: , , , normal bilirubin.  CBC with normal hemoglobin, WBC but platelets 108 today.  Elevated ethanol level at admission, negative UDS.  Negative acute hepatitis panel, CK, magnesium, and lipase.      Past Medical History:  Past Medical History:   Diagnosis Date    ADHD     Bursitis of elbow     right elbow    Has never received COVID-19 vaccine     Hemorrhoids     Hypertension     B/P OK SINCE STOPPED SMOKING-NO MEDS CURRENTLY    Left shoulder pain     Rotator cuff tear     LUCIANA     Past Surgical History:  Past Surgical History:   Procedure Laterality Date    ARM DEBRIDEMENT Left 7/27/2022    Procedure: UPPER EXTREMITY DEBRIDEMENT labrium;  Surgeon: Queta Cordero MD;  [facility];  Service: Orthopedics;  Laterality: Left;    HEMORRHOIDECTOMY " N/A 6/23/2016    Procedure: HEMORRHOIDECTOMY;  Surgeon: Atilio Smith MD;  Location: [facility] MAIN OR;  Service:     SHOULDER ARTHROSCOPY W/ ROTATOR CUFF REPAIR Right 12/4/2017    Procedure: SHOULDER ARTHROSCOPY WITH MINI OPEN ROTATOR CUFF REPAIR  DEBRIDEMENT OF LABRUM DEBRIDEMENT OF SUBSCAPULARIS DECOMPRESSION;  Surgeon: Pily Cordero MD;  Location[place] [facility] [place] [facility];  Service:     SHOULDER ARTHROSCOPY W/ ROTATOR CUFF REPAIR Left 7/27/2022    Procedure: SHOULDER ARTHROSCOPY WITH ROTATOR CUFF REPAIR;  Surgeon: Queta Cordero MD;  Location[place] [facility] [place] [facility];  Service: Orthopedics;  Laterality: Left;      Social History:   Social History     Tobacco Use    Smoking status: Every Day     Current packs/day: 0.25     Average packs/day: 0.3 packs/day for 10.0 years (2.5 ttl pk-yrs)     Types: Cigarettes     Passive exposure: Current    Smokeless tobacco: Former   Substance Use Topics    Alcohol use: Yes     Alcohol/week: 25.0 standard drinks of alcohol     Types: 25 Shots of liquor per week     Comment: 4-5 drinnks per day      Family History:  Family History   Problem Relation Age of Onset    Malig Hyperthermia Neg Hx        Home Meds:  Medications Prior to Admission   Medication Sig Dispense Refill Last Dose    ondansetron (ZOFRAN) 8 MG tablet Take 1 tablet by mouth Every 8 (Eight) Hours As Needed for Nausea. 9 tablet 0     acetaminophen (TYLENOL) 500 MG tablet Take 2 tablets by mouth 2 (Two) Times a Day. 40 tablet 0     lisdexamfetamine (Vyvanse) 30 MG capsule Take 1 capsule by mouth Every Morning 30 capsule 0     multivitamin with minerals tablet tablet Take 1 tablet by mouth Daily. PT HOLDING FOR SURGERY        Current Meds:   folic acid, 1 mg, Oral, Daily  folic acid 1 mg in sodium chloride 0.9 % 50 mL IVPB, 1 mg, Intravenous, Daily  LORazepam, 2 mg, Oral, Q6H   Followed by  LORazepam, 1 mg, Oral, Q6H   Followed by  [START ON 9/26/2024] LORazepam, 1 mg, Oral, Q12H   Followed by  [START ON 9/28/2024] LORazepam, 1  mg, Oral, Daily  sodium chloride, 10 mL, Intravenous, Q12H  thiamine (B-1) IV, 200 mg, Intravenous, Q8H   Followed by  [START ON 9/30/2024] thiamine, 100 mg, Oral, Daily      Allergies:  No Known Allergies      Objective     Vital Signs  Temp:  [97.7 °F (36.5 °C)-99.5 °F (37.5 °C)] 97.7 °F (36.5 °C)  Heart Rate:  [] 61  Resp:  [16-18] 18  BP: (126-160)/() 139/91  Physical Exam:   Constitutional:    Alert, cooperative, in no acute distress    Eyes:          conjunctivae and sclerae normal, no icterus    HENT:   Atraumatic, normal hearing, mucosa moist    Neck:   Trachea midline,    Lungs:     normal respiratory effort    Abdomen:     Soft, nondistended, nontender; normal bowel sounds , no organomegaly    Skin:   No bruising, rash, or pallor   Psychiatric:  normal mood and affect; A&O x3     Results Review:   I reviewed the patient's new clinical results.    Results from last 7 days   Lab Units 09/25/24  0522 09/24/24  1134   WBC 10*3/mm3 4.63 6.32   HEMOGLOBIN g/dL 15.2 17.6   HEMATOCRIT % 46.6 50.8   PLATELETS 10*3/mm3 108* 135*     Results from last 7 days   Lab Units 09/25/24  0522 09/24/24  1134   SODIUM mmol/L 142 139   POTASSIUM mmol/L 3.7 2.8*   CHLORIDE mmol/L 105 95*   CO2 mmol/L 28.7 26.1   BUN mg/dL 6 8   CREATININE mg/dL 0.75* 0.80   CALCIUM mg/dL 7.7* 9.0   BILIRUBIN mg/dL 1.2 0.9   ALK PHOS U/L 163* 202*   ALT (SGPT) U/L 171* 223*   AST (SGOT) U/L 244* 312*   GLUCOSE mg/dL 90 212*         Lab Results   Lab Value Date/Time    LIPASE 49 09/24/2024 1134    LIPASE 21 09/05/2024 1816       Radiology:  CT Abdomen Pelvis With Contrast   Final Result       1. Colonic wall appears thickened however the colon is collapsed and   this most likely represents pseudo thickening. Underlying colitis   difficult to entirely exclude.   2. Hepatic steatosis       This report was finalized on 9/24/2024 1:05 PM by Dr. Deon Sands M.D on Workstation: [id number]              Assessment & Plan   Active  "Hospital Problems    Diagnosis     **Intractable nausea and vomiting        Assessment:  Nausea and vomiting, resolved  Morning diarrhea x 3-4 weeks  Alcohol abuse with possible withdrawal symptoms  Alcoholic hepatitis with transaminitis, improved    Plan:  Patient reports complete resolution in nausea and vomiting.  Still with some loose to watery stools in the morning only.  He denies melena, hematochezia, hematemesis, or coffee-ground emesis.  But admits to emesis that looks like \"chewing tobacco\".  Normal hemoglobin so we will watch for now.  Recommend observation overnight with reassessment of LFTs and CBC in the AM.  Continue to monitor for withdrawal with CIWA protocol.  If he continues to do better from a GI standpoint okay to discharge tomorrow.  Biggest concern would be withdrawal symptoms at this point.  Recommended he stop all alcohol use      I discussed the patients findings and my recommendations with patient.    Dragon dictation used throughout this note.            Halle Gordon PA-C  [facility]  [address] [place] [address]  Office: [phone]      "

## 2024-09-25 NOTE — PROGRESS NOTES
Name: Alvaro Morris ADMIT: 2024   : 1985  PCP: Christiano Heredia MD    MRN: [id number] LOS: 0 days   AGE/SEX: 38 y.o. male  ROOM: [facility]     Subjective   Subjective   Patient seen and examined this morning.  Hospital day 1.  He is awake and resting in bed, he is feeling somewhat better today, still having some nausea and loose stools.  Discussed with nursing, most recent CIWA score was 2, he has some anxiety and tremors, otherwise, no acute complaints       Objective   Objective   Vital Signs  Temp:  [97.7 °F (36.5 °C)-99.5 °F (37.5 °C)] 97.8 °F (36.6 °C)  Heart Rate:  [61-81] 71  Resp:  [16-18] 18  BP: (126-146)/() 144/100  SpO2:  [95 %-98 %] 98 %  on   ;   Device (Oxygen Therapy): room air  Body mass index is 23.11 kg/m².  Physical Exam  Vitals and nursing note reviewed.   Constitutional:       General: He is awake. He is not in acute distress.  Cardiovascular:      Rate and Rhythm: Normal rate and regular rhythm.      Pulses: Normal pulses.      Heart sounds: Normal heart sounds.   Pulmonary:      Effort: Pulmonary effort is normal. No respiratory distress.      Breath sounds: Normal breath sounds.   Abdominal:      General: There is no distension.      Palpations: Abdomen is soft.      Tenderness: There is no abdominal tenderness.   Skin:     General: Skin is warm and dry.   Neurological:      Mental Status: He is alert and oriented to person, place, and time.   Psychiatric:         Behavior: Behavior is cooperative.       Results Review     I reviewed the patient's new clinical results.  Results from last 7 days   Lab Units 24  0522 24  1134   WBC 10*3/mm3 4.63 6.32   HEMOGLOBIN g/dL 15.2 17.6   PLATELETS 10*3/mm3 108* 135*     Results from last 7 days   Lab Units 24  0522 24  1134   SODIUM mmol/L 142 139   POTASSIUM mmol/L 3.7 2.8*   CHLORIDE mmol/L 105 95*   CO2 mmol/L 28.7 26.1   BUN mg/dL 6 8   CREATININE mg/dL 0.75* 0.80   GLUCOSE mg/dL 90 212*   EGFR  mL/min/1.73 117.7 115.5     Results from last 7 days   Lab Units 09/25/24  0522 09/24/24  1134   ALBUMIN g/dL 2.9* 3.7   BILIRUBIN mg/dL 1.2 0.9   ALK PHOS U/L 163* 202*   AST (SGOT) U/L 244* 312*   ALT (SGPT) U/L 171* 223*     Results from last 7 days   Lab Units 09/25/24  0522 09/24/24  1134   CALCIUM mg/dL 7.7* 9.0   ALBUMIN g/dL 2.9* 3.7   MAGNESIUM mg/dL  --  2.0  1.9       Hemoglobin A1C   Date/Time Value Ref Range Status   09/24/2024 1134 4.80 4.80 - 5.60 % Final       CT Abdomen Pelvis With Contrast    Result Date: 9/24/2024   1. Colonic wall appears thickened however the colon is collapsed and this most likely represents pseudo thickening. Underlying colitis difficult to entirely exclude. 2. Hepatic steatosis  This report was finalized on 9/24/2024 1:05 PM by Dr. Deon Sands M.D on Workstation: [id number]       I have personally reviewed all medications:  Scheduled Medications  folic acid, 1 mg, Oral, Daily  folic acid 1 mg in sodium chloride 0.9 % 50 mL IVPB, 1 mg, Intravenous, Daily  sodium chloride, 10 mL, Intravenous, Q12H  thiamine (B-1) IV, 200 mg, Intravenous, Q8H   Followed by  [START ON 9/30/2024] thiamine, 100 mg, Oral, Daily    Infusions  sodium chloride, 125 mL/hr, Last Rate: 125 mL/hr (09/25/24 1020)    Diet  Diet: Gastrointestinal; Fiber-Restricted, Low Irritant; Fluid Consistency: Thin (IDDSI 0)    I have personally reviewed:  [x]  Laboratory   [x]  Microbiology   [x]  Radiology   [x]  EKG/Telemetry  []  Cardiology/Vascular   []  Pathology    []  Records       Assessment/Plan     Active Hospital Problems    Diagnosis  POA    **Intractable nausea and vomiting [R11.2]  Yes    Alcohol abuse [F10.10]  Yes    Hypokalemia [E87.6]  Yes    Transaminitis [R74.01]  Yes    Hyperglycemia [R73.9]  Yes    Thrombocytopenia [D69.6]  Yes    Diarrhea [R19.7]  Yes    Tobacco dependence [F17.200]  Yes    Essential hypertension [I10]  Yes      Resolved Hospital Problems   No resolved problems to display.        38 y.o. male admitted with Intractable nausea and vomiting.    Intractable nausea with vomiting  Diarrhea  Transaminitis  - CT AP obtained, showing colonic wall thickening, difficult to entirely exclude colitis, along with hepatic steatosis. GI PCR negative. Hepatitis panel/Lipase/CK levels normal. Tox scree negative. GI consulted and following, etiology of transaminitis felt to be 2/2 alcoholic hepatitis.   - GI planning to continue close monitoring for now. Will continue to follow their plans/recommendations. Greatly appreciate their help.  - Continue IVF, supportive medications.  - Repeat CMP in AM.    Alcohol dependence with concern for withdrawal  - ETOH level elevated on arrival, later complicated by some evidence of withdrawal.  - He is on CIWA protocol, thiamine, folic acid. Access consulted, but patient refused.  - Continue current protocol, closely monitor.  - Telemetry, pulse oximetry, seizure precautions, aspiration precautions  - Consult Access center. Will follow their plans/recommendations. Greatly appreciate their help.    Hypertension  - Blood pressure transiently elevated, likely 2/2 GI symptoms and withdrawal, patient not on scheduled medicines at home. Will add PRN medication for now, closely monitor, consider initiation of treatment with scheduled medications, if indicated.     Thrombocytopenia  - Platelets found to be low on most recent labs, likely 2/2 ETOH. No indications for urgent intervention at present. Will monitor for now.  - Order repeat CBC in AM for reassessment.    Hypokalemia  - Noted previously, repleted and improved. Continue to monitor.    Tobacco use  - Encourage cessation.    Hyperglycemia  - Noted on labs, A1c 4.80%; Some values >200, order SSI.      All workup, problems and plans new to me today. First day taking care of patient.    SCDs for DVT prophylaxis.  Full code.  Discussed with patient and nursing staff.  Anticipate discharge home when cleared by  consultants.  Expected discharge date/ time has not been documented.      Jerry Vargas DO  [facility]  09/25/24

## 2024-09-25 NOTE — NURSING NOTE
LINDSAY (Miguel) returned call about Ativan.   Advised provider that Ativan order is complete   and patient is CIWA.  Provider said he was driving and would  take care of it when he can.  No new orders.

## 2024-09-25 NOTE — PLAN OF CARE
Goal Outcome Evaluation:    Patient admitted for nausea/vomiting.   Nausea intermittent, vomiting resolved.  Patient given ativan for anxiety/withdrawal.  Patient able to sleep between care after meds given.  GI consult today.

## 2024-09-26 ENCOUNTER — HOSPITAL ENCOUNTER (EMERGENCY)
Facility: HOSPITAL | Age: 39
Discharge: HOME | End: 2024-09-26
Attending: STUDENT IN AN ORGANIZED HEALTH CARE EDUCATION/TRAINING PROGRAM
Payer: COMMERCIAL

## 2024-09-26 ENCOUNTER — READMISSION MANAGEMENT (OUTPATIENT)
Dept: CALL CENTER | Facility: HOSPITAL | Age: 39
End: 2024-09-26
Payer: COMMERCIAL

## 2024-09-26 VITALS
OXYGEN SATURATION: 99 % | HEART RATE: 71 BPM | HEIGHT: 74 IN | RESPIRATION RATE: 18 BRPM | WEIGHT: 153.88 LBS | BODY MASS INDEX: 19.75 KG/M2 | TEMPERATURE: 98 F | DIASTOLIC BLOOD PRESSURE: 89 MMHG | SYSTOLIC BLOOD PRESSURE: 139 MMHG

## 2024-09-26 VITALS
DIASTOLIC BLOOD PRESSURE: 109 MMHG | HEIGHT: 74 IN | RESPIRATION RATE: 18 BRPM | TEMPERATURE: 97.6 F | SYSTOLIC BLOOD PRESSURE: 139 MMHG | OXYGEN SATURATION: 96 % | HEART RATE: 103 BPM | WEIGHT: 153.88 LBS | BODY MASS INDEX: 19.75 KG/M2

## 2024-09-26 DIAGNOSIS — F10.920 ALCOHOLIC INTOXICATION WITHOUT COMPLICATION: Primary | ICD-10-CM

## 2024-09-26 DIAGNOSIS — F10.239 ALCOHOL DEPENDENCE WITH WITHDRAWAL WITH COMPLICATION: ICD-10-CM

## 2024-09-26 LAB
ALBUMIN SERPL-MCNC: 3.3 G/DL (ref 3.5–5.2)
ALBUMIN/GLOB SERPL: 1.3 G/DL
ALP SERPL-CCNC: 164 U/L (ref 39–117)
ALT SERPL W P-5'-P-CCNC: 161 U/L (ref 1–41)
ANION GAP SERPL CALCULATED.3IONS-SCNC: 9.9 MMOL/L (ref 5–15)
AST SERPL-CCNC: 175 U/L (ref 1–40)
BASOPHILS # BLD AUTO: 0.04 10*3/MM3 (ref 0–0.2)
BASOPHILS NFR BLD AUTO: 0.7 % (ref 0–1.5)
BILIRUB SERPL-MCNC: 1.1 MG/DL (ref 0–1.2)
BUN SERPL-MCNC: 6 MG/DL (ref 6–20)
BUN/CREAT SERPL: 7.8 (ref 7–25)
CALCIUM SPEC-SCNC: 8.3 MG/DL (ref 8.6–10.5)
CHLORIDE SERPL-SCNC: 100 MMOL/L (ref 98–107)
CO2 SERPL-SCNC: 29.1 MMOL/L (ref 22–29)
CREAT SERPL-MCNC: 0.77 MG/DL (ref 0.76–1.27)
DEPRECATED RDW RBC AUTO: 57.6 FL (ref 37–54)
EGFRCR SERPLBLD CKD-EPI 2021: 116.8 ML/MIN/1.73
EOSINOPHIL # BLD AUTO: 0.09 10*3/MM3 (ref 0–0.4)
EOSINOPHIL NFR BLD AUTO: 1.7 % (ref 0.3–6.2)
ERYTHROCYTE [DISTWIDTH] IN BLOOD BY AUTOMATED COUNT: 17.1 % (ref 12.3–15.4)
GLOBULIN UR ELPH-MCNC: 2.5 GM/DL
GLUCOSE BLDC GLUCOMTR-MCNC: 100 MG/DL (ref 70–130)
GLUCOSE SERPL-MCNC: 87 MG/DL (ref 65–99)
HCT VFR BLD AUTO: 46.2 % (ref 37.5–51)
HGB BLD-MCNC: 15.9 G/DL (ref 13–17.7)
IMM GRANULOCYTES # BLD AUTO: 0.02 10*3/MM3 (ref 0–0.05)
IMM GRANULOCYTES NFR BLD AUTO: 0.4 % (ref 0–0.5)
LYMPHOCYTES # BLD AUTO: 1.47 10*3/MM3 (ref 0.7–3.1)
LYMPHOCYTES NFR BLD AUTO: 27.3 % (ref 19.6–45.3)
MCH RBC QN AUTO: 31.8 PG (ref 26.6–33)
MCHC RBC AUTO-ENTMCNC: 34.4 G/DL (ref 31.5–35.7)
MCV RBC AUTO: 92.4 FL (ref 79–97)
MONOCYTES # BLD AUTO: 0.47 10*3/MM3 (ref 0.1–0.9)
MONOCYTES NFR BLD AUTO: 8.7 % (ref 5–12)
NEUTROPHILS NFR BLD AUTO: 3.29 10*3/MM3 (ref 1.7–7)
NEUTROPHILS NFR BLD AUTO: 61.2 % (ref 42.7–76)
PLATELET # BLD AUTO: 131 10*3/MM3 (ref 140–450)
PMV BLD AUTO: 10.3 FL (ref 6–12)
POTASSIUM SERPL-SCNC: 3.5 MMOL/L (ref 3.5–5.2)
PROT SERPL-MCNC: 5.8 G/DL (ref 6–8.5)
RBC # BLD AUTO: 5 10*6/MM3 (ref 4.14–5.8)
SODIUM SERPL-SCNC: 139 MMOL/L (ref 136–145)
WBC NRBC COR # BLD AUTO: 5.38 10*3/MM3 (ref 3.4–10.8)

## 2024-09-26 PROCEDURE — 85025 COMPLETE CBC W/AUTO DIFF WBC: CPT | Performed by: STUDENT IN AN ORGANIZED HEALTH CARE EDUCATION/TRAINING PROGRAM

## 2024-09-26 PROCEDURE — 99214 OFFICE O/P EST MOD 30 MIN: CPT | Performed by: INTERNAL MEDICINE

## 2024-09-26 PROCEDURE — 80053 COMPREHEN METABOLIC PANEL: CPT | Performed by: STUDENT IN AN ORGANIZED HEALTH CARE EDUCATION/TRAINING PROGRAM

## 2024-09-26 PROCEDURE — 82948 REAGENT STRIP/BLOOD GLUCOSE: CPT

## 2024-09-26 PROCEDURE — 25810000003 SODIUM CHLORIDE 0.9 % SOLUTION: Performed by: NURSE PRACTITIONER

## 2024-09-26 PROCEDURE — 99283 EMERGENCY DEPT VISIT LOW MDM: CPT

## 2024-09-26 PROCEDURE — G0378 HOSPITAL OBSERVATION PER HR: HCPCS

## 2024-09-26 PROCEDURE — 25010000002 THIAMINE HCL 200 MG/2ML SOLUTION: Performed by: INTERNAL MEDICINE

## 2024-09-26 RX ORDER — FOLIC ACID 1 MG/1
1 TABLET ORAL DAILY
Qty: 30 TABLET | Refills: 0 | Status: SHIPPED | OUTPATIENT
Start: 2024-09-26 | End: 2024-10-26

## 2024-09-26 RX ORDER — DIAZEPAM 5 MG
10 TABLET ORAL ONCE
Status: DISCONTINUED | OUTPATIENT
Start: 2024-09-26 | End: 2024-09-26 | Stop reason: HOSPADM

## 2024-09-26 RX ORDER — CHLORDIAZEPOXIDE HYDROCHLORIDE 25 MG/1
50 CAPSULE, GELATIN COATED ORAL
Qty: 34 CAPSULE | Refills: 0 | Status: SHIPPED | OUTPATIENT
Start: 2024-09-26 | End: 2024-09-26

## 2024-09-26 RX ORDER — L.ACID,PARA/B.BIFIDUM/S.THERM 8B CELL
1 CAPSULE ORAL DAILY
Status: DISCONTINUED | OUTPATIENT
Start: 2024-09-26 | End: 2024-09-26 | Stop reason: HOSPADM

## 2024-09-26 RX ORDER — LANOLIN ALCOHOL/MO/W.PET/CERES
100 CREAM (GRAM) TOPICAL DAILY
Qty: 30 TABLET | Refills: 0 | Status: SHIPPED | OUTPATIENT
Start: 2024-09-26 | End: 2024-10-26

## 2024-09-26 RX ORDER — POTASSIUM CHLORIDE 750 MG/1
40 TABLET, FILM COATED, EXTENDED RELEASE ORAL EVERY 4 HOURS
Status: DISCONTINUED | OUTPATIENT
Start: 2024-09-26 | End: 2024-09-26 | Stop reason: HOSPADM

## 2024-09-26 RX ORDER — CHLORDIAZEPOXIDE HYDROCHLORIDE 25 MG/1
CAPSULE, GELATIN COATED ORAL
Qty: 34 CAPSULE | Refills: 0 | Status: SHIPPED | OUTPATIENT
Start: 2024-09-26 | End: 2024-10-05

## 2024-09-26 RX ORDER — CHLORDIAZEPOXIDE HYDROCHLORIDE 25 MG/1
50 CAPSULE, GELATIN COATED ORAL ONCE
Status: COMPLETED | OUTPATIENT
Start: 2024-09-26 | End: 2024-09-26

## 2024-09-26 RX ADMIN — THIAMINE HYDROCHLORIDE 200 MG: 100 INJECTION, SOLUTION INTRAMUSCULAR; INTRAVENOUS at 05:57

## 2024-09-26 RX ADMIN — HYDROXYZINE HYDROCHLORIDE 25 MG: 25 TABLET ORAL at 00:28

## 2024-09-26 RX ADMIN — SODIUM CHLORIDE 125 ML/HR: 9 INJECTION, SOLUTION INTRAVENOUS at 01:10

## 2024-09-26 RX ADMIN — CHLORDIAZEPOXIDE HYDROCHLORIDE 50 MG: 25 CAPSULE ORAL at 14:44

## 2024-09-26 RX ADMIN — LORAZEPAM 2 MG: 1 TABLET ORAL at 00:28

## 2024-09-26 RX ADMIN — POTASSIUM CHLORIDE 40 MEQ: 750 TABLET, EXTENDED RELEASE ORAL at 05:57

## 2024-09-26 NOTE — NURSING NOTE
Patient anxious and requesting to leave. MD paged and ordered 10mg Valium and to let the patient know he would be by soon, patient refused this and wanted to leave. He was A&Ox4. AMA paperwork signed and MD notified.

## 2024-09-26 NOTE — DISCHARGE INSTRUCTIONS
Please follow-up with your primary care physician within 1 week for repeat assessment    Please return to the ER with new or worsening symptoms    Please follow-up with an inpatient rehab facility such as the [facility] to further manage your alcohol addiction.

## 2024-09-26 NOTE — ED PROVIDER NOTES
EMERGENCY DEPARTMENT ENCOUNTER  Room Number:  37/37  PCP: Christiano Heredia MD  Independent Historians: Patient      HPI:  Chief Complaint: had concerns including Alcohol Intoxication.     Context: Alvaro Morris is a 38 y.o. male with a medical history of ADD, hypertension, alcohol dependence who presents to the ED c/o acute dependence and intoxication.  Patient left AMA from the hospital this morning after being admitted for intractable nausea and vomiting likely secondary to alcohol use and withdrawal.  Patient's symptoms had come under control however this morning he became very anxious and left AGAINST MEDICAL ADVICE.  Patient states he drinks 3 bottles of fireball after leaving the hospital.  Patient returns because he felt his nausea and vomiting and abdominal discomfort returning and is asking for help to get off the alcohol.      Review of prior external notes (non-ED) -and- Review of prior external test results outside of this encounter: Consult note from gastroenterology from earlier today reviewed and notable for visit secondary to follow-up on intractable nausea and vomiting and transaminitis.  At that time it was noted that he had complete resolution of nausea vomiting and diarrhea that was suspected to be secondary to alcohol abuse and withdrawal.  GI signed off at that time.  Patient ultimately left AMA earlier this morning.    Prescription drug monitoring program review:         PAST MEDICAL HISTORY  Active Ambulatory Problems     Diagnosis Date Noted    ADD (attention deficit disorder) 06/29/2017    Essential hypertension 06/29/2017    Chronic right shoulder pain 09/12/2017    Incomplete tear of rotator cuff 11/08/2017    History of rotator cuff surgery 01/17/2018    Encounter for monitoring opioid maintenance therapy 05/03/2018    H/O shoulder surgery 08/25/2018    History of alcohol abuse 03/17/2020    Tobacco dependence 09/11/2020    Traumatic complete tear of left rotator cuff 07/15/2022     Encounter for Prevnar pneumococcal vaccination 08/08/2024    Intractable nausea and vomiting 09/24/2024    Hypokalemia 09/25/2024    Transaminitis 09/25/2024    Hyperglycemia 09/25/2024    Thrombocytopenia 09/25/2024    Diarrhea 09/25/2024    Alcohol dependence 09/25/2024     Resolved Ambulatory Problems     Diagnosis Date Noted    No Resolved Ambulatory Problems     Past Medical History:   Diagnosis Date    ADHD     Bursitis of elbow     Has never received COVID-19 vaccine     Hemorrhoids     Hypertension     Left shoulder pain     Rotator cuff tear          PAST SURGICAL HISTORY  Past Surgical History:   Procedure Laterality Date    ARM DEBRIDEMENT Left 7/27/2022    Procedure: UPPER EXTREMITY DEBRIDEMENT labrium;  Surgeon: Queta Cordero MD;  Location: [facility] OR [facility];  Service: Orthopedics;  Laterality: Left;    HEMORRHOIDECTOMY N/A 6/23/2016    Procedure: HEMORRHOIDECTOMY;  Surgeon: Atilio Smith MD;  Location: [facility];  Service:     SHOULDER ARTHROSCOPY W/ ROTATOR CUFF REPAIR Right 12/4/2017    Procedure: SHOULDER ARTHROSCOPY WITH MINI OPEN ROTATOR CUFF REPAIR  DEBRIDEMENT OF LABRUM DEBRIDEMENT OF SUBSCAPULARIS DECOMPRESSION;  Surgeon: Pily Cordero MD;  Location: [facility] OR [facility];  Service:     SHOULDER ARTHROSCOPY W/ ROTATOR CUFF REPAIR Left 7/27/2022    Procedure: SHOULDER ARTHROSCOPY WITH ROTATOR CUFF REPAIR;  Surgeon: Queta Cordero MD;  Location: [facility] OR [facility];  Service: Orthopedics;  Laterality: Left;         FAMILY HISTORY  Family History   Problem Relation Age of Onset    Malig Hyperthermia Neg Hx          SOCIAL HISTORY  Social History     Socioeconomic History    Marital status:    Tobacco Use    Smoking status: Every Day     Current packs/day: 0.25     Average packs/day: 0.3 packs/day for 10.0 years (2.5 ttl pk-yrs)     Types: Cigarettes     Passive exposure: Current    Smokeless tobacco: Former   Vaping Use    Vaping status: Former    Substances: Nicotine    Devices:  Disposable   Substance and Sexual Activity    Alcohol use: Yes     Alcohol/week: 25.0 standard drinks of alcohol     Types: 25 Shots of liquor per week     Comment: 4-5 drinnks per day    Drug use: No    Sexual activity: Defer         ALLERGIES  Patient has no known allergies.      REVIEW OF SYSTEMS  Review of Systems  Included in HPI  All systems reviewed and negative except for those discussed in HPI.      PHYSICAL EXAM    I have reviewed the triage vital signs and nursing notes.    ED Triage Vitals [09/26/24 1331]   Temp Heart Rate Resp BP SpO2   97.6 °F (36.4 °C) (!) 149 20 -- 97 %      Temp src Heart Rate Source Patient Position BP Location FiO2 (%)   Tympanic Monitor -- -- --       Physical Exam  GENERAL: alert, no acute distress  SKIN: Warm, dry  HENT: Normocephalic, atraumatic  EYES: no scleral icterus  CV: regular rhythm, regular rate  RESPIRATORY: normal effort, lungs clear  ABDOMEN: soft, nontender, nondistended  MUSCULOSKELETAL: no deformity  NEURO: alert, moves all extremities, follows commands            LAB RESULTS  Recent Results (from the past 24 hour(s))   POC Glucose Once    Collection Time: 09/25/24  4:16 PM    Specimen: Blood   Result Value Ref Range    Glucose 88 70 - 130 mg/dL   POC Glucose Once    Collection Time: 09/25/24  8:04 PM    Specimen: Blood   Result Value Ref Range    Glucose 106 70 - 130 mg/dL   Comprehensive Metabolic Panel    Collection Time: 09/26/24  3:43 AM    Specimen: Blood   Result Value Ref Range    Glucose 87 65 - 99 mg/dL    BUN 6 6 - 20 mg/dL    Creatinine 0.77 0.76 - 1.27 mg/dL    Sodium 139 136 - 145 mmol/L    Potassium 3.5 3.5 - 5.2 mmol/L    Chloride 100 98 - 107 mmol/L    CO2 29.1 (H) 22.0 - 29.0 mmol/L    Calcium 8.3 (L) 8.6 - 10.5 mg/dL    Total Protein 5.8 (L) 6.0 - 8.5 g/dL    Albumin 3.3 (L) 3.5 - 5.2 g/dL    ALT (SGPT) 161 (H) 1 - 41 U/L    AST (SGOT) 175 (H) 1 - 40 U/L    Alkaline Phosphatase 164 (H) 39 - 117 U/L    Total Bilirubin 1.1 0.0 - 1.2 mg/dL     Globulin 2.5 gm/dL    A/G Ratio 1.3 g/dL    BUN/Creatinine Ratio 7.8 7.0 - 25.0    Anion Gap 9.9 5.0 - 15.0 mmol/L    eGFR 116.8 >60.0 mL/min/1.73   CBC Auto Differential    Collection Time: 09/26/24  3:43 AM    Specimen: Blood   Result Value Ref Range    WBC 5.38 3.40 - 10.80 10*3/mm3    RBC 5.00 4.14 - 5.80 10*6/mm3    Hemoglobin 15.9 13.0 - 17.7 g/dL    Hematocrit 46.2 37.5 - 51.0 %    MCV 92.4 79.0 - 97.0 fL    MCH 31.8 26.6 - 33.0 pg    MCHC 34.4 31.5 - 35.7 g/dL    RDW 17.1 (H) 12.3 - 15.4 %    RDW-SD 57.6 (H) 37.0 - 54.0 fl    MPV 10.3 6.0 - 12.0 fL    Platelets 131 (L) 140 - 450 10*3/mm3    Neutrophil % 61.2 42.7 - 76.0 %    Lymphocyte % 27.3 19.6 - 45.3 %    Monocyte % 8.7 5.0 - 12.0 %    Eosinophil % 1.7 0.3 - 6.2 %    Basophil % 0.7 0.0 - 1.5 %    Immature Grans % 0.4 0.0 - 0.5 %    Neutrophils, Absolute 3.29 1.70 - 7.00 10*3/mm3    Lymphocytes, Absolute 1.47 0.70 - 3.10 10*3/mm3    Monocytes, Absolute 0.47 0.10 - 0.90 10*3/mm3    Eosinophils, Absolute 0.09 0.00 - 0.40 10*3/mm3    Basophils, Absolute 0.04 0.00 - 0.20 10*3/mm3    Immature Grans, Absolute 0.02 0.00 - 0.05 10*3/mm3   POC Glucose Once    Collection Time: 09/26/24  6:14 AM    Specimen: Blood   Result Value Ref Range    Glucose 100 70 - 130 mg/dL         RADIOLOGY  No Radiology Exams Resulted Within Past 24 Hours      MEDICATIONS GIVEN IN ER  Medications   chlordiazePOXIDE (LIBRIUM) capsule 50 mg (50 mg Oral Given 9/26/24 1014)         ORDERS PLACED DURING THIS VISIT:  No orders of the defined types were placed in this encounter.        OUTPATIENT MEDICATION MANAGEMENT:  No current Epic-ordered facility-administered medications on file.     Current Outpatient Medications Ordered in Epic   Medication Sig Dispense Refill    chlordiazePOXIDE (LIBRIUM) 25 MG capsule Take 2 capsules by mouth Dose Adjusted By Provider As Needed for 5 days. Day 1: Take 50mg (2 capsules) by mouth every 6 hours   Day 2: Take 50mg (2 capsules) by mouth every 8 hours  Day  3: Take 50mg (2 capsules) by mouth every 12 hours   Day 4: Take 50mg (2 capsules) by mouth at night    Can take an additional 50mg (2 capsules) for worsening or emergence of symptoms of withdrawal limited to one symptom-triggered dose in a 24 hour period. 34 capsule 0    acetaminophen (TYLENOL) 500 MG tablet Take 2 tablets by mouth 2 (Two) Times a Day. 40 tablet 0    folic acid (FOLVITE) 1 MG tablet Take 1 tablet by mouth Daily for 30 days. 30 tablet 0    lisdexamfetamine (Vyvanse) 30 MG capsule Take 1 capsule by mouth Every Morning 30 capsule 0    multivitamin with minerals tablet tablet Take 1 tablet by mouth Daily. PT HOLDING FOR SURGERY      ondansetron (ZOFRAN) 8 MG tablet Take 1 tablet by mouth Every 8 (Eight) Hours As Needed for Nausea. 9 tablet 0    thiamine (VITAMIN B1) 100 MG tablet Take 1 tablet by mouth Daily for 30 days. 30 tablet 0         PROCEDURES  Procedures            PROGRESS, DATA ANALYSIS, CONSULTS, AND MEDICAL DECISION MAKING  All labs have been independently interpreted by me.  All radiology studies have been reviewed by me. All EKG's have been independently viewed and interpreted by me.  Discussion below represents my analysis of pertinent findings related to patient's condition, differential diagnosis, treatment plan and final disposition.    Differential diagnosis includes but is not limited to intoxication, withdrawal, malingering.    Clinical Scores:                   ED Course as of 09/26/24 1458   Thu Sep 26, 2024   1454 Had a lengthy discussion with the patient and ultimately using shared decision making techniques we elected to proceed with an outpatient management strategy using a Librium taper to help control patient's alcohol use.  Will give first dose in the emergency department.  Utilizing pharmacy's help we prescribed appropriate Librium taper.  Prescription sent to our [facility] pharmacy.  Patient is agreeable with this plan.  I advised patient on following up with his primary  care physician and following up with inpatient detox units including the [facility].  Patient states he will look into these.  Patient is able to demonstrate clinical sobriety with ambulation and clear conversation. [MW]      ED Course User Index  [MW] Blane Orosco MD             AS OF 14:58 EDT VITALS:    BP - (!) 139/109  HR - 103  TEMP - 97.6 °F (36.4 °C) (Tympanic)  O2 SATS - 96%    COMPLEXITY OF CARE  Admission was considered but after careful review of the patient's presentation, physical examination, diagnostic results, and response to treatment the patient may be safely discharged with outpatient follow-up.      DIAGNOSIS  Final diagnoses:   Alcoholic intoxication without complication         DISPOSITION  ED Disposition       ED Disposition   Discharge    Condition   Stable    Comment   --                Please note that portions of this document were completed with a voice recognition program.    Note Disclaimer: At [facility], we believe that sharing information builds trust and better relationships. You are receiving this note because you recently visited [facility]. It is possible you will see health information before a provider has talked with you about it. This kind of information can be easy to misunderstand. To help you fully understand what it means for your health, we urge you to discuss this note with your provider.         Blane Orosco MD  09/26/24 6911

## 2024-09-26 NOTE — CASE MANAGEMENT/SOCIAL WORK
Case Management Discharge Note      Final Note: Pt left ROXANNA Bentley RN         Selected Continued Care - Discharged on 9/26/2024 Admission date: 9/24/2024 - Discharge disposition: Home or Self Care      Destination    No services have been selected for the patient.                Durable Medical Equipment    No services have been selected for the patient.                Dialysis/Infusion    No services have been selected for the patient.                Home Medical Care    No services have been selected for the patient.                Therapy    No services have been selected for the patient.                Community Resources    No services have been selected for the patient.                Community & DME    No services have been selected for the patient.                         Final Discharge Disposition Code: 07 - left MELANY

## 2024-09-26 NOTE — CASE MANAGEMENT/SOCIAL WORK
Discharge Planning Assessment  [facility]     Patient Name: Alvaro Morris  MRN: [id number]  Today's Date: 9/26/2024    Admit Date: 9/24/2024    Plan: Plan home with family.  PETE Bentley RN   Discharge Needs Assessment       Row Name 09/26/24 0802       Living Environment    People in Home child(josh), dependent;spouse    Name(s) of People in Home Spouse  ( Halle Morris [phone]) and daughters ( Damon 9 y/o and Emilia 10 y/o)    Current Living Arrangements home    Potentially Unsafe Housing Conditions none    In the past 12 months has the electric, gas, oil, or water company threatened to shut off services in your home? No    Primary Care Provided by self    Provides Primary Care For child(josh)    Family Caregiver if Needed spouse    Family Caregiver Names Spouse ( Halle Morris [phone])    Quality of Family Relationships helpful;involved;supportive    Able to Return to Prior Arrangements yes    Living Arrangement Comments Pt lives in a single story house with his spouse ( Halle Morris [phone]) and daughters ( Damon 9 y/o and Emilia 10 y/o).       Resource/Environmental Concerns    Resource/Environmental Concerns none    Transportation Concerns none       Transportation Needs    In the past 12 months, has lack of transportation kept you from medical appointments or from getting medications? no    In the past 12 months, has lack of transportation kept you from meetings, work, or from getting things needed for daily living? No       Food Insecurity    Within the past 12 months, you worried that your food would run out before you got the money to buy more. Never true    Within the past 12 months, the food you bought just didn't last and you didn't have money to get more. Never true       Transition Planning    Patient/Family Anticipates Transition to home with family    Patient/Family Anticipated Services at Transition none    Transportation Anticipated car, drives self        Discharge Needs Assessment    Readmission Within the Last 30 Days no previous admission in last 30 days    Equipment Currently Used at Home none    Concerns to be Addressed no discharge needs identified;denies needs/concerns at this time    Anticipated Changes Related to Illness none    Equipment Needed After Discharge none                   Discharge Plan       Row Name 09/26/24 0805       Plan    Plan Plan home with family.  PETE Bentley RN    Patient/Family in Agreement with Plan yes    Plan Comments FACE SHEET VERIFIED.  Spoke with pt at bedside. Pt's PCP is Dr. Christiano Heredia. Pt lives in a single story house with his spouse ( Halle Morris [phone]) and daughters ( Damon 7 y/o and Emilia 8 y/o).  Pt is independent with ADLs.  Pt denies using any DMEs.  Pt gets his prescriptions at  [facility]  ([facility]).  Pt denies any issues affording medications. Pt is not current with HH. Pt has not been in SNF.  Pt denies any discharge needs. Pt states he will drive his self home. Plan home with family.  PETE Bentley RN                  Continued Care and Services - Admitted Since 9/24/2024    No active coordination exists for this encounter.       Expected Discharge Date and Time       Expected Discharge Date Expected Discharge Time    Sep 28, 2024            Demographic Summary       Row Name 09/26/24 0802       General Information    Admission Type observation    Arrived From emergency department    Referral Source admission list    Reason for Consult discharge planning    Preferred Language English                   Functional Status       Row Name 09/26/24 0802       Functional Status    Usual Activity Tolerance good    Current Activity Tolerance good       Functional Status, IADL    Medications independent    Meal Preparation assistive person    Housekeeping assistive person    Laundry assistive person    Shopping assistive person       Mental Status    General Appearance WDL WDL                    Psychosocial    No documentation.                  Abuse/Neglect    No documentation.                  Legal    No documentation.                  Substance Abuse    No documentation.                  Patient Forms    No documentation.                     Nan Bentley RN

## 2024-09-26 NOTE — CASE MANAGEMENT/SOCIAL WORK
Continued Stay Note  [facility]     Patient Name: Alvaro Morris  MRN: [id number]  Today's Date: 9/26/2024    Admit Date: 9/24/2024    Plan: Plan home with family.  PETE Bentley RN   Discharge Plan       Row Name 09/26/24 0805       Plan    Plan Plan home with family.  PETE Bentley RN    Patient/Family in Agreement with Plan yes    Plan Comments FACE SHEET VERIFIED.  Spoke with pt at bedside. Pt's PCP is Dr. Christiano Heredia. Pt lives in a single story house with his spouse ( Halle Morris [phone]) and daughters ( Damon 9 y/o and Emilia 8 y/o).  Pt is independent with ADLs.  Pt denies using any DMEs.  Pt gets his prescriptions at  [facility]  ([address]).  Pt denies any issues affording medications. Pt is not current with HH. Pt has not been in SNF.  Pt denies any discharge needs. Pt states he will drive his self home. Plan home with family.  PETE Bentley RN                   Discharge Codes    No documentation.                 Expected Discharge Date and Time       Expected Discharge Date Expected Discharge Time    Sep 28, 2024               Nan Bentley RN

## 2024-09-26 NOTE — PROGRESS NOTES
[facility]  Inpatient Progress Note    Reason for Follow Up:   intractable nausea and vomiting, transaminitis    Subjective     Interval History:   Received 2 doses of Ativan yesterday morning but has not received any benzos since.  He is not tremulous.  He is awake and alert.  He slept soundly last night.  The nausea vomiting diarrhea have stopped.  He is tolerating a diet.    Current Facility-Administered Medications:     sennosides-docusate (PERICOLACE) 8.6-50 MG per tablet 2 tablet, 2 tablet, Oral, BID PRN **AND** polyethylene glycol (MIRALAX) packet 17 g, 17 g, Oral, Daily PRN **AND** bisacodyl (DULCOLAX) EC tablet 5 mg, 5 mg, Oral, Daily PRN **AND** bisacodyl (DULCOLAX) suppository 10 mg, 10 mg, Rectal, Daily PRN, Genesis Abby C, APRN    Calcium Replacement - Follow Nurse / BPA Driven Protocol, , Does not apply, PRN, Ara Rivera MD    cloNIDine (CATAPRES) tablet 0.1 mg, 0.1 mg, Oral, Q4H PRN, Ara Rivera MD    dextrose (D50W) (25 g/50 mL) IV injection 25 g, 25 g, Intravenous, Q15 Min PRN, Jerry Vargas DO    dextrose (GLUTOSE) oral gel 15 g, 15 g, Oral, Q15 Min PRN, Jerry Vargas DO    folic acid (FOLVITE) tablet 1 mg, 1 mg, Oral, Daily, Cheryl Hurtadoa DAVID, APRN, 1 mg at 09/25/24 0814    folic acid 1 mg in sodium chloride 0.9 % 50 mL IVPB, 1 mg, Intravenous, Daily, Ara Rivera MD    glucagon (GLUCAGEN) injection 1 mg, 1 mg, Intramuscular, Q15 Min PRN, Jerry Vargas DO    hydrALAZINE (APRESOLINE) injection 10 mg, 10 mg, Intravenous, Q8H PRN, Jerry Vargas DO    hydrOXYzine (ATARAX) tablet 25 mg, 25 mg, Oral, TID PRN, Jerry Vargas DO, 25 mg at 09/26/24 0028    insulin lispro (HUMALOG/ADMELOG) injection 2-7 Units, 2-7 Units, Subcutaneous, 4x Daily AC & at Bedtime, Jerry Vargas, DO, 2 Units at 09/25/24 1355    lactobacillus acidophilus (RISAQUAD) capsule 1 capsule, 1 capsule, Oral, Daily, Osvaldo Dove MD    LORazepam (ATIVAN) tablet 1 mg, 1 mg,  Oral, Q1H PRN **OR** LORazepam (ATIVAN) injection 1 mg, 1 mg, Intravenous, Q1H PRN, 1 mg at 09/25/24 2100 **OR** LORazepam (ATIVAN) tablet 2 mg, 2 mg, Oral, Q1H PRN, 2 mg at 09/26/24 0028 **OR** LORazepam (ATIVAN) injection 2 mg, 2 mg, Intravenous, Q1H PRN **OR** LORazepam (ATIVAN) injection 2 mg, 2 mg, Intravenous, Q15 Min PRN **OR** LORazepam (ATIVAN) injection 2 mg, 2 mg, Intramuscular, Q15 Min PRN, Ara Rivera MD    Magnesium Standard Dose Replacement - Follow Nurse / BPA Driven Protocol, , Does not apply, Miguel ESPINO Renate Andrea, MD    nitroglycerin (NITROSTAT) SL tablet 0.4 mg, 0.4 mg, Sublingual, Q5 Min PRN, Madisons, Abby C, APRN    potassium chloride (K-DUR,KLOR-CON) ER tablet 40 mEq, 40 mEq, Oral, Q4H, Osvaldo Dove MD, 40 mEq at 09/26/24 0557    Potassium Replacement - Follow Nurse / BPA Driven Protocol, , Does not apply, Miguel ESPINO Renate Andrea, MD    sodium chloride 0.9 % flush 10 mL, 10 mL, Intravenous, PRN, Gibson Valadez MD    sodium chloride 0.9 % flush 10 mL, 10 mL, Intravenous, Q12H, Blevens, Abby C, APRN, 10 mL at 09/25/24 2100    sodium chloride 0.9 % flush 10 mL, 10 mL, Intravenous, PRN, Blevens, Abby C, APRN    sodium chloride 0.9 % infusion 40 mL, 40 mL, Intravenous, PRN, Blevens, Abby C, APRN    sodium chloride 0.9 % infusion, 125 mL/hr, Intravenous, Continuous, Blevens, Abby C, APRN, Last Rate: 125 mL/hr at 09/26/24 0517, 125 mL/hr at 09/26/24 0517    thiamine (B-1) injection 200 mg, 200 mg, Intravenous, Q8H, 200 mg at 09/26/24 0557 **FOLLOWED BY** [START ON 9/30/2024] thiamine (VITAMIN B-1) tablet 100 mg, 100 mg, Oral, Daily, Ara Rivera MD  Review of Systems:    The following systems were reviewed and negative;  constitution and gastrointestinal    Objective     Vital Signs  Temp:  [97.7 °F (36.5 °C)-98.9 °F (37.2 °C)] 98.9 °F (37.2 °C)  Heart Rate:  [61-79] 79  Resp:  [18] 18  BP: (139-150)/() 139/88  Body mass index is 19.76  kg/m².    Intake/Output Summary (Last 24 hours) at 9/26/2024 0618  Last data filed at 9/26/2024 0200  Gross per 24 hour   Intake 480 ml   Output --   Net 480 ml     I/O this shift:  In: 480 [P.O.:480]  Out: -      Physical Exam:   General: patient awake, alert and cooperative   Eyes: Normal lids and lashes, no scleral icterus   Neck: supple, normal ROM   Skin: warm and dry, not jaundiced   Pulm:  regular and unlabored   Abdomen: soft, nontender, nondistended    Extremities: no rash or edema   Psychiatric: Normal mood and behavior; memory intact     Results Review:     I reviewed the patient's new clinical results.    Results from last 7 days   Lab Units 09/26/24  0343 09/25/24  0522 09/24/24  1134   WBC 10*3/mm3 5.38 4.63 6.32   HEMOGLOBIN g/dL 15.9 15.2 17.6   HEMATOCRIT % 46.2 46.6 50.8   PLATELETS 10*3/mm3 131* 108* 135*     Results from last 7 days   Lab Units 09/26/24  0343 09/25/24  0522 09/24/24  1134   SODIUM mmol/L 139 142 139   POTASSIUM mmol/L 3.5 3.7 2.8*   CHLORIDE mmol/L 100 105 95*   CO2 mmol/L 29.1* 28.7 26.1   BUN mg/dL 6 6 8   CREATININE mg/dL 0.77 0.75* 0.80   CALCIUM mg/dL 8.3* 7.7* 9.0   BILIRUBIN mg/dL 1.1 1.2 0.9   ALK PHOS U/L 164* 163* 202*   ALT (SGPT) U/L 161* 171* 223*   AST (SGOT) U/L 175* 244* 312*   GLUCOSE mg/dL 87 90 212*         Lab Results   Lab Value Date/Time    LIPASE 49 09/24/2024 1134    LIPASE 21 09/05/2024 1816       Radiology:  CT Abdomen Pelvis With Contrast   Final Result       1. Colonic wall appears thickened however the colon is collapsed and   this most likely represents pseudo thickening. Underlying colitis   difficult to entirely exclude.   2. Hepatic steatosis       This report was finalized on 9/24/2024 1:05 PM by Dr. Deon Sands M.D on Workstation: [id number]              Assessment & Plan     Active Hospital Problems    Diagnosis     **Intractable nausea and vomiting     Hypokalemia     Transaminitis     Hyperglycemia     Thrombocytopenia     Diarrhea      Alcohol dependence     Tobacco dependence     Essential hypertension        Assessment:  Nausea and vomiting, resolved  Morning diarrhea x 3-4 weeks  Alcohol abuse with possible withdrawal symptoms  Alcoholic hepatitis with transaminitis, improved      Plan:  He has had complete resolution of nausea vomiting and diarrhea.  Suspect that these were related to alcohol abuse and withdrawal.  H/h stable - no signs of active GIB  He seems stable from a withdrawal standpoint-not requiring any benzos at this point.  discussed alcohol cessation with him at length.  LFTs trending down-expect continued improvement if he remains abstinent from alcohol.  No further GI recommendations at this time-stable from discharge from our standpoint.  We will sign off but are available as needed    I discussed the patients findings and my recommendations with patient and nursing staff.            Rosalie Grace M.D.  [facility], [place], [facility]  [phone]

## 2024-09-26 NOTE — PLAN OF CARE
Goal Outcome Evaluation:      Pt from Obs unit. Aox4 , room air , On CiWA protpcol. Anxious. Medicated pre order . IVF continues to infuse. SR on tele, Room air. Refuses bed alarm. Ambulated x1 around nurse station. Plan of care is ongoing.

## 2024-09-27 ENCOUNTER — TRANSITIONAL CARE MANAGEMENT TELEPHONE ENCOUNTER (OUTPATIENT)
Dept: CALL CENTER | Facility: HOSPITAL | Age: 39
End: 2024-09-27
Payer: COMMERCIAL

## 2024-10-03 ENCOUNTER — TELEPHONE (OUTPATIENT)
Dept: PSYCHIATRY | Facility: CLINIC | Age: 39
End: 2024-10-03
Payer: COMMERCIAL

## 2024-10-03 DIAGNOSIS — F90.2 ATTENTION DEFICIT HYPERACTIVITY DISORDER, COMBINED TYPE: ICD-10-CM

## 2024-10-03 RX ORDER — LISDEXAMFETAMINE DIMESYLATE 30 MG/1
30 CAPSULE ORAL EVERY MORNING
Qty: 30 CAPSULE | Refills: 0 | Status: CANCELLED | OUTPATIENT
Start: 2024-10-03

## 2024-10-03 NOTE — TELEPHONE ENCOUNTER
RX from 08/26/24 has been canceled at previous pharmacy.   Patient is requesting new rx to be sent to pharmacy listed on request.

## 2024-10-03 NOTE — TELEPHONE ENCOUNTER
Patient contacted the office stated he did not  rx for vyvanse 30MG that was sent to [facility] on 08/26/24 due to they was out of stock. Patient was made aware he will need to call pharmacies in his area to see who does have that medications in stock and call the clinic back and then we will send in a new request. In the mean time I contacted the [facility] at 2:22 pm and spoke with pharmacist Deon and confirmed patient did not pick the rx up and they are currently out of stock. I have went ahead and canceled the prescription for Vyvanse 30 mg.

## 2024-10-03 NOTE — TELEPHONE ENCOUNTER
Reviewed patient's chart and [facility] #[id number].  It appears that patient went to the ER on September 26, 2024 with acute alcohol intoxication and was prescribed Librium, which is a benzodiazepine.  Patient had told this APRN that he is not consuming alcohol anymore at our initial visit.  Vyvanse will not be prescribed until patient is seen to discuss this.  Prescribing a stimulant in combination with the benzodiazepine is contraindicated.

## 2024-10-04 NOTE — TELEPHONE ENCOUNTER
I spoke with the patient this morning and made him aware of the providers message. Patient then stated he went to the ED for diarrhea and vomiting no for because he had been drinking. Patient gave verbal understanding that provider will not send in the vyvanse that he is requesting until the patient is seen to discuss with the provider. Appointment was scheduled at the patients request.

## 2024-10-19 ENCOUNTER — HOSPITAL ENCOUNTER (EMERGENCY)
Facility: HOSPITAL | Age: 39
Discharge: HOME | End: 2024-10-19
Attending: STUDENT IN AN ORGANIZED HEALTH CARE EDUCATION/TRAINING PROGRAM
Payer: COMMERCIAL

## 2024-10-19 VITALS
HEIGHT: 75 IN | WEIGHT: 185 LBS | TEMPERATURE: 96.7 F | BODY MASS INDEX: 23 KG/M2 | OXYGEN SATURATION: 94 % | HEART RATE: 89 BPM | DIASTOLIC BLOOD PRESSURE: 59 MMHG | RESPIRATION RATE: 18 BRPM | SYSTOLIC BLOOD PRESSURE: 114 MMHG

## 2024-10-19 DIAGNOSIS — F10.920 ALCOHOLIC INTOXICATION WITHOUT COMPLICATION: Primary | ICD-10-CM

## 2024-10-19 DIAGNOSIS — F10.10 ALCOHOL ABUSE: ICD-10-CM

## 2024-10-19 LAB
ALBUMIN SERPL-MCNC: 4.3 G/DL (ref 3.5–5.2)
ALBUMIN/GLOB SERPL: 1.4 G/DL
ALP SERPL-CCNC: 98 U/L (ref 39–117)
ALT SERPL W P-5'-P-CCNC: 47 U/L (ref 1–41)
AMPHET+METHAMPHET UR QL: NEGATIVE
ANION GAP SERPL CALCULATED.3IONS-SCNC: 12.9 MMOL/L (ref 5–15)
APAP SERPL-MCNC: <5 MCG/ML (ref 0–30)
AST SERPL-CCNC: 43 U/L (ref 1–40)
BARBITURATES UR QL SCN: NEGATIVE
BASOPHILS # BLD AUTO: 0.06 10*3/MM3 (ref 0–0.2)
BASOPHILS NFR BLD AUTO: 0.5 % (ref 0–1.5)
BENZODIAZ UR QL SCN: POSITIVE
BILIRUB SERPL-MCNC: 0.3 MG/DL (ref 0–1.2)
BUN SERPL-MCNC: 11 MG/DL (ref 6–20)
BUN/CREAT SERPL: 13.1 (ref 7–25)
CALCIUM SPEC-SCNC: 9.2 MG/DL (ref 8.6–10.5)
CANNABINOIDS SERPL QL: NEGATIVE
CHLORIDE SERPL-SCNC: 106 MMOL/L (ref 98–107)
CO2 SERPL-SCNC: 27.1 MMOL/L (ref 22–29)
COCAINE UR QL: NEGATIVE
CREAT SERPL-MCNC: 0.84 MG/DL (ref 0.76–1.27)
DEPRECATED RDW RBC AUTO: 56.8 FL (ref 37–54)
EGFRCR SERPLBLD CKD-EPI 2021: 113.8 ML/MIN/1.73
EOSINOPHIL # BLD AUTO: 0.08 10*3/MM3 (ref 0–0.4)
EOSINOPHIL NFR BLD AUTO: 0.7 % (ref 0.3–6.2)
ERYTHROCYTE [DISTWIDTH] IN BLOOD BY AUTOMATED COUNT: 16.3 % (ref 12.3–15.4)
ETHANOL BLD-MCNC: 324 MG/DL (ref 0–10)
ETHANOL UR QL: 0.32 %
FENTANYL UR-MCNC: NEGATIVE NG/ML
GLOBULIN UR ELPH-MCNC: 3.1 GM/DL
GLUCOSE SERPL-MCNC: 104 MG/DL (ref 65–99)
HCT VFR BLD AUTO: 51.8 % (ref 37.5–51)
HGB BLD-MCNC: 17.4 G/DL (ref 13–17.7)
HOLD SPECIMEN: NORMAL
HOLD SPECIMEN: NORMAL
IMM GRANULOCYTES # BLD AUTO: 0.02 10*3/MM3 (ref 0–0.05)
IMM GRANULOCYTES NFR BLD AUTO: 0.2 % (ref 0–0.5)
LYMPHOCYTES # BLD AUTO: 1.83 10*3/MM3 (ref 0.7–3.1)
LYMPHOCYTES NFR BLD AUTO: 15 % (ref 19.6–45.3)
MCH RBC QN AUTO: 32 PG (ref 26.6–33)
MCHC RBC AUTO-ENTMCNC: 33.6 G/DL (ref 31.5–35.7)
MCV RBC AUTO: 95.2 FL (ref 79–97)
METHADONE UR QL SCN: NEGATIVE
MONOCYTES # BLD AUTO: 0.78 10*3/MM3 (ref 0.1–0.9)
MONOCYTES NFR BLD AUTO: 6.4 % (ref 5–12)
NEUTROPHILS NFR BLD AUTO: 77.2 % (ref 42.7–76)
NEUTROPHILS NFR BLD AUTO: 9.46 10*3/MM3 (ref 1.7–7)
NRBC BLD AUTO-RTO: 0 /100 WBC (ref 0–0.2)
OPIATES UR QL: NEGATIVE
OXYCODONE UR QL SCN: NEGATIVE
PLATELET # BLD AUTO: 251 10*3/MM3 (ref 140–450)
PMV BLD AUTO: 9.3 FL (ref 6–12)
POTASSIUM SERPL-SCNC: 3.9 MMOL/L (ref 3.5–5.2)
PROT SERPL-MCNC: 7.4 G/DL (ref 6–8.5)
RBC # BLD AUTO: 5.44 10*6/MM3 (ref 4.14–5.8)
SALICYLATES SERPL-MCNC: <0.3 MG/DL
SODIUM SERPL-SCNC: 146 MMOL/L (ref 136–145)
WBC NRBC COR # BLD AUTO: 12.23 10*3/MM3 (ref 3.4–10.8)
WHOLE BLOOD HOLD COAG: NORMAL
WHOLE BLOOD HOLD SPECIMEN: NORMAL

## 2024-10-19 PROCEDURE — 80307 DRUG TEST PRSMV CHEM ANLYZR: CPT | Performed by: PHYSICIAN ASSISTANT

## 2024-10-19 PROCEDURE — 99283 EMERGENCY DEPT VISIT LOW MDM: CPT

## 2024-10-19 PROCEDURE — 85025 COMPLETE CBC W/AUTO DIFF WBC: CPT | Performed by: PHYSICIAN ASSISTANT

## 2024-10-19 PROCEDURE — 80143 DRUG ASSAY ACETAMINOPHEN: CPT | Performed by: PHYSICIAN ASSISTANT

## 2024-10-19 PROCEDURE — 25810000003 SODIUM CHLORIDE 0.9 % SOLUTION: Performed by: PHYSICIAN ASSISTANT

## 2024-10-19 PROCEDURE — 80179 DRUG ASSAY SALICYLATE: CPT | Performed by: PHYSICIAN ASSISTANT

## 2024-10-19 PROCEDURE — 80053 COMPREHEN METABOLIC PANEL: CPT | Performed by: PHYSICIAN ASSISTANT

## 2024-10-19 PROCEDURE — 82077 ASSAY SPEC XCP UR&BREATH IA: CPT | Performed by: PHYSICIAN ASSISTANT

## 2024-10-19 RX ORDER — HYDROXYZINE HYDROCHLORIDE 25 MG/1
25 TABLET, FILM COATED ORAL ONCE
Status: COMPLETED | OUTPATIENT
Start: 2024-10-19 | End: 2024-10-19

## 2024-10-19 RX ORDER — SODIUM CHLORIDE 0.9 % (FLUSH) 0.9 %
10 SYRINGE (ML) INJECTION AS NEEDED
Status: DISCONTINUED | OUTPATIENT
Start: 2024-10-19 | End: 2024-10-19 | Stop reason: HOSPADM

## 2024-10-19 RX ADMIN — HYDROXYZINE HYDROCHLORIDE 25 MG: 25 TABLET ORAL at 15:38

## 2024-10-19 RX ADMIN — SODIUM CHLORIDE 500 ML: 9 INJECTION, SOLUTION INTRAVENOUS at 10:28

## 2024-10-19 NOTE — ED PROVIDER NOTES
" EMERGENCY DEPARTMENT ENCOUNTER      Room Number:  01/01  PCP: Christiano Hreedia MD  Independent Historians: Patient  Patient Care Team:  Christiano Heredia MD as PCP - General (Internal Medicine)  Pily Cordero MD as Surgeon (Orthopedic Surgery)  Klaus Ram APRN as Nurse Practitioner (Nurse Practitioner)       HPI:  Chief Complaint: AMS, alcohol intoxication    A complete HPI/ROS/PMH/PSH/SH/FH are unobtainable due to: Altered Mental Status    Chronic or social conditions impacting patient care (Social Determinants of Health): None      Context: Alvaro Morris is a 38 y.o. male with a PMH significant for ADD, hypertension, alcoholism who presents to the ED c/o acute altered mental status related to alcohol consumption.  The patient reports that he is an alcoholic and drink almost a pint of vodka this morning.  For the past 2 weeks he has been drinking between a half pint and a pint of vodka every morning.  He wakes up nauseous and vomiting each morning and very tremulous and \"shaky\".  He drinks excessively in the morning \"just so I can function\".  He has been admitted to the hospital for alcohol cessation help in the past but upon discharge he reports that he did not have the appropriate resources in place to continue with sobriety and within 2 days of leaving the hospital he had relapsed.  No increased stressors.  He is motivated to get clean because he has 2 small children who he knows he should be sober to help with.  No suicidal or homicidal ideation.  No other illicit drug use.      Upon review of prior external notes (non-ED) -and- Review of prior external test results outside of this encounter it appears the patient was evaluated in the office with family medicine for hypertension on 8/8/2024.  The patient had a normal hepatitis panel and hemoglobin A1c on 9/20/2024.      PAST MEDICAL HISTORY  Active Ambulatory Problems     Diagnosis Date Noted    ADD (attention deficit disorder) " 06/29/2017    Essential hypertension 06/29/2017    Chronic right shoulder pain 09/12/2017    Incomplete tear of rotator cuff 11/08/2017    History of rotator cuff surgery 01/17/2018    Encounter for monitoring opioid maintenance therapy 05/03/2018    H/O shoulder surgery 08/25/2018    History of alcohol abuse 03/17/2020    Tobacco dependence 09/11/2020    Traumatic complete tear of left rotator cuff 07/15/2022    Encounter for Prevnar pneumococcal vaccination 08/08/2024    Intractable nausea and vomiting 09/24/2024    Hypokalemia 09/25/2024    Transaminitis 09/25/2024    Hyperglycemia 09/25/2024    Thrombocytopenia 09/25/2024    Diarrhea 09/25/2024    Alcohol dependence 09/25/2024     Resolved Ambulatory Problems     Diagnosis Date Noted    No Resolved Ambulatory Problems     Past Medical History:   Diagnosis Date    ADHD     Bursitis of elbow     Has never received COVID-19 vaccine     Hemorrhoids     Hypertension     Left shoulder pain     Rotator cuff tear          PAST SURGICAL HISTORY  Past Surgical History:   Procedure Laterality Date    ARM DEBRIDEMENT Left 7/27/2022    Procedure: UPPER EXTREMITY DEBRIDEMENT labrium;  Surgeon: Queta Cordero MD;  Location: [facility];  Service: Orthopedics;  Laterality: Left;    HEMORRHOIDECTOMY N/A 6/23/2016    Procedure: HEMORRHOIDECTOMY;  Surgeon: Atilio Smith MD;  Location: [facility];  Service:     SHOULDER ARTHROSCOPY W/ ROTATOR CUFF REPAIR Right 12/4/2017    Procedure: SHOULDER ARTHROSCOPY WITH MINI OPEN ROTATOR CUFF REPAIR  DEBRIDEMENT OF LABRUM DEBRIDEMENT OF SUBSCAPULARIS DECOMPRESSION;  Surgeon: Pily Cordero MD;  Location: [facility];  Service:     SHOULDER ARTHROSCOPY W/ ROTATOR CUFF REPAIR Left 7/27/2022    Procedure: SHOULDER ARTHROSCOPY WITH ROTATOR CUFF REPAIR;  Surgeon: Queta Cordero MD;  Location: [facility];  Service: Orthopedics;  Laterality: Left;         FAMILY HISTORY  Family History   Problem Relation Age of Onset     Malig Hyperthermia Neg Hx          SOCIAL HISTORY  Social History     Socioeconomic History    Marital status:    Tobacco Use    Smoking status: Every Day     Current packs/day: 0.25     Average packs/day: 0.3 packs/day for 10.0 years (2.5 ttl pk-yrs)     Types: Cigarettes     Passive exposure: Current    Smokeless tobacco: Former   Vaping Use    Vaping status: Former    Substances: Nicotine    Devices: Disposable   Substance and Sexual Activity    Alcohol use: Yes     Alcohol/week: 25.0 standard drinks of alcohol     Types: 25 Shots of liquor per week     Comment: 4-5 drinnks per day    Drug use: No    Sexual activity: Defer         ALLERGIES  Patient has no known allergies.      REVIEW OF SYSTEMS  Included in HPI  All systems reviewed and negative except for those discussed in HPI.      PHYSICAL EXAM    I have reviewed the triage vital signs and nursing notes.    ED Triage Vitals [10/19/24 0955]   Temp Heart Rate Resp BP SpO2   96.7 °F (35.9 °C) (!) 126 18 -- 98 %      Temp src Heart Rate Source Patient Position BP Location FiO2 (%)   Tympanic Monitor -- -- --       Physical Exam  Constitutional:       General: He is not in acute distress.     Appearance: He is well-developed.   HENT:      Head: Normocephalic and atraumatic.   Eyes:      General: No scleral icterus.     Conjunctiva/sclera: Conjunctivae normal.   Neck:      Trachea: No tracheal deviation.   Cardiovascular:      Rate and Rhythm: Normal rate and regular rhythm.   Pulmonary:      Effort: Pulmonary effort is normal.      Breath sounds: Normal breath sounds.   Abdominal:      Palpations: Abdomen is soft.      Tenderness: There is no abdominal tenderness. There is no guarding.   Musculoskeletal:         General: No deformity.      Cervical back: Normal range of motion.   Lymphadenopathy:      Cervical: No cervical adenopathy.   Skin:     General: Skin is warm and dry.   Neurological:      Mental Status: He is alert and oriented to person, place,  and time.      Sensory: Sensation is intact.      Motor: Motor function is intact.   Psychiatric:         Speech: Speech is slurred and tangential.         Behavior: Behavior normal.         Thought Content: Thought content does not include homicidal or suicidal ideation.         Vital signs and nursing notes reviewed.      PPE: I wore a surgical mask throughout my encounters with the pt. I performed hand hygiene on entry into the pt room and upon exit.     LAB RESULTS  Recent Results (from the past 24 hours)   Comprehensive Metabolic Panel    Collection Time: 10/19/24 10:24 AM    Specimen: Blood   Result Value Ref Range    Glucose 104 (H) 65 - 99 mg/dL    BUN 11 6 - 20 mg/dL    Creatinine 0.84 0.76 - 1.27 mg/dL    Sodium 146 (H) 136 - 145 mmol/L    Potassium 3.9 3.5 - 5.2 mmol/L    Chloride 106 98 - 107 mmol/L    CO2 27.1 22.0 - 29.0 mmol/L    Calcium 9.2 8.6 - 10.5 mg/dL    Total Protein 7.4 6.0 - 8.5 g/dL    Albumin 4.3 3.5 - 5.2 g/dL    ALT (SGPT) 47 (H) 1 - 41 U/L    AST (SGOT) 43 (H) 1 - 40 U/L    Alkaline Phosphatase 98 39 - 117 U/L    Total Bilirubin 0.3 0.0 - 1.2 mg/dL    Globulin 3.1 gm/dL    A/G Ratio 1.4 g/dL    BUN/Creatinine Ratio 13.1 7.0 - 25.0    Anion Gap 12.9 5.0 - 15.0 mmol/L    eGFR 113.8 >60.0 mL/min/1.73   Acetaminophen Level    Collection Time: 10/19/24 10:24 AM    Specimen: Blood   Result Value Ref Range    Acetaminophen <5.0 0.0 - 30.0 mcg/mL   Ethanol    Collection Time: 10/19/24 10:24 AM    Specimen: Blood   Result Value Ref Range    Ethanol 324 (H) 0 - 10 mg/dL    Ethanol % 0.324 %   Salicylate Level    Collection Time: 10/19/24 10:24 AM    Specimen: Blood   Result Value Ref Range    Salicylate <0.3 <=30.0 mg/dL   Urine Drug Screen - Urine, Clean Catch    Collection Time: 10/19/24 10:24 AM    Specimen: Urine, Clean Catch   Result Value Ref Range    Amphet/Methamphet, Screen Negative Negative    Barbiturates Screen, Urine Negative Negative    Benzodiazepine Screen, Urine Positive (A)  Negative    Cocaine Screen, Urine Negative Negative    Opiate Screen Negative Negative    THC, Screen, Urine Negative Negative    Methadone Screen, Urine Negative Negative    Oxycodone Screen, Urine Negative Negative    Fentanyl, Urine Negative Negative   Green Top (Gel)    Collection Time: 10/19/24 10:24 AM   Result Value Ref Range    Extra Tube Hold for add-ons.    Lavender Top    Collection Time: 10/19/24 10:24 AM   Result Value Ref Range    Extra Tube hold for add-on    Gold Top - SST    Collection Time: 10/19/24 10:24 AM   Result Value Ref Range    Extra Tube Hold for add-ons.    Light Blue Top    Collection Time: 10/19/24 10:24 AM   Result Value Ref Range    Extra Tube Hold for add-ons.    CBC Auto Differential    Collection Time: 10/19/24 10:24 AM    Specimen: Blood   Result Value Ref Range    WBC 12.23 (H) 3.40 - 10.80 10*3/mm3    RBC 5.44 4.14 - 5.80 10*6/mm3    Hemoglobin 17.4 13.0 - 17.7 g/dL    Hematocrit 51.8 (H) 37.5 - 51.0 %    MCV 95.2 79.0 - 97.0 fL    MCH 32.0 26.6 - 33.0 pg    MCHC 33.6 31.5 - 35.7 g/dL    RDW 16.3 (H) 12.3 - 15.4 %    RDW-SD 56.8 (H) 37.0 - 54.0 fl    MPV 9.3 6.0 - 12.0 fL    Platelets 251 140 - 450 10*3/mm3    Neutrophil % 77.2 (H) 42.7 - 76.0 %    Lymphocyte % 15.0 (L) 19.6 - 45.3 %    Monocyte % 6.4 5.0 - 12.0 %    Eosinophil % 0.7 0.3 - 6.2 %    Basophil % 0.5 0.0 - 1.5 %    Immature Grans % 0.2 0.0 - 0.5 %    Neutrophils, Absolute 9.46 (H) 1.70 - 7.00 10*3/mm3    Lymphocytes, Absolute 1.83 0.70 - 3.10 10*3/mm3    Monocytes, Absolute 0.78 0.10 - 0.90 10*3/mm3    Eosinophils, Absolute 0.08 0.00 - 0.40 10*3/mm3    Basophils, Absolute 0.06 0.00 - 0.20 10*3/mm3    Immature Grans, Absolute 0.02 0.00 - 0.05 10*3/mm3    nRBC 0.0 0.0 - 0.2 /100 WBC         RADIOLOGY  No Radiology Exams Resulted Within Past 24 Hours      MEDICATIONS GIVEN IN ER  Medications   sodium chloride 0.9 % bolus 500 mL (0 mL Intravenous Stopped 10/19/24 8598)   hydrOXYzine (ATARAX) tablet 25 mg (25 mg Oral  Given 10/19/24 1538)         ORDERS PLACED DURING THIS VISIT:  Orders Placed This Encounter   Procedures    [place] Draw    Comprehensive Metabolic Panel    Acetaminophen Level    Ethanol    Salicylate Level    Urine Drug Screen - Urine, Clean Catch    CBC Auto Differential    Vital Signs    Continuous Pulse Oximetry    POC Glucose Once    CBC & Differential    Green Top (Gel)    Lavender Top    Gold Top - SST    Light Blue Top         OUTPATIENT MEDICATION MANAGEMENT:  No current Epic-ordered facility-administered medications on file.     Current Outpatient Medications Ordered in Epic   Medication Sig Dispense Refill    acetaminophen (TYLENOL) 500 MG tablet Take 2 tablets by mouth 2 (Two) Times a Day. 40 tablet 0    folic acid (FOLVITE) 1 MG tablet Take 1 tablet by mouth Daily for 30 days. 30 tablet 0    lisdexamfetamine (Vyvanse) 30 MG capsule Take 1 capsule by mouth Every Morning 30 capsule 0    multivitamin with minerals tablet tablet Take 1 tablet by mouth Daily. PT HOLDING FOR SURGERY      ondansetron (ZOFRAN) 8 MG tablet Take 1 tablet by mouth Every 8 (Eight) Hours As Needed for Nausea. 9 tablet 0    thiamine (VITAMIN B1) 100 MG tablet Take 1 tablet by mouth Daily for 30 days. 30 tablet 0              PROGRESS, DATA ANALYSIS, CONSULTS, AND MEDICAL DECISION MAKING  All labs have been independently interpreted by me.  All radiology studies have been reviewed by me. All EKG's have been independently viewed and interpreted by me.  Discussion below represents my analysis of pertinent findings related to patient's condition, differential diagnosis, treatment plan and final disposition.      DIFFERENTIAL DIAGNOSIS INCLUDE BUT NOT LIMITED TO:     Alcohol intoxication, depression, alcohol withdrawal    Clinical Scores: N/A      ED Course as of 10/20/24 0551   Sat Oct 19, 2024   1037 WBC(!): 12.23 [DC]   1101 Ethanol(!): 324 [DC]   1101 AST (SGOT)(!): 43 [DC]   1101 ALT (SGPT)(!): 47 [DC]   1130 Benzodiazepine Screen,  Urine(!): Positive [DC]   1706 Patient reassessed, significantly improved.  Speech is clear.  Patient states he does not want to be admitted.  States his main withdrawal symptom is nausea and vomiting in the morning. [DN]   1706 Patient is clinically sober, ambulating without difficulty.  He will take an Uber home to stay with his mother-in-law. [DN]      ED Course User Index  [DC] Seferino Terry PA  [DN] Jake Shen MD            AS OF 05:51 EDT VITALS:    BP - 114/59  HR - 89  TEMP - 96.7 °F (35.9 °C) (Tympanic)  O2 SATS - 94%    COMPLEXITY OF CARE  Admission was considered but after careful review of the patient's presentation, physical examination, diagnostic results, and response to treatment the patient may be safely discharged with outpatient follow-up.      DIAGNOSIS  Final diagnoses:   Alcohol abuse   Alcoholic intoxication without complication         DISPOSITION  ED Disposition       ED Disposition   Discharge    Condition   Stable    Comment   --                Please note that portions of this document were completed with a voice recognition program.    Note Disclaimer: At [facility], we believe that sharing information builds trust and better relationships. You are receiving this note because you recently visited [facility]. It is possible you will see health information before a provider has talked with you about it. This kind of information can be easy to misunderstand. To help you fully understand what it means for your health, we urge you to discuss this note with your provider.         Seferino Terry PA  10/20/24 5307

## 2024-10-19 NOTE — ED PROVIDER NOTES
MD ATTESTATION NOTE    The MAINOR and I have discussed this patient's history, physical exam, MDM, and treatment plan.  I have reviewed the documentation and personally had a face to face interaction with the patient. I affirm the documentation and agree with the treatment and plan.  The attached note describes my personal findings.      I provided a substantive portion of the medical decision making.        Brief HPI: 38-year-old male, history of alcohol abuse, presents to the ED for evaluation of alcohol intoxication and requesting medical detox.  Patient has outpatient support set up for after his detox.  Reports he rapidly goes into alcohol withdrawal.  Denies any pain, nausea, vomiting, fever, chills, or other complaints at this time.    PHYSICAL EXAM  ED Triage Vitals   Temp Heart Rate Resp BP SpO2   10/19/24 0955 10/19/24 0955 10/19/24 0955 10/19/24 1025 10/19/24 0955   96.7 °F (35.9 °C) (!) 126 18 144/84 98 %      Temp src Heart Rate Source Patient Position BP Location FiO2 (%)   10/19/24 0955 10/19/24 0955 -- -- --   Tympanic Monitor            GENERAL: Slurred speech, mild intoxication, no acute distress  HENT: nares patent  EYES: no scleral icterus  CV: regular rhythm, normal rate  RESPIRATORY: normal effort  ABDOMEN: soft  MUSCULOSKELETAL: no deformity  NEURO: alert, moves all extremities, follows commands  PSYCH:  calm, cooperative  SKIN: warm, dry    Vital signs and nursing notes reviewed.        Medical Decision Making:  ED Course as of 10/19/24 1707   Sat Oct 19, 2024   1037 WBC(!): 12.23 [DC]   1101 Ethanol(!): 324 [DC]   1101 AST (SGOT)(!): 43 [DC]   1101 ALT (SGPT)(!): 47 [DC]   1130 Benzodiazepine Screen, Urine(!): Positive [DC]   1706 Patient reassessed, significantly improved.  Speech is clear.  Patient states he does not want to be admitted.  States his main withdrawal symptom is nausea and vomiting in the morning. [DN]   1706 Patient is clinically sober, ambulating without difficulty.  He will  take an Uber home to stay with his mother-in-law. [DN]      ED Course User Index  [DC] Seferino Terry, PA  [DN] Jake Shen MD Nichols, Dylan J, MD  10/19/24 1214       Jake Shen MD  10/19/24 1709       Jake Shen MD  10/19/24 9951

## 2024-10-23 ENCOUNTER — HOSPITAL ENCOUNTER (EMERGENCY)
Facility: HOSPITAL | Age: 39
Discharge: HOME | End: 2024-10-23
Attending: EMERGENCY MEDICINE | Admitting: EMERGENCY MEDICINE
Payer: COMMERCIAL

## 2024-10-23 VITALS
BODY MASS INDEX: 23.1 KG/M2 | SYSTOLIC BLOOD PRESSURE: 150 MMHG | RESPIRATION RATE: 20 BRPM | TEMPERATURE: 96.5 F | HEART RATE: 104 BPM | HEIGHT: 74 IN | WEIGHT: 180 LBS | DIASTOLIC BLOOD PRESSURE: 97 MMHG | OXYGEN SATURATION: 93 %

## 2024-10-23 DIAGNOSIS — F10.230 ALCOHOL DEPENDENCE WITH UNCOMPLICATED WITHDRAWAL: Primary | ICD-10-CM

## 2024-10-23 LAB
ALBUMIN SERPL-MCNC: 4.1 G/DL (ref 3.5–5.2)
ALBUMIN/GLOB SERPL: 1.3 G/DL
ALP SERPL-CCNC: 97 U/L (ref 39–117)
ALT SERPL W P-5'-P-CCNC: 52 U/L (ref 1–41)
AMPHET+METHAMPHET UR QL: NEGATIVE
ANION GAP SERPL CALCULATED.3IONS-SCNC: 12.8 MMOL/L (ref 5–15)
AST SERPL-CCNC: 66 U/L (ref 1–40)
BARBITURATES UR QL SCN: NEGATIVE
BASOPHILS # BLD AUTO: 0.04 10*3/MM3 (ref 0–0.2)
BASOPHILS NFR BLD AUTO: 0.7 % (ref 0–1.5)
BENZODIAZ UR QL SCN: POSITIVE
BILIRUB SERPL-MCNC: 0.4 MG/DL (ref 0–1.2)
BUN SERPL-MCNC: 6 MG/DL (ref 6–20)
BUN/CREAT SERPL: 9.4 (ref 7–25)
CALCIUM SPEC-SCNC: 8.7 MG/DL (ref 8.6–10.5)
CANNABINOIDS SERPL QL: NEGATIVE
CHLORIDE SERPL-SCNC: 102 MMOL/L (ref 98–107)
CO2 SERPL-SCNC: 27.2 MMOL/L (ref 22–29)
COCAINE UR QL: NEGATIVE
CREAT SERPL-MCNC: 0.64 MG/DL (ref 0.76–1.27)
DEPRECATED RDW RBC AUTO: 55.9 FL (ref 37–54)
EGFRCR SERPLBLD CKD-EPI 2021: 123.5 ML/MIN/1.73
EOSINOPHIL # BLD AUTO: 0.13 10*3/MM3 (ref 0–0.4)
EOSINOPHIL NFR BLD AUTO: 2.2 % (ref 0.3–6.2)
ERYTHROCYTE [DISTWIDTH] IN BLOOD BY AUTOMATED COUNT: 16 % (ref 12.3–15.4)
ETHANOL BLD-MCNC: 25 MG/DL (ref 0–10)
ETHANOL UR QL: 0.03 %
FENTANYL UR-MCNC: NEGATIVE NG/ML
GLOBULIN UR ELPH-MCNC: 3.1 GM/DL
GLUCOSE SERPL-MCNC: 95 MG/DL (ref 65–99)
HCT VFR BLD AUTO: 48.7 % (ref 37.5–51)
HGB BLD-MCNC: 16.1 G/DL (ref 13–17.7)
IMM GRANULOCYTES # BLD AUTO: 0.01 10*3/MM3 (ref 0–0.05)
IMM GRANULOCYTES NFR BLD AUTO: 0.2 % (ref 0–0.5)
LIPASE SERPL-CCNC: 34 U/L (ref 13–60)
LYMPHOCYTES # BLD AUTO: 1.85 10*3/MM3 (ref 0.7–3.1)
LYMPHOCYTES NFR BLD AUTO: 30.9 % (ref 19.6–45.3)
MCH RBC QN AUTO: 31.3 PG (ref 26.6–33)
MCHC RBC AUTO-ENTMCNC: 33.1 G/DL (ref 31.5–35.7)
MCV RBC AUTO: 94.6 FL (ref 79–97)
METHADONE UR QL SCN: NEGATIVE
MONOCYTES # BLD AUTO: 0.43 10*3/MM3 (ref 0.1–0.9)
MONOCYTES NFR BLD AUTO: 7.2 % (ref 5–12)
NEUTROPHILS NFR BLD AUTO: 3.52 10*3/MM3 (ref 1.7–7)
NEUTROPHILS NFR BLD AUTO: 58.8 % (ref 42.7–76)
NRBC BLD AUTO-RTO: 0 /100 WBC (ref 0–0.2)
OPIATES UR QL: NEGATIVE
OXYCODONE UR QL SCN: NEGATIVE
PLATELET # BLD AUTO: 204 10*3/MM3 (ref 140–450)
PMV BLD AUTO: 9.5 FL (ref 6–12)
POTASSIUM SERPL-SCNC: 3.7 MMOL/L (ref 3.5–5.2)
PROT SERPL-MCNC: 7.2 G/DL (ref 6–8.5)
RBC # BLD AUTO: 5.15 10*6/MM3 (ref 4.14–5.8)
SODIUM SERPL-SCNC: 142 MMOL/L (ref 136–145)
WBC NRBC COR # BLD AUTO: 5.98 10*3/MM3 (ref 3.4–10.8)

## 2024-10-23 PROCEDURE — 80053 COMPREHEN METABOLIC PANEL: CPT | Performed by: EMERGENCY MEDICINE

## 2024-10-23 PROCEDURE — 25010000002 LORAZEPAM PER 2 MG: Performed by: EMERGENCY MEDICINE

## 2024-10-23 PROCEDURE — 90791 PSYCH DIAGNOSTIC EVALUATION: CPT

## 2024-10-23 PROCEDURE — 85025 COMPLETE CBC W/AUTO DIFF WBC: CPT | Performed by: EMERGENCY MEDICINE

## 2024-10-23 PROCEDURE — 80307 DRUG TEST PRSMV CHEM ANLYZR: CPT | Performed by: EMERGENCY MEDICINE

## 2024-10-23 PROCEDURE — 96374 THER/PROPH/DIAG INJ IV PUSH: CPT

## 2024-10-23 PROCEDURE — 25010000002 THIAMINE HCL 200 MG/2ML SOLUTION: Performed by: EMERGENCY MEDICINE

## 2024-10-23 PROCEDURE — 82077 ASSAY SPEC XCP UR&BREATH IA: CPT | Performed by: EMERGENCY MEDICINE

## 2024-10-23 PROCEDURE — 96375 TX/PRO/DX INJ NEW DRUG ADDON: CPT

## 2024-10-23 PROCEDURE — 25010000002 ONDANSETRON PER 1 MG: Performed by: EMERGENCY MEDICINE

## 2024-10-23 PROCEDURE — 99283 EMERGENCY DEPT VISIT LOW MDM: CPT

## 2024-10-23 PROCEDURE — 83690 ASSAY OF LIPASE: CPT | Performed by: EMERGENCY MEDICINE

## 2024-10-23 RX ORDER — LORAZEPAM 2 MG/ML
2 INJECTION INTRAMUSCULAR ONCE
Status: COMPLETED | OUTPATIENT
Start: 2024-10-23 | End: 2024-10-23

## 2024-10-23 RX ORDER — THIAMINE HYDROCHLORIDE 100 MG/ML
100 INJECTION, SOLUTION INTRAMUSCULAR; INTRAVENOUS ONCE
Status: COMPLETED | OUTPATIENT
Start: 2024-10-23 | End: 2024-10-23

## 2024-10-23 RX ORDER — CHLORDIAZEPOXIDE HYDROCHLORIDE 25 MG/1
25 CAPSULE, GELATIN COATED ORAL ONCE
Status: COMPLETED | OUTPATIENT
Start: 2024-10-23 | End: 2024-10-23

## 2024-10-23 RX ORDER — SODIUM CHLORIDE 0.9 % (FLUSH) 0.9 %
10 SYRINGE (ML) INJECTION AS NEEDED
Status: DISCONTINUED | OUTPATIENT
Start: 2024-10-23 | End: 2024-10-23 | Stop reason: HOSPADM

## 2024-10-23 RX ORDER — ONDANSETRON 2 MG/ML
4 INJECTION INTRAMUSCULAR; INTRAVENOUS ONCE
Status: COMPLETED | OUTPATIENT
Start: 2024-10-23 | End: 2024-10-23

## 2024-10-23 RX ORDER — HYDROXYZINE HYDROCHLORIDE 25 MG/1
25 TABLET, FILM COATED ORAL ONCE
Status: COMPLETED | OUTPATIENT
Start: 2024-10-23 | End: 2024-10-23

## 2024-10-23 RX ADMIN — CHLORDIAZEPOXIDE HYDROCHLORIDE 25 MG: 25 CAPSULE ORAL at 09:30

## 2024-10-23 RX ADMIN — LORAZEPAM 2 MG: 2 INJECTION INTRAMUSCULAR; INTRAVENOUS at 08:32

## 2024-10-23 RX ADMIN — ONDANSETRON 4 MG: 2 INJECTION, SOLUTION INTRAMUSCULAR; INTRAVENOUS at 08:32

## 2024-10-23 RX ADMIN — THIAMINE HYDROCHLORIDE 100 MG: 100 INJECTION, SOLUTION INTRAMUSCULAR; INTRAVENOUS at 09:30

## 2024-10-23 RX ADMIN — HYDROXYZINE HYDROCHLORIDE 25 MG: 25 TABLET ORAL at 13:16

## 2024-10-23 NOTE — ED PROVIDER NOTES
EMERGENCY DEPARTMENT ENCOUNTER  Room Number:  09/09  PCP: Christiano Heredia MD  Independent Historians: Patient      HPI:  Chief Complaint: had concerns including Withdrawal.     A complete HPI/ROS/PMH/PSH/SH/FH are unobtainable due to: Nothing      Context: Alvaro Morris is a 38 y.o. male with a medical history of alcohol abuse, hypertension, who presents to the ED c/o acute alcohol withdrawal.  Patient states that he had been sober for 2 years while he was in [facility].  He was released from [facility] 2 months ago and began drinking again.  His last drink was yesterday.  He now presents in alcohol withdrawal and requesting assistance with alcohol detoxification.  He is interested in going to the [facility] because it is close to his home, however he called them and they told him he may need to be seen in the hospital first for medical detox.  He has had nausea and vomiting.  He denies abdominal pain.  He denies history of alcohol withdrawal seizure or hallucinations.  He was previously going to [employer] meetings but he has not gone to them recently.  He denies other drug use.      Review of prior external notes (non-ED) -and- Review of prior external test results outside of this encounter: Patient was seen here in the emergency department 4 days ago with complaint of alcohol withdrawal.  He received IV fluids and hydroxyzine and ultimately declined admission.        PAST MEDICAL HISTORY  Active Ambulatory Problems     Diagnosis Date Noted    ADD (attention deficit disorder) 06/29/2017    Essential hypertension 06/29/2017    Chronic right shoulder pain 09/12/2017    Incomplete tear of rotator cuff 11/08/2017    History of rotator cuff surgery 01/17/2018    Encounter for monitoring opioid maintenance therapy 05/03/2018    H/O shoulder surgery 08/25/2018    History of alcohol abuse 03/17/2020    Tobacco dependence 09/11/2020    Traumatic complete tear of left rotator cuff 07/15/2022    Encounter for Prevnar pneumococcal  vaccination 08/08/2024    Intractable nausea and vomiting 09/24/2024    Hypokalemia 09/25/2024    Transaminitis 09/25/2024    Hyperglycemia 09/25/2024    Thrombocytopenia 09/25/2024    Diarrhea 09/25/2024    Alcohol dependence 09/25/2024     Resolved Ambulatory Problems     Diagnosis Date Noted    No Resolved Ambulatory Problems     Past Medical History:   Diagnosis Date    ADHD     Bursitis of elbow     Has never received COVID-19 vaccine     Hemorrhoids     Hypertension     Left shoulder pain     Rotator cuff tear          PAST SURGICAL HISTORY  Past Surgical History:   Procedure Laterality Date    ARM DEBRIDEMENT Left 7/27/2022    Procedure: UPPER EXTREMITY DEBRIDEMENT labrium;  Surgeon: Queta Cordero MD;  Location: [facility];  Service: Orthopedics;  Laterality: Left;    HEMORRHOIDECTOMY N/A 6/23/2016    Procedure: HEMORRHOIDECTOMY;  Surgeon: Atilio Smith MD;  Location: [facility];  Service:     SHOULDER ARTHROSCOPY W/ ROTATOR CUFF REPAIR Right 12/4/2017    Procedure: SHOULDER ARTHROSCOPY WITH MINI OPEN ROTATOR CUFF REPAIR  DEBRIDEMENT OF LABRUM DEBRIDEMENT OF SUBSCAPULARIS DECOMPRESSION;  Surgeon: Pily Cordero MD;  Location: [facility];  Service:     SHOULDER ARTHROSCOPY W/ ROTATOR CUFF REPAIR Left 7/27/2022    Procedure: SHOULDER ARTHROSCOPY WITH ROTATOR CUFF REPAIR;  Surgeon: Queta Cordero MD;  Location: [facility];  Service: Orthopedics;  Laterality: Left;         FAMILY HISTORY  Family History   Problem Relation Age of Onset    Malig Hyperthermia Neg Hx          SOCIAL HISTORY  Social History     Socioeconomic History    Marital status:    Tobacco Use    Smoking status: Some Days     Current packs/day: 0.25     Average packs/day: 0.3 packs/day for 10.0 years (2.5 ttl pk-yrs)     Types: Cigarettes     Passive exposure: Current    Smokeless tobacco: Former   Vaping Use    Vaping status: Former    Substances: Nicotine    Devices: Disposable   Substance and Sexual  "Activity    Alcohol use: Yes     Alcohol/week: 25.0 standard drinks of alcohol     Types: 25 Shots of liquor per week     Comment: patient reports drinking \"anything. fireball, beer, whiskey\" 10/23/24    Drug use: No    Sexual activity: Defer         ALLERGIES  Patient has no known allergies.      REVIEW OF SYSTEMS  Review of all 14 systems is negative other than stated in the HPI above.      PHYSICAL EXAM    I have reviewed the triage vital signs and nursing notes.    ED Triage Vitals   Temp Heart Rate Resp BP SpO2   10/23/24 0809 10/23/24 0809 10/23/24 0809 10/23/24 0810 10/23/24 0809   96.5 °F (35.8 °C) (!) 122 19 162/92 99 %      Temp src Heart Rate Source Patient Position BP Location FiO2 (%)   10/23/24 0809 10/23/24 0809 10/23/24 0815 10/23/24 0815 --   Tympanic Monitor Lying Right arm          GENERAL: awake and alert, appears mildly anxious, no acute distress  HENT: Normocephalic, atraumatic  EYES: no scleral icterus  CV: regular rhythm, regular rate  RESPIRATORY: normal effort  ABDOMEN: soft, nondistended, nontender throughout  MUSCULOSKELETAL: no deformity  NEURO: alert, moves all extremities, follows commands, tremulous, speech fluent and clear  PSYCH: calm, cooperative  SKIN: Warm, dry          LAB RESULTS  Recent Results (from the past 24 hours)   Comprehensive Metabolic Panel    Collection Time: 10/23/24  8:32 AM    Specimen: Blood   Result Value Ref Range    Glucose 95 65 - 99 mg/dL    BUN 6 6 - 20 mg/dL    Creatinine 0.64 (L) 0.76 - 1.27 mg/dL    Sodium 142 136 - 145 mmol/L    Potassium 3.7 3.5 - 5.2 mmol/L    Chloride 102 98 - 107 mmol/L    CO2 27.2 22.0 - 29.0 mmol/L    Calcium 8.7 8.6 - 10.5 mg/dL    Total Protein 7.2 6.0 - 8.5 g/dL    Albumin 4.1 3.5 - 5.2 g/dL    ALT (SGPT) 52 (H) 1 - 41 U/L    AST (SGOT) 66 (H) 1 - 40 U/L    Alkaline Phosphatase 97 39 - 117 U/L    Total Bilirubin 0.4 0.0 - 1.2 mg/dL    Globulin 3.1 gm/dL    A/G Ratio 1.3 g/dL    BUN/Creatinine Ratio 9.4 7.0 - 25.0    Anion Gap " 12.8 5.0 - 15.0 mmol/L    eGFR 123.5 >60.0 mL/min/1.73   Lipase    Collection Time: 10/23/24  8:32 AM    Specimen: Blood   Result Value Ref Range    Lipase 34 13 - 60 U/L   Ethanol    Collection Time: 10/23/24  8:32 AM    Specimen: Blood   Result Value Ref Range    Ethanol 25 (H) 0 - 10 mg/dL    Ethanol % 0.025 %   CBC Auto Differential    Collection Time: 10/23/24  8:32 AM    Specimen: Blood   Result Value Ref Range    WBC 5.98 3.40 - 10.80 10*3/mm3    RBC 5.15 4.14 - 5.80 10*6/mm3    Hemoglobin 16.1 13.0 - 17.7 g/dL    Hematocrit 48.7 37.5 - 51.0 %    MCV 94.6 79.0 - 97.0 fL    MCH 31.3 26.6 - 33.0 pg    MCHC 33.1 31.5 - 35.7 g/dL    RDW 16.0 (H) 12.3 - 15.4 %    RDW-SD 55.9 (H) 37.0 - 54.0 fl    MPV 9.5 6.0 - 12.0 fL    Platelets 204 140 - 450 10*3/mm3    Neutrophil % 58.8 42.7 - 76.0 %    Lymphocyte % 30.9 19.6 - 45.3 %    Monocyte % 7.2 5.0 - 12.0 %    Eosinophil % 2.2 0.3 - 6.2 %    Basophil % 0.7 0.0 - 1.5 %    Immature Grans % 0.2 0.0 - 0.5 %    Neutrophils, Absolute 3.52 1.70 - 7.00 10*3/mm3    Lymphocytes, Absolute 1.85 0.70 - 3.10 10*3/mm3    Monocytes, Absolute 0.43 0.10 - 0.90 10*3/mm3    Eosinophils, Absolute 0.13 0.00 - 0.40 10*3/mm3    Basophils, Absolute 0.04 0.00 - 0.20 10*3/mm3    Immature Grans, Absolute 0.01 0.00 - 0.05 10*3/mm3    nRBC 0.0 0.0 - 0.2 /100 WBC   Urine Drug Screen - Urine, Clean Catch    Collection Time: 10/23/24 11:42 AM    Specimen: Urine, Clean Catch   Result Value Ref Range    Amphet/Methamphet, Screen Negative Negative    Barbiturates Screen, Urine Negative Negative    Benzodiazepine Screen, Urine Positive (A) Negative    Cocaine Screen, Urine Negative Negative    Opiate Screen Negative Negative    THC, Screen, Urine Negative Negative    Methadone Screen, Urine Negative Negative    Oxycodone Screen, Urine Negative Negative    Fentanyl, Urine Negative Negative       The above labs were ordered by me and independently reviewed by me.     RADIOLOGY  No Radiology Exams Resulted  Within Past 24 Hours    The above radiology studies were ordered by me.  See ED course for independent interpretations.     MEDICATIONS GIVEN IN ER  Medications   sodium chloride 0.9 % flush 10 mL (has no administration in time range)   LORazepam (ATIVAN) injection 2 mg (2 mg Intravenous Given 10/23/24 0832)   ondansetron (ZOFRAN) injection 4 mg (4 mg Intravenous Given 10/23/24 0832)   thiamine (B-1) injection 100 mg (100 mg Intravenous Given 10/23/24 0930)   chlordiazePOXIDE (LIBRIUM) capsule 25 mg (25 mg Oral Given 10/23/24 0930)         ORDERS PLACED DURING THIS VISIT:  Orders Placed This Encounter   Procedures    Comprehensive Metabolic Panel    Lipase    Ethanol    Urine Drug Screen - Urine, Clean Catch    CBC Auto Differential    Continuous Pulse Oximetry    Monitor Blood Pressure    Psych / [facility]    Insert Peripheral IV    CBC & Differential         OUTPATIENT MEDICATION MANAGEMENT:  Current Facility-Administered Medications Ordered in Epic   Medication Dose Route Frequency Provider Last Rate Last Admin    sodium chloride 0.9 % flush 10 mL  10 mL Intravenous PRN Judson Whitmore MD         Current Outpatient Medications Ordered in Epic   Medication Sig Dispense Refill    acetaminophen (TYLENOL) 500 MG tablet Take 2 tablets by mouth 2 (Two) Times a Day. 40 tablet 0    folic acid (FOLVITE) 1 MG tablet Take 1 tablet by mouth Daily for 30 days. 30 tablet 0    lisdexamfetamine (Vyvanse) 30 MG capsule Take 1 capsule by mouth Every Morning 30 capsule 0    multivitamin with minerals tablet tablet Take 1 tablet by mouth Daily. PT HOLDING FOR SURGERY      ondansetron (ZOFRAN) 8 MG tablet Take 1 tablet by mouth Every 8 (Eight) Hours As Needed for Nausea. 9 tablet 0    thiamine (VITAMIN B1) 100 MG tablet Take 1 tablet by mouth Daily for 30 days. 30 tablet 0         PROCEDURES  Procedures            PROGRESS, DATA ANALYSIS, CONSULTS, AND MEDICAL DECISION MAKING  All labs have been independently interpreted  by me.  All radiology studies have been reviewed by me. All EKG's have been independently viewed and interpreted by me.  Discussion below represents my analysis of pertinent findings related to patient's condition, differential diagnosis, treatment plan and final disposition.    Differential diagnosis includes but is not limited to:  Alcohol withdrawal  Acute pancreatitis  Alcoholic hepatitis  Alcoholic gastritis  Dehydration  Hypokalemia      Clinical Scores:                  ED Course as of 10/23/24 1250   Wed Oct 23, 2024   1247 Access evaluated patient.  Patient would like to proceed with alcohol rehabilitation at the Center.  Access advised that patient's wife can take him there directly to be evaluated.  He is medically cleared for further evaluation and treatment of his alcohol withdrawal symptoms there.  He does not have a history of alcohol withdrawal seizure or delirium tremens and does not meet criteria for hospitalization at this time. [JR]      ED Course User Index  [JR] Judson Whitmore MD             AS OF 12:50 EDT VITALS:    BP - 128/73  HR - 102  TEMP - 96.5 °F (35.8 °C) (Tympanic)  O2 SATS - 94%    COMPLEXITY OF CARE  Admission was considered but after careful review of the patient's presentation, physical examination, diagnostic results, and response to treatment the patient may be safely discharged with outpatient follow-up.      Chronic or social conditions impacting patient care (Social Determinants of Health):     DIAGNOSIS  Final diagnoses:   Alcohol dependence with uncomplicated withdrawal           DISPOSITION  DISCHARGE    Patient discharged in stable condition.    Reviewed implications of results, diagnosis, meds, responsibility to follow up, warning signs and symptoms of possible worsening, potential complications and reasons to return to ER.    Patient/Family voiced understanding of above instructions.    Discussed plan for discharge, as there is no emergent indication for  admission. Patient referred to primary care provider for BP management due to today's BP. Pt/family is agreeable and understands need for follow up and repeat testing.  Pt is aware that discharge does not mean that nothing is wrong but it indicates no emergency is present that requires admission and they must continue care with follow-up as given below or physician of their choice.     FOLLOW-UP  [facility]  [address] [place] [id number]  [phone]  Go today           Medication List      No changes were made to your prescriptions during this visit.             Prescription drug monitoring program review:           Please note that portions of this document were completed with a voice recognition program.    Note Disclaimer: At [facility], we believe that sharing information builds trust and better relationships. You are receiving this note because you recently visited [facility]. It is possible you will see health information before a provider has talked with you about it. This kind of information can be easy to misunderstand. To help you fully understand what it means for your health, we urge you to discuss this note with your provider.         Judson Whitmore MD  10/23/24 3493

## 2024-10-23 NOTE — CONSULTS
"[facility] consult d/t ETOH. Pt presented to ED today w/ c/o ETOH withdrawal and requesting detox d/t [facility] informing pt that he would need medical detox before admission to their inpatient ELOISA tx program. MD reports pt is willing to participate in inpatient ELOISA tx and may require benzos but is not displaying a risk for DT's at this time. Access previously saw pt in September 2024 in which pt indicated that he does not have a drinking problem. Pt presented to ED for same 4 days ago but declined admission.     Pt in room alone upon entry. Introduced self and role. Pt is a 37 yo  male. A&Ox4. Presents as somewhat unkempt w/ restlessness and tremors. Anxious/depressed mood w/ congruent/tense affect. Cooperative towards clinician despite some irritability, impulsivity, and agitation. Fair insight and poor judgment. UDS + benzos and BAL 25 at admission. CIWA score 16 at 11:44 d/t nausea/vomiting, tremors, sweats, anxiety, headache, and agitation. Rates current depression 6-7/10 and anxiety 9-10/10 (10 being worst). Denies current wish to be dead/SI/HI/AVH. Reports waking up in the middle of the night craving ETOH leading to sleep disruption. Appetite is \"pretty good.\" Current stressors include ETOH withdrawal.    MENTAL HEALTH: Per chart, pt w/ hx of ADHD. Reports difficulty accomplishing anything or finishing tasks throughout lifetime. Denies experiencing depression/anxiety outside of ETOH withdrawal. Started seeing [facility] for medication management in August 2024 where he was prescribed Vyvanse 30 mg daily. States that he has had difficulty getting the prescription filled and intends on attempting another appt w/ provider to resolve barriers around accessing medication. Denies hx of psychiatric hospitalizations/SI/HI/AVH. Denies any other form of mental health tx.     SUBSTANCE USE: Smokes about 1/3 PPD w/ first use at age 11 and last use before admission. Denies current nicotine cravings. " "Reports drinking 1 pint daily \"on top of about 9 fireballs and a few beers.\" States that he will drink \"anything from whiskey to beer to fireball.\" Started drinking at age 15-15 and last drink was yesterday. Denies hx of memory loss/blackouts/withdrawals/DT's. However, pt reports experiencing tremors in the AM that lead to him having to drink when he wakes up to \"feel normal.\" Reports multiple attempts to quit drinking, stating \"This is my 2nd or 3rd time here.\" Longest sobriety was 2 yrs while in senior living. Denies current ETOH cravings. Hx of daily opiate use w/ last + UDS for opiates being 6 yrs ago. Hx of 6 month inpatient ELOISA tx program, as well as ELOISA tx through [facility]. Hx of involvement in AA and has a sponsor who he has been communicating w/ regularly. Sober supports include his sponsor, aunt, and \"some close friends.\" Family hx: parents ETOH use.     SOCIAL: Pt has been  to spouse for 10 yrs. Has 7 yo and 10 yo daughters. Lives in a house w/ wife and children. Reports safe environment. Support system includes his AA sponsor, aunt, and \"some close friends.\" Completed HS and currently unemployed. Released 2 months ago from 2-yr senior living sentence after charges of wanton endangerment and possession of firearm by convicted felon. Currently on probation. Pt is Shinto and enjoys fishing, yardwork, playing w/ his kids, and riding bikes.     PLAN: Pt acknowledges that his ETOH use is problematic and states \"I will never drink another drop.\" States \"I know I've said that a lot but I have the tools and I just need to use them.\" Reports feeling motivated to get sober for his physical health and his children. Initially states that he's going to participate in daily AA meetings and increase contact w/ AA sponsor. Challenged pt to consider the severity of his ETOH use and recommended pt to consider residential ELOISA tx. Provided verbal information around [facility] and their ability to " "provide transportation. However, pt adamantly states that he would like to d/c to [facility] for inpatient ELOISA tx. Discussed pt's relapse risk but pt states \"I've already planned all of this out w/ my sponsor.\" Reports that his wife will pick him up from the hospital and transport him to [facility] once medically cleared. Denies need for clinician to discuss plan w/ wife. Reviewed exclusionary criteria for admission at [facility] w/ MD who stated pt is considered medically cleared at this time. No further needs/questions/concerns identified. Access will sign off and refer back to medical.   "

## 2024-10-23 NOTE — ED NOTES
Pt reports he's withdrawing from ETOH. His last drink was yesterday. Pt c/o vomiting, chills, and shaking. Pt reports he usually drinks a pint a day.

## 2024-10-23 NOTE — DISCHARGE INSTRUCTIONS
Please proceed directly to the [facility] for further management of alcohol withdrawal symptoms.  You did receive Ativan and Librium today here in the emergency department.  You have been medically cleared for further evaluation and management at the [facility].

## 2024-10-30 ENCOUNTER — TELEPHONE (OUTPATIENT)
Dept: PSYCHIATRY | Facility: CLINIC | Age: 39
End: 2024-10-30
Payer: COMMERCIAL

## 2024-10-30 NOTE — TELEPHONE ENCOUNTER
Patient has been to the ER twice in the last month for acute alcohol intoxication.  At 1 of those visits he was prescribed a benzodiazepine.  Patient missed his last appointment with this APRN.  We will need to discuss before proceeding with any stimulant medications.

## 2024-11-04 ENCOUNTER — TELEMEDICINE (OUTPATIENT)
Dept: PSYCHIATRY | Facility: CLINIC | Age: 39
End: 2024-11-04
Payer: COMMERCIAL

## 2024-11-04 DIAGNOSIS — F90.2 ATTENTION DEFICIT HYPERACTIVITY DISORDER, COMBINED TYPE: Primary | ICD-10-CM

## 2024-11-04 PROCEDURE — 99213 OFFICE O/P EST LOW 20 MIN: CPT

## 2024-11-04 PROCEDURE — 96127 BRIEF EMOTIONAL/BEHAV ASSMT: CPT

## 2024-11-04 NOTE — PROGRESS NOTES
This provider is located at [place], [place]. The Patient is seen remotely using Video. Patient is being seen via telehealth and confirm that they are in a secure environment for this session. Patient is located in [place], [place] in his car. The patient's condition being diagnosed/treated is appropriate for telemedicine. Provider identified as Klaus Ram as well as credentials APRN MSN PMHNP-BC.   The client/patient gave consent to be seen remotely, and when consent is given they understand that the consent allows for patient identifiable information to be sent to a third party as needed.  They may refuse to be seen remotely at any time. The electronic data is encrypted and password protected, and the patient has been advised of the potential risks to privacy not withstanding such measures.    Chief Complaint  ADHD    Subjective        Alvaro Morris presents to [facility] for   History of Present Illness  Patient is seen today for follow-up visit for ADHD.  Discussed with patient what happened over the course of the last 2 months.  Patient shares that he relapsed on alcohol.  States he went to the ER multiple times.  States he took 1 drink and it escalated into what it did.  He states he did go through detox and is now attending daily AA meetings.  States he has a sponsor.  States he is working as a .  Feels like he learned his lesson and does not want to do that again.  Patient did receive a prescription for Librium at one of the ER visits that he took.  Patient informed that in order to be prescribed a stimulant medication for his ADHD that he will need to provide a clean urine drug screen, remain drug and alcohol-free.  Patient agreeable.  We will order a urine drug screen for patient to complete.  Patient denies any depression.  Denies any hopelessness.  Denies any suicidal or homicidal ideation.  Denies any anxiety.  Denies any manic type symptoms.  Denies  any paranoia.  Denies any auditory or visual hallucinations.  States he continues to have issues with focus and attention.  States it is making it hard for him to do his job.  States he has increased concentration.  States he has can trouble completing tasks.  Objective   Vital Signs:   There were no vitals taken for this visit.      Mental Status Exam:   Hygiene:   good  Cooperation:  Cooperative  Eye Contact:  Good  Psychomotor Behavior:  Appropriate  Affect:  Full range  Mood: normal  Speech:  Normal  Thought Process:  Goal directed  Thought Content:  Normal  Suicidal:  None  Homicidal:  None  Hallucinations:  None  Delusion:  None  Memory:  Intact  Orientation:  Person, Place, Time, and Situation  Reliability:  good  Insight:  Good  Judgement:  Good  Impulse Control:  Good  Physical/Medical Issues:  No      PHQ-9 Depression Screening  Little interest or pleasure in doing things? (Patient-Rptd) Not at all   Feeling down, depressed, or hopeless? (Patient-Rptd) Not at all   PHQ-2 Total Score (Patient-Rptd) 0   Trouble falling or staying asleep, or sleeping too much? (Patient-Rptd) Not at all   Feeling tired or having little energy? (Patient-Rptd) Several days   Poor appetite or overeating? (Patient-Rptd) Not at all   Feeling bad about yourself - or that you are a failure or have let yourself or your family down? (Patient-Rptd) Not at all   Trouble concentrating on things, such as reading the newspaper or watching television? (Patient-Rptd) Several days   Moving or speaking so slowly that other people could have noticed? Or the opposite - being so fidgety or restless that you have been moving around a lot more than usual? (Patient-Rptd) Several days   Thoughts that you would be better off dead, or of hurting yourself in some way? (Patient-Rptd) Not at all   PHQ-9 Total Score (Patient-Rptd) 3   If you checked off any problems, how difficult have these problems made it for you to do your work, take care of things at  home, or get along with other people? (Patient-Rptd) Not difficult at all         PHQ-9 Total Score: (Patient-Rptd) 3     PERLA 7 anxiety screening tool that patient filled out virtually reviewed by this APRN at today's encounter.    LUCAS request number [id number] reviewed by this APRN at today's encounter.    Previous Provider notes and available records reviewed by this APRN today.   Current Medications:   Current Outpatient Medications   Medication Sig Dispense Refill    acetaminophen (TYLENOL) 500 MG tablet Take 2 tablets by mouth 2 (Two) Times a Day. 40 tablet 0    lisdexamfetamine (Vyvanse) 30 MG capsule Take 1 capsule by mouth Every Morning 30 capsule 0    multivitamin with minerals tablet tablet Take 1 tablet by mouth Daily. PT HOLDING FOR SURGERY      ondansetron (ZOFRAN) 8 MG tablet Take 1 tablet by mouth Every 8 (Eight) Hours As Needed for Nausea. 9 tablet 0     No current facility-administered medications for this visit.       Physical Exam   Result Review :    The following data was reviewed by: TODD Larry on 11/04/2024:  Common labs          9/26/2024    03:43 10/19/2024    10:24 10/23/2024    08:32   Common Labs   Glucose 87  104  95    BUN 6  11  6    Creatinine 0.77  0.84  0.64    Sodium 139  146  142    Potassium 3.5  3.9  3.7    Chloride 100  106  102    Calcium 8.3  9.2  8.7    Albumin 3.3  4.3  4.1    Total Bilirubin 1.1  0.3  0.4    Alkaline Phosphatase 164  98  97    AST (SGOT) 175  43  66    ALT (SGPT) 161  47  52    WBC 5.38  12.23  5.98    Hemoglobin 15.9  17.4  16.1    Hematocrit 46.2  51.8  48.7    Platelets 131  251  204         Assessment and Plan   Problem List Items Addressed This Visit    None  Visit Diagnoses       Attention deficit hyperactivity disorder, combined type    -  Primary    Relevant Orders    Urine Drug Screen - Urine, Clean Catch          Discussed treatment options with patient.  We will order a new urine drug screen.  Patient will have to produce a clean  urine drug screen sample before Vyvanse 30 mg daily will be prescribed for ADHD.  Patient agreeable.  We will see patient back in 5 weeks to reassess.  Encouraged patient to contact the office if he has any issues sooner.    TREATMENT PLAN/GOALS: Continue supportive psychotherapy efforts and medications as indicated. Treatment and medication options discussed during today's visit. Patient ackowledged and verbally consented to continue with current treatment plan and was educated on the importance of compliance with treatment and follow-up appointments.    DEPRESSION:  Patient screened positive for depression based on a PHQ-9 score of 3 on 11/4/2024. Follow-up recommendations include: Suicide Risk Assessment performed.       MEDICATION ISSUES:  We discussed risks, benefits, and side effects of the above medications and the patient was agreeable with the plan. Patient was educated on the importance of compliance with treatment and follow-up appointments.  Patient is agreeable to call the office with any worsening of symptoms or onset of side effects. Patient is agreeable to call 911 or go to the nearest ER should he/she begin having SI/HI.      Counseled patient regarding multimodal approach with healthy nutrition, healthy sleep, regular physical activity, social activities, counseling, and medications.      Coping skills reviewed and encouraged positive framing of thoughts     Assisted patient in processing above session content; acknowledged and normalized patient’s thoughts, feelings, and concerns.  Applied  positive coping skills and behavior management in session.  Allowed patient to freely discuss issues without interruption or judgment. Provided safe, confidential environment to facilitate the development of positive therapeutic relationship and encourage open, honest communication. Assisted patient in identifying risk factors which would indicate the need for higher level of care including thoughts to harm  self or others and/or self-harming behavior and encouraged patient to contact this office, call 911, or present to the nearest emergency room should any of these events occur. Discussed crisis intervention services and means to access. If patient has any concerns or needs assistance they were instructed to call the [facility] at [phone].    MEDS ORDERED DURING VISIT:  No orders of the defined types were placed in this encounter.        Follow Up   Return in about 4 weeks (around 12/2/2024) for Video visit.    Patient was given instructions and counseling regarding his condition or for health maintenance advice. Please see specific information pulled into the AVS if appropriate.         This document has been electronically signed by TODD Larry  November 4, 2024 10:50 EST    Part of this note may be an electronic transcription/translation of spoken language to printed text using the Dragon Dictation System.

## 2024-12-09 ENCOUNTER — TELEPHONE (OUTPATIENT)
Dept: PSYCHIATRY | Facility: CLINIC | Age: 39
End: 2024-12-09

## 2024-12-09 NOTE — TELEPHONE ENCOUNTER
Left pt a vm regarding his missed appt for today with the provider.  Also, reminded pt of the no show policy.    PER [facility] NO-SHOW POLICY- Educated patient on no-show policy and reminded them that we understand unexpected circumstances arise, however, anytime you miss your appointment we are unable to provide you appropriate care. We ask that you call at least 24 hours in advance to cancel or reschedule an appointment.  We would like to take this opportunity to remind you of our policy stating patients who miss THREE or more appointments without cancelling or rescheduling 24 hours in advance of the appointment may be subject to dismissal from the practice for non-compliance.      Patient verbalized understanding of this policy. Discussed with patient that in the event that there are three or more no show visits, it will be recommended that they pursue another provider.  Patient verbalizes understanding and is agreeable to this.

## 2025-03-29 ENCOUNTER — HOSPITAL ENCOUNTER (INPATIENT)
Facility: HOSPITAL | Age: 40
LOS: 1 days | Discharge: LEFT BEFORE DISCHARGE | DRG: 897 | End: 2025-03-30
Attending: EMERGENCY MEDICINE | Admitting: INTERNAL MEDICINE
Payer: COMMERCIAL

## 2025-03-29 DIAGNOSIS — F10.930 ALCOHOL WITHDRAWAL SYNDROME WITHOUT COMPLICATION: Primary | ICD-10-CM

## 2025-03-29 PROBLEM — F10.939 ALCOHOL WITHDRAWAL SYNDROME: Status: ACTIVE | Noted: 2025-03-29

## 2025-03-29 PROBLEM — F10.939 ALCOHOL WITHDRAWAL: Status: ACTIVE | Noted: 2025-03-29

## 2025-03-29 LAB
ALBUMIN SERPL-MCNC: 4.4 G/DL (ref 3.5–5.2)
ALBUMIN/GLOB SERPL: 1.7 G/DL
ALP SERPL-CCNC: 83 U/L (ref 39–117)
ALT SERPL W P-5'-P-CCNC: 17 U/L (ref 1–41)
AMPHET+METHAMPHET UR QL: NEGATIVE
AMPHETAMINES UR QL: NEGATIVE
ANION GAP SERPL CALCULATED.3IONS-SCNC: 14.1 MMOL/L (ref 5–15)
AST SERPL-CCNC: 30 U/L (ref 1–40)
BARBITURATES UR QL SCN: NEGATIVE
BASOPHILS # BLD AUTO: 0.04 10*3/MM3 (ref 0–0.2)
BASOPHILS NFR BLD AUTO: 0.7 % (ref 0–1.5)
BENZODIAZ UR QL SCN: NEGATIVE
BILIRUB SERPL-MCNC: 0.7 MG/DL (ref 0–1.2)
BUN SERPL-MCNC: 6 MG/DL (ref 6–20)
BUN/CREAT SERPL: 7.2 (ref 7–25)
BUPRENORPHINE SERPL-MCNC: NEGATIVE NG/ML
CALCIUM SPEC-SCNC: 8.8 MG/DL (ref 8.6–10.5)
CANNABINOIDS SERPL QL: NEGATIVE
CHLORIDE SERPL-SCNC: 104 MMOL/L (ref 98–107)
CO2 SERPL-SCNC: 26.9 MMOL/L (ref 22–29)
COCAINE UR QL: NEGATIVE
CREAT SERPL-MCNC: 0.83 MG/DL (ref 0.76–1.27)
DEPRECATED RDW RBC AUTO: 46.2 FL (ref 37–54)
EGFRCR SERPLBLD CKD-EPI 2021: 113.5 ML/MIN/1.73
EOSINOPHIL # BLD AUTO: 0.06 10*3/MM3 (ref 0–0.4)
EOSINOPHIL NFR BLD AUTO: 1 % (ref 0.3–6.2)
ERYTHROCYTE [DISTWIDTH] IN BLOOD BY AUTOMATED COUNT: 13 % (ref 12.3–15.4)
ETHANOL BLD-MCNC: 288 MG/DL (ref 0–10)
ETHANOL UR QL: 0.29 %
FENTANYL UR-MCNC: NEGATIVE NG/ML
GLOBULIN UR ELPH-MCNC: 2.6 GM/DL
GLUCOSE SERPL-MCNC: 116 MG/DL (ref 65–99)
HCT VFR BLD AUTO: 46.3 % (ref 37.5–51)
HGB BLD-MCNC: 16 G/DL (ref 13–17.7)
IMM GRANULOCYTES # BLD AUTO: 0.01 10*3/MM3 (ref 0–0.05)
IMM GRANULOCYTES NFR BLD AUTO: 0.2 % (ref 0–0.5)
LYMPHOCYTES # BLD AUTO: 2.65 10*3/MM3 (ref 0.7–3.1)
LYMPHOCYTES NFR BLD AUTO: 45.9 % (ref 19.6–45.3)
MAGNESIUM SERPL-MCNC: 2 MG/DL (ref 1.6–2.6)
MCH RBC QN AUTO: 33.1 PG (ref 26.6–33)
MCHC RBC AUTO-ENTMCNC: 34.6 G/DL (ref 31.5–35.7)
MCV RBC AUTO: 95.7 FL (ref 79–97)
METHADONE UR QL SCN: NEGATIVE
MONOCYTES # BLD AUTO: 0.25 10*3/MM3 (ref 0.1–0.9)
MONOCYTES NFR BLD AUTO: 4.3 % (ref 5–12)
NEUTROPHILS NFR BLD AUTO: 2.76 10*3/MM3 (ref 1.7–7)
NEUTROPHILS NFR BLD AUTO: 47.9 % (ref 42.7–76)
NRBC BLD AUTO-RTO: 0 /100 WBC (ref 0–0.2)
OPIATES UR QL: NEGATIVE
OXYCODONE UR QL SCN: NEGATIVE
PCP UR QL SCN: NEGATIVE
PLATELET # BLD AUTO: 176 10*3/MM3 (ref 140–450)
PMV BLD AUTO: 9.3 FL (ref 6–12)
POTASSIUM SERPL-SCNC: 4 MMOL/L (ref 3.5–5.2)
PROT SERPL-MCNC: 7 G/DL (ref 6–8.5)
RBC # BLD AUTO: 4.84 10*6/MM3 (ref 4.14–5.8)
SODIUM SERPL-SCNC: 145 MMOL/L (ref 136–145)
TRICYCLICS UR QL SCN: NEGATIVE
WBC NRBC COR # BLD AUTO: 5.77 10*3/MM3 (ref 3.4–10.8)

## 2025-03-29 PROCEDURE — 25010000002 LORAZEPAM PER 2 MG: Performed by: INTERNAL MEDICINE

## 2025-03-29 PROCEDURE — 80053 COMPREHEN METABOLIC PANEL: CPT | Performed by: PHYSICIAN ASSISTANT

## 2025-03-29 PROCEDURE — 25810000003 SODIUM CHLORIDE 0.9 % SOLUTION: Performed by: EMERGENCY MEDICINE

## 2025-03-29 PROCEDURE — 85025 COMPLETE CBC W/AUTO DIFF WBC: CPT | Performed by: PHYSICIAN ASSISTANT

## 2025-03-29 PROCEDURE — 83735 ASSAY OF MAGNESIUM: CPT | Performed by: PHYSICIAN ASSISTANT

## 2025-03-29 PROCEDURE — 82077 ASSAY SPEC XCP UR&BREATH IA: CPT | Performed by: PHYSICIAN ASSISTANT

## 2025-03-29 PROCEDURE — 96374 THER/PROPH/DIAG INJ IV PUSH: CPT

## 2025-03-29 PROCEDURE — 96376 TX/PRO/DX INJ SAME DRUG ADON: CPT

## 2025-03-29 PROCEDURE — 80307 DRUG TEST PRSMV CHEM ANLYZR: CPT | Performed by: PHYSICIAN ASSISTANT

## 2025-03-29 PROCEDURE — 25010000002 LORAZEPAM PER 2 MG: Performed by: EMERGENCY MEDICINE

## 2025-03-29 PROCEDURE — 99285 EMERGENCY DEPT VISIT HI MDM: CPT

## 2025-03-29 RX ORDER — ONDANSETRON 2 MG/ML
4 INJECTION INTRAMUSCULAR; INTRAVENOUS EVERY 6 HOURS PRN
Status: DISCONTINUED | OUTPATIENT
Start: 2025-03-29 | End: 2025-03-30 | Stop reason: HOSPADM

## 2025-03-29 RX ORDER — ACETAMINOPHEN 650 MG/1
650 SUPPOSITORY RECTAL EVERY 4 HOURS PRN
Status: DISCONTINUED | OUTPATIENT
Start: 2025-03-29 | End: 2025-03-30 | Stop reason: HOSPADM

## 2025-03-29 RX ORDER — LORAZEPAM 2 MG/ML
2 INJECTION INTRAMUSCULAR
Status: DISCONTINUED | OUTPATIENT
Start: 2025-03-29 | End: 2025-03-29 | Stop reason: SDUPTHER

## 2025-03-29 RX ORDER — LORAZEPAM 2 MG/ML
2 INJECTION INTRAMUSCULAR
Status: DISCONTINUED | OUTPATIENT
Start: 2025-03-29 | End: 2025-03-30 | Stop reason: HOSPADM

## 2025-03-29 RX ORDER — AMOXICILLIN 250 MG
2 CAPSULE ORAL 2 TIMES DAILY PRN
Status: DISCONTINUED | OUTPATIENT
Start: 2025-03-29 | End: 2025-03-30 | Stop reason: HOSPADM

## 2025-03-29 RX ORDER — MULTIPLE VITAMINS W/ MINERALS TAB 9MG-400MCG
1 TAB ORAL DAILY
Status: DISCONTINUED | OUTPATIENT
Start: 2025-03-29 | End: 2025-03-30 | Stop reason: HOSPADM

## 2025-03-29 RX ORDER — BISACODYL 10 MG
10 SUPPOSITORY, RECTAL RECTAL DAILY PRN
Status: DISCONTINUED | OUTPATIENT
Start: 2025-03-29 | End: 2025-03-30 | Stop reason: HOSPADM

## 2025-03-29 RX ORDER — ACETAMINOPHEN 325 MG/1
650 TABLET ORAL EVERY 4 HOURS PRN
Status: DISCONTINUED | OUTPATIENT
Start: 2025-03-29 | End: 2025-03-30 | Stop reason: HOSPADM

## 2025-03-29 RX ORDER — LORAZEPAM 1 MG/1
1 TABLET ORAL
Status: DISCONTINUED | OUTPATIENT
Start: 2025-03-29 | End: 2025-03-30 | Stop reason: HOSPADM

## 2025-03-29 RX ORDER — LORAZEPAM 2 MG/ML
1 INJECTION INTRAMUSCULAR
Status: DISCONTINUED | OUTPATIENT
Start: 2025-03-29 | End: 2025-03-30 | Stop reason: HOSPADM

## 2025-03-29 RX ORDER — SODIUM CHLORIDE 0.9 % (FLUSH) 0.9 %
10 SYRINGE (ML) INJECTION AS NEEDED
Status: DISCONTINUED | OUTPATIENT
Start: 2025-03-29 | End: 2025-03-30 | Stop reason: HOSPADM

## 2025-03-29 RX ORDER — LORAZEPAM 1 MG/1
1 TABLET ORAL
Status: DISCONTINUED | OUTPATIENT
Start: 2025-03-29 | End: 2025-03-29 | Stop reason: SDUPTHER

## 2025-03-29 RX ORDER — LORAZEPAM 1 MG/1
2 TABLET ORAL
Status: DISCONTINUED | OUTPATIENT
Start: 2025-03-29 | End: 2025-03-29 | Stop reason: SDUPTHER

## 2025-03-29 RX ORDER — LORAZEPAM 2 MG/ML
1 INJECTION INTRAMUSCULAR
Status: DISCONTINUED | OUTPATIENT
Start: 2025-03-29 | End: 2025-03-29 | Stop reason: SDUPTHER

## 2025-03-29 RX ORDER — ONDANSETRON 4 MG/1
4 TABLET, ORALLY DISINTEGRATING ORAL EVERY 6 HOURS PRN
Status: DISCONTINUED | OUTPATIENT
Start: 2025-03-29 | End: 2025-03-30 | Stop reason: HOSPADM

## 2025-03-29 RX ORDER — ACETAMINOPHEN 160 MG/5ML
650 SOLUTION ORAL EVERY 4 HOURS PRN
Status: DISCONTINUED | OUTPATIENT
Start: 2025-03-29 | End: 2025-03-30 | Stop reason: HOSPADM

## 2025-03-29 RX ORDER — LORAZEPAM 1 MG/1
2 TABLET ORAL
Status: DISCONTINUED | OUTPATIENT
Start: 2025-03-29 | End: 2025-03-30 | Stop reason: HOSPADM

## 2025-03-29 RX ORDER — CLONIDINE HYDROCHLORIDE 0.1 MG/1
0.1 TABLET ORAL EVERY 4 HOURS PRN
Status: DISCONTINUED | OUTPATIENT
Start: 2025-03-29 | End: 2025-03-30 | Stop reason: HOSPADM

## 2025-03-29 RX ORDER — FOLIC ACID 1 MG/1
1 TABLET ORAL DAILY
Status: DISCONTINUED | OUTPATIENT
Start: 2025-03-29 | End: 2025-03-30 | Stop reason: HOSPADM

## 2025-03-29 RX ORDER — BISACODYL 5 MG/1
5 TABLET, DELAYED RELEASE ORAL DAILY PRN
Status: DISCONTINUED | OUTPATIENT
Start: 2025-03-29 | End: 2025-03-30 | Stop reason: HOSPADM

## 2025-03-29 RX ORDER — LORAZEPAM 2 MG/ML
1 INJECTION INTRAMUSCULAR ONCE
Status: COMPLETED | OUTPATIENT
Start: 2025-03-29 | End: 2025-03-29

## 2025-03-29 RX ORDER — THIAMINE HYDROCHLORIDE 100 MG/ML
200 INJECTION, SOLUTION INTRAMUSCULAR; INTRAVENOUS EVERY 8 HOURS SCHEDULED
Status: DISCONTINUED | OUTPATIENT
Start: 2025-03-30 | End: 2025-03-30 | Stop reason: HOSPADM

## 2025-03-29 RX ORDER — POLYETHYLENE GLYCOL 3350 17 G/17G
17 POWDER, FOR SOLUTION ORAL DAILY PRN
Status: DISCONTINUED | OUTPATIENT
Start: 2025-03-29 | End: 2025-03-30 | Stop reason: HOSPADM

## 2025-03-29 RX ADMIN — LORAZEPAM 2 MG: 2 INJECTION INTRAMUSCULAR; INTRAVENOUS at 23:02

## 2025-03-29 RX ADMIN — LORAZEPAM 2 MG: 2 INJECTION INTRAMUSCULAR; INTRAVENOUS at 21:37

## 2025-03-29 RX ADMIN — FOLIC ACID 1 MG: 5 INJECTION, SOLUTION INTRAMUSCULAR; INTRAVENOUS; SUBCUTANEOUS at 20:39

## 2025-03-29 RX ADMIN — Medication 1 TABLET: at 19:51

## 2025-03-29 RX ADMIN — Medication 100 MG: at 19:51

## 2025-03-29 RX ADMIN — LORAZEPAM 1 MG: 2 INJECTION INTRAMUSCULAR; INTRAVENOUS at 17:30

## 2025-03-29 RX ADMIN — SODIUM CHLORIDE 1000 ML: 9 INJECTION, SOLUTION INTRAVENOUS at 17:31

## 2025-03-29 RX ADMIN — LORAZEPAM 2 MG: 2 INJECTION INTRAMUSCULAR; INTRAVENOUS at 19:51

## 2025-03-29 NOTE — ED NOTES
Pt arrives for alcohol withdrawal. Pt's last drink was around 1000 today.   Pt went to the [facility] and they were unable to admit. Him. Pt states he drinks 10-20 fireball shots a day

## 2025-03-29 NOTE — ED PROVIDER NOTES
I supervised care provided by the midlevel provider.   We have discussed this patient's history, physical exam, and treatment plan.  I have reviewed the note and personally saw and examined the patient and agree with the plan of care.   This is a 39-year-old male history of alcohol dependence, hypertension who had an time of sobriety and started drinking heavy alcohol again the past several weeks.  He drinks multiple shots of fireball daily.  His last drink was early this morning.  He went to try to get treatment from a rehab facility and they refused him and said he needed to go to the emergency department.  He is not suicidal but he feels down from his alcohol abuse.  He wants to try to stop drinking.  He has had periods of sobriety in the past.  Denies any suicidal ideation.  He feels as if he is going through alcohol withdrawal he feels very nervous and anxious.  Denies any focal pain.  He has had nausea but no real vomiting.  Has had some loose stools.    GENERAL: This is a male that looks older than stated age.  Appears tremulous and anxious.  He is poorly kept.  Smells of alcohol.  Vitals have been reviewed.  His heart rate fluctuates from 90s to low 100s.  He is afebrile.  O2 sat is normal.  HENT: nares patent  Head/neck/ face are symmetric without gross deformity or swelling  EYES: no scleral icterus  CV: regular rhythm, regular rate with intact distal pulses  RESPIRATORY: normal effort and no respiratory distress  ABDOMEN: soft and nontender  MUSCULOSKELETAL: no deformity  NEURO: alert and appropriate, moves all extremities, follows commands.  Patient is tremulous but he has no focal motor or sensory changes.  SKIN: warm, dry    Vital signs and nursing notes reviewed.    Plan   ED Course as of 03/29/25 2243   Sat Mar 29, 2025   1727 WBC: 5.77 [DC]   1727 Hemoglobin: 16.0 [DC]   1727 Platelets: 176 [DC]   1739 Ethanol(!): 288 [DC]   1740 Magnesium: 2.0 [DC]   1740 ALT (SGPT): 17 [DC]   1740 AST (SGOT): 30  [DC]   1740 Alkaline Phosphatase: 83 [DC]   1740 Total Bilirubin: 0.7 [DC]   1823 Ethanol(!): 288 [MM]   1823 WBC: 5.77 [MM]   1823 Hemoglobin: 16.0 [MM]   1823 Glucose(!): 116 [MM]   1935 Discussed case with Dr. [facility], who will admit the patient to telemetry. [DC]      ED Course User Index  [DC] Janeth Hoff, PA  [MM] Atul Branch MD         This is a gentleman that has a long history of alcohol abuse likely has some alcohol intoxication and some withdrawals.  Will very likely need to be admitted to the hospital.  Will check some lab work and electrolytes.         Atul Branch MD  03/29/25 1176

## 2025-03-29 NOTE — Clinical Note
Level of Care: Telemetry [5]   Diagnosis: Alcohol withdrawal [291.81.ICD-9-CM]   Admitting Physician: JACKY CADENA [[id number]]   Attending Physician: JACKY CADENA [[id number]]   Certification: I certify that inpatient services are medically necessary for this patient for a duration of greater than two midnights. See H&P and MD Progress Notes for additional information about the patient's course of treatment.

## 2025-03-29 NOTE — ED PROVIDER NOTES
EMERGENCY DEPARTMENT ENCOUNTER      PCP: Christiano Heredia MD  Patient Care Team:  Christiano Heredia MD as PCP - General (Internal Medicine)  Pily Cordero MD as Surgeon (Orthopedic Surgery)  Klaus Ram APRN as Nurse Practitioner (Nurse Practitioner)   Independent Historians: Patient    HPI:  Chief Complaint: Alcohol withdrawal  A complete HPI/ROS/PMH/PSH/SH/FH are unobtainable due to: None    Chronic or social conditions impacting patient care (social determinants of health): None    Context: Alvaro Morris is a 39 y.o. male with history of hypertension, alcohol dependence who presents to the ED c/o acute alcohol withdrawal.  Patient reports over the past few weeks he has been drinking about 15-20 shots of fireball daily.  He states he has not drink that much today and last drink was at 10:00 this morning.  Patient states he went to the [facility] yesterday but was told he needed to be medically cleared.  Patient has gone through alcohol withdrawal before but denies history of any alcohol withdrawal seizures or other complications.  Patient reports headache, anxiety.    Review of prior external notes and/or external test results outside of this encounter: Chart review shows several no-show to psychiatry appointments over the last several months.  Patient was also seen in the ER for alcohol dependence with uncomplicated withdrawal several times in October 2024.      PAST MEDICAL HISTORY  Active Ambulatory Problems     Diagnosis Date Noted    ADD (attention deficit disorder) 06/29/2017    Essential hypertension 06/29/2017    Chronic right shoulder pain 09/12/2017    Incomplete tear of rotator cuff 11/08/2017    History of rotator cuff surgery 01/17/2018    Encounter for monitoring opioid maintenance therapy 05/03/2018    H/O shoulder surgery 08/25/2018    History of alcohol abuse 03/17/2020    Tobacco dependence 09/11/2020    Traumatic complete tear of left rotator cuff 07/15/2022    Encounter for  Prevnar pneumococcal vaccination 08/08/2024    Intractable nausea and vomiting 09/24/2024    Hypokalemia 09/25/2024    Transaminitis 09/25/2024    Hyperglycemia 09/25/2024    Thrombocytopenia 09/25/2024    Diarrhea 09/25/2024    Alcohol dependence 09/25/2024     Resolved Ambulatory Problems     Diagnosis Date Noted    No Resolved Ambulatory Problems     Past Medical History:   Diagnosis Date    ADHD     Bursitis of elbow     Has never received COVID-19 vaccine     Hemorrhoids     Hypertension     Left shoulder pain     Rotator cuff tear        The patient qualifies to receive the vaccine, but they have not yet received it.    PAST SURGICAL HISTORY  Past Surgical History:   Procedure Laterality Date    ARM DEBRIDEMENT Left 7/27/2022    Procedure: UPPER EXTREMITY DEBRIDEMENT labrium;  Surgeon: Queta Cordero MD;  Location: [facility];  Service: Orthopedics;  Laterality: Left;    HEMORRHOIDECTOMY N/A 6/23/2016    Procedure: HEMORRHOIDECTOMY;  Surgeon: Atilio Smith MD;  Location: [facility];  Service:     SHOULDER ARTHROSCOPY W/ ROTATOR CUFF REPAIR Right 12/4/2017    Procedure: SHOULDER ARTHROSCOPY WITH MINI OPEN ROTATOR CUFF REPAIR  DEBRIDEMENT OF LABRUM DEBRIDEMENT OF SUBSCAPULARIS DECOMPRESSION;  Surgeon: Pily Cordero MD;  Location: [facility];  Service:     SHOULDER ARTHROSCOPY W/ ROTATOR CUFF REPAIR Left 7/27/2022    Procedure: SHOULDER ARTHROSCOPY WITH ROTATOR CUFF REPAIR;  Surgeon: Queta Cordero MD;  Location: [facility];  Service: Orthopedics;  Laterality: Left;         FAMILY HISTORY  Family History   Problem Relation Age of Onset    Malig Hyperthermia Neg Hx          SOCIAL HISTORY  Social History     Socioeconomic History    Marital status:    Tobacco Use    Smoking status: Some Days     Current packs/day: 0.25     Average packs/day: 0.3 packs/day for 10.0 years (2.5 ttl pk-yrs)     Types: Cigarettes     Passive exposure: Current    Smokeless tobacco: Former   Vaping  "Use    Vaping status: Former    Substances: Nicotine    Devices: Disposable   Substance and Sexual Activity    Alcohol use: Yes     Alcohol/week: 25.0 standard drinks of alcohol     Types: 25 Shots of liquor per week     Comment: patient reports drinking \"anything. fireball, beer, whiskey\" 10/23/24    Drug use: No    Sexual activity: Defer         ALLERGIES  Patient has no known allergies.        REVIEW OF SYSTEMS  Review of Systems   Cardiovascular:  Negative for chest pain.   Gastrointestinal:  Positive for nausea.   Neurological:  Positive for headaches.   Psychiatric/Behavioral:  The patient is nervous/anxious.         All systems reviewed and negative except for those discussed in HPI.       PHYSICAL EXAM    I have reviewed the triage vital signs and nursing notes.    ED Triage Vitals   Temp Heart Rate Resp BP SpO2   03/29/25 1627 03/29/25 1627 03/29/25 1627 03/29/25 1628 03/29/25 1627   98.2 °F (36.8 °C) (!) 122 18 136/95 99 %      Temp src Heart Rate Source Patient Position BP Location FiO2 (%)   -- -- -- -- --              Physical Exam  GENERAL: alert, no acute distress, tremulous  SKIN: Warm, dry  HENT: Normocephalic, atraumatic  EYES: no scleral icterus  CV: regular rhythm, tachycardic rate  RESPIRATORY: normal effort, lungs clear  ABDOMEN: soft, nontender, nondistended  MUSCULOSKELETAL: no deformity  NEURO: alert, moves all extremities, follows commands          LAB RESULTS  Recent Results (from the past 24 hours)   Comprehensive Metabolic Panel    Collection Time: 03/29/25  5:07 PM    Specimen: Blood   Result Value Ref Range    Glucose 116 (H) 65 - 99 mg/dL    BUN 6 6 - 20 mg/dL    Creatinine 0.83 0.76 - 1.27 mg/dL    Sodium 145 136 - 145 mmol/L    Potassium 4.0 3.5 - 5.2 mmol/L    Chloride 104 98 - 107 mmol/L    CO2 26.9 22.0 - 29.0 mmol/L    Calcium 8.8 8.6 - 10.5 mg/dL    Total Protein 7.0 6.0 - 8.5 g/dL    Albumin 4.4 3.5 - 5.2 g/dL    ALT (SGPT) 17 1 - 41 U/L    AST (SGOT) 30 1 - 40 U/L    Alkaline " Phosphatase 83 39 - 117 U/L    Total Bilirubin 0.7 0.0 - 1.2 mg/dL    Globulin 2.6 gm/dL    A/G Ratio 1.7 g/dL    BUN/Creatinine Ratio 7.2 7.0 - 25.0    Anion Gap 14.1 5.0 - 15.0 mmol/L    eGFR 113.5 >60.0 mL/min/1.73   Ethanol    Collection Time: 03/29/25  5:07 PM    Specimen: Blood   Result Value Ref Range    Ethanol 288 (H) 0 - 10 mg/dL    Ethanol % 0.288 %   Magnesium    Collection Time: 03/29/25  5:07 PM    Specimen: Blood   Result Value Ref Range    Magnesium 2.0 1.6 - 2.6 mg/dL   CBC Auto Differential    Collection Time: 03/29/25  5:07 PM    Specimen: Blood   Result Value Ref Range    WBC 5.77 3.40 - 10.80 10*3/mm3    RBC 4.84 4.14 - 5.80 10*6/mm3    Hemoglobin 16.0 13.0 - 17.7 g/dL    Hematocrit 46.3 37.5 - 51.0 %    MCV 95.7 79.0 - 97.0 fL    MCH 33.1 (H) 26.6 - 33.0 pg    MCHC 34.6 31.5 - 35.7 g/dL    RDW 13.0 12.3 - 15.4 %    RDW-SD 46.2 37.0 - 54.0 fl    MPV 9.3 6.0 - 12.0 fL    Platelets 176 140 - 450 10*3/mm3    Neutrophil % 47.9 42.7 - 76.0 %    Lymphocyte % 45.9 (H) 19.6 - 45.3 %    Monocyte % 4.3 (L) 5.0 - 12.0 %    Eosinophil % 1.0 0.3 - 6.2 %    Basophil % 0.7 0.0 - 1.5 %    Immature Grans % 0.2 0.0 - 0.5 %    Neutrophils, Absolute 2.76 1.70 - 7.00 10*3/mm3    Lymphocytes, Absolute 2.65 0.70 - 3.10 10*3/mm3    Monocytes, Absolute 0.25 0.10 - 0.90 10*3/mm3    Eosinophils, Absolute 0.06 0.00 - 0.40 10*3/mm3    Basophils, Absolute 0.04 0.00 - 0.20 10*3/mm3    Immature Grans, Absolute 0.01 0.00 - 0.05 10*3/mm3    nRBC 0.0 0.0 - 0.2 /100 WBC   Urine Drug Screen - Urine, Clean Catch    Collection Time: 03/29/25  5:11 PM    Specimen: Urine, Clean Catch   Result Value Ref Range    THC, Screen, Urine Negative Negative    Phencyclidine (PCP), Urine Negative Negative    Cocaine Screen, Urine Negative Negative    Methamphetamine, Ur Negative Negative    Opiate Screen Negative Negative    Amphetamine Screen, Urine Negative Negative    Benzodiazepine Screen, Urine Negative Negative    Tricyclic Antidepressants  Screen Negative Negative    Methadone Screen, Urine Negative Negative    Barbiturates Screen, Urine Negative Negative    Oxycodone Screen, Urine Negative Negative    Buprenorphine, Screen, Urine Negative Negative   Fentanyl, Urine - Urine, Clean Catch    Collection Time: 03/29/25  5:11 PM    Specimen: Urine, Clean Catch   Result Value Ref Range    Fentanyl, Urine Negative Negative       Ordered the above labs and independently reviewed and interpreted the results.        RADIOLOGY  No Radiology Exams Resulted Within Past 24 Hours    I ordered the above noted radiological studies. Independently reviewed and interpreted by me.  See dictation for official radiology interpretation.      PROCEDURES    Procedures      MEDICATIONS GIVEN IN ER    Medications   sodium chloride 0.9 % flush 10 mL (has no administration in time range)   acetaminophen (TYLENOL) tablet 650 mg (has no administration in time range)     Or   acetaminophen (TYLENOL) 160 MG/5ML oral solution 650 mg (has no administration in time range)     Or   acetaminophen (TYLENOL) suppository 650 mg (has no administration in time range)   ondansetron ODT (ZOFRAN-ODT) disintegrating tablet 4 mg (has no administration in time range)     Or   ondansetron (ZOFRAN) injection 4 mg (has no administration in time range)   melatonin tablet 2.5 mg (has no administration in time range)   sennosides-docusate (PERICOLACE) 8.6-50 MG per tablet 2 tablet (has no administration in time range)     And   polyethylene glycol (MIRALAX) packet 17 g (has no administration in time range)     And   bisacodyl (DULCOLAX) EC tablet 5 mg (has no administration in time range)     And   bisacodyl (DULCOLAX) suppository 10 mg (has no administration in time range)   Magnesium Standard Dose Replacement - Follow Nurse / BPA Driven Protocol (has no administration in time range)   folic acid (FOLVITE) tablet 1 mg (has no administration in time range)   cloNIDine (CATAPRES) tablet 0.1 mg (has no  administration in time range)   thiamine (B-1) injection 200 mg (has no administration in time range)     Followed by   thiamine (VITAMIN B-1) tablet 100 mg (has no administration in time range)   folic acid 1 mg in sodium chloride 0.9 % 50 mL IVPB (has no administration in time range)   multivitamin with minerals 1 tablet (1 tablet Oral Given 3/29/25 1951)   LORazepam (ATIVAN) tablet 1 mg ( Oral Not Given:  See Alt 3/29/25 1951)     Or   LORazepam (ATIVAN) injection 1 mg ( Intravenous Not Given:  See Alt 3/29/25 1951)     Or   LORazepam (ATIVAN) tablet 2 mg ( Oral Not Given:  See Alt 3/29/25 1951)     Or   LORazepam (ATIVAN) injection 2 mg (2 mg Intravenous Given 3/29/25 1951)     Or   LORazepam (ATIVAN) injection 2 mg ( Intravenous Not Given:  See Alt 3/29/25 1951)     Or   LORazepam (ATIVAN) injection 2 mg ( Intramuscular Not Given:  See Alt 3/29/25 1951)   sodium chloride 0.9 % bolus 1,000 mL (1,000 mL Intravenous New Bag 3/29/25 1731)   LORazepam (ATIVAN) injection 1 mg (1 mg Intravenous Given 3/29/25 1730)   thiamine (VITAMIN B-1) tablet 100 mg (100 mg Oral Given 3/29/25 1951)         PROGRESS, DATA ANALYSIS, CONSULTS, AND MEDICAL DECISION MAKING    All labs have been independently reviewed and interpreted by me.  All radiology studies have been independently reviewed and interpreted by me and discussed with radiologist dictating the report.   EKG's independently reviewed and interpreted by me.  Discussion below represents my analysis of pertinent findings related to patient's condition, differential diagnosis, treatment plan and final disposition.    Differential diagnosis: alcohol intoxication, withdrawal, delirium tremens, malingering    ED Course as of 03/29/25 2001   Sat Mar 29, 2025   1727 WBC: 5.77 [DC]   1727 Hemoglobin: 16.0 [DC]   1727 Platelets: 176 [DC]   1739 Ethanol(!): 288 [DC]   1740 Magnesium: 2.0 [DC]   1740 ALT (SGPT): 17 [DC]   1740 AST (SGOT): 30 [DC]   1740 Alkaline Phosphatase: 83 [DC]    1740 Total Bilirubin: 0.7 [DC]   1823 Ethanol(!): 288 [MM]   1823 WBC: 5.77 [MM]   1823 Hemoglobin: 16.0 [MM]   1823 Glucose(!): 116 [MM]   1935 Discussed case with Dr. Rivera [facility], who will admit the patient to telemetry. [DC]      ED Course User Index  [DC] Janeth Hoff PA  [MM] Atul Branch MD             AS OF 20:01 EDT VITALS:    BP - 137/77  HR - 92  TEMP - 98.2 °F (36.8 °C)  O2 SATS - 97%        DIAGNOSIS  Final diagnoses:   Alcohol withdrawal syndrome without complication         DISPOSITION  ED Disposition       ED Disposition   Decision to Admit    Condition   --    Comment   Level of Care: Telemetry [5]   Diagnosis: Alcohol withdrawal syndrome [[id number]]   Admitting Physician: JACKY RIVERA [[id number]]   Attending Physician: JACKY RIVERA [[id number]]   Certification: I Certify That Inpatient Hospital Services Are Medically Necessary For Greater Than 2 Midnights                    Note Disclaimer: At [facility], we believe that sharing information builds trust and better relationships. You are receiving this note because you recently visited [facility]. It is possible you will see health information before a provider has talked with you about it. This kind of information can be easy to misunderstand. To help you fully understand what it means for your health, we urge you to discuss this note with your provider.         Janeth Hoff PA  03/29/25 2001

## 2025-03-30 ENCOUNTER — READMISSION MANAGEMENT (OUTPATIENT)
Dept: CALL CENTER | Facility: HOSPITAL | Age: 40
End: 2025-03-30
Payer: COMMERCIAL

## 2025-03-30 VITALS
RESPIRATION RATE: 17 BRPM | TEMPERATURE: 97.9 F | WEIGHT: 167.4 LBS | HEIGHT: 75 IN | OXYGEN SATURATION: 98 % | DIASTOLIC BLOOD PRESSURE: 94 MMHG | HEART RATE: 83 BPM | BODY MASS INDEX: 20.81 KG/M2 | SYSTOLIC BLOOD PRESSURE: 146 MMHG

## 2025-03-30 LAB
ANION GAP SERPL CALCULATED.3IONS-SCNC: 11.8 MMOL/L (ref 5–15)
BUN SERPL-MCNC: 9 MG/DL (ref 6–20)
BUN/CREAT SERPL: 12.7 (ref 7–25)
CALCIUM SPEC-SCNC: 8.6 MG/DL (ref 8.6–10.5)
CHLORIDE SERPL-SCNC: 103 MMOL/L (ref 98–107)
CO2 SERPL-SCNC: 26.2 MMOL/L (ref 22–29)
CREAT SERPL-MCNC: 0.71 MG/DL (ref 0.76–1.27)
DEPRECATED RDW RBC AUTO: 45.9 FL (ref 37–54)
EGFRCR SERPLBLD CKD-EPI 2021: 118.9 ML/MIN/1.73
ERYTHROCYTE [DISTWIDTH] IN BLOOD BY AUTOMATED COUNT: 13.1 % (ref 12.3–15.4)
GLUCOSE SERPL-MCNC: 99 MG/DL (ref 65–99)
HCT VFR BLD AUTO: 41.3 % (ref 37.5–51)
HGB BLD-MCNC: 14.3 G/DL (ref 13–17.7)
MCH RBC QN AUTO: 33.1 PG (ref 26.6–33)
MCHC RBC AUTO-ENTMCNC: 34.6 G/DL (ref 31.5–35.7)
MCV RBC AUTO: 95.6 FL (ref 79–97)
PLATELET # BLD AUTO: 145 10*3/MM3 (ref 140–450)
PMV BLD AUTO: 9.7 FL (ref 6–12)
POTASSIUM SERPL-SCNC: 3.7 MMOL/L (ref 3.5–5.2)
RBC # BLD AUTO: 4.32 10*6/MM3 (ref 4.14–5.8)
SODIUM SERPL-SCNC: 141 MMOL/L (ref 136–145)
WBC NRBC COR # BLD AUTO: 5.68 10*3/MM3 (ref 3.4–10.8)

## 2025-03-30 PROCEDURE — 96376 TX/PRO/DX INJ SAME DRUG ADON: CPT

## 2025-03-30 PROCEDURE — 25010000002 THIAMINE PER 100 MG: Performed by: INTERNAL MEDICINE

## 2025-03-30 PROCEDURE — 85027 COMPLETE CBC AUTOMATED: CPT | Performed by: INTERNAL MEDICINE

## 2025-03-30 PROCEDURE — 80048 BASIC METABOLIC PNL TOTAL CA: CPT | Performed by: INTERNAL MEDICINE

## 2025-03-30 PROCEDURE — 25010000002 LORAZEPAM PER 2 MG: Performed by: INTERNAL MEDICINE

## 2025-03-30 PROCEDURE — 90791 PSYCH DIAGNOSTIC EVALUATION: CPT

## 2025-03-30 PROCEDURE — 36415 COLL VENOUS BLD VENIPUNCTURE: CPT | Performed by: INTERNAL MEDICINE

## 2025-03-30 PROCEDURE — 96375 TX/PRO/DX INJ NEW DRUG ADDON: CPT

## 2025-03-30 RX ORDER — PHENOBARBITAL SODIUM 65 MG/ML
65 INJECTION, SOLUTION INTRAMUSCULAR; INTRAVENOUS ONCE
Status: DISCONTINUED | OUTPATIENT
Start: 2025-03-31 | End: 2025-03-30 | Stop reason: HOSPADM

## 2025-03-30 RX ORDER — PHENOBARBITAL 32.4 MG/1
32.4 TABLET ORAL ONCE
Status: DISCONTINUED | OUTPATIENT
Start: 2025-04-02 | End: 2025-03-30 | Stop reason: HOSPADM

## 2025-03-30 RX ORDER — PHENOBARBITAL 32.4 MG/1
32.4 TABLET ORAL ONCE
Status: DISCONTINUED | OUTPATIENT
Start: 2025-04-01 | End: 2025-03-30 | Stop reason: HOSPADM

## 2025-03-30 RX ADMIN — FOLIC ACID 1 MG: 5 INJECTION, SOLUTION INTRAMUSCULAR; INTRAVENOUS; SUBCUTANEOUS at 08:10

## 2025-03-30 RX ADMIN — Medication 10 ML: at 04:03

## 2025-03-30 RX ADMIN — LORAZEPAM 2 MG: 2 INJECTION INTRAMUSCULAR; INTRAVENOUS at 04:04

## 2025-03-30 RX ADMIN — LORAZEPAM 1 MG: 2 INJECTION INTRAMUSCULAR; INTRAVENOUS at 10:39

## 2025-03-30 RX ADMIN — LORAZEPAM 2 MG: 2 INJECTION INTRAMUSCULAR; INTRAVENOUS at 08:10

## 2025-03-30 RX ADMIN — Medication 1 TABLET: at 08:09

## 2025-03-30 RX ADMIN — ACETAMINOPHEN 650 MG: 325 TABLET, FILM COATED ORAL at 04:02

## 2025-03-30 RX ADMIN — THIAMINE HYDROCHLORIDE 200 MG: 100 INJECTION, SOLUTION INTRAMUSCULAR; INTRAVENOUS at 04:03

## 2025-03-30 RX ADMIN — ACETAMINOPHEN 650 MG: 325 TABLET, FILM COATED ORAL at 08:09

## 2025-03-30 NOTE — CONSULTS
"Access Center consult regarding alcohol use. Similarly to access consult note from October 2024, pt planned to check self into [facility] near. He was told he would need medical clearance before admission; his wife drove him here. Ethanol level in , UDS negative. Reviewed patients chart and noted 3/29 CIWA score of 18 at 1930. Pt receiving Ativan per detox protocol. Patients primary RN, Sharon, reported patient as awake exhibiting anxiety and restlessness. Upon entry to room pt was noted alone shaking legs vigorously. Introduced self and role.     Pt was pleasant and agreeable to interview. He complained of mild headache, anxiety and restlessness. \"I can't sit still.\" Pt reported current anxiety level as an 8 out of 10, and stated he has not felt depressed, but has been \"drinking to not feel like crap.\" Pt denies SI, HI, and hallucinations. Pt denies history of suicide attempts and has not wised to be dead or not wake up. Pt sleep \"has been crap\", stating he will get a couple hours and wake up, and makes up for it when he can. Pt appetite is \"ok\" he states since relapse for the past month or two he is probably only eating once per day.     Mental health: Similar to previous note patient sights difficulty obtaining his Vyvanse, either related to virtual consults with wrong MD or inability to pay for it. He states when he is on it he \"gets shit done\" and \"I don't even think about drinking.\" Pt drinking has likely prevented follow up as he states now his only barrier to obtaining is just \"making the call.\" Pt states he sees providers through [facility] and was unable to recall provider names. Pt continues to deny psychiatric hospitalization. Pt is not on anti-depressant or anxiety medications and denies current need for them.    Substance use: Pt continues to smoke 1/3 pack per day, first use age 11. Pt states he was sober until this January/February after his October visit to [facility] ED and a 10 " "day admission to [facility]. He states that since relapse he has been drinking \"10 to 20 fireballs, one shots\" per day every day of the week. He denies using other substances during that time. When asked why he is now seeking treatment he states \"I know I need to do it for my kids.\" He denies motivation from his wife stating that she is an alcoholic and often starts drinking first thing in the morning. Pt also cites needing to clean up his act for a April 14th parole follow up. He also states he has a court date around that time in which he hopes to get the ok for Vivitrol injection for alcohol craving control. See note from 10/23/24 for history of substance abuse treatment, and opiate use history.    Social: Pt continues to live in home with his wife and 2 children, aged 8 and 10. Pt's mother lives down the road and is primary support for them. Pt continues to voice support from his aunt Pebbles, close friends and his sponsor that he has had for past year. Pt currently working as a  which he has done for \"20 years\".  See note from 10/23/24 on pt legal issues, hobbies, etc.    Plan: Patient voiced motivation for sobriety several times throughout conversation. In addition to legal issues and desire to wanting to be a positive role model for his children he states he \"knows the ropes\" and knows what he needs to do, but now has to put it into practice. Pt declines need for inpatient rehab at this time, though I encouraged patient to consider [facility] tx options in that after medical clearance here and the scenario of less complicated detox, he may need something other than AA and a sponsor to break his current pattern of alcohol use. Pt focus today was on being able to return to his job as a , which he enjoys. Pt current plan is to have a safe detox, call sponsor and return to nearby AA groups he has already attended. Provided patient with [facility] treatment handout for his consideration.       " Statement Selected

## 2025-03-30 NOTE — NURSING NOTE
Patient sitting on side of bed saying he is ready to go home. Dr Soriano has already seen him today and says he needs to stay for several days. Patient says he cannot stay for several days.

## 2025-03-30 NOTE — H&P
HISTORY AND PHYSICAL   [facility]        Date of Admission: 3/29/2025  Patient Identification:  Name: Alvaro Morris  Age: 39 y.o.  Sex: male  :  1985  MRN: [id number]                     Primary Care Physician: Christiano Heredia MD    Chief Complaint:  39 year old gentleman presented to the emergency room with tremors, malaise and alcohol withdrawal; he has a history of alcohol dependence; his last drink was this am; he has apst history of withdrawal but denies any history of seizures; he was seen at the [place] but told he had to medically cleared first; no visitors are present with him    History of Present Illness:   As above    Past Medical History:  Past Medical History:   Diagnosis Date    ADHD     Bursitis of elbow     right elbow    Has never received COVID-19 vaccine     Hemorrhoids     Hypertension     B/P OK SINCE STOPPED SMOKING-NO MEDS CURRENTLY    Left shoulder pain     Rotator cuff tear     LUCIANA     Past Surgical History:  Past Surgical History:   Procedure Laterality Date    ARM DEBRIDEMENT Left 2022    Procedure: UPPER EXTREMITY DEBRIDEMENT labrium;  Surgeon: Queta Cordero MD;  Location: [facility];  Service: Orthopedics;  Laterality: Left;    HEMORRHOIDECTOMY N/A 2016    Procedure: HEMORRHOIDECTOMY;  Surgeon: Atilio Smith MD;  Location: [facility]  Service:     SHOULDER ARTHROSCOPY W/ ROTATOR CUFF REPAIR Right 2017    Procedure: SHOULDER ARTHROSCOPY WITH MINI OPEN ROTATOR CUFF REPAIR  DEBRIDEMENT OF LABRUM DEBRIDEMENT OF SUBSCAPULARIS DECOMPRESSION;  Surgeon: Pily Cordero MD;  Location: [facility];  Service:     SHOULDER ARTHROSCOPY W/ ROTATOR CUFF REPAIR Left 2022    Procedure: SHOULDER ARTHROSCOPY WITH ROTATOR CUFF REPAIR;  Surgeon: Queta Cordero MD;  Location: [facility];  Service: Orthopedics;  Laterality: Left;      Home Meds:  Medications Prior to Admission   Medication Sig Dispense Refill Last Dose/Taking     "acetaminophen (TYLENOL) 500 MG tablet Take 2 tablets by mouth 2 (Two) Times a Day. 40 tablet 0     lisdexamfetamine (Vyvanse) 30 MG capsule Take 1 capsule by mouth Every Morning 30 capsule 0     multivitamin with minerals tablet tablet Take 1 tablet by mouth Daily. PT HOLDING FOR SURGERY       ondansetron (ZOFRAN) 8 MG tablet Take 1 tablet by mouth Every 8 (Eight) Hours As Needed for Nausea. 9 tablet 0        Allergies:  No Known Allergies  Immunizations:  Immunization History   Administered Date(s) Administered    Pneumococcal Conjugate 20-Valent (PCV20) 08/08/2024    Tdap 02/28/2019     Social History:   Social History     Social History Narrative    Not on file     Social History     Socioeconomic History    Marital status:    Tobacco Use    Smoking status: Some Days     Current packs/day: 0.25     Average packs/day: 0.3 packs/day for 10.0 years (2.5 ttl pk-yrs)     Types: Cigarettes     Passive exposure: Current    Smokeless tobacco: Former   Vaping Use    Vaping status: Former    Substances: Nicotine    Devices: Disposable   Substance and Sexual Activity    Alcohol use: Yes     Alcohol/week: 25.0 standard drinks of alcohol     Types: 25 Shots of liquor per week     Comment: patient reports drinking \"anything. fireball, beer, whiskey\" 10/23/24    Drug use: No    Sexual activity: Defer       Family History:  Family History   Problem Relation Age of Onset    Malig Hyperthermia Neg Hx         Review of Systems  See history of present illness and past medical history.  Patient denies headache, dizziness, syncope, falls, trauma, change in vision, change in hearing, change in taste, changes in weight, changes in appetite, focal weakness, numbness, or paresthesia.  Patient denies chest pain, palpitations, dyspnea, orthopnea, PND, cough, sinus pressure, rhinorrhea, epistaxis, hemoptysis, nausea, vomiting,hematemesis, diarrhea, constipation or hematochezia.  Denies cold or heat intolerance, polydipsia, polyuria, " "polyphagia. Denies hematuria, pyuria, dysuria, hesitancy, frequency or urgency. Denies consumption of raw and under cooked meats foods or change in water source.     Objective:  T Max 24 hrs: Temp (24hrs), Av.3 °F (36.8 °C), Min:98.2 °F (36.8 °C), Max:98.4 °F (36.9 °C)    Vitals Ranges:   Temp:  [98.2 °F (36.8 °C)-98.4 °F (36.9 °C)] 98.4 °F (36.9 °C)  Heart Rate:  [] 83  Resp:  [18] 18  BP: (133-139)/() 133/119      Exam:  BP (!) 133/119 (BP Location: Right arm, Patient Position: Sitting)   Pulse 83   Temp 98.4 °F (36.9 °C) (Oral)   Resp 18   Ht 190.5 cm (75\")   Wt 74.3 kg (163 lb 14.4 oz)   SpO2 96%   BMI 20.49 kg/m²     General Appearance:    Alert, cooperative, no distress, appears stated agel tremulous   Head:    Normocephalic, without obvious abnormality, atraumatic   Eyes:    PERRL, conjunctivae/corneas clear, EOM's intact, both eyes   Ears:    Normal external ear canals, both ears   Nose:   Nares normal, septum midline, mucosa normal, no drainage    or sinus tenderness   Throat:   Lips, mucosa, and tongue normal   Neck:   Supple, symmetrical, trachea midline, no adenopathy;     thyroid:  no enlargement/tenderness/nodules; no carotid    bruit or JVD   Back:     Symmetric, no curvature, ROM normal, no CVA tenderness   Lungs:     Clear to auscultation bilaterally, respirations unlabored   Chest Wall:    No tenderness or deformity    Heart:    Regular rate and rhythm, S1 and S2 normal, no murmur, rub   or gallop   Abdomen:     Soft, nontender, bowel sounds active all four quadrants,     no masses, no hepatomegaly, no splenomegaly   Extremities:   Extremities normal, atraumatic, no cyanosis or edema                       .    Data Review:  Labs in chart were reviewed.  WBC   Date Value Ref Range Status   2025 5.77 3.40 - 10.80 10*3/mm3 Final     Hemoglobin   Date Value Ref Range Status   2025 16.0 13.0 - 17.7 g/dL Final     Hematocrit   Date Value Ref Range Status   2025 " 46.3 37.5 - 51.0 % Final     Platelets   Date Value Ref Range Status   03/29/2025 176 140 - 450 10*3/mm3 Final     Sodium   Date Value Ref Range Status   03/29/2025 145 136 - 145 mmol/L Final     Potassium   Date Value Ref Range Status   03/29/2025 4.0 3.5 - 5.2 mmol/L Final     Comment:     Slight hemolysis detected by analyzer. Result may be falsely elevated.     Chloride   Date Value Ref Range Status   03/29/2025 104 98 - 107 mmol/L Final     CO2   Date Value Ref Range Status   03/29/2025 26.9 22.0 - 29.0 mmol/L Final     BUN   Date Value Ref Range Status   03/29/2025 6 6 - 20 mg/dL Final     Creatinine   Date Value Ref Range Status   03/29/2025 0.83 0.76 - 1.27 mg/dL Final     Glucose   Date Value Ref Range Status   03/29/2025 116 (H) 65 - 99 mg/dL Final     Calcium   Date Value Ref Range Status   03/29/2025 8.8 8.6 - 10.5 mg/dL Final     Magnesium   Date Value Ref Range Status   03/29/2025 2.0 1.6 - 2.6 mg/dL Final                Imaging Results (All)       None              Assessment:  Active Hospital Problems    Diagnosis  POA    **Alcohol withdrawal [F10.939]  Yes    Alcohol withdrawal syndrome [F10.939]  Yes      Resolved Hospital Problems   No resolved problems to display.   Alcohol dependence  Hypertension  Hyperglycemia  Tobacco use  underweight    Plan:  Will put on ciwa protocol  Trend labs  Fluids  Monitor on telemetry  Dw patient and ed provider    Ara Rivera MD  3/29/2025  23:18 EDT

## 2025-03-30 NOTE — PROGRESS NOTES
Name: Alvaro Morris ADMIT: 3/29/2025   : 1985  PCP: Christiano Heredia MD    MRN: [id number] LOS: 1 days   AGE/SEX: 39 y.o. male  ROOM: [facility]     Subjective   Subjective   No acute events overnight.  Patient states he is feeling okay today.  Feeling pretty fidgety.  Denies chest pain or shortness of breath.  Access has just left the room.    Objective   Objective     Vital Signs  Temp:  [97.9 °F (36.6 °C)-98.4 °F (36.9 °C)] 97.9 °F (36.6 °C)  Heart Rate:  [] 83  Resp:  [16-18] 17  BP: (133-146)/() 146/94  SpO2:  [92 %-100 %] 98 %  on   ;   Device (Oxygen Therapy): room air  Body mass index is 20.92 kg/m².    Physical Exam  General: Alert, no acute distress.  Sitting up in bed.  Fidgety.  Tremulous.  ENT: No conjunctival injection or scleral icterus. Moist mucous membranes.   Neuro: Eyes open and moving in all directions, strength normal in all extremities, no focal deficits.   Lungs: Clear to auscultation bilaterally. No wheeze or crackles. No distress.   Heart: RRR, no murmurs. No edema.  Abdomen: Soft, non-tender, non-distended. Normal bowel sounds.   Ext: Warm and well-perfused. No edema.   Skin: No rashes or lesions. IV site without swelling or erythema.     Results Review     I reviewed the patient's new clinical results:  Results from last 7 days   Lab Units 25  0730 25  1707   WBC 10*3/mm3 5.68 5.77   HEMOGLOBIN g/dL 14.3 16.0   PLATELETS 10*3/mm3 145 176     Results from last 7 days   Lab Units 25  0730 25  1707   SODIUM mmol/L 141 145   POTASSIUM mmol/L 3.7 4.0   CHLORIDE mmol/L 103 104   CO2 mmol/L 26.2 26.9   BUN mg/dL 9 6   CREATININE mg/dL 0.71* 0.83   GLUCOSE mg/dL 99 116*   EGFR mL/min/1.73 118.9 113.5     Results from last 7 days   Lab Units 25  1707   ALBUMIN g/dL 4.4   BILIRUBIN mg/dL 0.7   ALK PHOS U/L 83   AST (SGOT) U/L 30   ALT (SGPT) U/L 17     Results from last 7 days   Lab Units 25  0730 25  1707   CALCIUM mg/dL 8.6  "8.8   ALBUMIN g/dL  --  4.4   MAGNESIUM mg/dL  --  2.0       No results found for: \"HGBA1C\", \"POCGLU\"    No radiology results for the last day    I have personally reviewed all medications:  Scheduled Medications  folic acid, 1 mg, Oral, Daily  folic acid 1 mg in sodium chloride 0.9 % 50 mL IVPB, 1 mg, Intravenous, Daily  multivitamin with minerals, 1 tablet, Oral, Daily  thiamine (B-1) IV, 200 mg, Intravenous, Q8H   Followed by  [START ON 4/4/2025] thiamine, 100 mg, Oral, Daily    Infusions   Diet  Diet: Cardiac; Healthy Heart (2-3 Na+); Fluid Consistency: Thin (IDDSI 0)      Intake/Output Summary (Last 24 hours) at 3/30/2025 1125  Last data filed at 3/30/2025 0807  Gross per 24 hour   Intake 240 ml   Output --   Net 240 ml       Assessment/Plan     Active Hospital Problems    Diagnosis  POA    **Alcohol withdrawal [F10.939]  Yes    Alcohol withdrawal syndrome [F10.939]  Yes      Resolved Hospital Problems   No resolved problems to display.     39 y.o. male with Alcohol withdrawal.    Alcohol use disorder  Alcohol withdrawal  -CIWA protocol  -Thiamine, folate, multivitamin  -Ativan as needed for withdrawal  -Access consulted and following  -Most recent CIWA score: 10, 10, 8, 8, 10  -Monitor CIWA's closely.  Given consistent elevation and concern for significant withdrawal, going to go ahead and start a phenobarb taper.    Elevated blood pressure  -BP intermittently elevated, likely secondary to withdrawal  -Most recently trend is improved  -Monitor for now, can add as needed medication if needed    SCDs for DVT prophylaxis.  Full code.  Discussed with patient.  Anticipate discharge TBD      Charlene Soriano MD  [facility]  03/30/25  11:25 EDT    "

## 2025-03-30 NOTE — NURSING NOTE
Patient has decided to leave AMA. Nurse notified Dr Soriano and  Queta. Nurse asked patient who would transport him home. He is currently calling for a ride.

## 2025-03-30 NOTE — ED NOTES
"Nursing report ED to floor  Alvaro Morris  39 y.o.  male    HPI :  HPI  Stated Reason for Visit: alcohol withdrawals  History Obtained From: patient  Duration (Hours): 5    Chief Complaint  Chief Complaint   Patient presents with    Alcohol Problem       Admitting doctor:   Ara Rivera MD    Admitting diagnosis:   The encounter diagnosis was Alcohol withdrawal syndrome without complication.    Code status:   Current Code Status       Date Active Code Status Order ID Comments User Context       3/29/2025 1935 CPR (Attempt to Resuscitate) [id number]  Ara Rivera MD ED        Question Answer    Code Status (Patient has no pulse and is not breathing) CPR (Attempt to Resuscitate)    Medical Interventions (Patient has pulse or is breathing) Full                    Allergies:   Patient has no known allergies.    Isolation:   No active isolations    Intake and Output  No intake or output data in the 24 hours ending 03/29/25 2004    Weight:       03/29/25 1907   Weight: 82 kg (180 lb 12.4 oz)       Most recent vitals:   Vitals:    03/29/25 1649 03/29/25 1650 03/29/25 1907 03/29/25 1931   BP: 137/77   139/97   Pulse:  92  96   Resp:       Temp:       SpO2:  97%  92%   Weight:   82 kg (180 lb 12.4 oz)    Height:   188 cm (74\")        Active LDAs/IV Access:   Lines, Drains & Airways       Active LDAs       Name Placement date Placement time Site Days    Peripheral IV 03/29/25 1707 Anterior;Left Forearm 03/29/25 1707  Forearm  less than 1                    Labs (abnormal labs have a star):   Labs Reviewed   COMPREHENSIVE METABOLIC PANEL - Abnormal; Notable for the following components:       Result Value    Glucose 116 (*)     All other components within normal limits    Narrative:     GFR Categories in Chronic Kidney Disease (CKD)      GFR Category          GFR (mL/min/1.73)    Interpretation  G1                     90 or greater         Normal or high (1)  G2                      60-89                " Mild decrease (1)  G3a                   45-59                Mild to moderate decrease  G3b                   30-44                Moderate to severe decrease  G4                    15-29                Severe decrease  G5                    14 or less           Kidney failure          (1)In the absence of evidence of kidney disease, neither GFR category G1 or G2 fulfill the criteria for CKD.    eGFR calculation 2021 CKD-EPI creatinine equation, which does not include race as a factor   ETHANOL - Abnormal; Notable for the following components:    Ethanol 288 (*)     All other components within normal limits   CBC WITH AUTO DIFFERENTIAL - Abnormal; Notable for the following components:    MCH 33.1 (*)     Lymphocyte % 45.9 (*)     Monocyte % 4.3 (*)     All other components within normal limits   URINE DRUG SCREEN - Normal    Narrative:     Cutoff For Drugs Screened:    Amphetamines               500 ng/ml  Barbiturates               200 ng/ml  Benzodiazepines            150 ng/ml  Cocaine                    150 ng/ml  Methadone                  200 ng/ml  Opiates                    100 ng/ml  Phencyclidine               25 ng/ml  THC                         50 ng/ml  Methamphetamine            500 ng/ml  Tricyclic Antidepressants  300 ng/ml  Oxycodone                  100 ng/ml  Buprenorphine               10 ng/ml    The normal value for all drugs tested is negative. This report includes unconfirmed screening results, with the cutoff values listed, to be used for medical treatment purposes only.  Unconfirmed results must not be used for non-medical purposes such as employment or legal testing.  Clinical consideration should be applied to any drug of abuse test, particularly when unconfirmed results are used.     MAGNESIUM - Normal   FENTANYL, URINE - Normal    Narrative:     Negative Threshold:      Fentanyl 5 ng/mL     The normal value for the drug tested is negative. This report includes final unconfirmed  screening results to be used for medical treatment purposes only. Unconfirmed results must not be used for non-medical purposes such as employment or legal testing. Clinical consideration should be applied to any drug of abuse test, particularly when unconfirmed results are used.          CBC AND DIFFERENTIAL    Narrative:     The following orders were created for panel order CBC & Differential.  Procedure                               Abnormality         Status                     ---------                               -----------         ------                     CBC Auto Differential[[id number]]        Abnormal            Final result                 Please view results for these tests on the individual orders.       EKG:   No orders to display       Meds given in ED:   Medications   sodium chloride 0.9 % flush 10 mL (has no administration in time range)   acetaminophen (TYLENOL) tablet 650 mg (has no administration in time range)     Or   acetaminophen (TYLENOL) 160 MG/5ML oral solution 650 mg (has no administration in time range)     Or   acetaminophen (TYLENOL) suppository 650 mg (has no administration in time range)   ondansetron ODT (ZOFRAN-ODT) disintegrating tablet 4 mg (has no administration in time range)     Or   ondansetron (ZOFRAN) injection 4 mg (has no administration in time range)   melatonin tablet 2.5 mg (has no administration in time range)   sennosides-docusate (PERICOLACE) 8.6-50 MG per tablet 2 tablet (has no administration in time range)     And   polyethylene glycol (MIRALAX) packet 17 g (has no administration in time range)     And   bisacodyl (DULCOLAX) EC tablet 5 mg (has no administration in time range)     And   bisacodyl (DULCOLAX) suppository 10 mg (has no administration in time range)   Magnesium Standard Dose Replacement - Follow Nurse / BPA Driven Protocol (has no administration in time range)   folic acid (FOLVITE) tablet 1 mg (has no administration in time range)   cloNIDine  "(CATAPRES) tablet 0.1 mg (has no administration in time range)   thiamine (B-1) injection 200 mg (has no administration in time range)     Followed by   thiamine (VITAMIN B-1) tablet 100 mg (has no administration in time range)   folic acid 1 mg in sodium chloride 0.9 % 50 mL IVPB (has no administration in time range)   multivitamin with minerals 1 tablet (1 tablet Oral Given 3/29/25 1951)   LORazepam (ATIVAN) tablet 1 mg ( Oral Not Given:  See Alt 3/29/25 1951)     Or   LORazepam (ATIVAN) injection 1 mg ( Intravenous Not Given:  See Alt 3/29/25 1951)     Or   LORazepam (ATIVAN) tablet 2 mg ( Oral Not Given:  See Alt 3/29/25 1951)     Or   LORazepam (ATIVAN) injection 2 mg (2 mg Intravenous Given 3/29/25 1951)     Or   LORazepam (ATIVAN) injection 2 mg ( Intravenous Not Given:  See Alt 3/29/25 1951)     Or   LORazepam (ATIVAN) injection 2 mg ( Intramuscular Not Given:  See Alt 3/29/25 1951)   sodium chloride 0.9 % bolus 1,000 mL (1,000 mL Intravenous New Bag 3/29/25 1731)   LORazepam (ATIVAN) injection 1 mg (1 mg Intravenous Given 3/29/25 1730)   thiamine (VITAMIN B-1) tablet 100 mg (100 mg Oral Given 3/29/25 1951)       Imaging results:  No radiology results for the last day    Ambulatory status:   - ad jackelin    Social issues:   Social History     Socioeconomic History    Marital status:    Tobacco Use    Smoking status: Some Days     Current packs/day: 0.25     Average packs/day: 0.3 packs/day for 10.0 years (2.5 ttl pk-yrs)     Types: Cigarettes     Passive exposure: Current    Smokeless tobacco: Former   Vaping Use    Vaping status: Former    Substances: Nicotine    Devices: Disposable   Substance and Sexual Activity    Alcohol use: Yes     Alcohol/week: 25.0 standard drinks of alcohol     Types: 25 Shots of liquor per week     Comment: patient reports drinking \"anything. fireball, beer, whiskey\" 10/23/24    Drug use: No    Sexual activity: Defer       Peripheral Neurovascular  Peripheral Neurovascular " (Adult)  Peripheral Neurovascular WDL: WDL, pulse assessment  Pulse Assessment: radial    Neuro Cognitive  Neuro Cognitive (Adult)  Cognitive/Neuro/Behavioral WDL: .WDL except, arousability, mood/behavior, level of consciousness, motor response, orientation, speech, all  Level of Consciousness: Alert  Arousal Level: opens eyes spontaneously  Orientation: person, place, time, situation  Speech: clear, spontaneous, logical  Mood/Behavior: behavior appropriate to situation, cooperative, calm  Motor Response  Motor Response: general motor response  General Motor Response: purposeful motor response    Learning  Learning Assessment  Learning Readiness and Ability: no barriers identified  Education Provided  Person Taught: patient  Teaching Method: verbal instruction  Teaching Focus: symptom/problem overview, medical device/equipment use, medication actions and side effects, diet modification  Education Outcome Evaluation: verbalizes understanding,  used, needs reinforcement    Respiratory  Respiratory  Airway WDL: WDL  Respiratory WDL  Respiratory WDL: WDL, all  Rhythm/Pattern, Respiratory: no shortness of breath reported, depth regular, pattern regular, unlabored  Expansion/Accessory Muscles/Retractions: no use of accessory muscles, no retractions, expansion symmetric    Abdominal Pain  Safety Interventions  Safety Precautions/Falls Reduction: commode/urinal/bedpan at bedside, assistive device/personal items within reach, seizure precautions    Pain Assessments  Pain (Adult)  (0-10) Pain Rating: Rest: 0  (0-10) Pain Rating: Activity: 0    NIH Stroke Scale       Dina Escobar RN  03/29/25 20:04 EDT

## 2025-03-30 NOTE — OUTREACH NOTE
Prep Survey      Flowsheet Row Responses   [facility] patient discharged from? [place]   Is LACE score < 7 ? Yes   [facility]ligibility [facility]   Date of Admission 03/29/25   Date of Discharge 03/30/25   Discharge Disposition Home or Self Care   Discharge diagnosis *Alcohol withdrawal   Does the patient have one of the following disease processes/diagnoses(primary or secondary)? Other   Does the patient have Home health ordered? No   Is there a DME ordered? No   Prep survey completed? Yes            JENNIFER DAVIES - Registered Nurse

## 2025-03-31 ENCOUNTER — TRANSITIONAL CARE MANAGEMENT TELEPHONE ENCOUNTER (OUTPATIENT)
Dept: CALL CENTER | Facility: HOSPITAL | Age: 40
End: 2025-03-31
Payer: COMMERCIAL

## 2025-03-31 NOTE — OUTREACH NOTE
Call Center TCM Note      Flowsheet Row Responses   [facility] patient discharged from? [place]   Does the patient have one of the following disease processes/diagnoses(primary or secondary)? Other   TCM attempt successful? No   Unsuccessful attempts Attempt 1   Revoked Reason Other  [Patient left AMA- not elgible for TCM]            Bassam CUETO - Registered Nurse    3/31/2025, 10:11 EDT

## 2025-03-31 NOTE — PAYOR COMM NOTE
"Alvaro Morris (39 y.o. Male)        PLEASE SEE ATTACHED FOR AN INPT MEDICAL ADMIT    REF#[id number]    PLEASE CALL  OR  [phone]    THANK YOU    DORA BRADLEY LPN [facility]   Date of Birth   1985    Social Security Number       Address   [address] [facility] [address]    Home Phone   [phone]    MRN   [id number]       Church   [employer]    Marital Status                               Admission Date   3/29/2025    Admission Type   Emergency    Admitting Provider   Ara Rivera MD    Attending Provider       Department, Room/Bed   [facility] [address] [facility], E663/1       Discharge Date   3/30/2025    Discharge Disposition   Home or Self Care    Discharge Destination                                 Attending Provider: (none)   Allergies: No Known Allergies    Isolation: None   Infection: None   Code Status: Prior    Ht: 190.5 cm (75\")   Wt: 75.9 kg (167 lb 6.4 oz)    Admission Cmt: None   Principal Problem: Alcohol withdrawal [F10.939]                   Active Insurance as of 3/29/2025       Primary Coverage       Payor Plan Insurance Group Employer/Plan Group    [employer]HE[employer] BLUE CROSS ANTHEM BLUE CROSS BLUE SHIELD PPO [id number]       Payor Plan Address Payor Plan Phone Number Payor Plan Fax Number Effective Dates    [address] [phone]  2022 - None Entered    [facility] [id number]         Subscriber Name Subscriber Birth Date Member ID       HALLE MORRIS 10/5/1985 [id number]                     Emergency Contacts        (Rel.) Home Phone Work Phone Mobile Phone    Halle Morris (Spouse) -- -- [phone]              [place]: [facility] [id number]  Tax ID [id number]     History & Physical        Ara Rivera MD at [phone]          HISTORY AND PHYSICAL   [facility]        Date of Admission: 3/29/2025  Patient Identification:  Name: Alvaro Morris  Age: 39 y.o.  Sex: male  :  " 1985  MRN: [id number]                     Primary Care Physician: Christiano Heredia MD    Chief Complaint:  39 year old gentleman presented to the emergency room with tremors, malaise and alcohol withdrawal; he has a history of alcohol dependence; his last drink was this am; he has apst history of withdrawal but denies any history of seizures; he was seen at the [facility] but told he had to medically cleared first; no visitors are present with him    History of Present Illness:   As above    Past Medical History:  Past Medical History:   Diagnosis Date    ADHD     Bursitis of elbow     right elbow    Has never received COVID-19 vaccine     Hemorrhoids     Hypertension     B/P OK SINCE STOPPED SMOKING-NO MEDS CURRENTLY    Left shoulder pain     Rotator cuff tear     LUCIANA     Past Surgical History:  Past Surgical History:   Procedure Laterality Date    ARM DEBRIDEMENT Left 7/27/2022    Procedure: UPPER EXTREMITY DEBRIDEMENT labrium;  Surgeon: Queta Cordero MD;  Location: [facility];  Service: Orthopedics;  Laterality: Left;    HEMORRHOIDECTOMY N/A 6/23/2016    Procedure: HEMORRHOIDECTOMY;  Surgeon: Atilio Smith MD;  Location: [facility];  Service:     SHOULDER ARTHROSCOPY W/ ROTATOR CUFF REPAIR Right 12/4/2017    Procedure: SHOULDER ARTHROSCOPY WITH MINI OPEN ROTATOR CUFF REPAIR  DEBRIDEMENT OF LABRUM DEBRIDEMENT OF SUBSCAPULARIS DECOMPRESSION;  Surgeon: Pily Cordero MD;  Location: [facility];  Service:     SHOULDER ARTHROSCOPY W/ ROTATOR CUFF REPAIR Left 7/27/2022    Procedure: SHOULDER ARTHROSCOPY WITH ROTATOR CUFF REPAIR;  Surgeon: Queta Cordero MD;  Location: [facility];  Service: Orthopedics;  Laterality: Left;      Home Meds:  Medications Prior to Admission   Medication Sig Dispense Refill Last Dose/Taking    acetaminophen (TYLENOL) 500 MG tablet Take 2 tablets by mouth 2 (Two) Times a Day. 40 tablet 0     lisdexamfetamine (Vyvanse) 30 MG capsule Take 1 capsule by mouth  "Every Morning 30 capsule 0     multivitamin with minerals tablet tablet Take 1 tablet by mouth Daily. PT HOLDING FOR SURGERY       ondansetron (ZOFRAN) 8 MG tablet Take 1 tablet by mouth Every 8 (Eight) Hours As Needed for Nausea. 9 tablet 0        Allergies:  No Known Allergies  Immunizations:  Immunization History   Administered Date(s) Administered    Pneumococcal Conjugate 20-Valent (PCV20) 08/08/2024    Tdap 02/28/2019     Social History:   Social History     Social History Narrative    Not on file     Social History     Socioeconomic History    Marital status:    Tobacco Use    Smoking status: Some Days     Current packs/day: 0.25     Average packs/day: 0.3 packs/day for 10.0 years (2.5 ttl pk-yrs)     Types: Cigarettes     Passive exposure: Current    Smokeless tobacco: Former   Vaping Use    Vaping status: Former    Substances: Nicotine    Devices: Disposable   Substance and Sexual Activity    Alcohol use: Yes     Alcohol/week: 25.0 standard drinks of alcohol     Types: 25 Shots of liquor per week     Comment: patient reports drinking \"anything. fireball, beer, whiskey\" 10/23/24    Drug use: No    Sexual activity: Defer       Family History:  Family History   Problem Relation Age of Onset    Malig Hyperthermia Neg Hx         Review of Systems  See history of present illness and past medical history.  Patient denies headache, dizziness, syncope, falls, trauma, change in vision, change in hearing, change in taste, changes in weight, changes in appetite, focal weakness, numbness, or paresthesia.  Patient denies chest pain, palpitations, dyspnea, orthopnea, PND, cough, sinus pressure, rhinorrhea, epistaxis, hemoptysis, nausea, vomiting,hematemesis, diarrhea, constipation or hematochezia.  Denies cold or heat intolerance, polydipsia, polyuria, polyphagia. Denies hematuria, pyuria, dysuria, hesitancy, frequency or urgency. Denies consumption of raw and under cooked meats foods or change in water source. " "    Objective:  T Max 24 hrs: Temp (24hrs), Av.3 °F (36.8 °C), Min:98.2 °F (36.8 °C), Max:98.4 °F (36.9 °C)    Vitals Ranges:   Temp:  [98.2 °F (36.8 °C)-98.4 °F (36.9 °C)] 98.4 °F (36.9 °C)  Heart Rate:  [] 83  Resp:  [18] 18  BP: (133-139)/() 133/119      Exam:  BP (!) 133/119 (BP Location: Right arm, Patient Position: Sitting)   Pulse 83   Temp 98.4 °F (36.9 °C) (Oral)   Resp 18   Ht 190.5 cm (75\")   Wt 74.3 kg (163 lb 14.4 oz)   SpO2 96%   BMI 20.49 kg/m²     General Appearance:    Alert, cooperative, no distress, appears stated agel tremulous   Head:    Normocephalic, without obvious abnormality, atraumatic   Eyes:    PERRL, conjunctivae/corneas clear, EOM's intact, both eyes   Ears:    Normal external ear canals, both ears   Nose:   Nares normal, septum midline, mucosa normal, no drainage    or sinus tenderness   Throat:   Lips, mucosa, and tongue normal   Neck:   Supple, symmetrical, trachea midline, no adenopathy;     thyroid:  no enlargement/tenderness/nodules; no carotid    bruit or JVD   Back:     Symmetric, no curvature, ROM normal, no CVA tenderness   Lungs:     Clear to auscultation bilaterally, respirations unlabored   Chest Wall:    No tenderness or deformity    Heart:    Regular rate and rhythm, S1 and S2 normal, no murmur, rub   or gallop   Abdomen:     Soft, nontender, bowel sounds active all four quadrants,     no masses, no hepatomegaly, no splenomegaly   Extremities:   Extremities normal, atraumatic, no cyanosis or edema                       .    Data Review:  Labs in chart were reviewed.  WBC   Date Value Ref Range Status   2025 5.77 3.40 - 10.80 10*3/mm3 Final     Hemoglobin   Date Value Ref Range Status   2025 16.0 13.0 - 17.7 g/dL Final     Hematocrit   Date Value Ref Range Status   2025 46.3 37.5 - 51.0 % Final     Platelets   Date Value Ref Range Status   2025 176 140 - 450 10*3/mm3 Final     Sodium   Date Value Ref Range Status   2025 " 145 136 - 145 mmol/L Final     Potassium   Date Value Ref Range Status   03/29/2025 4.0 3.5 - 5.2 mmol/L Final     Comment:     Slight hemolysis detected by analyzer. Result may be falsely elevated.     Chloride   Date Value Ref Range Status   03/29/2025 104 98 - 107 mmol/L Final     CO2   Date Value Ref Range Status   03/29/2025 26.9 22.0 - 29.0 mmol/L Final     BUN   Date Value Ref Range Status   03/29/2025 6 6 - 20 mg/dL Final     Creatinine   Date Value Ref Range Status   03/29/2025 0.83 0.76 - 1.27 mg/dL Final     Glucose   Date Value Ref Range Status   03/29/2025 116 (H) 65 - 99 mg/dL Final     Calcium   Date Value Ref Range Status   03/29/2025 8.8 8.6 - 10.5 mg/dL Final     Magnesium   Date Value Ref Range Status   03/29/2025 2.0 1.6 - 2.6 mg/dL Final                Imaging Results (All)       None              Assessment:  Active Hospital Problems    Diagnosis  POA    **Alcohol withdrawal [F10.939]  Yes    Alcohol withdrawal syndrome [F10.939]  Yes      Resolved Hospital Problems   No resolved problems to display.   Alcohol dependence  Hypertension  Hyperglycemia  Tobacco use  underweight    Plan:  Will put on ciwa protocol  Trend labs  Fluids  Monitor on telemetry  Dw patient and ed provider    Ara Rivera MD  3/29/2025  23:18 EDT      Electronically signed by Ara Rivera MD at 03/29/25 2321          Emergency Department Notes        Dina Mosquera, RN at 03/29/25 2004          Nursing report ED to floor  Alvaro Morris  39 y.o.  male    HPI :  HPI  Stated Reason for Visit: alcohol withdrawals  History Obtained From: patient  Duration (Hours): 5    Chief Complaint  Chief Complaint   Patient presents with    Alcohol Problem       Admitting doctor:   Ara Rivera MD    Admitting diagnosis:   The encounter diagnosis was Alcohol withdrawal syndrome without complication.    Code status:   Current Code Status       Date Active Code Status Order ID Comments User Context        "3/29/2025 1935 CPR (Attempt to Resuscitate) [id number]  Ara Rivera MD ED        Question Answer    Code Status (Patient has no pulse and is not breathing) CPR (Attempt to Resuscitate)    Medical Interventions (Patient has pulse or is breathing) Full                    Allergies:   Patient has no known allergies.    Isolation:   No active isolations    Intake and Output  No intake or output data in the 24 hours ending 03/29/25 2004    Weight:       03/29/25 1907   Weight: 82 kg (180 lb 12.4 oz)       Most recent vitals:   Vitals:    03/29/25 1649 03/29/25 1650 03/29/25 1907 03/29/25 1931   BP: 137/77   139/97   Pulse:  92  96   Resp:       Temp:       SpO2:  97%  92%   Weight:   82 kg (180 lb 12.4 oz)    Height:   188 cm (74\")        Active LDAs/IV Access:   Lines, Drains & Airways       Active LDAs       Name Placement date Placement time Site Days    Peripheral IV 03/29/25 1707 Anterior;Left Forearm 03/29/25  1707  Forearm  less than 1                    Labs (abnormal labs have a star):   Labs Reviewed   COMPREHENSIVE METABOLIC PANEL - Abnormal; Notable for the following components:       Result Value    Glucose 116 (*)     All other components within normal limits    Narrative:     GFR Categories in Chronic Kidney Disease (CKD)      GFR Category          GFR (mL/min/1.73)    Interpretation  G1                     90 or greater         Normal or high (1)  G2                      60-89                Mild decrease (1)  G3a                   45-59                Mild to moderate decrease  G3b                   30-44                Moderate to severe decrease  G4                    15-29                Severe decrease  G5                    14 or less           Kidney failure          (1)In the absence of evidence of kidney disease, neither GFR category G1 or G2 fulfill the criteria for CKD.    eGFR calculation 2021 CKD-EPI creatinine equation, which does not include race as a factor   ETHANOL - Abnormal; " Notable for the following components:    Ethanol 288 (*)     All other components within normal limits   CBC WITH AUTO DIFFERENTIAL - Abnormal; Notable for the following components:    MCH 33.1 (*)     Lymphocyte % 45.9 (*)     Monocyte % 4.3 (*)     All other components within normal limits   URINE DRUG SCREEN - Normal    Narrative:     Cutoff For Drugs Screened:    Amphetamines               500 ng/ml  Barbiturates               200 ng/ml  Benzodiazepines            150 ng/ml  Cocaine                    150 ng/ml  Methadone                  200 ng/ml  Opiates                    100 ng/ml  Phencyclidine               25 ng/ml  THC                         50 ng/ml  Methamphetamine            500 ng/ml  Tricyclic Antidepressants  300 ng/ml  Oxycodone                  100 ng/ml  Buprenorphine               10 ng/ml    The normal value for all drugs tested is negative. This report includes unconfirmed screening results, with the cutoff values listed, to be used for medical treatment purposes only.  Unconfirmed results must not be used for non-medical purposes such as employment or legal testing.  Clinical consideration should be applied to any drug of abuse test, particularly when unconfirmed results are used.     MAGNESIUM - Normal   FENTANYL, URINE - Normal    Narrative:     Negative Threshold:      Fentanyl 5 ng/mL     The normal value for the drug tested is negative. This report includes final unconfirmed screening results to be used for medical treatment purposes only. Unconfirmed results must not be used for non-medical purposes such as employment or legal testing. Clinical consideration should be applied to any drug of abuse test, particularly when unconfirmed results are used.          CBC AND DIFFERENTIAL    Narrative:     The following orders were created for panel order CBC & Differential.  Procedure                               Abnormality         Status                     ---------                                -----------         ------                     CBC Auto Differential[[id number]]        Abnormal            Final result                 Please view results for these tests on the individual orders.       EKG:   No orders to display       Meds given in ED:   Medications   sodium chloride 0.9 % flush 10 mL (has no administration in time range)   acetaminophen (TYLENOL) tablet 650 mg (has no administration in time range)     Or   acetaminophen (TYLENOL) 160 MG/5ML oral solution 650 mg (has no administration in time range)     Or   acetaminophen (TYLENOL) suppository 650 mg (has no administration in time range)   ondansetron ODT (ZOFRAN-ODT) disintegrating tablet 4 mg (has no administration in time range)     Or   ondansetron (ZOFRAN) injection 4 mg (has no administration in time range)   melatonin tablet 2.5 mg (has no administration in time range)   sennosides-docusate (PERICOLACE) 8.6-50 MG per tablet 2 tablet (has no administration in time range)     And   polyethylene glycol (MIRALAX) packet 17 g (has no administration in time range)     And   bisacodyl (DULCOLAX) EC tablet 5 mg (has no administration in time range)     And   bisacodyl (DULCOLAX) suppository 10 mg (has no administration in time range)   Magnesium Standard Dose Replacement - Follow Nurse / BPA Driven Protocol (has no administration in time range)   folic acid (FOLVITE) tablet 1 mg (has no administration in time range)   cloNIDine (CATAPRES) tablet 0.1 mg (has no administration in time range)   thiamine (B-1) injection 200 mg (has no administration in time range)     Followed by   thiamine (VITAMIN B-1) tablet 100 mg (has no administration in time range)   folic acid 1 mg in sodium chloride 0.9 % 50 mL IVPB (has no administration in time range)   multivitamin with minerals 1 tablet (1 tablet Oral Given 3/29/25 1951)   LORazepam (ATIVAN) tablet 1 mg ( Oral Not Given:  See Alt 3/29/25 1951)     Or   LORazepam (ATIVAN) injection 1 mg (  "Intravenous Not Given:  See Alt 3/29/25 1951)     Or   LORazepam (ATIVAN) tablet 2 mg ( Oral Not Given:  See Alt 3/29/25 1951)     Or   LORazepam (ATIVAN) injection 2 mg (2 mg Intravenous Given 3/29/25 1951)     Or   LORazepam (ATIVAN) injection 2 mg ( Intravenous Not Given:  See Alt 3/29/25 1951)     Or   LORazepam (ATIVAN) injection 2 mg ( Intramuscular Not Given:  See Alt 3/29/25 1951)   sodium chloride 0.9 % bolus 1,000 mL (1,000 mL Intravenous New Bag 3/29/25 1731)   LORazepam (ATIVAN) injection 1 mg (1 mg Intravenous Given 3/29/25 1730)   thiamine (VITAMIN B-1) tablet 100 mg (100 mg Oral Given 3/29/25 1951)       Imaging results:  No radiology results for the last day    Ambulatory status:   - ad jackelin    Social issues:   Social History     Socioeconomic History    Marital status:    Tobacco Use    Smoking status: Some Days     Current packs/day: 0.25     Average packs/day: 0.3 packs/day for 10.0 years (2.5 ttl pk-yrs)     Types: Cigarettes     Passive exposure: Current    Smokeless tobacco: Former   Vaping Use    Vaping status: Former    Substances: Nicotine    Devices: Disposable   Substance and Sexual Activity    Alcohol use: Yes     Alcohol/week: 25.0 standard drinks of alcohol     Types: 25 Shots of liquor per week     Comment: patient reports drinking \"anything. fireball, beer, whiskey\" 10/23/24    Drug use: No    Sexual activity: Defer       Peripheral Neurovascular  Peripheral Neurovascular (Adult)  Peripheral Neurovascular WDL: WDL, pulse assessment  Pulse Assessment: radial    Neuro Cognitive  Neuro Cognitive (Adult)  Cognitive/Neuro/Behavioral WDL: .WDL except, arousability, mood/behavior, level of consciousness, motor response, orientation, speech, all  Level of Consciousness: Alert  Arousal Level: opens eyes spontaneously  Orientation: person, place, time, situation  Speech: clear, spontaneous, logical  Mood/Behavior: behavior appropriate to situation, cooperative, calm  Motor Response  Motor " Response: general motor response  General Motor Response: purposeful motor response    Learning  Learning Assessment  Learning Readiness and Ability: no barriers identified  Education Provided  Person Taught: patient  Teaching Method: verbal instruction  Teaching Focus: symptom/problem overview, medical device/equipment use, medication actions and side effects, diet modification  Education Outcome Evaluation: verbalizes understanding,  used, needs reinforcement    Respiratory  Respiratory  Airway WDL: WDL  Respiratory WDL  Respiratory WDL: WDL, all  Rhythm/Pattern, Respiratory: no shortness of breath reported, depth regular, pattern regular, unlabored  Expansion/Accessory Muscles/Retractions: no use of accessory muscles, no retractions, expansion symmetric    Abdominal Pain  Safety Interventions  Safety Precautions/Falls Reduction: commode/urinal/bedpan at bedside, assistive device/personal items within reach, seizure precautions    Pain Assessments  Pain (Adult)  (0-10) Pain Rating: Rest: 0  (0-10) Pain Rating: Activity: 0    NIH Stroke Scale       Dina Escobar RN  03/29/25 20:04 EDT      Electronically signed by Dina Escobar RN at 03/29/25 2004       Atul Branch MD at 03/29/25 4499          I supervised care provided by the midlevel provider.   We have discussed this patient's history, physical exam, and treatment plan.  I have reviewed the note and personally saw and examined the patient and agree with the plan of care.   This is a 39-year-old male history of alcohol dependence, hypertension who had an time of sobriety and started drinking heavy alcohol again the past several weeks.  He drinks multiple shots of fireball daily.  His last drink was early this morning.  He went to try to get treatment from a rehab facility and they refused him and said he needed to go to the emergency department.  He is not suicidal but he feels down from his alcohol abuse.  He wants to try to stop  drinking.  He has had periods of sobriety in the past.  Denies any suicidal ideation.  He feels as if he is going through alcohol withdrawal he feels very nervous and anxious.  Denies any focal pain.  He has had nausea but no real vomiting.  Has had some loose stools.    GENERAL: This is a male that looks older than stated age.  Appears tremulous and anxious.  He is poorly kept.  Smells of alcohol.  Vitals have been reviewed.  His heart rate fluctuates from 90s to low 100s.  He is afebrile.  O2 sat is normal.  HENT: nares patent  Head/neck/ face are symmetric without gross deformity or swelling  EYES: no scleral icterus  CV: regular rhythm, regular rate with intact distal pulses  RESPIRATORY: normal effort and no respiratory distress  ABDOMEN: soft and nontender  MUSCULOSKELETAL: no deformity  NEURO: alert and appropriate, moves all extremities, follows commands.  Patient is tremulous but he has no focal motor or sensory changes.  SKIN: warm, dry    Vital signs and nursing notes reviewed.    Plan   ED Course as of 03/29/25 2243   Sat Mar 29, 2025   1727 WBC: 5.77 [DC]   1727 Hemoglobin: 16.0 [DC]   1727 Platelets: 176 [DC]   1739 Ethanol(!): 288 [DC]   1740 Magnesium: 2.0 [DC]   1740 ALT (SGPT): 17 [DC]   1740 AST (SGOT): 30 [DC]   1740 Alkaline Phosphatase: 83 [DC]   1740 Total Bilirubin: 0.7 [DC]   1823 Ethanol(!): 288 [MM]   1823 WBC: 5.77 [MM]   1823 Hemoglobin: 16.0 [MM]   1823 Glucose(!): 116 [MM]   1935 Discussed case with Dr. Rivera [facility], who will admit the patient to telemetry. [DC]      ED Course User Index  [DC] Janeth Hoff PA  [MM] Atul Branch MD         This is a gentleman that has a long history of alcohol abuse likely has some alcohol intoxication and some withdrawals.  Will very likely need to be admitted to the hospital.  Will check some lab work and electrolytes.         Atul Branch MD  03/29/25 2243      Electronically signed by Atul Branch MD at 03/29/25 2243       Janeth Hoff,  PA at 03/29/25 9367       Attestation signed by Atul Branch MD at 03/29/25 5638          SHARED APC FACE TO FACE: I performed a substantive part of the MDM during the patient's E/M visit. I personally evaluated and examined the patient. I personally made or approved the documented management plan and acknowledge its risk of complications.   Atul Branch MD 3/29/2025 22:46 EDT                              EMERGENCY DEPARTMENT ENCOUNTER      PCP: Christiano Heredia MD  Patient Care Team:  Christiano Heredia MD as PCP - General (Internal Medicine)  Pily Cordero MD as Surgeon (Orthopedic Surgery)  Klaus Ram APRN as Nurse Practitioner (Nurse Practitioner)   Independent Historians: Patient    HPI:  Chief Complaint: Alcohol withdrawal  A complete HPI/ROS/PMH/PSH/SH/FH are unobtainable due to: None    Chronic or social conditions impacting patient care (social determinants of health): None    Context: Alvaro Morris is a 39 y.o. male with history of hypertension, alcohol dependence who presents to the ED c/o acute alcohol withdrawal.  Patient reports over the past few weeks he has been drinking about 15-20 shots of fireball daily.  He states he has not drink that much today and last drink was at 10:00 this morning.  Patient states he went to the [facility] yesterday but was told he needed to be medically cleared.  Patient has gone through alcohol withdrawal before but denies history of any alcohol withdrawal seizures or other complications.  Patient reports headache, anxiety.    Review of prior external notes and/or external test results outside of this encounter: Chart review shows several no-show to psychiatry appointments over the last several months.  Patient was also seen in the ER for alcohol dependence with uncomplicated withdrawal several times in October 2024.      PAST MEDICAL HISTORY  Active Ambulatory Problems     Diagnosis Date Noted    ADD (attention deficit disorder) 06/29/2017     Essential hypertension 06/29/2017    Chronic right shoulder pain 09/12/2017    Incomplete tear of rotator cuff 11/08/2017    History of rotator cuff surgery 01/17/2018    Encounter for monitoring opioid maintenance therapy 05/03/2018    H/O shoulder surgery 08/25/2018    History of alcohol abuse 03/17/2020    Tobacco dependence 09/11/2020    Traumatic complete tear of left rotator cuff 07/15/2022    Encounter for Prevnar pneumococcal vaccination 08/08/2024    Intractable nausea and vomiting 09/24/2024    Hypokalemia 09/25/2024    Transaminitis 09/25/2024    Hyperglycemia 09/25/2024    Thrombocytopenia 09/25/2024    Diarrhea 09/25/2024    Alcohol dependence 09/25/2024     Resolved Ambulatory Problems     Diagnosis Date Noted    No Resolved Ambulatory Problems     Past Medical History:   Diagnosis Date    ADHD     Bursitis of elbow     Has never received COVID-19 vaccine     Hemorrhoids     Hypertension     Left shoulder pain     Rotator cuff tear        The patient qualifies to receive the vaccine, but they have not yet received it.    PAST SURGICAL HISTORY  Past Surgical History:   Procedure Laterality Date    ARM DEBRIDEMENT Left 7/27/2022    Procedure: UPPER EXTREMITY DEBRIDEMENT labrium;  Surgeon: Queta Cordero MD;  Location: [facility];  Service: Orthopedics;  Laterality: Left;    HEMORRHOIDECTOMY N/A 6/23/2016    Procedure: HEMORRHOIDECTOMY;  Surgeon: Atilio Smith MD;  Location: [facility];  Service:     SHOULDER ARTHROSCOPY W/ ROTATOR CUFF REPAIR Right 12/4/2017    Procedure: SHOULDER ARTHROSCOPY WITH MINI OPEN ROTATOR CUFF REPAIR  DEBRIDEMENT OF LABRUM DEBRIDEMENT OF SUBSCAPULARIS DECOMPRESSION;  Surgeon: Pily Cordero MD;  Location: [facility];  Service:     SHOULDER ARTHROSCOPY W/ ROTATOR CUFF REPAIR Left 7/27/2022    Procedure: SHOULDER ARTHROSCOPY WITH ROTATOR CUFF REPAIR;  Surgeon: Queta Cordero MD;  Location: [facility];  Service: Orthopedics;  Laterality: Left;  "        FAMILY HISTORY  Family History   Problem Relation Age of Onset    Malig Hyperthermia Neg Hx          SOCIAL HISTORY  Social History     Socioeconomic History    Marital status:    Tobacco Use    Smoking status: Some Days     Current packs/day: 0.25     Average packs/day: 0.3 packs/day for 10.0 years (2.5 ttl pk-yrs)     Types: Cigarettes     Passive exposure: Current    Smokeless tobacco: Former   Vaping Use    Vaping status: Former    Substances: Nicotine    Devices: Disposable   Substance and Sexual Activity    Alcohol use: Yes     Alcohol/week: 25.0 standard drinks of alcohol     Types: 25 Shots of liquor per week     Comment: patient reports drinking \"anything. fireball, beer, whiskey\" 10/23/24    Drug use: No    Sexual activity: Defer         ALLERGIES  Patient has no known allergies.        REVIEW OF SYSTEMS  Review of Systems   Cardiovascular:  Negative for chest pain.   Gastrointestinal:  Positive for nausea.   Neurological:  Positive for headaches.   Psychiatric/Behavioral:  The patient is nervous/anxious.         All systems reviewed and negative except for those discussed in HPI.       PHYSICAL EXAM    I have reviewed the triage vital signs and nursing notes.    ED Triage Vitals   Temp Heart Rate Resp BP SpO2   03/29/25 1627 03/29/25 1627 03/29/25 1627 03/29/25 1628 03/29/25 1627   98.2 °F (36.8 °C) (!) 122 18 136/95 99 %      Temp src Heart Rate Source Patient Position BP Location FiO2 (%)   -- -- -- -- --              Physical Exam  GENERAL: alert, no acute distress, tremulous  SKIN: Warm, dry  HENT: Normocephalic, atraumatic  EYES: no scleral icterus  CV: regular rhythm, tachycardic rate  RESPIRATORY: normal effort, lungs clear  ABDOMEN: soft, nontender, nondistended  MUSCULOSKELETAL: no deformity  NEURO: alert, moves all extremities, follows commands          LAB RESULTS  Recent Results (from the past 24 hours)   Comprehensive Metabolic Panel    Collection Time: 03/29/25  5:07 PM    " Specimen: Blood   Result Value Ref Range    Glucose 116 (H) 65 - 99 mg/dL    BUN 6 6 - 20 mg/dL    Creatinine 0.83 0.76 - 1.27 mg/dL    Sodium 145 136 - 145 mmol/L    Potassium 4.0 3.5 - 5.2 mmol/L    Chloride 104 98 - 107 mmol/L    CO2 26.9 22.0 - 29.0 mmol/L    Calcium 8.8 8.6 - 10.5 mg/dL    Total Protein 7.0 6.0 - 8.5 g/dL    Albumin 4.4 3.5 - 5.2 g/dL    ALT (SGPT) 17 1 - 41 U/L    AST (SGOT) 30 1 - 40 U/L    Alkaline Phosphatase 83 39 - 117 U/L    Total Bilirubin 0.7 0.0 - 1.2 mg/dL    Globulin 2.6 gm/dL    A/G Ratio 1.7 g/dL    BUN/Creatinine Ratio 7.2 7.0 - 25.0    Anion Gap 14.1 5.0 - 15.0 mmol/L    eGFR 113.5 >60.0 mL/min/1.73   Ethanol    Collection Time: 03/29/25  5:07 PM    Specimen: Blood   Result Value Ref Range    Ethanol 288 (H) 0 - 10 mg/dL    Ethanol % 0.288 %   Magnesium    Collection Time: 03/29/25  5:07 PM    Specimen: Blood   Result Value Ref Range    Magnesium 2.0 1.6 - 2.6 mg/dL   CBC Auto Differential    Collection Time: 03/29/25  5:07 PM    Specimen: Blood   Result Value Ref Range    WBC 5.77 3.40 - 10.80 10*3/mm3    RBC 4.84 4.14 - 5.80 10*6/mm3    Hemoglobin 16.0 13.0 - 17.7 g/dL    Hematocrit 46.3 37.5 - 51.0 %    MCV 95.7 79.0 - 97.0 fL    MCH 33.1 (H) 26.6 - 33.0 pg    MCHC 34.6 31.5 - 35.7 g/dL    RDW 13.0 12.3 - 15.4 %    RDW-SD 46.2 37.0 - 54.0 fl    MPV 9.3 6.0 - 12.0 fL    Platelets 176 140 - 450 10*3/mm3    Neutrophil % 47.9 42.7 - 76.0 %    Lymphocyte % 45.9 (H) 19.6 - 45.3 %    Monocyte % 4.3 (L) 5.0 - 12.0 %    Eosinophil % 1.0 0.3 - 6.2 %    Basophil % 0.7 0.0 - 1.5 %    Immature Grans % 0.2 0.0 - 0.5 %    Neutrophils, Absolute 2.76 1.70 - 7.00 10*3/mm3    Lymphocytes, Absolute 2.65 0.70 - 3.10 10*3/mm3    Monocytes, Absolute 0.25 0.10 - 0.90 10*3/mm3    Eosinophils, Absolute 0.06 0.00 - 0.40 10*3/mm3    Basophils, Absolute 0.04 0.00 - 0.20 10*3/mm3    Immature Grans, Absolute 0.01 0.00 - 0.05 10*3/mm3    nRBC 0.0 0.0 - 0.2 /100 WBC   Urine Drug Screen - Urine, Clean Catch     Collection Time: 03/29/25  5:11 PM    Specimen: Urine, Clean Catch   Result Value Ref Range    THC, Screen, Urine Negative Negative    Phencyclidine (PCP), Urine Negative Negative    Cocaine Screen, Urine Negative Negative    Methamphetamine, Ur Negative Negative    Opiate Screen Negative Negative    Amphetamine Screen, Urine Negative Negative    Benzodiazepine Screen, Urine Negative Negative    Tricyclic Antidepressants Screen Negative Negative    Methadone Screen, Urine Negative Negative    Barbiturates Screen, Urine Negative Negative    Oxycodone Screen, Urine Negative Negative    Buprenorphine, Screen, Urine Negative Negative   Fentanyl, Urine - Urine, Clean Catch    Collection Time: 03/29/25  5:11 PM    Specimen: Urine, Clean Catch   Result Value Ref Range    Fentanyl, Urine Negative Negative       Ordered the above labs and independently reviewed and interpreted the results.        RADIOLOGY  No Radiology Exams Resulted Within Past 24 Hours    I ordered the above noted radiological studies. Independently reviewed and interpreted by me.  See dictation for official radiology interpretation.      PROCEDURES    Procedures      MEDICATIONS GIVEN IN ER    Medications   sodium chloride 0.9 % flush 10 mL (has no administration in time range)   acetaminophen (TYLENOL) tablet 650 mg (has no administration in time range)     Or   acetaminophen (TYLENOL) 160 MG/5ML oral solution 650 mg (has no administration in time range)     Or   acetaminophen (TYLENOL) suppository 650 mg (has no administration in time range)   ondansetron ODT (ZOFRAN-ODT) disintegrating tablet 4 mg (has no administration in time range)     Or   ondansetron (ZOFRAN) injection 4 mg (has no administration in time range)   melatonin tablet 2.5 mg (has no administration in time range)   sennosides-docusate (PERICOLACE) 8.6-50 MG per tablet 2 tablet (has no administration in time range)     And   polyethylene glycol (MIRALAX) packet 17 g (has no  administration in time range)     And   bisacodyl (DULCOLAX) EC tablet 5 mg (has no administration in time range)     And   bisacodyl (DULCOLAX) suppository 10 mg (has no administration in time range)   Magnesium Standard Dose Replacement - Follow Nurse / BPA Driven Protocol (has no administration in time range)   folic acid (FOLVITE) tablet 1 mg (has no administration in time range)   cloNIDine (CATAPRES) tablet 0.1 mg (has no administration in time range)   thiamine (B-1) injection 200 mg (has no administration in time range)     Followed by   thiamine (VITAMIN B-1) tablet 100 mg (has no administration in time range)   folic acid 1 mg in sodium chloride 0.9 % 50 mL IVPB (has no administration in time range)   multivitamin with minerals 1 tablet (1 tablet Oral Given 3/29/25 1951)   LORazepam (ATIVAN) tablet 1 mg ( Oral Not Given:  See Alt 3/29/25 1951)     Or   LORazepam (ATIVAN) injection 1 mg ( Intravenous Not Given:  See Alt 3/29/25 1951)     Or   LORazepam (ATIVAN) tablet 2 mg ( Oral Not Given:  See Alt 3/29/25 1951)     Or   LORazepam (ATIVAN) injection 2 mg (2 mg Intravenous Given 3/29/25 1951)     Or   LORazepam (ATIVAN) injection 2 mg ( Intravenous Not Given:  See Alt 3/29/25 1951)     Or   LORazepam (ATIVAN) injection 2 mg ( Intramuscular Not Given:  See Alt 3/29/25 1951)   sodium chloride 0.9 % bolus 1,000 mL (1,000 mL Intravenous New Bag 3/29/25 1731)   LORazepam (ATIVAN) injection 1 mg (1 mg Intravenous Given 3/29/25 1730)   thiamine (VITAMIN B-1) tablet 100 mg (100 mg Oral Given 3/29/25 1951)         PROGRESS, DATA ANALYSIS, CONSULTS, AND MEDICAL DECISION MAKING    All labs have been independently reviewed and interpreted by me.  All radiology studies have been independently reviewed and interpreted by me and discussed with radiologist dictating the report.   EKG's independently reviewed and interpreted by me.  Discussion below represents my analysis of pertinent findings related to patient's  condition, differential diagnosis, treatment plan and final disposition.    Differential diagnosis: alcohol intoxication, withdrawal, delirium tremens, malingering    ED Course as of 03/29/25 2001   Sat Mar 29, 2025   1727 WBC: 5.77 [DC]   1727 Hemoglobin: 16.0 [DC]   1727 Platelets: 176 [DC]   1739 Ethanol(!): 288 [DC]   1740 Magnesium: 2.0 [DC]   1740 ALT (SGPT): 17 [DC]   1740 AST (SGOT): 30 [DC]   1740 Alkaline Phosphatase: 83 [DC]   1740 Total Bilirubin: 0.7 [DC]   1823 Ethanol(!): 288 [MM]   1823 WBC: 5.77 [MM]   1823 Hemoglobin: 16.0 [MM]   1823 Glucose(!): 116 [MM]   1935 Discussed case with Dr. Rivera [facility], who will admit the patient to telemetry. [DC]      ED Course User Index  [DC] Janeth Hoff PA  [MM] Atul Branch MD             AS OF 20:01 EDT VITALS:    BP - 137/77  HR - 92  TEMP - 98.2 °F (36.8 °C)  O2 SATS - 97%        DIAGNOSIS  Final diagnoses:   Alcohol withdrawal syndrome without complication         DISPOSITION  ED Disposition       ED Disposition   Decision to Admit    Condition   --    Comment   Level of Care: Telemetry [5]   Diagnosis: Alcohol withdrawal syndrome [059836]   Admitting Physician: JACKY RIVERA [[id number]]   Attending Physician: JACKY RIVERA [[id number]]   Certification: I Certify That Inpatient Hospital Services Are Medically Necessary For Greater Than 2 Midnights                    Note Disclaimer: At [facility], we believe that sharing information builds trust and better relationships. You are receiving this note because you recently visited [facility]. It is possible you will see health information before a provider has talked with you about it. This kind of information can be easy to misunderstand. To help you fully understand what it means for your health, we urge you to discuss this note with your provider.         Janeth Hoff PA  03/29/25 2001      Electronically signed by Atul Branch MD at 03/29/25 2246       Constanza Shen RN at 03/29/25  1626          Pt arrives for alcohol withdrawal. Pt's last drink was around 1000 today.   Pt went to the [facility] and they were unable to admit. Him. Pt states he drinks 10-20 fireball shots a day     Electronically signed by Constanza Shen RN at 03/29/25 1627       Oxygen Therapy (last 2 days) before discharge       Date/Time SpO2 Device (Oxygen Therapy) Flow (L/min) (Oxygen Therapy) Oxygen Concentration (%) ETCO2 (mmHg)    03/30/25 0807 -- room air -- -- --    03/30/25 0731 98 room air -- -- --    03/30/25 0000 -- room air -- -- --    03/29/25 2300 100 room air -- -- --    03/29/25 2200 -- room air -- -- --    03/29/25 2108 96 room air -- -- --    03/29/25 1931 92 -- -- -- --    03/29/25 1650 97 -- -- -- --    03/29/25 1627 99 room air -- -- --          Intake & Output (last 3 days)         03/28 0701  03/29 0700 03/29 0701  03/30 0700 03/30 0701  03/31 0700 03/31 0701  04/01 0700    P.O.   240     Total Intake(mL/kg)   240 (3.2)     Net   +240                    Lines, Drains & Airways       Active LDAs       None              Inactive LDAs       Name Placement date Placement time Removal date Removal time Site Days    [REMOVED] Peripheral IV 03/29/25 1707 Anterior;Left Forearm 03/29/25  1707  03/30/25  1214  Forearm  less than 1                   Date/Time CIWA-Ar Total    03/30/25 1034 10     03/30/25 0845 8     03/30/25 0807 12     03/30/25 0600 8     03/30/25 0400 8     03/30/25 0200 8     03/30/25 0000 14     03/29/25 2200 10     03/29/25 19:30:38 18 (P)      03/29/25 1830 10     03/29/25 17:24:29 17           Medication Administration Report for Alvaro Morris as of 3/28/25 through 3/30/25     Legend:    Given Hold Not Given Due Canceled Entry Other Actions    Time Time (Time) Time Time-Action         Discontinued     Completed     Future     MAR Hold     Linked             Medications 03/28/25 03/29/25 03/30/25     Discontinued Medications    acetaminophen (TYLENOL) tablet 650 mg  Dose: 650 mg  Freq:  Every 4 Hours PRN Route: PO  PRN Reason: Mild Pain  Start: 03/29/25 1934 End: 03/30/25 1442     Admin Instructions:   If given for fever, use fever parameter: fever greater than 100.4 °F  Based on patient request - if ordered for moderate or severe pain, provider allows for administration of a medication prescribed for a lower pain scale.    Do not exceed 4 grams of acetaminophen in a 24 hr period. Max dose of 2gm for AST/ALT greater than 120 units/L.    If given for pain, use the following pain scale:   Mild Pain = Pain Score of 1-3, CPOT 1-2  Moderate Pain = Pain Score of 4-6, CPOT 3-4  Severe Pain = Pain Score of 7-10, CPOT 5-8        0402-Given [C]     0809-Given           Or   acetaminophen (TYLENOL) 160 MG/5ML oral solution 650 mg  Dose: 650 mg  Freq: Every 4 Hours PRN Route: PO  PRN Reason: Mild Pain  Start: 03/29/25 1934 End: 03/30/25 1442     Admin Instructions:   If given for fever, use fever parameter: fever greater than 100.4 °F  Based on patient request - if ordered for moderate or severe pain, provider allows for administration of a medication prescribed for a lower pain scale.    Do not exceed 4 grams of acetaminophen in a 24 hr period. Max dose of 2gm for AST/ALT greater than 120 units/L.    If given for pain, use the following pain scale:   Mild Pain = Pain Score of 1-3, CPOT 1-2  Moderate Pain = Pain Score of 4-6, CPOT 3-4  Severe Pain = Pain Score of 7-10, CPOT 5-8        0402-Not Given:  See Alt     0809-Not Given:  See Alt           Or   acetaminophen (TYLENOL) suppository 650 mg  Dose: 650 mg  Freq: Every 4 Hours PRN Route: RE  PRN Reason: Mild Pain  Start: 03/29/25 1934 End: 03/30/25 1442     Admin Instructions:   If given for fever, use fever parameter: fever greater than 100.4 °F  Based on patient request - if ordered for moderate or severe pain, provider allows for administration of a medication prescribed for a lower pain scale.    Do not exceed 4 grams of acetaminophen in a 24 hr period.  Max dose of 2gm for AST/ALT greater than 120 units/L.    If given for pain, use the following pain scale:   Mild Pain = Pain Score of 1-3, CPOT 1-2  Moderate Pain = Pain Score of 4-6, CPOT 3-4  Severe Pain = Pain Score of 7-10, CPOT 5-8        0402-Not Given:  See Alt     0809-Not Given:  See Alt            sennosides-docusate (PERICOLACE) 8.6-50 MG per tablet 2 tablet  Dose: 2 tablet  Freq: 2 Times Daily PRN Route: PO  PRN Reason: Constipation  Start: 03/29/25 1935 End: 03/30/25 1442     Admin Instructions:   Start bowel management regimen if patient has not had a bowel movement after 12 hours.           And   polyethylene glycol (MIRALAX) packet 17 g  Dose: 17 g  Freq: Daily PRN Route: PO  PRN Reason: Constipation  PRN Comment: Use if senna-docusate is ineffective  Start: 03/29/25 1935 End: 03/30/25 1442     Admin Instructions:   Use if no bowel movement after 12 hours. Mix in 6-8 ounces of water.  Use 4-8 ounces of water, tea, or juice for each 17 gram dose.           And   bisacodyl (DULCOLAX) EC tablet 5 mg  Dose: 5 mg  Freq: Daily PRN Route: PO  PRN Reason: Constipation  PRN Comment: Use if polyethylene glycol is ineffective  Start: 03/29/25 1935 End: 03/30/25 1442     Admin Instructions:   Use if no bowel movement after 12 hours.  Swallow whole. Do not crush, split, or chew tablet.           And   bisacodyl (DULCOLAX) suppository 10 mg  Dose: 10 mg  Freq: Daily PRN Route: RE  PRN Reason: Constipation  PRN Comment: Use if bisacodyl oral is ineffective  Start: 03/29/25 1935 End: 03/30/25 1442     Admin Instructions:   Use if no bowel movement after 12 hours.  Hold for diarrhea           cloNIDine (CATAPRES) tablet 0.1 mg  Dose: 0.1 mg  Freq: Every 4 Hours PRN Route: PO  PRN Reason: High Blood Pressure  PRN Comment: sbp greater than 160  Start: 03/29/25 1935 End: 03/30/25 1442     Admin Instructions:   For systolic blood pressure greater than 160 mmHg  Caution: Look alike/sound alike drug alert.             LORazepam (ATIVAN) tablet 1 mg  Dose: 1 mg  Freq: Every 1 Hour PRN Route: PO  PRN Reason: Withdrawal  PRN Comment: For CIWA-Ar 8-10  Start: 03/29/25 1936 End: 03/29/25 1941     Admin Instructions:   Reassess 1 Hour After Administration   Caution: Look alike/sound alike drug alert           Or   LORazepam (ATIVAN) injection 1 mg  Dose: 1 mg  Freq: Every 1 Hour PRN Route: IV  PRN Reason: Withdrawal  PRN Comment: For CIWA-Ar 8-10  Start: 03/29/25 1936 End: 03/29/25 1941     Admin Instructions:   Reassess 1 Hour After Administration           Or   LORazepam (ATIVAN) tablet 2 mg  Dose: 2 mg  Freq: Every 1 Hour PRN Route: PO  PRN Reason: Withdrawal  PRN Comment: Withdrawal. For CIWA-Ar 11-15.  Start: 03/29/25 1936 End: 03/29/25 1941     Admin Instructions:   Reassess 1 Hour After Administration.   Caution: Look alike/sound alike drug alert           Or   LORazepam (ATIVAN) injection 2 mg  Dose: 2 mg  Freq: Every 1 Hour PRN Route: IV  PRN Reason: Withdrawal  PRN Comment: Withdrawal. For CIWA-Ar 11-15.  Start: 03/29/25 1936 End: 03/29/25 1941     Admin Instructions:   Reassess 1 Hour After Administration           Or   LORazepam (ATIVAN) injection 2 mg  Dose: 2 mg  Freq: Every 15 Minutes PRN Route: IV  PRN Reason: Withdrawal  PRN Comment: Withdrawal. For CIWA-Ar Greater Than 15.  Start: 03/29/25 1936 End: 03/29/25 1941     Admin Instructions:   Reassess 15 Minutes After Each Administration.  If CIWA-Ar Does Not Decrease Contact Provider To Discuss Transfer to Higher Level of Care.           Or   LORazepam (ATIVAN) injection 2 mg  Dose: 2 mg  Freq: Every 15 Minutes PRN Route: IM  PRN Reason: Withdrawal  PRN Comment: Withdrawal. For CIWA-Ar Greater Than 15.  Start: 03/29/25 1936 End: 03/29/25 1941     Admin Instructions:   Reassess 15 Minutes After Each Administration.  If CIWA-Ar Does Not Decrease Contact Provider To Discuss Transfer to Higher Level of Care.            LORazepam (ATIVAN) tablet 1 mg  Dose: 1 mg  Freq: Every  1 Hour PRN Route: PO  PRN Reason: Withdrawal  PRN Comment: For CIWA-Ar 8-10  Start: 03/29/25 1936 End: 03/30/25 1442     Admin Instructions:   Reassess 1 Hour After Administration   Caution: Look alike/sound alike drug alert       1951-Not Given:  See Alt     2137-Not Given:  See Alt     2302-Not Given:  See Alt        0404-Not Given:  See Alt     0810-Not Given:  See Alt     0938-Not Given:  See Alt       1039-Not Given:  See Alt     2137-Not Given:  See Alt           Or   LORazepam (ATIVAN) injection 1 mg  Dose: 1 mg  Freq: Every 1 Hour PRN Route: IV  PRN Reason: Withdrawal  PRN Comment: For CIWA-Ar 8-10  Start: 03/29/25 1936 End: 03/30/25 1442     Admin Instructions:   Reassess 1 Hour After Administration       1951-Not Given:  See Alt     2137-Not Given:  See Alt     2302-Not Given:  See Alt        0404-Not Given:  See Alt     0810-Not Given:  See Alt     0938-Not Given:  See Alt       1039-Given     2137-Not Given:  See Alt           Or   LORazepam (ATIVAN) tablet 2 mg  Dose: 2 mg  Freq: Every 1 Hour PRN Route: PO  PRN Reason: Withdrawal  PRN Comment: Withdrawal. For CIWA-Ar 11-15.  Start: 03/29/25 1936 End: 03/30/25 1442     Admin Instructions:   Reassess 1 Hour After Administration.  Caution: Look alike/sound alike drug alert       1951-Not Given:  See Alt     2137-Not Given:  See Alt     2302-Not Given:  See Alt        0404-Not Given:  See Alt     0810-Not Given:  See Alt     0938-Not Given:  See Alt       1039-Not Given:  See Alt     2137-Not Given:  See Alt           Or   LORazepam (ATIVAN) injection 2 mg  Dose: 2 mg  Freq: Every 1 Hour PRN Route: IV  PRN Reason: Withdrawal  PRN Comment: Withdrawal. For CIWA-Ar 11-15.  Start: 03/29/25 1936 End: 03/30/25 1442     Admin Instructions:   Reassess 1 Hour After Administration       1951-Given     2137-Given     2302-Not Given:  See Alt        0404-Given     0810-Not Given:  See Alt     0938-Canceled Entry       1039-Not Given:  See Alt     2137-Canceled  "Entry           Or   LORazepam (ATIVAN) injection 2 mg  Dose: 2 mg  Freq: Every 15 Minutes PRN Route: IV  PRN Reason: Withdrawal  PRN Comment: Withdrawal. For CIWA-Ar Greater Than 15.  Start: 03/29/25 1936 End: 03/30/25 1442     Admin Instructions:   Reassess 15 Minutes After Each Administration.  If CIWA-Ar Does Not Decrease Contact Provider To Discuss Transfer to Higher Level of Care.       1951-Not Given:  See Alt     2137-Not Given:  See Alt     2302-Given        0404-Not Given:  See Alt     0810-Given     0938-Not Given:  See Alt       1039-Not Given:  See Alt     2137-Not Given:  See Alt           Or   LORazepam (ATIVAN) injection 2 mg  Dose: 2 mg  Freq: Every 15 Minutes PRN Route: IM  PRN Reason: Withdrawal  PRN Comment: Withdrawal. For CIWA-Ar Greater Than 15.  Start: 03/29/25 1936 End: 03/30/25 1442     Admin Instructions:   Reassess 15 Minutes After Each Administration.  If CIWA-Ar Does Not Decrease Contact Provider To Discuss Transfer to Higher Level of Care.       1951-Not Given:  See Alt     2137-Not Given:  See Alt     2302-Not Given:  See Alt        0404-Not Given:  See Alt     0810-Not Given:  See Alt     0938-Not Given:  See Alt       1039-Not Given:  See Alt     2137-Not Given:  See Alt           Magnesium Standard Dose Replacement - Follow Nurse / BPA Driven Protocol  Freq: As Needed Route: XX  PRN Reason: Other  Start: 03/29/25 1934 End: 03/30/25 1442     Admin Instructions:   Open Order & Select \"[facility] Electrolyte Replacement Protocol Algorithm\" to View Details           melatonin tablet 2.5 mg  Dose: 2.5 mg  Freq: Nightly PRN Route: PO  PRN Reason: Sleep  Start: 03/29/25 1935 End: 03/30/25 1442            ondansetron ODT (ZOFRAN-ODT) disintegrating tablet 4 mg  Dose: 4 mg  Freq: Every 6 Hours PRN Route: PO  PRN Reasons: Nausea,Vomiting  Start: 03/29/25 1934 End: 03/30/25 1442     Admin Instructions:   If BOTH ondansetron (ZOFRAN) and promethazine (PHENERGAN) are ordered use ondansetron first and " THEN promethazine IF ondansetron is ineffective.  Place on tongue and allow to dissolve.           Or   ondansetron (ZOFRAN) injection 4 mg  Dose: 4 mg  Freq: Every 6 Hours PRN Route: IV  PRN Reasons: Nausea,Vomiting  Start: 25 End: 25 1442            sodium chloride 0.9 % flush 10 mL  Dose: 10 mL  Freq: As Needed Route: IV  PRN Reason: Line Care  Start: 25 End: 25 1442        0403-Given                        Blood Administration Record (From admission, onward)      None          Operative/Procedure Notes (all)    No notes of this type exist for this encounter.          Physician Progress Notes (all)        Charlene Soriano MD at 25 1125              Name: Alvaro Morris ADMIT: 3/29/2025   : 1985  PCP: Christiano Heredia MD    MRN: [id number] LOS: 1 days   AGE/SEX: 39 y.o. male  ROOM: [facility]     Subjective   Subjective   No acute events overnight.  Patient states he is feeling okay today.  Feeling pretty fidgety.  Denies chest pain or shortness of breath.  Access has just left the room.    Objective   Objective     Vital Signs  Temp:  [97.9 °F (36.6 °C)-98.4 °F (36.9 °C)] 97.9 °F (36.6 °C)  Heart Rate:  [] 83  Resp:  [16-18] 17  BP: (133-146)/() 146/94  SpO2:  [92 %-100 %] 98 %  on   ;   Device (Oxygen Therapy): room air  Body mass index is 20.92 kg/m².    Physical Exam  General: Alert, no acute distress.  Sitting up in bed.  Fidgety.  Tremulous.  ENT: No conjunctival injection or scleral icterus. Moist mucous membranes.   Neuro: Eyes open and moving in all directions, strength normal in all extremities, no focal deficits.   Lungs: Clear to auscultation bilaterally. No wheeze or crackles. No distress.   Heart: RRR, no murmurs. No edema.  Abdomen: Soft, non-tender, non-distended. Normal bowel sounds.   Ext: Warm and well-perfused. No edema.   Skin: No rashes or lesions. IV site without swelling or erythema.     Results Review     I reviewed the  "patient's new clinical results:  Results from last 7 days   Lab Units 03/30/25  0730 03/29/25  1707   WBC 10*3/mm3 5.68 5.77   HEMOGLOBIN g/dL 14.3 16.0   PLATELETS 10*3/mm3 145 176     Results from last 7 days   Lab Units 03/30/25  0730 03/29/25  1707   SODIUM mmol/L 141 145   POTASSIUM mmol/L 3.7 4.0   CHLORIDE mmol/L 103 104   CO2 mmol/L 26.2 26.9   BUN mg/dL 9 6   CREATININE mg/dL 0.71* 0.83   GLUCOSE mg/dL 99 116*   EGFR mL/min/1.73 118.9 113.5     Results from last 7 days   Lab Units 03/29/25  1707   ALBUMIN g/dL 4.4   BILIRUBIN mg/dL 0.7   ALK PHOS U/L 83   AST (SGOT) U/L 30   ALT (SGPT) U/L 17     Results from last 7 days   Lab Units 03/30/25  0730 03/29/25  1707   CALCIUM mg/dL 8.6 8.8   ALBUMIN g/dL  --  4.4   MAGNESIUM mg/dL  --  2.0       No results found for: \"HGBA1C\", \"POCGLU\"    No radiology results for the last day    I have personally reviewed all medications:  Scheduled Medications  folic acid, 1 mg, Oral, Daily  folic acid 1 mg in sodium chloride 0.9 % 50 mL IVPB, 1 mg, Intravenous, Daily  multivitamin with minerals, 1 tablet, Oral, Daily  thiamine (B-1) IV, 200 mg, Intravenous, Q8H   Followed by  [START ON 4/4/2025] thiamine, 100 mg, Oral, Daily    Infusions   Diet  Diet: Cardiac; Healthy Heart (2-3 Na+); Fluid Consistency: Thin (IDDSI 0)      Intake/Output Summary (Last 24 hours) at 3/30/2025 1125  Last data filed at 3/30/2025 0807  Gross per 24 hour   Intake 240 ml   Output --   Net 240 ml       Assessment/Plan     Active Hospital Problems    Diagnosis  POA    **Alcohol withdrawal [F10.939]  Yes    Alcohol withdrawal syndrome [F10.939]  Yes      Resolved Hospital Problems   No resolved problems to display.     39 y.o. male with Alcohol withdrawal.    Alcohol use disorder  Alcohol withdrawal  -[facility] protocol  -Thiamine, folate, multivitamin  -Ativan as needed for withdrawal  -Access consulted and following  -Most recent CIWA score: 10, 10, 8, 8, 10  -Monitor CIWA's closely.  Given consistent " elevation and concern for significant withdrawal, going to go ahead and start a phenobarb taper.    Elevated blood pressure  -BP intermittently elevated, likely secondary to withdrawal  -Most recently trend is improved  -Monitor for now, can add as needed medication if needed    SCDs for DVT prophylaxis.  Full code.  Discussed with patient.  Anticipate discharge TBD      Charlene Soriano MD  [facility]  03/30/25  11:25 EDT      Electronically signed by Charlene Soriano MD at 03/30/25 1130       Consult Notes (all)    No notes of this type exist for this encounter.          Sharon Alvarado, RN   Registered Nurse  Medicine     Nursing Note     Signed     Date of Service: 03/30/25 1214  Creation Time: 03/30/25 1214     Signed         Patient leaving AMA- document signed.  IV removed.  Wife re is transporting home.

## 2025-03-31 NOTE — CASE MANAGEMENT/SOCIAL WORK
Case Management Discharge Note      Final Note: Pt left ROXANNA Bentley RN         Selected Continued Care - Discharged on 3/30/2025 Admission date: 3/29/2025 - Discharge disposition: Home or Self Care      Destination    No services have been selected for the patient.                Durable Medical Equipment    No services have been selected for the patient.                Dialysis/Infusion    No services have been selected for the patient.                Home Medical Care    No services have been selected for the patient.                Therapy    No services have been selected for the patient.                Community Resources    No services have been selected for the patient.                Community & DME    No services have been selected for the patient.                         Final Discharge Disposition Code: 07 - left [facility]

## (undated) DEVICE — PENCL E/S HNDSWCH ROCKR CB

## (undated) DEVICE — PK ARTHSCP SHLDR TOWER 40

## (undated) DEVICE — KT POSTN ARM TRIMANO BEACH CHR W/DRP

## (undated) DEVICE — GLV SURG BIOGEL LTX PF 6 1/2

## (undated) DEVICE — TBG PENCL TELESCP MEGADYNE SMOKE EVAC 10FT

## (undated) DEVICE — SKIN PREP TRAY W/CHG: Brand: MEDLINE INDUSTRIES, INC.

## (undated) DEVICE — 3M™ STERI-STRIP™ REINFORCED ADHESIVE SKIN CLOSURES, R1547, 1/2 IN X 4 IN (12 MM X 100 MM), 6 STRIPS/ENVELOPE: Brand: 3M™ STERI-STRIP™

## (undated) DEVICE — DRSNG WND GZ CURAD OIL EMULSION 3X3IN STRL

## (undated) DEVICE — TP NDL SCORPION MULTIFIRE

## (undated) DEVICE — BLD SHAVER BONECUTTER 5MM 13CM

## (undated) DEVICE — TBG ARTHSCP PUMP W CONN/REDUC 8FT

## (undated) DEVICE — INTENDED FOR TISSUE SEPARATION, AND OTHER PROCEDURES THAT REQUIRE A SHARP SURGICAL BLADE TO PUNCTURE OR CUT.: Brand: BARD-PARKER ® CARBON RIB-BACK BLADES

## (undated) DEVICE — TBG ARTHSCP PT W CONN/REDUC 8FT

## (undated) DEVICE — DRAPE,SHOULDER,BEACH ULTRAGARD: Brand: MEDLINE

## (undated) DEVICE — ABL ASP APOLLO RF XL 90D

## (undated) DEVICE — GLV SURG SENSICARE GREEN W/ALOE PF LF 6.5 STRL

## (undated) DEVICE — UNDYED BRAIDED (POLYGLACTIN 910), SYNTHETIC ABSORBABLE SUTURE: Brand: COATED VICRYL

## (undated) DEVICE — ABL APOLLO RF M/PRT 90D

## (undated) DEVICE — TRAP FLD MINIVAC MEGADYNE 100ML

## (undated) DEVICE — INTENT TO BE USED WITH SUTURE MATERIAL FOR TISSUE CLOSURE: Brand: RICHARD-ALLAN® NEEDLE 1/2 CIRCLE TAPER

## (undated) DEVICE — SUT VIC 2/0 CT2 27IN J269H

## (undated) DEVICE — POOLE SUCTION HANDLE: Brand: CARDINAL HEALTH